# Patient Record
Sex: MALE | Race: WHITE | NOT HISPANIC OR LATINO | Employment: OTHER | URBAN - METROPOLITAN AREA
[De-identification: names, ages, dates, MRNs, and addresses within clinical notes are randomized per-mention and may not be internally consistent; named-entity substitution may affect disease eponyms.]

---

## 2017-01-11 ENCOUNTER — OFFICE VISIT (OUTPATIENT)
Dept: FAMILY MEDICINE | Facility: CLINIC | Age: 37
End: 2017-01-11
Payer: MEDICARE

## 2017-01-11 VITALS
SYSTOLIC BLOOD PRESSURE: 124 MMHG | DIASTOLIC BLOOD PRESSURE: 70 MMHG | HEIGHT: 73 IN | WEIGHT: 166 LBS | OXYGEN SATURATION: 98 % | HEART RATE: 92 BPM | RESPIRATION RATE: 12 BRPM | BODY MASS INDEX: 22 KG/M2

## 2017-01-11 DIAGNOSIS — F17.200 TOBACCO DEPENDENCE: ICD-10-CM

## 2017-01-11 DIAGNOSIS — H10.13 ALLERGIC CONJUNCTIVITIS, BILATERAL: ICD-10-CM

## 2017-01-11 DIAGNOSIS — F41.9 ANXIETY: Primary | ICD-10-CM

## 2017-01-11 PROCEDURE — 99213 OFFICE O/P EST LOW 20 MIN: CPT | Mod: S$PBB,,, | Performed by: FAMILY MEDICINE

## 2017-01-11 PROCEDURE — 99213 OFFICE O/P EST LOW 20 MIN: CPT | Mod: PBBFAC,PO | Performed by: FAMILY MEDICINE

## 2017-01-11 PROCEDURE — 99999 PR PBB SHADOW E&M-EST. PATIENT-LVL III: CPT | Mod: PBBFAC,,, | Performed by: FAMILY MEDICINE

## 2017-01-11 RX ORDER — SODIUM FLUORIDE/POTASSIUM NIT 1.1 %-5 %
PASTE (ML) DENTAL
Refills: 6 | COMMUNITY
Start: 2016-12-27 | End: 2018-03-05

## 2017-01-11 RX ORDER — HYDROCODONE BITARTRATE AND ACETAMINOPHEN 10; 325 MG/1; MG/1
TABLET ORAL
Refills: 0 | COMMUNITY
Start: 2016-12-20 | End: 2017-04-12 | Stop reason: SDUPTHER

## 2017-01-11 RX ORDER — ALPRAZOLAM 2 MG/1
2 TABLET ORAL 2 TIMES DAILY PRN
Qty: 60 TABLET | Refills: 2 | Status: SHIPPED | OUTPATIENT
Start: 2017-01-11 | End: 2017-04-09 | Stop reason: SDUPTHER

## 2017-01-11 NOTE — PROGRESS NOTES
Subjective:       Patient ID: Umang Greer is a 36 y.o. male.    Chief Complaint: Anxiety (3 month follow up)    HPI Comments: Here for f/u anxiety.  Was at ER 3 days ago for eye swelling which was thought to be from allergies.  Anxiety stable.    Anxiety   Presents for follow-up visit. Patient reports no chest pain, decreased concentration, palpitations or shortness of breath. Symptoms occur most days. The quality of sleep is fair.         Review of Systems   Constitutional: Negative for chills and fever.   Eyes: Positive for itching.   Respiratory: Negative for cough, chest tightness and shortness of breath.    Cardiovascular: Negative for chest pain, palpitations and leg swelling.   Gastrointestinal: Negative for abdominal distention and abdominal pain.   Endocrine: Negative for cold intolerance and heat intolerance.   Skin: Negative for rash and wound.   Psychiatric/Behavioral: Negative for decreased concentration and dysphoric mood.       Objective:      Physical Exam   Constitutional: He appears well-developed and well-nourished.   HENT:   Head: Normocephalic and atraumatic.   Cardiovascular: Normal rate, regular rhythm and normal heart sounds.    Pulmonary/Chest: Effort normal and breath sounds normal.   Abdominal: Soft. Bowel sounds are normal.   Psychiatric: He has a normal mood and affect.   Nursing note and vitals reviewed.      Assessment:       1. Anxiety    2. Allergic conjunctivitis, bilateral    3. Tobacco dependence        Plan:       Anxiety  -     alprazolam (XANAX) 2 MG Tab; Take 1 tablet (2 mg total) by mouth 2 (two) times daily as needed.  Dispense: 60 tablet; Refill: 2    Allergic conjunctivitis, bilateral  -     Ambulatory referral to Optometry    Tobacco dependence        To optho to make sure it is just allergies.  Will monitor chronic medical issues and continue current plan of care.    Return in about 3 months (around 4/11/2017), or if symptoms worsen or fail to improve.

## 2017-01-11 NOTE — MR AVS SNAPSHOT
[facility]  [address] [facility] [address]  [facility] [address]  Phone: [phone]  Fax: [phone]                  Umang Greer   2017 9:00 AM   Office Visit    Description:  Male : 1980   Provider:  LUPILLO Khan MD   Department:  [facility]           Reason for Visit     Anxiety           Diagnoses this Visit        Comments    Anxiety    -  Primary     Allergic conjunctivitis, bilateral         Tobacco dependence                To Do List           Future Appointments        Provider Department Dept Phone    2017 11:00 AM JOSE MARIA Starks OD [place] - Optometry [phone]    3/10/2017 10:00 AM LAB, [place] [facility] [phone]    3/17/2017 8:00 AM Luis Locke MD [place] - Rheumatology [phone]    2017 8:40 AM LUPILLO Khan MD [facility] [phone]      Goals (5 Years of Data)     None      Follow-Up and Disposition     Return in about 3 months (around 2017), or if symptoms worsen or fail to improve.    Follow-up and Disposition History       These Medications        Disp Refills Start End    alprazolam (XANAX) 2 MG Tab 60 tablet 2 2017     Take 1 tablet (2 mg total) by mouth 2 (two) times daily as needed. - Oral    Pharmacy: [facility] [address] [place] [address] AT [facility] Ph #: [phone]         [facility] On Call     [facility] Nurse Care Line -  Assistance  Registered nurses in the [facility] Center provide clinical advisement, health education, appointment booking, and other advisory services.  Call for this free service at [phone].             Medications           Message regarding Medications     Verify the changes and/or additions to your medication regime listed below are the same as discussed with your clinician today.  If any of these changes or additions are incorrect, please notify your  "healthcare provider.             Verify that the below list of medications is an accurate representation of the medications you are currently taking.  If none reported, the list may be blank. If incorrect, please contact your healthcare provider. Carry this list with you in case of emergency.           Current Medications     alprazolam (XANAX) 2 MG Tab Take 1 tablet (2 mg total) by mouth 2 (two) times daily as needed.    baclofen (LIORESAL) 20 MG tablet Take 20 mg by mouth 4 (four) times daily as needed.    budesonide-formoterol 160-4.5 mcg (SYMBICORT) 160-4.5 mcg/actuation HFAA Inhale 2 puffs into the lungs every 12 (twelve) hours.    carvedilol (COREG) 3.125 MG tablet Take 1 tablet (3.125 mg total) by mouth 2 (two) times daily.    duloxetine (CYMBALTA) 30 MG capsule Take 1 capsule (30 mg total) by mouth once daily. Back pain    golimumab (SIMPONI) 50 mg/0.5 mL PnIj Inject 1 pen into the skin every 30 days.    hydrocodone-acetaminophen 10-325mg (NORCO)  mg Tab TK ONE T PO Q 4 H PRF PAIN    predniSONE (DELTASONE) 5 MG tablet 1  To 2 tab po qam prn pain    PREVIDENT 5000 ENAMEL PROTECT 1.1-5 % Pste BRUSH BID BRUSH AND SPIT OUT EXCESS . DO NOT RINSE    epinephrine (AUVI-Q) 0.3 mg/0.3 mL (1:1,000) AtIn Inject 0.3 mLs (0.3 mg total) into the muscle once.    oxycodone-acetaminophen (PERCOCET)  mg per tablet Take 1 tablet by mouth every 4 (four) hours as needed for Pain.           Clinical Reference Information           Vital Signs - Last Recorded  Most recent update: 1/11/2017  9:16 AM by Jenna Davison MA    BP Pulse Resp Ht Wt SpO2    124/70 (BP Location: Left arm, Patient Position: Sitting, BP Method: Manual) 92 12 6' 1" (1.854 m) 75.3 kg (166 lb 0.1 oz) 98%    BMI                21.9 kg/m2          Blood Pressure          Most Recent Value    BP  124/70      Allergies as of 1/11/2017     Asa [Aspirin]    Azulfidine [Sulfasalazine]    Ibuprofen    Aspirin (Tartrazine Only)    Gabapentin    "   Immunizations Administered on Date of Encounter - 1/11/2017     None      Orders Placed During Today's Visit      Normal Orders This Visit    Ambulatory referral to Optometry       Smoking Cessation     If you would like to quit smoking:   You may be eligible for free services if you are a [place] resident and started smoking cigarettes before September 1, 1988.  Call the Smoking Cessation Trust ([employer]) toll free at (655) 667-3136 or [phone] 000-6017.   Call 3-659-QUIT-NOW if you do not meet the above criteria.

## 2017-01-18 ENCOUNTER — OFFICE VISIT (OUTPATIENT)
Dept: OPTOMETRY | Facility: CLINIC | Age: 37
End: 2017-01-18
Payer: MEDICARE

## 2017-01-18 DIAGNOSIS — H10.13 ALLERGIC CONJUNCTIVITIS, BILATERAL: Primary | ICD-10-CM

## 2017-01-18 DIAGNOSIS — Z13.5 GLAUCOMA SCREENING: ICD-10-CM

## 2017-01-18 DIAGNOSIS — H43.393 VITREOUS FLOATERS, BILATERAL: ICD-10-CM

## 2017-01-18 PROCEDURE — 99213 OFFICE O/P EST LOW 20 MIN: CPT | Mod: PBBFAC,PO | Performed by: OPTOMETRIST

## 2017-01-18 PROCEDURE — 92004 COMPRE OPH EXAM NEW PT 1/>: CPT | Mod: S$PBB,,, | Performed by: OPTOMETRIST

## 2017-01-18 PROCEDURE — 99999 PR PBB SHADOW E&M-EST. PATIENT-LVL III: CPT | Mod: PBBFAC,,, | Performed by: OPTOMETRIST

## 2017-01-18 NOTE — MR AVS SNAPSHOT
[place] [facility]  [address]  [facility] [address]  Phone: [phone]  Fax: [phone]                  Umang Greer   2017 11:00 AM   Office Visit    Description:  Male : 1980   Provider:  JOSE MARIA Starks, OD   Department:  [place] - Optometry           Reason for Visit     Concerns About Ocular Health     Itchy Eye           Diagnoses this Visit        Comments    Allergic conjunctivitis, bilateral    -  Primary     Vitreous floaters, bilateral         Glaucoma screening                To Do List           Future Appointments        Provider Department Dept Phone    3/10/2017 10:00 AM LAB, [place] [facility] [phone]    3/17/2017 8:00 AM Luis Locke MD [facility] Rheumatology [phone]    2017 8:40 AM LUPILLO Khan MD [facility] Family Medicine [phone]      Goals (5 Years of Data)     None      Follow-Up and Disposition     Return in about 1 year (around 2018) for Annual Eye Exam.      [facility]     [facility] Care [facility] - 24/7 Assistance  Registered nurses in the [facility] provide clinical advisement, health education, appointment booking, and other advisory services.  Call for this free service at [phone].             Medications           Message regarding Medications     Verify the changes and/or additions to your medication regime listed below are the same as discussed with your clinician today.  If any of these changes or additions are incorrect, please notify your healthcare provider.        STOP taking these medications     oxycodone-acetaminophen (PERCOCET)  mg per tablet Take 1 tablet by mouth every 4 (four) hours as needed for Pain.           Verify that the below list of medications is an accurate representation of the medications you are currently taking.  If none reported, the list may be blank. If incorrect, please contact your healthcare provider. Carry this  "list with you in case of emergency.           Current Medications     alprazolam (XANAX) 2 MG Tab Take 1 tablet (2 mg total) by mouth 2 (two) times daily as needed.    baclofen (LIORESAL) 20 MG tablet Take 20 mg by mouth 4 (four) times daily as needed.    budesonide-formoterol 160-4.5 mcg (SYMBICORT) 160-4.5 mcg/actuation HFAA Inhale 2 puffs into the lungs every 12 (twelve) hours.    carvedilol (COREG) 3.125 MG tablet Take 1 tablet (3.125 mg total) by mouth 2 (two) times daily.    duloxetine (CYMBALTA) 30 MG capsule Take 1 capsule (30 mg total) by mouth once daily. Back pain    epinephrine (AUVI-Q) 0.3 mg/0.3 mL (1:1,000) AtIn Inject 0.3 mLs (0.3 mg total) into the muscle once.    golimumab (SIMPONI) 50 mg/0.5 mL PnIj Inject 1 pen into the skin every 30 days.    hydrocodone-acetaminophen 10-325mg (NORCO)  mg Tab TK ONE T PO Q 4 H PRF PAIN    predniSONE (DELTASONE) 5 MG tablet 1  To 2 tab po qam prn pain    PREVIDENT 5000 ENAMEL PROTECT 1.1-5 % Pste BRUSH BID BRUSH AND SPIT OUT EXCESS . DO NOT RINSE           Clinical Reference Information           Allergies as of 1/18/2017     Asa [Aspirin]    Azulfidine [Sulfasalazine]    Ibuprofen    Aspirin (Tartrazine Only)    Gabapentin      Immunizations Administered on Date of Encounter - 1/18/2017     None      Instructions    FLASHES / FLOATERS / POSTERIOR VITREOUS DETACHMENT    Call the clinic if you have any further changes in symptoms.  Including:  Increased numbers of floaters or flashing lights, dimness or darkness that moves through or stays constant in your vision, or any pain in the eye (s).            DRY EYES:  Use Over The Counter artificial tears as needed for dry eye symptoms.  Some common brands include:  Systane, Optive, and Refresh.  These drops can be used as frequently as desired, but may be most helpful use during long periods of concentrated work.  For example, reading / working at the computer.  Avoid drops that "get redness out", as these " contain medication that may further irritate the eyes.    ALLERGY EYES / SYMPTOMS:    Over the counter medications include--Zaditor and Alaway  Use as directed 1-2 drops daily for symptoms of itching / watering eyes.  These drops will not help for dry eye or exposure symptoms.           Smoking Cessation     If you would like to quit smoking:   You may be eligible for free services if you are a [place] resident and started smoking cigarettes before September 1, 1988.  Call the Smoking Cessation Trust ([facility]) toll free at (218) 235-3473 or [phone] 119-5177.   Call 1-742-QUIT-NOW if you do not meet the above criteria.

## 2017-01-18 NOTE — PROGRESS NOTES
HPI     DLE: x 10+ yrs    Pt states last Sunday eyes became puffy and very tender to the touch.   Thinks it may be allergies. + sometimes tear also and eyes have been red   for a couple of weeks. Has been on allergy meds. Was seen by Dr Payne   and was told to have eyes checked.       Last edited by Cheryle Quintana on 1/18/2017 10:57 AM.         Assessment /Plan     For exam results, see Encounter Report.    Allergic conjunctivitis, bilateral    Vitreous floaters, bilateral    Glaucoma screening      1. Acute allergic / possible atopic response OU ---now resolving per history and appearance  Gave suggestions for ATs and otc zaditor or alaway--use prn for s/s  2. Mild OU, RD precautions given  3. Not suspect  No refraction charges / no specs rx  Discussed and educated patient on current findings /plan.  RTC 1 year, prn if any changes / issues

## 2017-01-18 NOTE — LETTER
January 18, 2017      LUPILLO Khan MD  [address] [facility] [address] [place] [address]           [place] [facility]  [address] [place] [address]  Phone: [phone]  Fax: [phone]          Patient: Umang Greer   MR Number: [id number]   YOB: 1980   Date of Visit: 1/18/2017       Dear Dr. LUPILLO Khan:    Thank you for referring Umang Greer to me for evaluation. Attached you will find relevant portions of my assessment and plan of care.    If you have questions, please do not hesitate to call me. I look forward to following Umang Greer along with you.    Sincerely,    JOSE MARIA Starks, OD    Enclosure  CC:  No Recipients    If you would like to receive this communication electronically, please contact [email or web address] or [phone] to request more information on "[employer] Link access.    For providers and/or their staff who would like to refer a patient to [facility], please contact us through our one-stop-shop provider referral line, [facility] [place], at [phone].    If you feel you have received this communication in error or would no longer like to receive these types of communications, please e-mail [email or web address]

## 2017-03-10 ENCOUNTER — LAB VISIT (OUTPATIENT)
Dept: LAB | Facility: HOSPITAL | Age: 37
End: 2017-03-10
Attending: INTERNAL MEDICINE
Payer: MEDICARE

## 2017-03-10 DIAGNOSIS — M79.2 NEUROPATHIC PAIN, LEG, LEFT: ICD-10-CM

## 2017-03-10 DIAGNOSIS — M45.9 ANKYLOSING SPONDYLITIS: ICD-10-CM

## 2017-03-10 DIAGNOSIS — M47.817 LUMBAR AND SACRAL ARTHRITIS: ICD-10-CM

## 2017-03-10 DIAGNOSIS — M16.0 OSTEOARTHRITIS OF BOTH HIPS, UNSPECIFIED OSTEOARTHRITIS TYPE: ICD-10-CM

## 2017-03-10 LAB
ALBUMIN SERPL BCP-MCNC: 3.9 G/DL
ALP SERPL-CCNC: 104 U/L
ALT SERPL W/O P-5'-P-CCNC: 14 U/L
ANION GAP SERPL CALC-SCNC: 8 MMOL/L
AST SERPL-CCNC: 16 U/L
BASOPHILS # BLD AUTO: 0.03 K/UL
BASOPHILS NFR BLD: 0.2 %
BILIRUB SERPL-MCNC: 0.8 MG/DL
BUN SERPL-MCNC: 15 MG/DL
CALCIUM SERPL-MCNC: 9.5 MG/DL
CHLORIDE SERPL-SCNC: 104 MMOL/L
CO2 SERPL-SCNC: 30 MMOL/L
CREAT SERPL-MCNC: 0.9 MG/DL
CRP SERPL-MCNC: 17.8 MG/L
DIFFERENTIAL METHOD: ABNORMAL
EOSINOPHIL # BLD AUTO: 0.1 K/UL
EOSINOPHIL NFR BLD: 0.9 %
ERYTHROCYTE [DISTWIDTH] IN BLOOD BY AUTOMATED COUNT: 12.9 %
ERYTHROCYTE [SEDIMENTATION RATE] IN BLOOD BY WESTERGREN METHOD: 3 MM/HR
EST. GFR  (AFRICAN AMERICAN): >60 ML/MIN/1.73 M^2
EST. GFR  (NON AFRICAN AMERICAN): >60 ML/MIN/1.73 M^2
GLUCOSE SERPL-MCNC: 79 MG/DL
HCT VFR BLD AUTO: 41.5 %
HGB BLD-MCNC: 14.7 G/DL
LYMPHOCYTES # BLD AUTO: 3.8 K/UL
LYMPHOCYTES NFR BLD: 30.1 %
MCH RBC QN AUTO: 30.1 PG
MCHC RBC AUTO-ENTMCNC: 35.4 %
MCV RBC AUTO: 85 FL
MONOCYTES # BLD AUTO: 0.9 K/UL
MONOCYTES NFR BLD: 7.2 %
NEUTROPHILS # BLD AUTO: 7.8 K/UL
NEUTROPHILS NFR BLD: 61.6 %
PLATELET # BLD AUTO: 279 K/UL
PMV BLD AUTO: 9.8 FL
POTASSIUM SERPL-SCNC: 3.8 MMOL/L
PROT SERPL-MCNC: 7.6 G/DL
RBC # BLD AUTO: 4.88 M/UL
SODIUM SERPL-SCNC: 142 MMOL/L
WBC # BLD AUTO: 12.74 K/UL

## 2017-03-10 PROCEDURE — 85651 RBC SED RATE NONAUTOMATED: CPT | Mod: PO

## 2017-03-10 PROCEDURE — 36415 COLL VENOUS BLD VENIPUNCTURE: CPT | Mod: PO

## 2017-03-10 PROCEDURE — 86140 C-REACTIVE PROTEIN: CPT

## 2017-03-10 PROCEDURE — 80053 COMPREHEN METABOLIC PANEL: CPT

## 2017-03-10 PROCEDURE — 85025 COMPLETE CBC W/AUTO DIFF WBC: CPT

## 2017-03-13 ENCOUNTER — TELEPHONE (OUTPATIENT)
Dept: RHEUMATOLOGY | Facility: CLINIC | Age: 37
End: 2017-03-13

## 2017-03-13 NOTE — TELEPHONE ENCOUNTER
Spoke to pt regarding labs and he verbalized understanding. Pt states he doesn't think he has an infection, but was running 103 fever last night, and states he has no fever today and is feeling fine. He did say he has been exposed to the flu bc his dad and daughter both have or had it. He also states that his dermatologist prescribed him dicyclomine  And he is currently taking that right now. Please advise.

## 2017-03-13 NOTE — TELEPHONE ENCOUNTER
----- Message from Luis Locke MD sent at 3/12/2017  9:39 PM CDT -----  crp and whit count is elevated ask he has had an infection

## 2017-03-17 ENCOUNTER — OFFICE VISIT (OUTPATIENT)
Dept: RHEUMATOLOGY | Facility: CLINIC | Age: 37
End: 2017-03-17
Payer: MEDICARE

## 2017-03-17 VITALS
WEIGHT: 175.69 LBS | SYSTOLIC BLOOD PRESSURE: 111 MMHG | DIASTOLIC BLOOD PRESSURE: 65 MMHG | BODY MASS INDEX: 23.18 KG/M2 | HEART RATE: 88 BPM

## 2017-03-17 DIAGNOSIS — M45.9 ANKYLOSING SPONDYLITIS: ICD-10-CM

## 2017-03-17 DIAGNOSIS — M46.46 LUMBAR DISCITIS: ICD-10-CM

## 2017-03-17 DIAGNOSIS — R94.6 ABNORMAL RESULTS OF THYROID FUNCTION STUDIES: ICD-10-CM

## 2017-03-17 DIAGNOSIS — J32.9 SINUSITIS, UNSPECIFIED CHRONICITY, UNSPECIFIED LOCATION: Primary | ICD-10-CM

## 2017-03-17 DIAGNOSIS — E55.9 VITAMIN D DEFICIENCY DISEASE: ICD-10-CM

## 2017-03-17 PROCEDURE — 99999 PR PBB SHADOW E&M-EST. PATIENT-LVL III: CPT | Mod: PBBFAC,,, | Performed by: INTERNAL MEDICINE

## 2017-03-17 PROCEDURE — 99214 OFFICE O/P EST MOD 30 MIN: CPT | Mod: 25,S$PBB,, | Performed by: INTERNAL MEDICINE

## 2017-03-17 PROCEDURE — 99213 OFFICE O/P EST LOW 20 MIN: CPT | Mod: PBBFAC,PO,25 | Performed by: INTERNAL MEDICINE

## 2017-03-17 PROCEDURE — 96372 THER/PROPH/DIAG INJ SC/IM: CPT | Mod: PBBFAC,PO

## 2017-03-17 RX ORDER — AMMONIUM LACTATE 12 G/100G
LOTION TOPICAL
Qty: 225 G | Refills: 0 | Status: SHIPPED | OUTPATIENT
Start: 2017-03-17 | End: 2019-05-03

## 2017-03-17 RX ORDER — AMOXICILLIN AND CLAVULANATE POTASSIUM 875; 125 MG/1; MG/1
1 TABLET, FILM COATED ORAL 2 TIMES DAILY
Qty: 20 TABLET | Refills: 0 | Status: SHIPPED | OUTPATIENT
Start: 2017-03-17 | End: 2017-03-27

## 2017-03-17 RX ORDER — DOXYCYCLINE HYCLATE 100 MG
100 TABLET ORAL DAILY
Refills: 2 | COMMUNITY
Start: 2017-03-02 | End: 2018-02-02

## 2017-03-17 RX ORDER — CEFTRIAXONE 1 G/1
1 INJECTION, POWDER, FOR SOLUTION INTRAMUSCULAR; INTRAVENOUS
Status: COMPLETED | OUTPATIENT
Start: 2017-03-17 | End: 2017-03-17

## 2017-03-17 RX ADMIN — CEFTRIAXONE 1 G: 1 INJECTION, POWDER, FOR SOLUTION INTRAMUSCULAR; INTRAVENOUS at 08:03

## 2017-03-17 ASSESSMENT — ROUTINE ASSESSMENT OF PATIENT INDEX DATA (RAPID3)
TOTAL RAPID3 SCORE: 5.11
FATIGUE SCORE: 2
PAIN SCORE: 9
AM STIFFNESS SCORE: 1, YES
PSYCHOLOGICAL DISTRESS SCORE: 2.2
WHEN YOU AWAKENED IN THE MORNING OVER THE LAST WEEK, PLEASE INDICATE THE AMOUNT OF TIME IT TAKES UNTIL YOU ARE AS LIMBER AS YOU WILL BE FOR THE DAY: 30 MINUTES AFTER WAKING
MDHAQ FUNCTION SCORE: 1
PATIENT GLOBAL ASSESSMENT SCORE: 3

## 2017-03-17 NOTE — PROGRESS NOTES
Administered 1 gram of rocephin mixed with 2.1 ml of lidocaine. Given in the right upper outer gluteal. Informed of s/s to report verbalized understanding. No adverse reactions noted.    Lot # [id number]  Expiration sep 2018

## 2017-03-17 NOTE — PROGRESS NOTES
Subjective:       Patient ID: Umang Greer is a 36 y.o. male.    Chief Complaint: Follow-up    HPI Comments:  Follow up: on simponi finally improved but in pain mid back. And lower back pain. Patient complains of arthralgias and myalgias for which has been present for a few years. Pain is located in multiple joints, both shoulder(s), both elbow(s), both wrist(s), both MCP(s): 1st, 2nd, 3rd, 4th and 5th, both PIP(s): 1st, 2nd, 3rd, 4th and 5th, both DIP(s): 1st and 2nd, both hip(s), both knee(s) and both MTP(s): 1st, 2nd, 3rd, 4th and 5th, is described as aching, pulsating, shooting and throbbing, and is constant, moderate .                The disease course has been worsening.     He complains of joint swelling.         Review of Systems   Constitutional: Positive for activity change. Negative for appetite change and unexpected weight change.   HENT: Positive for sinus pressure. Negative for ear pain, facial swelling, rhinorrhea, sneezing and tinnitus.    Eyes: Negative for pain, discharge, redness, itching and visual disturbance.   Respiratory: Negative for apnea, chest tightness, shortness of breath and wheezing.    Cardiovascular: Negative for palpitations and leg swelling.   Gastrointestinal: Negative for constipation and diarrhea.   Endocrine: Negative for cold intolerance, heat intolerance, polydipsia and polyuria.   Genitourinary: Negative for decreased urine volume, difficulty urinating, flank pain, frequency, hematuria and urgency.   Musculoskeletal: Positive for back pain, joint swelling and neck stiffness. Negative for gait problem.   Skin: Negative for pallor and wound.   Allergic/Immunologic: Negative for immunocompromised state.   Neurological: Negative for dizziness, tremors, seizures and syncope.   Hematological: Negative for adenopathy. Does not bruise/bleed easily.   Psychiatric/Behavioral: Negative for sleep disturbance and suicidal ideas. The patient is not nervous/anxious.           Objective:     /65 (BP Location: Left arm, Patient Position: Sitting, BP Method: Automatic)  Pulse 88  Wt 79.7 kg (175 lb 11.3 oz)  BMI 23.18 kg/m2     Physical Exam   Vitals reviewed.  Constitutional: He is oriented to person, place, and time. He appears distressed.   HENT:   Head: Normocephalic and atraumatic.   Mouth/Throat: Oropharynx is clear and moist.   Eyes: EOM are normal. Pupils are equal, round, and reactive to light.   Neck: Neck supple. No thyromegaly present.   Cardiovascular: Normal rate, regular rhythm and normal heart sounds.  Exam reveals no gallop and no friction rub.    No murmur heard.  Pulmonary/Chest: Breath sounds normal. He has no wheezes. He has no rales. He exhibits no tenderness.   Abdominal: There is no tenderness. There is no rebound and no guarding.       Right Side Rheumatological Exam     Examination finds the 2nd MCP, 3rd MCP, 4th MCP and 5th MCP normal.    The patient is tender to palpation of the shoulder, elbow, wrist, knee, 1st PIP, 1st MCP, 2nd PIP, 2nd MCP, 3rd PIP, 3rd MCP, 4th PIP, 4th MCP and 5th PIP    He has swelling of the elbow, wrist, knee, 1st PIP, 1st MCP, 2nd PIP, 2nd MCP, 3rd PIP, 3rd MCP, 4th PIP, 4th MCP, 5th PIP and 5th MCP    The patient has an enlarged wrist, knee, 1st PIP, 2nd PIP, 3rd PIP, 4th PIP and 5th PIP    Shoulder Exam   Tenderness Location: no tenderness    Range of Motion   Active Abduction: abnormal   Adduction: abnormal  Sensation: normal    Knee Exam   Tenderness Location: medial joint line and LCL    Range of Motion   Extension: normal   Flexion: normal   Patellofemoral Crepitus: positive  Effusion: positive  Sensation: normal    Hip Exam   Tenderness Location: posterior and anterior    Range of Motion   Extension: abnormal   Flexion: abnormal   Sensation: normal    Elbow/Wrist Exam   Tenderness Location: no tenderness    Range of Motion   Elbow   Flexion: normal   Sensation: normal    Muscle Strength (0-5 scale):  Neck Flexion:   3  Neck Extension: 3  Deltoid:  3  Biceps: 3/5   Triceps:  3  Quadriceps:  3   Distal Lower Extremity: 3    Left Side Rheumatological Exam     Examination finds the elbow, wrist, 1st MCP, 2nd MCP, 3rd MCP, 4th MCP and 5th MCP normal.    The patient is tender to palpation of the shoulder, elbow, wrist, knee, 1st PIP, 1st MCP, 2nd PIP, 2nd MCP, 3rd PIP, 3rd MCP, 4th PIP, 4th MCP, 5th PIP and 5th MCP.    He has swelling of the wrist, knee, 1st PIP, 1st MCP, 2nd PIP, 2nd MCP, 3rd PIP, 3rd MCP, 4th PIP, 4th MCP, 5th PIP and 5th MCP    The patient has an enlarged knee, 1st PIP, 2nd PIP, 3rd PIP, 4th PIP and 5th PIP.    Shoulder Exam   Tenderness Location: no tenderness    Range of Motion   Active Abduction: abnormal   Extension: abnormal   Sensation: normal    Knee Exam   Tenderness Location: lateral joint line and medial joint line    Range of Motion   Extension: normal   Flexion: normal     Patellofemoral Crepitus: positive  Effusion: positive  Sensation: normal    Hip Exam   Tenderness Location: posterior and anterior    Range of Motion   Extension: abnormal   Flexion: abnormal   Sensation: normal    Elbow/Wrist Exam     Range of Motion   Elbow   Flexion: normal   Sensation: normal    Muscle Strength (0-5 scale):  Neck Flexion:  3  Neck Extension: 3  Deltoid:  3  Biceps: 3/5   Triceps:  3  Quadriceps:  3   Distal Lower Extremity: 3      Back/Neck Exam   Tenderness Right paramedian tenderness of the Upper C-Spine, Lower L-Spine, SI Joint, Lower T-Spine and Upper L-Spine.Left paramedian tenderness of the Upper C-Spine, Lower L-Spine, Lower C-Spine, SI Joint and Upper L-Spine.    Back Range of Motion   Extension: abnormal  Flexion: abnormal  Lateral Bend Right: abnormal  Lateral Bend Left: abnormal  Rotation Right: abnormal  Rotation Left: abnormal    Neck Range of Motion   Flexion: Limited  Extension: Limited  Right Lateral Bend: abnormal  Left Lateral Bend: abnormal  Right Rotation: abnormal  Left Rotation:  abnormal  Lymphadenopathy:     He has no cervical adenopathy.   Neurological: He is alert and oriented to person, place, and time. He displays weakness. He exhibits abnormal muscle tone.   Reflex Scores:       Patellar reflexes are 2+ on the right side and 2+ on the left side.  Skin: No rash noted. No erythema. No pallor.     Psychiatric: Mood and affect normal.   Musculoskeletal: He exhibits edema, tenderness and deformity.   Overall improved             Results for orders placed or performed in visit on 03/10/17   CBC auto differential   Result Value Ref Range    WBC 12.74 (H) 3.90 - 12.70 K/uL    RBC 4.88 4.60 - 6.20 M/uL    Hemoglobin 14.7 14.0 - 18.0 g/dL    Hematocrit 41.5 40.0 - 54.0 %    MCV 85 82 - 98 fL    MCH 30.1 27.0 - 31.0 pg    MCHC 35.4 32.0 - 36.0 %    RDW 12.9 11.5 - 14.5 %    Platelets 279 150 - 350 K/uL    MPV 9.8 9.2 - 12.9 fL    Gran # 7.8 (H) 1.8 - 7.7 K/uL    Lymph # 3.8 1.0 - 4.8 K/uL    Mono # 0.9 0.3 - 1.0 K/uL    Eos # 0.1 0.0 - 0.5 K/uL    Baso # 0.03 0.00 - 0.20 K/uL    Gran% 61.6 38.0 - 73.0 %    Lymph% 30.1 18.0 - 48.0 %    Mono% 7.2 4.0 - 15.0 %    Eosinophil% 0.9 0.0 - 8.0 %    Basophil% 0.2 0.0 - 1.9 %    Differential Method Automated    Comprehensive metabolic panel   Result Value Ref Range    Sodium 142 136 - 145 mmol/L    Potassium 3.8 3.5 - 5.1 mmol/L    Chloride 104 95 - 110 mmol/L    CO2 30 (H) 23 - 29 mmol/L    Glucose 79 70 - 110 mg/dL    BUN, Bld 15 6 - 20 mg/dL    Creatinine 0.9 0.5 - 1.4 mg/dL    Calcium 9.5 8.7 - 10.5 mg/dL    Total Protein 7.6 6.0 - 8.4 g/dL    Albumin 3.9 3.5 - 5.2 g/dL    Total Bilirubin 0.8 0.1 - 1.0 mg/dL    Alkaline Phosphatase 104 55 - 135 U/L    AST 16 10 - 40 U/L    ALT 14 10 - 44 U/L    Anion Gap 8 8 - 16 mmol/L    eGFR if African American >60.0 >60 mL/min/1.73 m^2    eGFR if non African American >60.0 >60 mL/min/1.73 m^2   C-reactive protein   Result Value Ref Range    CRP 17.8 (H) 0.0 - 8.2 mg/L   Sedimentation rate, manual   Result Value  Ref Range    Sed Rate 3 0 - 10 mm/Hr       Assessment:     Encounter Diagnoses   Name Primary?    Sinusitis, unspecified chronicity, unspecified location Yes    Ankylosing spondylitis     Vitamin D deficiency disease     Lumbar discitis          Plan:     Sinusitis, unspecified chronicity, unspecified location  -     cefTRIAXone injection 1 g; Inject 1 g into the muscle one time.  -     amoxicillin-clavulanate 875-125mg (AUGMENTIN) 875-125 mg per tablet; Take 1 tablet by mouth 2 (two) times daily.  Dispense: 20 tablet; Refill: 0  -     CBC auto differential; Future; Expected date: 3/17/17  -     Comprehensive metabolic panel; Future; Expected date: 3/17/17  -     C-reactive protein; Future; Expected date: 3/17/17  -     Sedimentation rate, manual; Future; Expected date: 3/17/17    Ankylosing spondylitis  -     cefTRIAXone injection 1 g; Inject 1 g into the muscle one time.  -     amoxicillin-clavulanate 875-125mg (AUGMENTIN) 875-125 mg per tablet; Take 1 tablet by mouth 2 (two) times daily.  Dispense: 20 tablet; Refill: 0  -     CBC auto differential; Future; Expected date: 3/17/17  -     Comprehensive metabolic panel; Future; Expected date: 3/17/17  -     C-reactive protein; Future; Expected date: 3/17/17  -     Sedimentation rate, manual; Future; Expected date: 3/17/17    Vitamin D deficiency disease  -     cefTRIAXone injection 1 g; Inject 1 g into the muscle one time.  -     amoxicillin-clavulanate 875-125mg (AUGMENTIN) 875-125 mg per tablet; Take 1 tablet by mouth 2 (two) times daily.  Dispense: 20 tablet; Refill: 0  -     CBC auto differential; Future; Expected date: 3/17/17  -     Comprehensive metabolic panel; Future; Expected date: 3/17/17  -     C-reactive protein; Future; Expected date: 3/17/17  -     Sedimentation rate, manual; Future; Expected date: 3/17/17    Lumbar discitis  -     cefTRIAXone injection 1 g; Inject 1 g into the muscle one time.  -     amoxicillin-clavulanate 875-125mg (AUGMENTIN)  875-125 mg per tablet; Take 1 tablet by mouth 2 (two) times daily.  Dispense: 20 tablet; Refill: 0  -     CBC auto differential; Future; Expected date: 3/17/17  -     Comprehensive metabolic panel; Future; Expected date: 3/17/17  -     C-reactive protein; Future; Expected date: 3/17/17  -     Sedimentation rate, manual; Future; Expected date: 3/17/17    no change in simponi we will consider cosentyx as a new option

## 2017-03-17 NOTE — MR AVS SNAPSHOT
[facility] Rheumatology  [address]  [facility] [place] [address]  Phone: [phone]  Fax: [phone]                  Umang Greer   3/17/2017 8:00 AM   Office Visit    Description:  Male : 1980   Provider:  Luis Locke MD   Department:  [place] - Rheumatology           Reason for Visit     Follow-up           Diagnoses this Visit        Comments    Sinusitis, unspecified chronicity, unspecified location    -  Primary     Ankylosing spondylitis         Vitamin D deficiency disease         Lumbar discitis         Abnormal results of thyroid function studies                To Do List           Future Appointments        Provider Department Dept Phone    2017 8:40 AM LUPILLO Khan MD [facility] [phone]      Goals (5 Years of Data)     None      Follow-Up and Disposition     Return in about 4 months (around 2017).       These Medications        Disp Refills Start End    amoxicillin-clavulanate 875-125mg (AUGMENTIN) 875-125 mg per tablet 20 tablet 0 3/17/2017 3/27/2017    Take 1 tablet by mouth 2 (two) times daily. - Oral    Pharmacy: [facility] [employer] [address] [place] [address] AT [address] [facility]  [address] Ph #: [phone]       ammonium lactate (LAC-HYDRIN) 12 % lotion 225 g 0 3/17/2017     Apply topically as needed for Dry Skin. - Topical (Top)    Pharmacy: [facility] [place] [id number] AT [address] [facility]  [address] Ph #: [phone]         [facility]     [facility] -  Assistance  Registered nurses in the [facility] provide clinical advisement, health education, appointment booking, and other advisory services.  Call for this free service at [phone].             Medications           Message regarding Medications     Verify the changes and/or additions to your medication regime listed below are the same as discussed with your clinician  today.  If any of these changes or additions are incorrect, please notify your healthcare provider.        START taking these NEW medications        Refills    amoxicillin-clavulanate 875-125mg (AUGMENTIN) 875-125 mg per tablet 0    Sig: Take 1 tablet by mouth 2 (two) times daily.    Class: Normal    Route: Oral    ammonium lactate (LAC-HYDRIN) 12 % lotion 0    Sig: Apply topically as needed for Dry Skin.    Class: Normal    Route: Topical (Top)      These medications were administered today        Dose Freq    cefTRIAXone injection 1 g 1 g Clinic/HOD 1 time    Sig: Inject 1 g into the muscle one time.    Class: Normal    Route: Intramuscular           Verify that the below list of medications is an accurate representation of the medications you are currently taking.  If none reported, the list may be blank. If incorrect, please contact your healthcare provider. Carry this list with you in case of emergency.           Current Medications     alprazolam (XANAX) 2 MG Tab Take 1 tablet (2 mg total) by mouth 2 (two) times daily as needed.    baclofen (LIORESAL) 20 MG tablet Take 20 mg by mouth 4 (four) times daily as needed.    budesonide-formoterol 160-4.5 mcg (SYMBICORT) 160-4.5 mcg/actuation HFAA Inhale 2 puffs into the lungs every 12 (twelve) hours.    carvedilol (COREG) 3.125 MG tablet Take 1 tablet (3.125 mg total) by mouth 2 (two) times daily.    doxycycline (VIBRA-TABS) 100 MG tablet Take 100 mg by mouth once daily.    duloxetine (CYMBALTA) 30 MG capsule Take 1 capsule (30 mg total) by mouth once daily. Back pain    golimumab (SIMPONI) 50 mg/0.5 mL PnIj Inject 1 pen into the skin every 30 days.    hydrocodone-acetaminophen 10-325mg (NORCO)  mg Tab TK ONE T PO Q 4 H PRF PAIN    predniSONE (DELTASONE) 5 MG tablet 1  To 2 tab po qam prn pain    PREVIDENT 5000 ENAMEL PROTECT 1.1-5 % Pste BRUSH BID BRUSH AND SPIT OUT EXCESS . DO NOT RINSE    ammonium lactate (LAC-HYDRIN) 12 % lotion Apply topically as needed  for Dry Skin.    amoxicillin-clavulanate 875-125mg (AUGMENTIN) 875-125 mg per tablet Take 1 tablet by mouth 2 (two) times daily.    epinephrine (AUVI-Q) 0.3 mg/0.3 mL (1:1,000) AtIn Inject 0.3 mLs (0.3 mg total) into the muscle once.           Clinical Reference Information           Your Vitals Were     BP Pulse Weight BMI       111/65 (BP Location: Left arm, Patient Position: Sitting, BP Method: Automatic) 88 79.7 kg (175 lb 11.3 oz) 23.18 kg/m2       Blood Pressure          Most Recent Value    BP  111/65      Allergies as of 3/17/2017     Asa [Aspirin]    Azulfidine [Sulfasalazine]    Ibuprofen    Aspirin (Tartrazine Only)    Gabapentin      Immunizations Administered on Date of Encounter - 3/17/2017     None      Orders Placed During Today's Visit     Future Labs/Procedures Expected by Expires    C-reactive protein  3/17/2017 5/16/2018    CBC auto differential  3/17/2017 5/16/2018    Comprehensive metabolic panel  3/17/2017 5/16/2018    Sedimentation rate, manual  3/17/2017 5/16/2018    TSH  3/17/2017 5/16/2018    Vitamin D  3/17/2017 5/16/2018      Instructions    Hold doxy until augment is complete       Smoking Cessation     If you would like to quit smoking:   You may be eligible for free services if you are a [place] resident and started smoking cigarettes before September 1, 1988.  Call the Smoking Cessation Trust ([facility]) toll free at (973) 763-7053 or [phone]) 448-5255.   Call 1-800-QUIT-NOW if you do not meet the above criteria.            Language Assistance Services     ATTENTION: Language assistance services are available, free of charge. Please call [phone].      ATENCIÓN: Si habla español, tiene a oreilly disposición servicios gratuitos de asistencia lingüística. Llame al [phone].     CHÚ Ý: N?u b?n nói Ti?ng Vi?t, có các d?ch v? h? tr? ngôn ng? mi?n phí dành cho b?n. G?i s? [phone].         [facility] complies with applicable Federal civil rights laws and does not  discriminate on the basis of race, color, national origin, age, disability, or sex.

## 2017-03-27 ENCOUNTER — PATIENT MESSAGE (OUTPATIENT)
Dept: FAMILY MEDICINE | Facility: CLINIC | Age: 37
End: 2017-03-27

## 2017-03-31 ENCOUNTER — OFFICE VISIT (OUTPATIENT)
Dept: FAMILY MEDICINE | Facility: CLINIC | Age: 37
End: 2017-03-31
Payer: MEDICARE

## 2017-03-31 VITALS
HEART RATE: 106 BPM | SYSTOLIC BLOOD PRESSURE: 110 MMHG | BODY MASS INDEX: 21.85 KG/M2 | HEIGHT: 73 IN | DIASTOLIC BLOOD PRESSURE: 76 MMHG | RESPIRATION RATE: 16 BRPM | WEIGHT: 164.88 LBS | OXYGEN SATURATION: 96 % | TEMPERATURE: 99 F

## 2017-03-31 DIAGNOSIS — F41.9 ANXIETY: Primary | ICD-10-CM

## 2017-03-31 PROCEDURE — 99213 OFFICE O/P EST LOW 20 MIN: CPT | Mod: S$PBB,,, | Performed by: FAMILY MEDICINE

## 2017-03-31 PROCEDURE — 99999 PR PBB SHADOW E&M-EST. PATIENT-LVL III: CPT | Mod: PBBFAC,,, | Performed by: FAMILY MEDICINE

## 2017-03-31 PROCEDURE — 99213 OFFICE O/P EST LOW 20 MIN: CPT | Mod: PBBFAC,PO | Performed by: FAMILY MEDICINE

## 2017-03-31 RX ORDER — AMOXICILLIN 875 MG/1
875 TABLET, FILM COATED ORAL EVERY 12 HOURS
Qty: 14 TABLET | Refills: 0 | Status: SHIPPED | OUTPATIENT
Start: 2017-03-31 | End: 2017-04-07

## 2017-03-31 NOTE — MR AVS SNAPSHOT
[facility]  [address] [facility] [address]  [facility] [address]  Phone: [phone]  Fax: [phone]                  Umang Greer   3/31/2017 2:00 PM   Office Visit    Description:  Male : 1980   Provider:  LUPILLO Khan MD   Department:  [facility]           Reason for Visit     Neck Pain           Diagnoses this Visit        Comments    Anxiety    -  Primary            To Do List           Future Appointments        Provider Department Dept Phone    2017 8:40 AM LUPILLO Khan MD [facility] [phone]    2017 8:40 AM LUPILLO Khan MD [facility] [phone]    2017 10:00 AM LAB, [place] [facility] [phone]    2017 8:30 AM Luis Locke MD [facility] [phone]      Goals (5 Years of Data)     None      Follow-Up and Disposition     Return in about 3 months (around 2017), or if symptoms worsen or fail to improve.    Follow-up and Disposition History       These Medications        Disp Refills Start End    amoxicillin (AMOXIL) 875 MG tablet 14 tablet 0 3/31/2017 2017    Take 1 tablet (875 mg total) by mouth every 12 (twelve) hours. - Oral    Pharmacy: [facility] [address] [place] [address] AT [facility]  [address] Ph #: [phone]         [facility] On Call     [facility] On [facility] Nurse Care Line -  Assistance  Unless otherwise directed by your provider, please contact [facility] On-Call, our nurse care line that is available for  assistance.     Registered nurses in the [facility] provide: appointment scheduling, clinical advisement, health education, and other advisory services.  Call: [phone] (toll free)               Medications           Message regarding Medications     Verify the changes and/or additions to your medication regime listed below are the same as discussed with your clinician  today.  If any of these changes or additions are incorrect, please notify your healthcare provider.        START taking these NEW medications        Refills    amoxicillin (AMOXIL) 875 MG tablet 0    Sig: Take 1 tablet (875 mg total) by mouth every 12 (twelve) hours.    Class: Normal    Route: Oral           Verify that the below list of medications is an accurate representation of the medications you are currently taking.  If none reported, the list may be blank. If incorrect, please contact your healthcare provider. Carry this list with you in case of emergency.           Current Medications     alprazolam (XANAX) 2 MG Tab Take 1 tablet (2 mg total) by mouth 2 (two) times daily as needed.    ammonium lactate (LAC-HYDRIN) 12 % lotion Apply topically as needed for Dry Skin.    baclofen (LIORESAL) 20 MG tablet Take 20 mg by mouth 4 (four) times daily as needed.    budesonide-formoterol 160-4.5 mcg (SYMBICORT) 160-4.5 mcg/actuation HFAA Inhale 2 puffs into the lungs every 12 (twelve) hours.    carvedilol (COREG) 3.125 MG tablet Take 1 tablet (3.125 mg total) by mouth 2 (two) times daily.    doxycycline (VIBRA-TABS) 100 MG tablet Take 100 mg by mouth once daily.    duloxetine (CYMBALTA) 30 MG capsule Take 1 capsule (30 mg total) by mouth once daily. Back pain    golimumab (SIMPONI) 50 mg/0.5 mL PnIj Inject 1 pen into the skin every 30 days.    hydrocodone-acetaminophen 10-325mg (NORCO)  mg Tab TK ONE T PO Q 4 H PRF PAIN    predniSONE (DELTASONE) 5 MG tablet 1  To 2 tab po qam prn pain    PREVIDENT 5000 ENAMEL PROTECT 1.1-5 % Pste BRUSH BID BRUSH AND SPIT OUT EXCESS . DO NOT RINSE    amoxicillin (AMOXIL) 875 MG tablet Take 1 tablet (875 mg total) by mouth every 12 (twelve) hours.    epinephrine (AUVI-Q) 0.3 mg/0.3 mL (1:1,000) AtIn Inject 0.3 mLs (0.3 mg total) into the muscle once.           Clinical Reference Information           Your Vitals Were     BP Pulse Temp Resp Height Weight    110/76 106 98.9 °F (37.2  "°C) 16 6' 1" (1.854 m) 74.8 kg (164 lb 14.5 oz)    SpO2 BMI             96% 21.76 kg/m2         Blood Pressure          Most Recent Value    BP  110/76      Allergies as of 3/31/2017     Asa [Aspirin]    Azulfidine [Sulfasalazine]    Ibuprofen    Aspirin (Tartrazine Only)    Gabapentin      Immunizations Administered on Date of Encounter - 3/31/2017     None      Smoking Cessation     If you would like to quit smoking:   You may be eligible for free services if you are a [place] resident and started smoking cigarettes before September 1, 1988.  Call the Smoking Cessation Trust ([facility]) toll free at (770) 124-1370 or (558) 706-6701.   Call 2-251-QUIT-NOW if you do not meet the above criteria.   Contact us via email: [email or web address]   View our website for more information: [email or web address]        Language Assistance Services     ATTENTION: Language assistance services are available, free of charge. Please call [phone].      ATENCIÓN: Si elham streeter, tiene a oreilly disposición servicios gratuitos de asistencia lingüística. Llame al [phone].     [facility] Ý: N?u b?n nói Ti?ng Vi?t, có các d?ch v? h? tr? ngôn ng? mi?n phí dành cho b?n. G?i s? [phone].         [facility] complies with applicable Federal civil rights laws and does not discriminate on the basis of race, color, national origin, age, disability, or sex.        "

## 2017-03-31 NOTE — PROGRESS NOTES
Subjective:       Patient ID: Umang Greer is a 36 y.o. male.    Chief Complaint: Neck Pain    HPI Comments: Here for f/u anxiety.  Got augmentin from rheum for sore throat but caused GI issues.  States doing well overall.    Neck Pain    Pertinent negatives include no chest pain or fever.     Review of Systems   Constitutional: Negative for chills and fever.   Respiratory: Negative for cough, chest tightness and shortness of breath.    Cardiovascular: Negative for chest pain, palpitations and leg swelling.   Endocrine: Negative for cold intolerance and heat intolerance.   Musculoskeletal: Positive for neck pain.   Skin: Negative for rash.       Objective:      Physical Exam   Constitutional: He appears well-developed and well-nourished.   HENT:   Head: Normocephalic and atraumatic.   Cardiovascular: Normal rate, regular rhythm and normal heart sounds.    Pulmonary/Chest: Effort normal and breath sounds normal.   Psychiatric: He has a normal mood and affect.   Nursing note and vitals reviewed.      Assessment:       1. Anxiety        Plan:       Anxiety    Other orders  -     amoxicillin (AMOXIL) 875 MG tablet; Take 1 tablet (875 mg total) by mouth every 12 (twelve) hours.  Dispense: 14 tablet; Refill: 0      Refill prn med as needed.  Trial of new med to finish course.    Return in about 3 months (around 6/30/2017), or if symptoms worsen or fail to improve.

## 2017-04-09 DIAGNOSIS — F41.9 ANXIETY: ICD-10-CM

## 2017-04-10 RX ORDER — ALPRAZOLAM 2 MG/1
TABLET ORAL
Qty: 60 TABLET | Refills: 0 | Status: SHIPPED | OUTPATIENT
Start: 2017-04-10 | End: 2017-05-09 | Stop reason: SDUPTHER

## 2017-04-11 ENCOUNTER — PATIENT MESSAGE (OUTPATIENT)
Dept: FAMILY MEDICINE | Facility: CLINIC | Age: 37
End: 2017-04-11

## 2017-04-11 ENCOUNTER — PATIENT MESSAGE (OUTPATIENT)
Dept: RHEUMATOLOGY | Facility: CLINIC | Age: 37
End: 2017-04-11

## 2017-04-11 DIAGNOSIS — J02.9 SORE THROAT: Primary | ICD-10-CM

## 2017-04-11 RX ORDER — HYDROCODONE BITARTRATE AND ACETAMINOPHEN 10; 325 MG/1; MG/1
TABLET ORAL
Refills: 0 | Status: CANCELLED | OUTPATIENT
Start: 2017-04-11

## 2017-04-11 NOTE — TELEPHONE ENCOUNTER
Pend what he needs; but if dr. villeda is not practicing or wont be able to write ever again we will get him new pain doc

## 2017-04-12 ENCOUNTER — TELEPHONE (OUTPATIENT)
Dept: RHEUMATOLOGY | Facility: CLINIC | Age: 37
End: 2017-04-12

## 2017-04-12 ENCOUNTER — PATIENT MESSAGE (OUTPATIENT)
Dept: RHEUMATOLOGY | Facility: CLINIC | Age: 37
End: 2017-04-12

## 2017-04-12 DIAGNOSIS — M45.9 ANKYLOSING SPONDYLITIS, UNSPECIFIED SITE OF SPINE: Primary | ICD-10-CM

## 2017-04-12 RX ORDER — HYDROCODONE BITARTRATE AND ACETAMINOPHEN 10; 325 MG/1; MG/1
TABLET ORAL
Qty: 120 TABLET | Refills: 0 | Status: SHIPPED | OUTPATIENT
Start: 2017-04-12 | End: 2017-05-12 | Stop reason: SDUPTHER

## 2017-04-13 ENCOUNTER — PATIENT MESSAGE (OUTPATIENT)
Dept: FAMILY MEDICINE | Facility: CLINIC | Age: 37
End: 2017-04-13

## 2017-04-17 ENCOUNTER — PATIENT MESSAGE (OUTPATIENT)
Dept: OTOLARYNGOLOGY | Facility: CLINIC | Age: 37
End: 2017-04-17

## 2017-04-17 ENCOUNTER — TELEPHONE (OUTPATIENT)
Dept: OTOLARYNGOLOGY | Facility: CLINIC | Age: 37
End: 2017-04-17

## 2017-04-17 NOTE — TELEPHONE ENCOUNTER
----- Message from Margo Bonilla sent at 4/17/2017  9:16 AM CDT -----  Contact: Umang  Patient is asking if can be seen earlier than scheduled of 5/4/17. Being referred by Dr Khan. Please call [phone]. Thanks!

## 2017-04-24 ENCOUNTER — PATIENT MESSAGE (OUTPATIENT)
Dept: OTOLARYNGOLOGY | Facility: CLINIC | Age: 37
End: 2017-04-24

## 2017-05-03 ENCOUNTER — OFFICE VISIT (OUTPATIENT)
Dept: OTOLARYNGOLOGY | Facility: CLINIC | Age: 37
End: 2017-05-03
Payer: MEDICARE

## 2017-05-03 VITALS
HEIGHT: 73 IN | DIASTOLIC BLOOD PRESSURE: 83 MMHG | SYSTOLIC BLOOD PRESSURE: 117 MMHG | HEART RATE: 104 BPM | WEIGHT: 170 LBS | BODY MASS INDEX: 22.53 KG/M2

## 2017-05-03 DIAGNOSIS — J35.8 ASYMMETRIC TONSILS: ICD-10-CM

## 2017-05-03 DIAGNOSIS — Z72.0 TOBACCO ABUSE: Primary | ICD-10-CM

## 2017-05-03 PROCEDURE — 99213 OFFICE O/P EST LOW 20 MIN: CPT | Mod: PBBFAC,PO | Performed by: NURSE PRACTITIONER

## 2017-05-03 PROCEDURE — 99999 PR PBB SHADOW E&M-EST. PATIENT-LVL III: CPT | Mod: PBBFAC,,, | Performed by: NURSE PRACTITIONER

## 2017-05-03 PROCEDURE — 99213 OFFICE O/P EST LOW 20 MIN: CPT | Mod: S$PBB,,, | Performed by: NURSE PRACTITIONER

## 2017-05-03 NOTE — LETTER
May 3, 2017      LUPILLO Khan MD  [address] [facility] [address] [place] [address]           [facility]  [address] [facility] [address] [place] [address]  Phone: [phone]  Fax: [phone]          Patient: Umang Greer   MR Number: [id number]   YOB: 1980   Date of Visit: 5/3/2017       Dear Dr. LUPILLO Khan:    Thank you for referring Umang Greer to me for evaluation. Attached you will find relevant portions of my assessment and plan of care.    If you have questions, please do not hesitate to call me. I look forward to following Umang Greer along with you.    Sincerely,    Rosi Regalado, PENNY    Enclosure  CC:  No Recipients    If you would like to receive this communication electronically, please contact [email or web address] or [phone] to request more information on [employer] Link access.    For providers and/or their staff who would like to refer a patient to [facility], please contact us through our one-stop-shop provider referral line, [facility], at [phone].    If you feel you have received this communication in error or would no longer like to receive these types of communications, please e-mail [email or web address]

## 2017-05-03 NOTE — PROGRESS NOTES
Subjective:       Patient ID: Umang Greer is a 36 y.o. male.    Chief Complaint: throat red and Oral Swelling    HPI   Patient is referred by Dr. Khan for throat concerns. H/o anxiety, COPD, tobacco abuse. He quit drinking EtOH 8 years ago when he was diagnosed with cardiomyopathy. He is attempting to wean cigarettes on his own, down to half pack per day.   Patient states sore throat started approx 1.5 months ago. Throat looked red and swollen, somewhat tender, so his rheumatologist gave Rocephin & Augmentin on 3/17/17 for sore throat/sinusitis. Then he received amoxicillin on 3/31/17. He states throat pain resolved, no dysphagia or odynophagia. However he looks in his throat and feels that his right tonsil is slightly more swollen than his left tonsil. He has no symptoms, just wants it checked out.     Review of Systems   Constitutional: Negative.    HENT: Negative.    Eyes: Negative.    Respiratory: Negative.    Cardiovascular: Negative.    Gastrointestinal: Negative.    Musculoskeletal: Negative.    Skin: Negative.    Neurological: Negative.    Hematological: Negative.    Psychiatric/Behavioral: Negative.        Objective:      Physical Exam   Constitutional: He is oriented to person, place, and time. Vital signs are normal. He appears well-developed and well-nourished. He is cooperative. He does not appear ill. No distress.   HENT:   Head: Normocephalic and atraumatic.   Right Ear: Hearing, tympanic membrane, external ear and ear canal normal. Tympanic membrane is not erythematous. No middle ear effusion.   Left Ear: Hearing, tympanic membrane, external ear and ear canal normal. Tympanic membrane is not erythematous.  No middle ear effusion.   Nose: Nose normal. No mucosal edema or rhinorrhea. Right sinus exhibits no maxillary sinus tenderness and no frontal sinus tenderness. Left sinus exhibits no maxillary sinus tenderness and no frontal sinus tenderness.   Mouth/Throat: Uvula is midline, oropharynx  is clear and moist and mucous membranes are normal. Mucous membranes are not pale, not dry and not cyanotic. No oral lesions. No oropharyngeal exudate, posterior oropharyngeal edema or posterior oropharyngeal erythema.   Eyes: Conjunctivae, EOM and lids are normal. Pupils are equal, round, and reactive to light. Right eye exhibits no discharge. Left eye exhibits no discharge. No scleral icterus.   Neck: Trachea normal and normal range of motion. Neck supple. No tracheal deviation present. No thyroid mass and no thyromegaly present.   Cardiovascular: Normal rate.    Pulmonary/Chest: Effort normal. No stridor. No respiratory distress. He has no wheezes.   Musculoskeletal: Normal range of motion.   Lymphadenopathy:        Head (right side): No submental, no submandibular, no tonsillar, no preauricular and no posterior auricular adenopathy present.        Head (left side): No submental, no submandibular, no tonsillar, no preauricular and no posterior auricular adenopathy present.     He has no cervical adenopathy.        Right cervical: No superficial cervical and no posterior cervical adenopathy present.       Left cervical: No superficial cervical and no posterior cervical adenopathy present.   Neurological: He is alert and oriented to person, place, and time. He has normal strength. Coordination and gait normal.   Skin: Skin is warm, dry and intact. No lesion and no rash noted. He is not diaphoretic. No cyanosis. No pallor.   Psychiatric: He has a normal mood and affect. His speech is normal and behavior is normal. Judgment and thought content normal. Cognition and memory are normal.   Nursing note and vitals reviewed.        Assessment:     Tonsils fairly symmetrically sized    Bilateral diffuse erythema most likely secondary to tobacco smoke  Plan:     Counseled to stop smoking.   Consideration of CT neck if concerns persist -- patient encouraged to call.     No further antibiotics unless exduate, fever,  lymphadenopathy. Patient to call if further symptoms develop.

## 2017-05-09 ENCOUNTER — PATIENT MESSAGE (OUTPATIENT)
Dept: FAMILY MEDICINE | Facility: CLINIC | Age: 37
End: 2017-05-09

## 2017-05-09 DIAGNOSIS — F41.9 ANXIETY: ICD-10-CM

## 2017-05-10 ENCOUNTER — PATIENT MESSAGE (OUTPATIENT)
Dept: FAMILY MEDICINE | Facility: CLINIC | Age: 37
End: 2017-05-10

## 2017-05-11 ENCOUNTER — TELEPHONE (OUTPATIENT)
Dept: FAMILY MEDICINE | Facility: CLINIC | Age: 37
End: 2017-05-11

## 2017-05-11 ENCOUNTER — PATIENT MESSAGE (OUTPATIENT)
Dept: FAMILY MEDICINE | Facility: CLINIC | Age: 37
End: 2017-05-11

## 2017-05-11 RX ORDER — ALPRAZOLAM 2 MG/1
TABLET ORAL
Qty: 60 TABLET | Refills: 0 | Status: SHIPPED | OUTPATIENT
Start: 2017-05-11 | End: 2017-06-08 | Stop reason: SDUPTHER

## 2017-05-11 RX ORDER — ALPRAZOLAM 2 MG/1
TABLET ORAL
Qty: 60 TABLET | Refills: 0 | OUTPATIENT
Start: 2017-05-11

## 2017-05-11 NOTE — TELEPHONE ENCOUNTER
Spoke to pt and notified him that medication refill request was sent and is waiting on his signature.

## 2017-05-11 NOTE — TELEPHONE ENCOUNTER
----- Message from Dianne Kumar sent at 5/11/2017  9:26 AM CDT -----  Patient would like to speak to office concerning his medication. He did not want to say more than that. Please call back at [phone].

## 2017-05-12 ENCOUNTER — PATIENT MESSAGE (OUTPATIENT)
Dept: RHEUMATOLOGY | Facility: CLINIC | Age: 37
End: 2017-05-12

## 2017-05-12 DIAGNOSIS — M45.9 ANKYLOSING SPONDYLITIS, UNSPECIFIED SITE OF SPINE: ICD-10-CM

## 2017-05-15 RX ORDER — HYDROCODONE BITARTRATE AND ACETAMINOPHEN 10; 325 MG/1; MG/1
TABLET ORAL
Qty: 120 TABLET | Refills: 0 | Status: SHIPPED | OUTPATIENT
Start: 2017-05-15 | End: 2017-06-15 | Stop reason: SDUPTHER

## 2017-06-08 DIAGNOSIS — F41.9 ANXIETY: ICD-10-CM

## 2017-06-09 RX ORDER — ALPRAZOLAM 2 MG/1
2 TABLET ORAL 2 TIMES DAILY PRN
Qty: 60 TABLET | Refills: 0 | Status: SHIPPED | OUTPATIENT
Start: 2017-06-09 | End: 2017-07-05 | Stop reason: SDUPTHER

## 2017-06-14 ENCOUNTER — PATIENT MESSAGE (OUTPATIENT)
Dept: RHEUMATOLOGY | Facility: CLINIC | Age: 37
End: 2017-06-14

## 2017-06-15 DIAGNOSIS — M45.9 ANKYLOSING SPONDYLITIS, UNSPECIFIED SITE OF SPINE: ICD-10-CM

## 2017-06-15 RX ORDER — HYDROCODONE BITARTRATE AND ACETAMINOPHEN 10; 325 MG/1; MG/1
TABLET ORAL
Qty: 120 TABLET | Refills: 0 | Status: SHIPPED | OUTPATIENT
Start: 2017-06-15 | End: 2017-07-14 | Stop reason: SDUPTHER

## 2017-07-05 ENCOUNTER — PATIENT MESSAGE (OUTPATIENT)
Dept: FAMILY MEDICINE | Facility: CLINIC | Age: 37
End: 2017-07-05

## 2017-07-05 DIAGNOSIS — F41.9 ANXIETY: ICD-10-CM

## 2017-07-05 RX ORDER — ALPRAZOLAM 2 MG/1
2 TABLET ORAL 2 TIMES DAILY PRN
Qty: 60 TABLET | Refills: 0 | Status: SHIPPED | OUTPATIENT
Start: 2017-07-05 | End: 2017-08-07 | Stop reason: SDUPTHER

## 2017-07-05 RX ORDER — ALPRAZOLAM 2 MG/1
2 TABLET ORAL 2 TIMES DAILY PRN
Qty: 60 TABLET | Refills: 0 | OUTPATIENT
Start: 2017-07-05

## 2017-07-14 DIAGNOSIS — M45.9 ANKYLOSING SPONDYLITIS, UNSPECIFIED SITE OF SPINE: ICD-10-CM

## 2017-07-14 RX ORDER — HYDROCODONE BITARTRATE AND ACETAMINOPHEN 10; 325 MG/1; MG/1
TABLET ORAL
Qty: 120 TABLET | Refills: 0 | Status: SHIPPED | OUTPATIENT
Start: 2017-07-14 | End: 2017-08-14 | Stop reason: SDUPTHER

## 2017-08-01 ENCOUNTER — LAB VISIT (OUTPATIENT)
Dept: LAB | Facility: HOSPITAL | Age: 37
End: 2017-08-01
Attending: INTERNAL MEDICINE
Payer: MEDICARE

## 2017-08-01 DIAGNOSIS — E55.9 VITAMIN D DEFICIENCY DISEASE: ICD-10-CM

## 2017-08-01 DIAGNOSIS — M45.9 ANKYLOSING SPONDYLITIS: ICD-10-CM

## 2017-08-01 DIAGNOSIS — J32.9 SINUSITIS, UNSPECIFIED CHRONICITY, UNSPECIFIED LOCATION: ICD-10-CM

## 2017-08-01 DIAGNOSIS — M46.46 LUMBAR DISCITIS: ICD-10-CM

## 2017-08-01 DIAGNOSIS — R94.6 ABNORMAL RESULTS OF THYROID FUNCTION STUDIES: ICD-10-CM

## 2017-08-01 LAB
25(OH)D3+25(OH)D2 SERPL-MCNC: 106 NG/ML
ALBUMIN SERPL BCP-MCNC: 3.6 G/DL
ALP SERPL-CCNC: 99 U/L
ALT SERPL W/O P-5'-P-CCNC: 10 U/L
ANION GAP SERPL CALC-SCNC: 11 MMOL/L
AST SERPL-CCNC: 17 U/L
BASOPHILS # BLD AUTO: 0.04 K/UL
BASOPHILS NFR BLD: 0.6 %
BILIRUB SERPL-MCNC: 0.4 MG/DL
BUN SERPL-MCNC: 11 MG/DL
CALCIUM SERPL-MCNC: 9.1 MG/DL
CHLORIDE SERPL-SCNC: 106 MMOL/L
CO2 SERPL-SCNC: 25 MMOL/L
CREAT SERPL-MCNC: 0.9 MG/DL
CRP SERPL-MCNC: 2.7 MG/L
DIFFERENTIAL METHOD: NORMAL
EOSINOPHIL # BLD AUTO: 0.2 K/UL
EOSINOPHIL NFR BLD: 2.2 %
ERYTHROCYTE [DISTWIDTH] IN BLOOD BY AUTOMATED COUNT: 12.7 %
ERYTHROCYTE [SEDIMENTATION RATE] IN BLOOD BY WESTERGREN METHOD: 4 MM/HR
EST. GFR  (AFRICAN AMERICAN): >60 ML/MIN/1.73 M^2
EST. GFR  (NON AFRICAN AMERICAN): >60 ML/MIN/1.73 M^2
GLUCOSE SERPL-MCNC: 113 MG/DL
HCT VFR BLD AUTO: 42.6 %
HGB BLD-MCNC: 14.8 G/DL
LYMPHOCYTES # BLD AUTO: 3.1 K/UL
LYMPHOCYTES NFR BLD: 45.7 %
MCH RBC QN AUTO: 30 PG
MCHC RBC AUTO-ENTMCNC: 34.7 G/DL
MCV RBC AUTO: 86 FL
MONOCYTES # BLD AUTO: 0.3 K/UL
MONOCYTES NFR BLD: 4.9 %
NEUTROPHILS # BLD AUTO: 3.1 K/UL
NEUTROPHILS NFR BLD: 46.5 %
PLATELET # BLD AUTO: 245 K/UL
PMV BLD AUTO: 9.9 FL
POTASSIUM SERPL-SCNC: 3.6 MMOL/L
PROT SERPL-MCNC: 7 G/DL
RBC # BLD AUTO: 4.94 M/UL
SODIUM SERPL-SCNC: 142 MMOL/L
TSH SERPL DL<=0.005 MIU/L-ACNC: 1.62 UIU/ML
WBC # BLD AUTO: 6.69 K/UL

## 2017-08-01 PROCEDURE — 84443 ASSAY THYROID STIM HORMONE: CPT

## 2017-08-01 PROCEDURE — 85025 COMPLETE CBC W/AUTO DIFF WBC: CPT

## 2017-08-01 PROCEDURE — 82306 VITAMIN D 25 HYDROXY: CPT

## 2017-08-01 PROCEDURE — 80053 COMPREHEN METABOLIC PANEL: CPT

## 2017-08-01 PROCEDURE — 85651 RBC SED RATE NONAUTOMATED: CPT | Mod: PO

## 2017-08-01 PROCEDURE — 86140 C-REACTIVE PROTEIN: CPT

## 2017-08-01 PROCEDURE — 36415 COLL VENOUS BLD VENIPUNCTURE: CPT | Mod: PO

## 2017-08-07 ENCOUNTER — PATIENT MESSAGE (OUTPATIENT)
Dept: FAMILY MEDICINE | Facility: CLINIC | Age: 37
End: 2017-08-07

## 2017-08-07 DIAGNOSIS — F41.9 ANXIETY: ICD-10-CM

## 2017-08-07 RX ORDER — ALPRAZOLAM 2 MG/1
2 TABLET ORAL 2 TIMES DAILY PRN
Qty: 60 TABLET | Refills: 0 | Status: CANCELLED | OUTPATIENT
Start: 2017-08-07

## 2017-08-09 RX ORDER — ALPRAZOLAM 2 MG/1
2 TABLET ORAL 2 TIMES DAILY PRN
Qty: 60 TABLET | Refills: 0 | Status: SHIPPED | OUTPATIENT
Start: 2017-08-09 | End: 2017-09-05 | Stop reason: SDUPTHER

## 2017-08-09 RX ORDER — ALPRAZOLAM 2 MG/1
2 TABLET ORAL 2 TIMES DAILY PRN
Qty: 60 TABLET | Refills: 0 | OUTPATIENT
Start: 2017-08-09

## 2017-08-14 DIAGNOSIS — M45.9 ANKYLOSING SPONDYLITIS, UNSPECIFIED SITE OF SPINE: ICD-10-CM

## 2017-08-14 RX ORDER — HYDROCODONE BITARTRATE AND ACETAMINOPHEN 10; 325 MG/1; MG/1
TABLET ORAL
Qty: 120 TABLET | Refills: 0 | Status: SHIPPED | OUTPATIENT
Start: 2017-08-14 | End: 2017-08-23 | Stop reason: SDUPTHER

## 2017-08-23 ENCOUNTER — OFFICE VISIT (OUTPATIENT)
Dept: RHEUMATOLOGY | Facility: CLINIC | Age: 37
End: 2017-08-23
Payer: MEDICARE

## 2017-08-23 VITALS
HEART RATE: 93 BPM | BODY MASS INDEX: 23.24 KG/M2 | WEIGHT: 176.13 LBS | SYSTOLIC BLOOD PRESSURE: 131 MMHG | DIASTOLIC BLOOD PRESSURE: 84 MMHG

## 2017-08-23 DIAGNOSIS — M46.46 LUMBAR DISCITIS: Primary | ICD-10-CM

## 2017-08-23 DIAGNOSIS — E83.52 HYPERCALCEMIA: ICD-10-CM

## 2017-08-23 DIAGNOSIS — T78.40XA ALLERGIC REACTION, INITIAL ENCOUNTER: ICD-10-CM

## 2017-08-23 DIAGNOSIS — G89.29 CHRONIC PAIN OF BOTH KNEES: ICD-10-CM

## 2017-08-23 DIAGNOSIS — M25.562 CHRONIC PAIN OF BOTH KNEES: ICD-10-CM

## 2017-08-23 DIAGNOSIS — M25.561 CHRONIC PAIN OF BOTH KNEES: ICD-10-CM

## 2017-08-23 DIAGNOSIS — M45.9 ANKYLOSING SPONDYLITIS, UNSPECIFIED SITE OF SPINE: ICD-10-CM

## 2017-08-23 PROCEDURE — 99214 OFFICE O/P EST MOD 30 MIN: CPT | Mod: S$PBB,,, | Performed by: INTERNAL MEDICINE

## 2017-08-23 PROCEDURE — 99213 OFFICE O/P EST LOW 20 MIN: CPT | Mod: PBBFAC,PO | Performed by: INTERNAL MEDICINE

## 2017-08-23 PROCEDURE — 99999 PR PBB SHADOW E&M-EST. PATIENT-LVL III: CPT | Mod: PBBFAC,,, | Performed by: INTERNAL MEDICINE

## 2017-08-23 RX ORDER — BACLOFEN 20 MG/1
20 TABLET ORAL 4 TIMES DAILY PRN
Qty: 120 TABLET | Refills: 3 | Status: SHIPPED | OUTPATIENT
Start: 2017-08-23 | End: 2018-01-02 | Stop reason: SDUPTHER

## 2017-08-23 RX ORDER — DOXYCYCLINE HYCLATE 100 MG
100 TABLET ORAL DAILY
Qty: 30 TABLET | Refills: 2 | Status: CANCELLED | OUTPATIENT
Start: 2017-08-23

## 2017-08-23 RX ORDER — HYDROCODONE BITARTRATE AND ACETAMINOPHEN 10; 325 MG/1; MG/1
TABLET ORAL
Qty: 120 TABLET | Refills: 0 | Status: SHIPPED | OUTPATIENT
Start: 2017-09-14 | End: 2017-10-13 | Stop reason: SDUPTHER

## 2017-08-23 RX ORDER — EPINEPHRINE 0.3 MG/.3ML
1 INJECTION SUBCUTANEOUS ONCE
Qty: 0.3 ML | Refills: 3 | Status: SHIPPED | OUTPATIENT
Start: 2017-08-23 | End: 2017-08-28 | Stop reason: SDUPTHER

## 2017-08-23 ASSESSMENT — ROUTINE ASSESSMENT OF PATIENT INDEX DATA (RAPID3)
TOTAL RAPID3 SCORE: 5.78
MDHAQ FUNCTION SCORE: 1.6
PSYCHOLOGICAL DISTRESS SCORE: 3.3
FATIGUE SCORE: 4
WHEN YOU AWAKENED IN THE MORNING OVER THE LAST WEEK, PLEASE INDICATE THE AMOUNT OF TIME IT TAKES UNTIL YOU ARE AS LIMBER AS YOU WILL BE FOR THE DAY: 30 MINUTES AFTER WAKING
AM STIFFNESS SCORE: 1, YES
PAIN SCORE: 9
PATIENT GLOBAL ASSESSMENT SCORE: 3

## 2017-08-23 NOTE — PROGRESS NOTES
Subjective:       Patient ID: Umang Greer is a 36 y.o. male.    Chief Complaint: Follow-up     Follow up: on simponi finally improved, . And lower back pain. Patient complains of arthralgias and myalgias for which has been present for a few years. Pain is located in multiple joints, both shoulder(s), both elbow(s), both wrist(s), both MCP(s): 1st, 2nd, 3rd, 4th and 5th, both PIP(s): 1st, 2nd, 3rd, 4th and 5th, both DIP(s): 1st and 2nd, both hip(s), both knee(s) and both MTP(s): 1st, 2nd, 3rd, 4th and 5th, is described as aching, pulsating, shooting and throbbing, and is constant, moderate .                  The disease course has been worsening.     He complains of joint swelling. Associated symptoms include fatigue.         Review of Systems   Constitutional: Positive for activity change and fatigue. Negative for appetite change and unexpected weight change.   HENT: Positive for sinus pressure. Negative for ear pain, facial swelling, rhinorrhea, sneezing and tinnitus.    Eyes: Negative for pain, discharge, redness, itching and visual disturbance.   Respiratory: Negative for apnea, chest tightness, shortness of breath and wheezing.    Cardiovascular: Negative for palpitations and leg swelling.   Gastrointestinal: Negative for constipation and diarrhea.   Endocrine: Negative for cold intolerance, heat intolerance, polydipsia and polyuria.   Genitourinary: Negative for decreased urine volume, difficulty urinating, flank pain, frequency, hematuria and urgency.   Musculoskeletal: Positive for back pain, joint swelling and neck stiffness. Negative for gait problem.   Skin: Negative for pallor and wound.   Allergic/Immunologic: Negative for immunocompromised state.   Neurological: Negative for dizziness, tremors, seizures and syncope.   Hematological: Negative for adenopathy. Does not bruise/bleed easily.   Psychiatric/Behavioral: Negative for sleep disturbance and suicidal ideas. The patient is not nervous/anxious.           Objective:     /84 (BP Location: Left arm, Patient Position: Sitting, BP Method: Medium (Automatic))   Pulse 93   Wt 79.9 kg (176 lb 2.4 oz)   BMI 23.24 kg/m²      Physical Exam   Vitals reviewed.  Constitutional: He is oriented to person, place, and time. He appears distressed.   HENT:   Head: Normocephalic and atraumatic.   Mouth/Throat: Oropharynx is clear and moist.   Eyes: EOM are normal. Pupils are equal, round, and reactive to light.   Neck: Neck supple. No thyromegaly present.   Cardiovascular: Normal rate, regular rhythm and normal heart sounds.  Exam reveals no gallop and no friction rub.    No murmur heard.  Pulmonary/Chest: Breath sounds normal. He has no wheezes. He has no rales. He exhibits no tenderness.   Abdominal: There is no tenderness. There is no rebound and no guarding.       Right Side Rheumatological Exam     Examination finds the 2nd MCP, 3rd MCP, 4th MCP and 5th MCP normal.    The patient is tender to palpation of the shoulder, elbow, wrist, knee, 1st PIP, 1st MCP, 2nd PIP, 2nd MCP, 3rd PIP, 3rd MCP, 4th PIP, 4th MCP and 5th PIP    He has swelling of the elbow, wrist, knee, 1st PIP, 1st MCP, 2nd PIP, 2nd MCP, 3rd PIP, 3rd MCP, 4th PIP, 4th MCP, 5th PIP and 5th MCP    The patient has an enlarged wrist, knee, 1st PIP, 2nd PIP, 3rd PIP, 4th PIP and 5th PIP    Shoulder Exam   Tenderness Location: no tenderness    Range of Motion   Active Abduction: abnormal   Adduction: abnormal  Sensation: normal    Knee Exam   Tenderness Location: medial joint line and LCL    Range of Motion   Extension: normal   Flexion: normal   Patellofemoral Crepitus: positive  Effusion: positive  Sensation: normal    Hip Exam   Tenderness Location: posterior and anterior    Range of Motion   Extension: abnormal   Flexion: abnormal   Sensation: normal    Elbow/Wrist Exam   Tenderness Location: no tenderness    Range of Motion   Elbow   Flexion: normal   Sensation: normal    Muscle Strength (0-5  scale):  Neck Flexion:  3  Neck Extension: 3  Deltoid:  3  Biceps: 3/5   Triceps:  3  Quadriceps:  3   Distal Lower Extremity: 3    Left Side Rheumatological Exam     Examination finds the elbow, wrist, 1st MCP, 2nd MCP, 3rd MCP, 4th MCP and 5th MCP normal.    The patient is tender to palpation of the shoulder, elbow, wrist, knee, 1st PIP, 1st MCP, 2nd PIP, 2nd MCP, 3rd PIP, 3rd MCP, 4th PIP, 4th MCP, 5th PIP and 5th MCP.    He has swelling of the wrist, knee, 1st PIP, 1st MCP, 2nd PIP, 2nd MCP, 3rd PIP, 3rd MCP, 4th PIP, 4th MCP, 5th PIP and 5th MCP    The patient has an enlarged knee, 1st PIP, 2nd PIP, 3rd PIP, 4th PIP and 5th PIP.    Shoulder Exam   Tenderness Location: no tenderness    Range of Motion   Active Abduction: abnormal   Extension: abnormal   Sensation: normal    Knee Exam   Tenderness Location: lateral joint line and medial joint line    Range of Motion   Extension: normal   Flexion: normal     Patellofemoral Crepitus: positive  Effusion: positive  Sensation: normal    Hip Exam   Tenderness Location: posterior and anterior    Range of Motion   Extension: abnormal   Flexion: abnormal   Sensation: normal    Elbow/Wrist Exam     Range of Motion   Elbow   Flexion: normal   Sensation: normal    Muscle Strength (0-5 scale):  Neck Flexion:  3  Neck Extension: 3  Deltoid:  3  Biceps: 3/5   Triceps:  3  Quadriceps:  3   Distal Lower Extremity: 3      Back/Neck Exam   Tenderness Right paramedian tenderness of the Upper C-Spine, Lower L-Spine, SI Joint, Lower T-Spine and Upper L-Spine.Left paramedian tenderness of the Upper C-Spine, Lower L-Spine, Lower C-Spine, SI Joint and Upper L-Spine.    Back Range of Motion   Extension: abnormal  Flexion: abnormal  Lateral Bend Right: abnormal  Lateral Bend Left: abnormal  Rotation Right: abnormal  Rotation Left: abnormal    Neck Range of Motion   Flexion: Limited  Extension: Limited  Right Lateral Bend: abnormal  Left Lateral Bend: abnormal  Right Rotation:  abnormal  Left Rotation: abnormal  Lymphadenopathy:     He has no cervical adenopathy.   Neurological: He is alert and oriented to person, place, and time. He displays weakness. He exhibits abnormal muscle tone.   Reflex Scores:       Patellar reflexes are 2+ on the right side and 2+ on the left side.  Skin: No rash noted. No erythema. No pallor.     Psychiatric: Mood and affect normal.   Musculoskeletal: He exhibits edema, tenderness and deformity.   Overall improved             Results for orders placed or performed in visit on 08/01/17   CBC auto differential   Result Value Ref Range    WBC 6.69 3.90 - 12.70 K/uL    RBC 4.94 4.60 - 6.20 M/uL    Hemoglobin 14.8 14.0 - 18.0 g/dL    Hematocrit 42.6 40.0 - 54.0 %    MCV 86 82 - 98 fL    MCH 30.0 27.0 - 31.0 pg    MCHC 34.7 32.0 - 36.0 g/dL    RDW 12.7 11.5 - 14.5 %    Platelets 245 150 - 350 K/uL    MPV 9.9 9.2 - 12.9 fL    Gran # 3.1 1.8 - 7.7 K/uL    Lymph # 3.1 1.0 - 4.8 K/uL    Mono # 0.3 0.3 - 1.0 K/uL    Eos # 0.2 0.0 - 0.5 K/uL    Baso # 0.04 0.00 - 0.20 K/uL    Gran% 46.5 38.0 - 73.0 %    Lymph% 45.7 18.0 - 48.0 %    Mono% 4.9 4.0 - 15.0 %    Eosinophil% 2.2 0.0 - 8.0 %    Basophil% 0.6 0.0 - 1.9 %    Differential Method Automated    Comprehensive metabolic panel   Result Value Ref Range    Sodium 142 136 - 145 mmol/L    Potassium 3.6 3.5 - 5.1 mmol/L    Chloride 106 95 - 110 mmol/L    CO2 25 23 - 29 mmol/L    Glucose 113 (H) 70 - 110 mg/dL    BUN, Bld 11 6 - 20 mg/dL    Creatinine 0.9 0.5 - 1.4 mg/dL    Calcium 9.1 8.7 - 10.5 mg/dL    Total Protein 7.0 6.0 - 8.4 g/dL    Albumin 3.6 3.5 - 5.2 g/dL    Total Bilirubin 0.4 0.1 - 1.0 mg/dL    Alkaline Phosphatase 99 55 - 135 U/L    AST 17 10 - 40 U/L    ALT 10 10 - 44 U/L    Anion Gap 11 8 - 16 mmol/L    eGFR if African American >60.0 >60 mL/min/1.73 m^2    eGFR if non African American >60.0 >60 mL/min/1.73 m^2   C-reactive protein   Result Value Ref Range    CRP 2.7 0.0 - 8.2 mg/L   Sedimentation rate, manual    Result Value Ref Range    Sed Rate 4 0 - 10 mm/Hr   Vitamin D   Result Value Ref Range    Vit D, 25-Hydroxy 106 (H) 30 - 96 ng/mL   TSH   Result Value Ref Range    TSH 1.625 0.400 - 4.000 uIU/mL       Assessment:     Encounter Diagnoses   Name Primary?    Lumbar discitis Yes    Allergic reaction, initial encounter     Ankylosing spondylitis, unspecified site of spine     Chronic pain of both knees          Plan:     Lumbar discitis  -     baclofen (LIORESAL) 20 MG tablet; Take 1 tablet (20 mg total) by mouth 4 (four) times daily as needed.  Dispense: 120 tablet; Refill: 3  -     epinephrine (AUVI-Q) 0.3 mg/0.3 mL AtIn; Inject 0.3 mLs (0.3 mg total) into the muscle once.  Dispense: 0.3 mL; Refill: 3  -     hydrocodone-acetaminophen 10-325mg (NORCO)  mg Tab; TK ONE T PO Q 6 H PRF PAIN  Dispense: 120 tablet; Refill: 0  -     X-Ray Knee 1 or 2 View Bilateral; Future; Expected date: 08/23/2017  -     C-reactive protein; Future; Expected date: 08/23/2017  -     CBC auto differential; Future; Expected date: 08/23/2017  -     Comprehensive metabolic panel; Future; Expected date: 08/23/2017  -     Vitamin D; Future; Expected date: 08/23/2017  -     PTH, intact; Future; Expected date: 08/23/2017    Allergic reaction, initial encounter  -     baclofen (LIORESAL) 20 MG tablet; Take 1 tablet (20 mg total) by mouth 4 (four) times daily as needed.  Dispense: 120 tablet; Refill: 3  -     epinephrine (AUVI-Q) 0.3 mg/0.3 mL AtIn; Inject 0.3 mLs (0.3 mg total) into the muscle once.  Dispense: 0.3 mL; Refill: 3  -     hydrocodone-acetaminophen 10-325mg (NORCO)  mg Tab; TK ONE T PO Q 6 H PRF PAIN  Dispense: 120 tablet; Refill: 0  -     X-Ray Knee 1 or 2 View Bilateral; Future; Expected date: 08/23/2017  -     C-reactive protein; Future; Expected date: 08/23/2017  -     CBC auto differential; Future; Expected date: 08/23/2017  -     Comprehensive metabolic panel; Future; Expected date: 08/23/2017  -     Vitamin D;  Future; Expected date: 08/23/2017  -     PTH, intact; Future; Expected date: 08/23/2017    Ankylosing spondylitis, unspecified site of spine  -     baclofen (LIORESAL) 20 MG tablet; Take 1 tablet (20 mg total) by mouth 4 (four) times daily as needed.  Dispense: 120 tablet; Refill: 3  -     epinephrine (AUVI-Q) 0.3 mg/0.3 mL AtIn; Inject 0.3 mLs (0.3 mg total) into the muscle once.  Dispense: 0.3 mL; Refill: 3  -     hydrocodone-acetaminophen 10-325mg (NORCO)  mg Tab; TK ONE T PO Q 6 H PRF PAIN  Dispense: 120 tablet; Refill: 0  -     X-Ray Knee 1 or 2 View Bilateral; Future; Expected date: 08/23/2017  -     C-reactive protein; Future; Expected date: 08/23/2017  -     CBC auto differential; Future; Expected date: 08/23/2017  -     Comprehensive metabolic panel; Future; Expected date: 08/23/2017  -     Vitamin D; Future; Expected date: 08/23/2017  -     PTH, intact; Future; Expected date: 08/23/2017    Chronic pain of both knees  -     baclofen (LIORESAL) 20 MG tablet; Take 1 tablet (20 mg total) by mouth 4 (four) times daily as needed.  Dispense: 120 tablet; Refill: 3  -     epinephrine (AUVI-Q) 0.3 mg/0.3 mL AtIn; Inject 0.3 mLs (0.3 mg total) into the muscle once.  Dispense: 0.3 mL; Refill: 3  -     hydrocodone-acetaminophen 10-325mg (NORCO)  mg Tab; TK ONE T PO Q 6 H PRF PAIN  Dispense: 120 tablet; Refill: 0  -     X-Ray Knee 1 or 2 View Bilateral; Future; Expected date: 08/23/2017  -     C-reactive protein; Future; Expected date: 08/23/2017  -     CBC auto differential; Future; Expected date: 08/23/2017  -     Comprehensive metabolic panel; Future; Expected date: 08/23/2017  -     Vitamin D; Future; Expected date: 08/23/2017  -     PTH, intact; Future; Expected date: 08/23/2017    Hypercalcemia   -     Vitamin D; Future; Expected date: 08/23/2017    Other orders  -     Cancel: doxycycline (VIBRA-TABS) 100 MG tablet; Take 1 tablet (100 mg total) by mouth once daily.  Dispense: 30 tablet; Refill: 2  -      golimumab (SIMPONI) 50 mg/0.5 mL PnIj; Inject 1 pen into the skin every 30 days.  Dispense: 0.5 mL; Refill: 11

## 2017-08-24 ENCOUNTER — HOSPITAL ENCOUNTER (OUTPATIENT)
Dept: RADIOLOGY | Facility: HOSPITAL | Age: 37
Discharge: HOME | End: 2017-08-24
Attending: INTERNAL MEDICINE
Payer: MEDICARE

## 2017-08-24 DIAGNOSIS — G89.29 CHRONIC PAIN OF BOTH KNEES: ICD-10-CM

## 2017-08-24 DIAGNOSIS — M25.562 CHRONIC PAIN OF BOTH KNEES: ICD-10-CM

## 2017-08-24 DIAGNOSIS — M45.9 ANKYLOSING SPONDYLITIS, UNSPECIFIED SITE OF SPINE: ICD-10-CM

## 2017-08-24 DIAGNOSIS — T78.40XA ALLERGIC REACTION, INITIAL ENCOUNTER: ICD-10-CM

## 2017-08-24 DIAGNOSIS — M46.46 LUMBAR DISCITIS: ICD-10-CM

## 2017-08-24 DIAGNOSIS — M25.561 CHRONIC PAIN OF BOTH KNEES: ICD-10-CM

## 2017-08-24 PROCEDURE — 73562 X-RAY EXAM OF KNEE 3: CPT | Mod: 50,TC,PO

## 2017-08-24 PROCEDURE — 73562 X-RAY EXAM OF KNEE 3: CPT | Mod: 26,50,, | Performed by: RADIOLOGY

## 2017-08-28 ENCOUNTER — OFFICE VISIT (OUTPATIENT)
Dept: CARDIOLOGY | Facility: CLINIC | Age: 37
End: 2017-08-28
Payer: MEDICARE

## 2017-08-28 VITALS
BODY MASS INDEX: 23.52 KG/M2 | DIASTOLIC BLOOD PRESSURE: 79 MMHG | SYSTOLIC BLOOD PRESSURE: 128 MMHG | WEIGHT: 177.5 LBS | HEART RATE: 80 BPM | HEIGHT: 73 IN

## 2017-08-28 DIAGNOSIS — I42.6 CARDIOMYOPATHY, ALCOHOLIC: Primary | ICD-10-CM

## 2017-08-28 DIAGNOSIS — F17.200 TOBACCO DEPENDENCE: ICD-10-CM

## 2017-08-28 DIAGNOSIS — I42.9 CARDIOMYOPATHY, UNSPECIFIED TYPE: ICD-10-CM

## 2017-08-28 PROCEDURE — 99214 OFFICE O/P EST MOD 30 MIN: CPT | Mod: S$PBB,,, | Performed by: INTERNAL MEDICINE

## 2017-08-28 PROCEDURE — 99999 PR PBB SHADOW E&M-EST. PATIENT-LVL II: CPT | Mod: PBBFAC,,, | Performed by: INTERNAL MEDICINE

## 2017-08-28 PROCEDURE — 99212 OFFICE O/P EST SF 10 MIN: CPT | Mod: PBBFAC,PO | Performed by: INTERNAL MEDICINE

## 2017-08-28 RX ORDER — EPINEPHRINE 0.15 MG/.3ML
0.3 INJECTION INTRAMUSCULAR
COMMUNITY

## 2017-08-28 RX ORDER — DULOXETIN HYDROCHLORIDE 30 MG/1
30 CAPSULE, DELAYED RELEASE ORAL DAILY
COMMUNITY
End: 2018-02-02 | Stop reason: SDUPTHER

## 2017-08-28 RX ORDER — CARVEDILOL 3.12 MG/1
3.12 TABLET ORAL 2 TIMES DAILY
Qty: 60 TABLET | Refills: 11 | Status: SHIPPED | OUTPATIENT
Start: 2017-08-28 | End: 2018-08-29 | Stop reason: SDUPTHER

## 2017-08-28 NOTE — PROGRESS NOTES
Subjective:    Patient ID:  Umang Greer is a 36 y.o. male who presents for follow-up of cardiomyopathy    HPI  He comes for follow up with no major problems, no chest pain, no shortness of breath.      Review of Systems   Constitution: Negative for decreased appetite, weakness, malaise/fatigue, weight gain and weight loss.   Cardiovascular: Negative for chest pain, dyspnea on exertion, leg swelling, palpitations and syncope.   Respiratory: Negative for cough and shortness of breath.    Gastrointestinal: Negative.    All other systems reviewed and are negative.       Objective:    Physical Exam   Constitutional: He is oriented to person, place, and time. He appears well-developed and well-nourished.   HENT:   Head: Normocephalic.   Eyes: Pupils are equal, round, and reactive to light.   Neck: Normal range of motion. Neck supple. No JVD present. Carotid bruit is not present. No thyromegaly present.   Cardiovascular: Normal rate, regular rhythm, normal heart sounds, intact distal pulses and normal pulses.  PMI is not displaced.  Exam reveals no gallop.    No murmur heard.  Pulmonary/Chest: Effort normal and breath sounds normal.   Abdominal: Soft. Normal appearance. He exhibits no mass. There is no hepatosplenomegaly. There is no tenderness.   Musculoskeletal: Normal range of motion. He exhibits no edema.   Neurological: He is alert and oriented to person, place, and time. He has normal strength and normal reflexes. No sensory deficit.   Skin: Skin is warm and intact.   Psychiatric: He has a normal mood and affect.   Nursing note and vitals reviewed.        Assessment:       1. Cardiomyopathy, alcoholic    2. Tobacco dependence         Plan:     Continue all cardiac medications  Regular exercise program  1 yr f/u with [facility]

## 2017-09-05 DIAGNOSIS — F41.9 ANXIETY: ICD-10-CM

## 2017-09-05 NOTE — TELEPHONE ENCOUNTER
Please see request for refill.   LOV 3/31/2017  LOV 1/11/2017    Next OV 10/30/2017  Please advise.

## 2017-09-06 RX ORDER — ALPRAZOLAM 2 MG/1
2 TABLET ORAL 2 TIMES DAILY PRN
Qty: 60 TABLET | Refills: 0 | Status: SHIPPED | OUTPATIENT
Start: 2017-09-06 | End: 2017-10-03 | Stop reason: SDUPTHER

## 2017-10-03 DIAGNOSIS — F41.9 ANXIETY: ICD-10-CM

## 2017-10-04 RX ORDER — ALPRAZOLAM 2 MG/1
2 TABLET ORAL 2 TIMES DAILY PRN
Qty: 60 TABLET | Refills: 0 | Status: SHIPPED | OUTPATIENT
Start: 2017-10-04 | End: 2017-10-30 | Stop reason: SDUPTHER

## 2017-10-13 ENCOUNTER — PATIENT MESSAGE (OUTPATIENT)
Dept: RHEUMATOLOGY | Facility: CLINIC | Age: 37
End: 2017-10-13

## 2017-10-13 DIAGNOSIS — M25.562 CHRONIC PAIN OF BOTH KNEES: ICD-10-CM

## 2017-10-13 DIAGNOSIS — J44.9 CHRONIC OBSTRUCTIVE PULMONARY DISEASE, UNSPECIFIED COPD TYPE: ICD-10-CM

## 2017-10-13 DIAGNOSIS — M46.46 LUMBAR DISCITIS: ICD-10-CM

## 2017-10-13 DIAGNOSIS — M25.561 CHRONIC PAIN OF BOTH KNEES: ICD-10-CM

## 2017-10-13 DIAGNOSIS — M45.9 ANKYLOSING SPONDYLITIS, UNSPECIFIED SITE OF SPINE: ICD-10-CM

## 2017-10-13 DIAGNOSIS — G89.29 CHRONIC PAIN OF BOTH KNEES: ICD-10-CM

## 2017-10-13 DIAGNOSIS — T78.40XA ALLERGIC REACTION, INITIAL ENCOUNTER: ICD-10-CM

## 2017-10-13 NOTE — PROGRESS NOTES
Refill Authorization Note     is requesting a refill authorization.    Brief assessment and rational for refill: APPROVE: prr  Name of medication: SYMBICORT 160/4.5MCG (120 ORAL INH)  How patient will take medication: 2 puffs BID   Amount/Quantity of medication ordered: 10.2g           Refills Authorized: Yes  If authorized number of refills: 11     Medication-related problems identified:   Medication education needs  Drug-disease interaction  Medication Therapy Plan: Per last notes pt still smokes.  Will approve 12 mo   Comments:

## 2017-10-15 RX ORDER — HYDROCODONE BITARTRATE AND ACETAMINOPHEN 10; 325 MG/1; MG/1
TABLET ORAL
Qty: 120 TABLET | Refills: 0 | Status: SHIPPED | OUTPATIENT
Start: 2017-10-15 | End: 2017-11-15 | Stop reason: SDUPTHER

## 2017-10-16 RX ORDER — BUDESONIDE AND FORMOTEROL FUMARATE DIHYDRATE 160; 4.5 UG/1; UG/1
AEROSOL RESPIRATORY (INHALATION)
Qty: 10.2 G | Refills: 11 | Status: SHIPPED | OUTPATIENT
Start: 2017-10-16 | End: 2018-09-27 | Stop reason: SDUPTHER

## 2017-10-30 ENCOUNTER — OFFICE VISIT (OUTPATIENT)
Dept: FAMILY MEDICINE | Facility: CLINIC | Age: 37
End: 2017-10-30
Payer: MEDICARE

## 2017-10-30 VITALS
BODY MASS INDEX: 23.66 KG/M2 | HEART RATE: 92 BPM | WEIGHT: 178.56 LBS | DIASTOLIC BLOOD PRESSURE: 84 MMHG | HEIGHT: 73 IN | SYSTOLIC BLOOD PRESSURE: 132 MMHG | RESPIRATION RATE: 16 BRPM

## 2017-10-30 DIAGNOSIS — M45.9 ANKYLOSING SPONDYLITIS, UNSPECIFIED SITE OF SPINE: ICD-10-CM

## 2017-10-30 DIAGNOSIS — F41.9 ANXIETY: Primary | ICD-10-CM

## 2017-10-30 DIAGNOSIS — F17.200 TOBACCO DEPENDENCE: ICD-10-CM

## 2017-10-30 PROBLEM — F13.20 BENZODIAZEPINE DEPENDENCE: Status: RESOLVED | Noted: 2017-10-30 | Resolved: 2017-10-30

## 2017-10-30 PROBLEM — F13.20 BENZODIAZEPINE DEPENDENCE: Status: ACTIVE | Noted: 2017-10-30

## 2017-10-30 PROCEDURE — 99214 OFFICE O/P EST MOD 30 MIN: CPT | Mod: S$PBB,,, | Performed by: FAMILY MEDICINE

## 2017-10-30 PROCEDURE — 99213 OFFICE O/P EST LOW 20 MIN: CPT | Mod: PBBFAC,PO | Performed by: FAMILY MEDICINE

## 2017-10-30 PROCEDURE — 99999 PR PBB SHADOW E&M-EST. PATIENT-LVL III: CPT | Mod: PBBFAC,,, | Performed by: FAMILY MEDICINE

## 2017-10-30 RX ORDER — ALPRAZOLAM 2 MG/1
2 TABLET ORAL 2 TIMES DAILY PRN
Qty: 60 TABLET | Refills: 2 | Status: SHIPPED | OUTPATIENT
Start: 2017-10-30 | End: 2018-02-02 | Stop reason: SDUPTHER

## 2017-10-30 NOTE — PROGRESS NOTES
Subjective:       Patient ID: Umang Greer is a 37 y.o. male.    Chief Complaint: Anxiety (Patient here for 3 month follow up)    Here for f/u chronic medical issues.  States doing well overall.  Has cut back on smoking.      Anxiety   Presents for follow-up visit. Symptoms include nervous/anxious behavior. Patient reports no chest pain, palpitations or shortness of breath.         Review of Systems   Constitutional: Negative for chills, fatigue and fever.   Respiratory: Negative for cough, chest tightness and shortness of breath.    Cardiovascular: Negative for chest pain, palpitations and leg swelling.   Gastrointestinal: Negative for abdominal distention and abdominal pain.   Endocrine: Negative for cold intolerance and heat intolerance.   Musculoskeletal: Positive for arthralgias.   Skin: Negative for rash.   Psychiatric/Behavioral: Negative for dysphoric mood. The patient is nervous/anxious.        Objective:      Physical Exam   Constitutional: He appears well-developed and well-nourished.   HENT:   Head: Normocephalic and atraumatic.   Cardiovascular: Normal rate, regular rhythm and normal heart sounds.    Pulmonary/Chest: Effort normal and breath sounds normal.   Musculoskeletal: Normal range of motion.   Psychiatric: He has a normal mood and affect.   Nursing note and vitals reviewed.      Assessment:       1. Anxiety    2. Ankylosing spondylitis, unspecified site of spine    3. Tobacco dependence        Plan:       Anxiety  -     ALPRAZolam (XANAX) 2 MG Tab; Take 1 tablet (2 mg total) by mouth 2 (two) times daily as needed.  Dispense: 60 tablet; Refill: 2    Ankylosing spondylitis, unspecified site of spine    Tobacco dependence      Will monitor chronic medical issues and continue current plan of care.  Stable on current meds.    Return in about 3 months (around 1/30/2018), or if symptoms worsen or fail to improve.

## 2017-11-14 DIAGNOSIS — T78.40XA ALLERGIC REACTION, INITIAL ENCOUNTER: ICD-10-CM

## 2017-11-14 DIAGNOSIS — M45.9 ANKYLOSING SPONDYLITIS: ICD-10-CM

## 2017-11-14 DIAGNOSIS — G89.29 CHRONIC PAIN OF BOTH KNEES: ICD-10-CM

## 2017-11-14 DIAGNOSIS — M25.561 CHRONIC PAIN OF BOTH KNEES: ICD-10-CM

## 2017-11-14 DIAGNOSIS — M45.9 ANKYLOSING SPONDYLITIS, UNSPECIFIED SITE OF SPINE: ICD-10-CM

## 2017-11-14 DIAGNOSIS — M25.562 CHRONIC PAIN OF BOTH KNEES: ICD-10-CM

## 2017-11-14 DIAGNOSIS — M46.46 LUMBAR DISCITIS: ICD-10-CM

## 2017-11-14 DIAGNOSIS — M47.817 LUMBAR AND SACRAL ARTHRITIS: ICD-10-CM

## 2017-11-15 RX ORDER — DULOXETIN HYDROCHLORIDE 30 MG/1
CAPSULE, DELAYED RELEASE ORAL
Qty: 30 CAPSULE | Refills: 0 | Status: SHIPPED | OUTPATIENT
Start: 2017-11-15 | End: 2017-12-12 | Stop reason: SDUPTHER

## 2017-11-15 RX ORDER — HYDROCODONE BITARTRATE AND ACETAMINOPHEN 10; 325 MG/1; MG/1
TABLET ORAL
Qty: 120 TABLET | Refills: 0 | Status: SHIPPED | OUTPATIENT
Start: 2017-11-15 | End: 2017-12-15 | Stop reason: SDUPTHER

## 2017-12-12 DIAGNOSIS — M45.9 ANKYLOSING SPONDYLITIS: ICD-10-CM

## 2017-12-12 DIAGNOSIS — M47.817 LUMBAR AND SACRAL ARTHRITIS: ICD-10-CM

## 2017-12-12 RX ORDER — DULOXETIN HYDROCHLORIDE 30 MG/1
CAPSULE, DELAYED RELEASE ORAL
Qty: 30 CAPSULE | Refills: 3 | Status: SHIPPED | OUTPATIENT
Start: 2017-12-12 | End: 2018-07-27 | Stop reason: SDUPTHER

## 2017-12-15 ENCOUNTER — PATIENT MESSAGE (OUTPATIENT)
Dept: RHEUMATOLOGY | Facility: CLINIC | Age: 37
End: 2017-12-15

## 2017-12-15 DIAGNOSIS — M25.562 CHRONIC PAIN OF BOTH KNEES: ICD-10-CM

## 2017-12-15 DIAGNOSIS — M45.9 ANKYLOSING SPONDYLITIS, UNSPECIFIED SITE OF SPINE: ICD-10-CM

## 2017-12-15 DIAGNOSIS — M25.561 CHRONIC PAIN OF BOTH KNEES: ICD-10-CM

## 2017-12-15 DIAGNOSIS — M46.46 LUMBAR DISCITIS: ICD-10-CM

## 2017-12-15 DIAGNOSIS — G89.29 CHRONIC PAIN OF BOTH KNEES: ICD-10-CM

## 2017-12-15 DIAGNOSIS — T78.40XA ALLERGIC REACTION, INITIAL ENCOUNTER: ICD-10-CM

## 2017-12-18 RX ORDER — HYDROCODONE BITARTRATE AND ACETAMINOPHEN 10; 325 MG/1; MG/1
TABLET ORAL
Qty: 120 TABLET | Refills: 0 | Status: SHIPPED | OUTPATIENT
Start: 2017-12-18 | End: 2018-01-18 | Stop reason: SDUPTHER

## 2017-12-22 ENCOUNTER — LAB VISIT (OUTPATIENT)
Dept: LAB | Facility: HOSPITAL | Age: 37
End: 2017-12-22
Attending: INTERNAL MEDICINE
Payer: MEDICARE

## 2017-12-22 DIAGNOSIS — M25.562 CHRONIC PAIN OF BOTH KNEES: ICD-10-CM

## 2017-12-22 DIAGNOSIS — E83.52 HYPERCALCEMIA: ICD-10-CM

## 2017-12-22 DIAGNOSIS — M25.561 CHRONIC PAIN OF BOTH KNEES: ICD-10-CM

## 2017-12-22 DIAGNOSIS — T78.40XA ALLERGIC REACTION, INITIAL ENCOUNTER: ICD-10-CM

## 2017-12-22 DIAGNOSIS — G89.29 CHRONIC PAIN OF BOTH KNEES: ICD-10-CM

## 2017-12-22 DIAGNOSIS — M46.46 LUMBAR DISCITIS: ICD-10-CM

## 2017-12-22 DIAGNOSIS — M45.9 ANKYLOSING SPONDYLITIS, UNSPECIFIED SITE OF SPINE: ICD-10-CM

## 2017-12-22 LAB
25(OH)D3+25(OH)D2 SERPL-MCNC: 26 NG/ML
ALBUMIN SERPL BCP-MCNC: 3.7 G/DL
ALP SERPL-CCNC: 96 U/L
ALT SERPL W/O P-5'-P-CCNC: 11 U/L
ANION GAP SERPL CALC-SCNC: 9 MMOL/L
AST SERPL-CCNC: 16 U/L
BASOPHILS # BLD AUTO: 0.06 K/UL
BASOPHILS NFR BLD: 0.9 %
BILIRUB SERPL-MCNC: 0.4 MG/DL
BUN SERPL-MCNC: 11 MG/DL
CALCIUM SERPL-MCNC: 9.3 MG/DL
CHLORIDE SERPL-SCNC: 106 MMOL/L
CO2 SERPL-SCNC: 26 MMOL/L
CREAT SERPL-MCNC: 1 MG/DL
CRP SERPL-MCNC: 5.9 MG/L
DIFFERENTIAL METHOD: NORMAL
EOSINOPHIL # BLD AUTO: 0.2 K/UL
EOSINOPHIL NFR BLD: 2.8 %
ERYTHROCYTE [DISTWIDTH] IN BLOOD BY AUTOMATED COUNT: 12.9 %
EST. GFR  (AFRICAN AMERICAN): >60 ML/MIN/1.73 M^2
EST. GFR  (NON AFRICAN AMERICAN): >60 ML/MIN/1.73 M^2
GLUCOSE SERPL-MCNC: 157 MG/DL
HCT VFR BLD AUTO: 41.2 %
HGB BLD-MCNC: 14.2 G/DL
IMM GRANULOCYTES # BLD AUTO: 0.01 K/UL
IMM GRANULOCYTES NFR BLD AUTO: 0.1 %
LYMPHOCYTES # BLD AUTO: 3.1 K/UL
LYMPHOCYTES NFR BLD: 44.9 %
MCH RBC QN AUTO: 30 PG
MCHC RBC AUTO-ENTMCNC: 34.5 G/DL
MCV RBC AUTO: 87 FL
MONOCYTES # BLD AUTO: 0.4 K/UL
MONOCYTES NFR BLD: 5.3 %
NEUTROPHILS # BLD AUTO: 3.1 K/UL
NEUTROPHILS NFR BLD: 46 %
NRBC BLD-RTO: 0 /100 WBC
PLATELET # BLD AUTO: 274 K/UL
PMV BLD AUTO: 9.7 FL
POTASSIUM SERPL-SCNC: 3.9 MMOL/L
PROT SERPL-MCNC: 7.2 G/DL
PTH-INTACT SERPL-MCNC: 44 PG/ML
RBC # BLD AUTO: 4.73 M/UL
SODIUM SERPL-SCNC: 141 MMOL/L
WBC # BLD AUTO: 6.8 K/UL

## 2017-12-22 PROCEDURE — 36415 COLL VENOUS BLD VENIPUNCTURE: CPT | Mod: PO

## 2017-12-22 PROCEDURE — 86140 C-REACTIVE PROTEIN: CPT

## 2017-12-22 PROCEDURE — 83970 ASSAY OF PARATHORMONE: CPT

## 2017-12-22 PROCEDURE — 85025 COMPLETE CBC W/AUTO DIFF WBC: CPT

## 2017-12-22 PROCEDURE — 82306 VITAMIN D 25 HYDROXY: CPT

## 2017-12-22 PROCEDURE — 80053 COMPREHEN METABOLIC PANEL: CPT

## 2018-01-02 DIAGNOSIS — M25.561 CHRONIC PAIN OF BOTH KNEES: ICD-10-CM

## 2018-01-02 DIAGNOSIS — M25.562 CHRONIC PAIN OF BOTH KNEES: ICD-10-CM

## 2018-01-02 DIAGNOSIS — T78.40XA ALLERGIC REACTION, INITIAL ENCOUNTER: ICD-10-CM

## 2018-01-02 DIAGNOSIS — G89.29 CHRONIC PAIN OF BOTH KNEES: ICD-10-CM

## 2018-01-02 DIAGNOSIS — M46.46 LUMBAR DISCITIS: ICD-10-CM

## 2018-01-02 DIAGNOSIS — M45.9 ANKYLOSING SPONDYLITIS, UNSPECIFIED SITE OF SPINE: ICD-10-CM

## 2018-01-02 RX ORDER — BACLOFEN 20 MG/1
TABLET ORAL
Qty: 120 TABLET | Refills: 0 | Status: SHIPPED | OUTPATIENT
Start: 2018-01-02 | End: 2018-02-04 | Stop reason: SDUPTHER

## 2018-01-18 DIAGNOSIS — G89.29 CHRONIC PAIN OF BOTH KNEES: ICD-10-CM

## 2018-01-18 DIAGNOSIS — M25.562 CHRONIC PAIN OF BOTH KNEES: ICD-10-CM

## 2018-01-18 DIAGNOSIS — M46.46 LUMBAR DISCITIS: ICD-10-CM

## 2018-01-18 DIAGNOSIS — M25.561 CHRONIC PAIN OF BOTH KNEES: ICD-10-CM

## 2018-01-18 DIAGNOSIS — T78.40XA ALLERGIC REACTION, INITIAL ENCOUNTER: ICD-10-CM

## 2018-01-18 DIAGNOSIS — M45.9 ANKYLOSING SPONDYLITIS, UNSPECIFIED SITE OF SPINE: ICD-10-CM

## 2018-01-19 RX ORDER — HYDROCODONE BITARTRATE AND ACETAMINOPHEN 10; 325 MG/1; MG/1
TABLET ORAL
Qty: 120 TABLET | Refills: 0 | Status: SHIPPED | OUTPATIENT
Start: 2018-01-19 | End: 2018-02-20 | Stop reason: SDUPTHER

## 2018-02-02 ENCOUNTER — OFFICE VISIT (OUTPATIENT)
Dept: FAMILY MEDICINE | Facility: CLINIC | Age: 38
End: 2018-02-02
Payer: MEDICARE

## 2018-02-02 VITALS
HEIGHT: 73 IN | BODY MASS INDEX: 22.82 KG/M2 | SYSTOLIC BLOOD PRESSURE: 128 MMHG | DIASTOLIC BLOOD PRESSURE: 78 MMHG | HEART RATE: 88 BPM | WEIGHT: 172.19 LBS | RESPIRATION RATE: 16 BRPM

## 2018-02-02 DIAGNOSIS — E55.9 VITAMIN D DEFICIENCY: ICD-10-CM

## 2018-02-02 DIAGNOSIS — F41.9 ANXIETY: Primary | ICD-10-CM

## 2018-02-02 DIAGNOSIS — J44.9 CHRONIC OBSTRUCTIVE PULMONARY DISEASE, UNSPECIFIED COPD TYPE: ICD-10-CM

## 2018-02-02 DIAGNOSIS — I42.6 CARDIOMYOPATHY, ALCOHOLIC: ICD-10-CM

## 2018-02-02 PROCEDURE — 99213 OFFICE O/P EST LOW 20 MIN: CPT | Mod: PBBFAC,PO | Performed by: FAMILY MEDICINE

## 2018-02-02 PROCEDURE — 99214 OFFICE O/P EST MOD 30 MIN: CPT | Mod: S$PBB,,, | Performed by: FAMILY MEDICINE

## 2018-02-02 PROCEDURE — 99999 PR PBB SHADOW E&M-EST. PATIENT-LVL III: CPT | Mod: PBBFAC,,, | Performed by: FAMILY MEDICINE

## 2018-02-02 RX ORDER — ALPRAZOLAM 2 MG/1
2 TABLET ORAL 2 TIMES DAILY PRN
Qty: 60 TABLET | Refills: 2 | Status: SHIPPED | OUTPATIENT
Start: 2018-02-02 | End: 2018-05-01 | Stop reason: SDUPTHER

## 2018-02-02 NOTE — PROGRESS NOTES
Subjective:       Patient ID: Umang Greer is a 37 y.o. male.    Chief Complaint: Anxiety (pateint here for follow up) and Medication Refill    Here for f/u chronic medical issues. Anxiety stable on prn med. Copd stable on current meds.      Anxiety   Presents for follow-up visit. Patient reports no chest pain, nervous/anxious behavior, palpitations, shortness of breath or suicidal ideas.       Medication Refill   Pertinent negatives include no abdominal pain, chest pain, chills, coughing, fatigue, fever or rash.     Review of Systems   Constitutional: Negative for chills, fatigue and fever.   Respiratory: Negative for cough, chest tightness and shortness of breath.    Cardiovascular: Negative for chest pain, palpitations and leg swelling.   Gastrointestinal: Negative for abdominal distention and abdominal pain.   Endocrine: Negative for cold intolerance and heat intolerance.   Skin: Negative for rash.   Psychiatric/Behavioral: Negative for dysphoric mood and suicidal ideas. The patient is not nervous/anxious.        Objective:      Physical Exam   Constitutional: He appears well-developed and well-nourished.   HENT:   Head: Normocephalic and atraumatic.   Cardiovascular: Normal rate, regular rhythm and normal heart sounds.    Pulmonary/Chest: Effort normal and breath sounds normal.   Abdominal: Soft. Bowel sounds are normal.   Psychiatric: He has a normal mood and affect.   Nursing note and vitals reviewed.      Assessment:       1. Anxiety    2. Cardiomyopathy, alcoholic    3. Chronic obstructive pulmonary disease, unspecified COPD type    4. Vitamin D deficiency        Plan:       Anxiety  -     ALPRAZolam (XANAX) 2 MG Tab; Take 1 tablet (2 mg total) by mouth 2 (two) times daily as needed.  Dispense: 60 tablet; Refill: 2    Cardiomyopathy, alcoholic    Chronic obstructive pulmonary disease, unspecified COPD type    Vitamin D deficiency    Will monitor chronic medical issues and continue current plan of  care.  Educated on vit d supplement.    Follow-up in about 3 months (around 5/2/2018), or if symptoms worsen or fail to improve.

## 2018-02-04 DIAGNOSIS — M25.562 CHRONIC PAIN OF BOTH KNEES: ICD-10-CM

## 2018-02-04 DIAGNOSIS — G89.29 CHRONIC PAIN OF BOTH KNEES: ICD-10-CM

## 2018-02-04 DIAGNOSIS — M45.9 ANKYLOSING SPONDYLITIS, UNSPECIFIED SITE OF SPINE: ICD-10-CM

## 2018-02-04 DIAGNOSIS — M25.561 CHRONIC PAIN OF BOTH KNEES: ICD-10-CM

## 2018-02-04 DIAGNOSIS — M46.46 LUMBAR DISCITIS: ICD-10-CM

## 2018-02-04 DIAGNOSIS — T78.40XA ALLERGIC REACTION, INITIAL ENCOUNTER: ICD-10-CM

## 2018-02-06 RX ORDER — BACLOFEN 20 MG/1
TABLET ORAL
Qty: 120 TABLET | Refills: 3 | Status: SHIPPED | OUTPATIENT
Start: 2018-02-06 | End: 2018-07-16 | Stop reason: SDUPTHER

## 2018-02-20 DIAGNOSIS — T78.40XA ALLERGIC REACTION, INITIAL ENCOUNTER: ICD-10-CM

## 2018-02-20 DIAGNOSIS — M46.46 LUMBAR DISCITIS: ICD-10-CM

## 2018-02-20 DIAGNOSIS — M45.9 ANKYLOSING SPONDYLITIS, UNSPECIFIED SITE OF SPINE: ICD-10-CM

## 2018-02-20 DIAGNOSIS — G89.29 CHRONIC PAIN OF BOTH KNEES: ICD-10-CM

## 2018-02-20 DIAGNOSIS — M25.561 CHRONIC PAIN OF BOTH KNEES: ICD-10-CM

## 2018-02-20 DIAGNOSIS — M25.562 CHRONIC PAIN OF BOTH KNEES: ICD-10-CM

## 2018-02-20 RX ORDER — HYDROCODONE BITARTRATE AND ACETAMINOPHEN 10; 325 MG/1; MG/1
TABLET ORAL
Qty: 120 TABLET | Refills: 0 | Status: SHIPPED | OUTPATIENT
Start: 2018-02-20 | End: 2018-03-05 | Stop reason: SDUPTHER

## 2018-03-05 ENCOUNTER — HOSPITAL ENCOUNTER (OUTPATIENT)
Dept: RADIOLOGY | Facility: HOSPITAL | Age: 38
Discharge: HOME | End: 2018-03-05
Attending: INTERNAL MEDICINE
Payer: MEDICARE

## 2018-03-05 ENCOUNTER — OFFICE VISIT (OUTPATIENT)
Dept: RHEUMATOLOGY | Facility: CLINIC | Age: 38
End: 2018-03-05
Payer: MEDICARE

## 2018-03-05 VITALS
SYSTOLIC BLOOD PRESSURE: 139 MMHG | HEART RATE: 85 BPM | BODY MASS INDEX: 22.95 KG/M2 | DIASTOLIC BLOOD PRESSURE: 94 MMHG | WEIGHT: 173.94 LBS

## 2018-03-05 DIAGNOSIS — M47.817 LUMBAR AND SACRAL ARTHRITIS: ICD-10-CM

## 2018-03-05 DIAGNOSIS — G89.29 CHRONIC PAIN OF BOTH KNEES: ICD-10-CM

## 2018-03-05 DIAGNOSIS — E55.9 VITAMIN D DEFICIENCY DISEASE: Primary | ICD-10-CM

## 2018-03-05 DIAGNOSIS — M25.561 CHRONIC PAIN OF BOTH KNEES: ICD-10-CM

## 2018-03-05 DIAGNOSIS — M25.562 CHRONIC PAIN OF BOTH KNEES: ICD-10-CM

## 2018-03-05 DIAGNOSIS — M45.9 ANKYLOSING SPONDYLITIS, UNSPECIFIED SITE OF SPINE: ICD-10-CM

## 2018-03-05 DIAGNOSIS — M46.46 LUMBAR DISCITIS: ICD-10-CM

## 2018-03-05 DIAGNOSIS — E55.9 VITAMIN D DEFICIENCY DISEASE: ICD-10-CM

## 2018-03-05 PROCEDURE — 99999 PR PBB SHADOW E&M-EST. PATIENT-LVL III: CPT | Mod: PBBFAC,,, | Performed by: INTERNAL MEDICINE

## 2018-03-05 PROCEDURE — 99213 OFFICE O/P EST LOW 20 MIN: CPT | Mod: PBBFAC,25,PO | Performed by: INTERNAL MEDICINE

## 2018-03-05 PROCEDURE — 72114 X-RAY EXAM L-S SPINE BENDING: CPT | Mod: 26,,, | Performed by: RADIOLOGY

## 2018-03-05 PROCEDURE — 72040 X-RAY EXAM NECK SPINE 2-3 VW: CPT | Mod: TC,PO

## 2018-03-05 PROCEDURE — 72070 X-RAY EXAM THORAC SPINE 2VWS: CPT | Mod: 26,,, | Performed by: RADIOLOGY

## 2018-03-05 PROCEDURE — 99214 OFFICE O/P EST MOD 30 MIN: CPT | Mod: S$PBB,,, | Performed by: INTERNAL MEDICINE

## 2018-03-05 PROCEDURE — 72040 X-RAY EXAM NECK SPINE 2-3 VW: CPT | Mod: 26,,, | Performed by: RADIOLOGY

## 2018-03-05 PROCEDURE — 72070 X-RAY EXAM THORAC SPINE 2VWS: CPT | Mod: TC,PO

## 2018-03-05 PROCEDURE — 72114 X-RAY EXAM L-S SPINE BENDING: CPT | Mod: TC,PO

## 2018-03-05 RX ORDER — HYDROCODONE BITARTRATE AND ACETAMINOPHEN 10; 325 MG/1; MG/1
TABLET ORAL
Qty: 120 TABLET | Refills: 0 | Status: SHIPPED | OUTPATIENT
Start: 2018-04-05 | End: 2018-05-15 | Stop reason: SDUPTHER

## 2018-03-05 RX ORDER — HYDROCODONE BITARTRATE AND ACETAMINOPHEN 10; 325 MG/1; MG/1
TABLET ORAL
Qty: 120 TABLET | Refills: 0 | Status: SHIPPED | OUTPATIENT
Start: 2018-03-05 | End: 2018-03-05 | Stop reason: SDUPTHER

## 2018-03-05 RX ORDER — PREDNISONE 5 MG/1
5 TABLET ORAL DAILY PRN
Qty: 30 TABLET | Refills: 0 | Status: SHIPPED | OUTPATIENT
Start: 2018-03-05 | End: 2018-04-04

## 2018-03-05 RX ORDER — CLINDAMYCIN PHOSPHATE 11.9 MG/ML
SOLUTION TOPICAL
Refills: 2 | COMMUNITY
Start: 2018-02-15 | End: 2020-01-22

## 2018-03-05 RX ORDER — DOXYCYCLINE 100 MG/1
CAPSULE ORAL
Refills: 2 | COMMUNITY
Start: 2018-02-15 | End: 2018-07-27

## 2018-03-05 ASSESSMENT — ROUTINE ASSESSMENT OF PATIENT INDEX DATA (RAPID3)
AM STIFFNESS SCORE: 1, YES
MDHAQ FUNCTION SCORE: 1.5
PATIENT GLOBAL ASSESSMENT SCORE: 3
FATIGUE SCORE: 5
PAIN SCORE: 10
WHEN YOU AWAKENED IN THE MORNING OVER THE LAST WEEK, PLEASE INDICATE THE AMOUNT OF TIME IT TAKES UNTIL YOU ARE AS LIMBER AS YOU WILL BE FOR THE DAY: ONE HOUR AFTER WAKING
PSYCHOLOGICAL DISTRESS SCORE: 3.3
TOTAL RAPID3 SCORE: 6

## 2018-03-05 NOTE — PROGRESS NOTES
Subjective:       Patient ID: Umang Greer is a 37 y.o. male.    Chief Complaint: Follow-up     Follow up: on simponi finally improved, . And lower back pain. Fatigue due to situation. Patient complains of arthralgias and myalgias for which has been present for a few years. Pain is located in multiple joints, both shoulder(s), both elbow(s), both wrist(s), both MCP(s): 1st, 2nd, 3rd, 4th and 5th, both PIP(s): 1st, 2nd, 3rd, 4th and 5th, both DIP(s): 1st and 2nd, both hip(s), both knee(s) and both MTP(s): 1st, 2nd, 3rd, 4th and 5th, is described as aching, pulsating, shooting and throbbing, and is constant, moderate .        Review of Systems   Constitutional: Positive for activity change. Negative for appetite change and unexpected weight change.   HENT: Positive for sinus pressure. Negative for ear pain, facial swelling, rhinorrhea, sneezing and tinnitus.    Eyes: Negative for pain, discharge, redness, itching and visual disturbance.   Respiratory: Negative for apnea, chest tightness, shortness of breath and wheezing.    Cardiovascular: Negative for palpitations and leg swelling.   Gastrointestinal: Negative for constipation and diarrhea.   Endocrine: Negative for cold intolerance, heat intolerance, polydipsia and polyuria.   Genitourinary: Negative for decreased urine volume, difficulty urinating, flank pain, frequency, hematuria and urgency.   Musculoskeletal: Positive for back pain and neck stiffness. Negative for gait problem.   Skin: Negative for pallor and wound.   Allergic/Immunologic: Negative for immunocompromised state.   Neurological: Negative for dizziness, tremors, seizures and syncope.   Hematological: Negative for adenopathy. Does not bruise/bleed easily.   Psychiatric/Behavioral: Negative for sleep disturbance and suicidal ideas. The patient is not nervous/anxious.          Objective:     BP (!) 139/94 (BP Location: Right arm, Patient Position: Sitting, BP Method: Medium (Automatic))   Pulse  85   Wt 78.9 kg (173 lb 15.1 oz)   BMI 22.95 kg/m²      Physical Exam   Vitals reviewed.  Constitutional: He is oriented to person, place, and time. He appears distressed.   HENT:   Head: Normocephalic and atraumatic.   Mouth/Throat: Oropharynx is clear and moist.   Eyes: EOM are normal. Pupils are equal, round, and reactive to light.   Neck: Neck supple. No thyromegaly present.   Cardiovascular: Normal rate, regular rhythm and normal heart sounds.  Exam reveals no gallop and no friction rub.    No murmur heard.  Pulmonary/Chest: Breath sounds normal. He has no wheezes. He has no rales. He exhibits no tenderness.   Abdominal: There is no tenderness. There is no rebound and no guarding.       Right Side Rheumatological Exam     Examination finds the 2nd MCP, 3rd MCP, 4th MCP and 5th MCP normal.    The patient is tender to palpation of the shoulder, elbow, wrist, knee, 1st PIP, 1st MCP, 2nd PIP, 2nd MCP, 3rd PIP, 3rd MCP, 4th PIP, 4th MCP and 5th PIP    He has swelling of the elbow, wrist, knee, 1st PIP, 1st MCP, 2nd PIP, 2nd MCP, 3rd PIP, 3rd MCP, 4th PIP, 4th MCP, 5th PIP and 5th MCP    The patient has an enlarged wrist, knee, 1st PIP, 2nd PIP, 3rd PIP, 4th PIP and 5th PIP    Shoulder Exam   Tenderness Location: no tenderness    Range of Motion   Active Abduction: abnormal   Adduction: abnormal  Sensation: normal    Knee Exam   Tenderness Location: medial joint line and LCL    Range of Motion   Extension: normal   Flexion: normal   Patellofemoral Crepitus: positive  Effusion: positive  Sensation: normal    Hip Exam   Tenderness Location: posterior and anterior    Range of Motion   Extension: abnormal   Flexion: abnormal   Sensation: normal    Elbow/Wrist Exam   Tenderness Location: no tenderness    Range of Motion   Elbow   Flexion: normal   Sensation: normal    Muscle Strength (0-5 scale):  Neck Flexion:  3  Neck Extension: 3  Deltoid:  3  Biceps: 3/5   Triceps:  3  Quadriceps:  3   Distal Lower Extremity:  3    Left Side Rheumatological Exam     Examination finds the elbow, wrist, 1st MCP, 2nd MCP, 3rd MCP, 4th MCP and 5th MCP normal.    The patient is tender to palpation of the shoulder, elbow, wrist, knee, 1st PIP, 1st MCP, 2nd PIP, 2nd MCP, 3rd PIP, 3rd MCP, 4th PIP, 4th MCP, 5th PIP and 5th MCP.    He has swelling of the wrist, knee, 1st PIP, 1st MCP, 2nd PIP, 2nd MCP, 3rd PIP, 3rd MCP, 4th PIP, 4th MCP, 5th PIP and 5th MCP    The patient has an enlarged knee, 1st PIP, 2nd PIP, 3rd PIP, 4th PIP and 5th PIP.    Shoulder Exam   Tenderness Location: no tenderness    Range of Motion   Active Abduction: abnormal   Extension: abnormal   Sensation: normal    Knee Exam   Tenderness Location: lateral joint line and medial joint line    Range of Motion   Extension: normal   Flexion: normal     Patellofemoral Crepitus: positive  Effusion: positive  Sensation: normal    Hip Exam   Tenderness Location: posterior and anterior    Range of Motion   Extension: abnormal   Flexion: abnormal   Sensation: normal    Elbow/Wrist Exam     Range of Motion   Elbow   Flexion: normal   Sensation: normal    Muscle Strength (0-5 scale):  Neck Flexion:  3  Neck Extension: 3  Deltoid:  3  Biceps: 3/5   Triceps:  3  Quadriceps:  3   Distal Lower Extremity: 3      Back/Neck Exam   Tenderness Right paramedian tenderness of the Upper C-Spine, Lower L-Spine, SI Joint, Lower T-Spine and Upper L-Spine.Left paramedian tenderness of the Upper C-Spine, Lower L-Spine, Lower C-Spine, SI Joint and Upper L-Spine.    Back Range of Motion   Extension: abnormal  Flexion: abnormal  Lateral Bend Right: abnormal  Lateral Bend Left: abnormal  Rotation Right: abnormal  Rotation Left: abnormal    Neck Range of Motion   Flexion: Limited  Extension: Limited  Right Lateral Bend: abnormal  Left Lateral Bend: abnormal  Right Rotation: abnormal  Left Rotation: abnormal  Lymphadenopathy:     He has no cervical adenopathy.   Neurological: He is alert and oriented to person,  place, and time. He displays weakness. He exhibits abnormal muscle tone.   Reflex Scores:       Patellar reflexes are 2+ on the right side and 2+ on the left side.  Skin: No rash noted. No erythema. No pallor.     Psychiatric: Mood and affect normal.   Musculoskeletal: He exhibits edema, tenderness and deformity.   Overall improved             Results for orders placed or performed in visit on 12/22/17   C-reactive protein   Result Value Ref Range    CRP 5.9 0.0 - 8.2 mg/L   CBC auto differential   Result Value Ref Range    WBC 6.80 3.90 - 12.70 K/uL    RBC 4.73 4.60 - 6.20 M/uL    Hemoglobin 14.2 14.0 - 18.0 g/dL    Hematocrit 41.2 40.0 - 54.0 %    MCV 87 82 - 98 fL    MCH 30.0 27.0 - 31.0 pg    MCHC 34.5 32.0 - 36.0 g/dL    RDW 12.9 11.5 - 14.5 %    Platelets 274 150 - 350 K/uL    MPV 9.7 9.2 - 12.9 fL    Immature Granulocytes 0.1 0.0 - 0.5 %    Gran # (ANC) 3.1 1.8 - 7.7 K/uL    Immature Grans (Abs) 0.01 0.00 - 0.04 K/uL    Lymph # 3.1 1.0 - 4.8 K/uL    Mono # 0.4 0.3 - 1.0 K/uL    Eos # 0.2 0.0 - 0.5 K/uL    Baso # 0.06 0.00 - 0.20 K/uL    nRBC 0 0 /100 WBC    Gran% 46.0 38.0 - 73.0 %    Lymph% 44.9 18.0 - 48.0 %    Mono% 5.3 4.0 - 15.0 %    Eosinophil% 2.8 0.0 - 8.0 %    Basophil% 0.9 0.0 - 1.9 %    Differential Method Automated    Comprehensive metabolic panel   Result Value Ref Range    Sodium 141 136 - 145 mmol/L    Potassium 3.9 3.5 - 5.1 mmol/L    Chloride 106 95 - 110 mmol/L    CO2 26 23 - 29 mmol/L    Glucose 157 (H) 70 - 110 mg/dL    BUN, Bld 11 6 - 20 mg/dL    Creatinine 1.0 0.5 - 1.4 mg/dL    Calcium 9.3 8.7 - 10.5 mg/dL    Total Protein 7.2 6.0 - 8.4 g/dL    Albumin 3.7 3.5 - 5.2 g/dL    Total Bilirubin 0.4 0.1 - 1.0 mg/dL    Alkaline Phosphatase 96 55 - 135 U/L    AST 16 10 - 40 U/L    ALT 11 10 - 44 U/L    Anion Gap 9 8 - 16 mmol/L    eGFR if African American >60.0 >60 mL/min/1.73 m^2    eGFR if non African American >60.0 >60 mL/min/1.73 m^2   Vitamin D   Result Value Ref Range    Vit D, 25-Hydroxy  26 (L) 30 - 96 ng/mL   PTH, intact   Result Value Ref Range    PTH, Intact 44.0 9.0 - 77.0 pg/mL       Assessment:     Encounter Diagnoses   Name Primary?    Vitamin D deficiency disease Yes    Lumbar and sacral arthritis     Lumbar discitis     Ankylosing spondylitis, unspecified site of spine     Chronic pain of both knees          Plan:     Vitamin D deficiency disease  -     X-Ray Cervical Spine AP And Lateral; Future; Expected date: 03/05/2018  -     X-Ray Lumbar Complete With Flex And Ext; Future; Expected date: 03/05/2018  -     X-Ray Thoracic Spine AP Lateral; Future; Expected date: 03/05/2018  -     Discontinue: hydrocodone-acetaminophen 10-325mg (NORCO)  mg Tab; TK ONE T PO Q 6 H PRF PAIN  Dispense: 120 tablet; Refill: 0  -     Comprehensive metabolic panel; Future; Expected date: 03/05/2018  -     CBC auto differential; Future; Expected date: 03/05/2018  -     C-reactive protein; Future; Expected date: 03/05/2018  -     Sedimentation rate, manual; Future; Expected date: 03/05/2018  -     predniSONE (DELTASONE) 5 MG tablet; Take 1 tablet (5 mg total) by mouth daily as needed.  Dispense: 30 tablet; Refill: 0  -     hydrocodone-acetaminophen 10-325mg (NORCO)  mg Tab; TK ONE T PO Q 6 H PRF PAIN  Dispense: 120 tablet; Refill: 0    Lumbar and sacral arthritis  -     X-Ray Cervical Spine AP And Lateral; Future; Expected date: 03/05/2018  -     X-Ray Lumbar Complete With Flex And Ext; Future; Expected date: 03/05/2018  -     X-Ray Thoracic Spine AP Lateral; Future; Expected date: 03/05/2018  -     Discontinue: hydrocodone-acetaminophen 10-325mg (NORCO)  mg Tab; TK ONE T PO Q 6 H PRF PAIN  Dispense: 120 tablet; Refill: 0  -     Comprehensive metabolic panel; Future; Expected date: 03/05/2018  -     CBC auto differential; Future; Expected date: 03/05/2018  -     C-reactive protein; Future; Expected date: 03/05/2018  -     Sedimentation rate, manual; Future; Expected date: 03/05/2018  -      predniSONE (DELTASONE) 5 MG tablet; Take 1 tablet (5 mg total) by mouth daily as needed.  Dispense: 30 tablet; Refill: 0  -     hydrocodone-acetaminophen 10-325mg (NORCO)  mg Tab; TK ONE T PO Q 6 H PRF PAIN  Dispense: 120 tablet; Refill: 0    Lumbar discitis  -     X-Ray Cervical Spine AP And Lateral; Future; Expected date: 03/05/2018  -     X-Ray Lumbar Complete With Flex And Ext; Future; Expected date: 03/05/2018  -     X-Ray Thoracic Spine AP Lateral; Future; Expected date: 03/05/2018  -     Discontinue: hydrocodone-acetaminophen 10-325mg (NORCO)  mg Tab; TK ONE T PO Q 6 H PRF PAIN  Dispense: 120 tablet; Refill: 0  -     Comprehensive metabolic panel; Future; Expected date: 03/05/2018  -     CBC auto differential; Future; Expected date: 03/05/2018  -     C-reactive protein; Future; Expected date: 03/05/2018  -     Sedimentation rate, manual; Future; Expected date: 03/05/2018  -     predniSONE (DELTASONE) 5 MG tablet; Take 1 tablet (5 mg total) by mouth daily as needed.  Dispense: 30 tablet; Refill: 0  -     hydrocodone-acetaminophen 10-325mg (NORCO)  mg Tab; TK ONE T PO Q 6 H PRF PAIN  Dispense: 120 tablet; Refill: 0    Ankylosing spondylitis, unspecified site of spine  -     X-Ray Cervical Spine AP And Lateral; Future; Expected date: 03/05/2018  -     X-Ray Lumbar Complete With Flex And Ext; Future; Expected date: 03/05/2018  -     X-Ray Thoracic Spine AP Lateral; Future; Expected date: 03/05/2018  -     Discontinue: hydrocodone-acetaminophen 10-325mg (NORCO)  mg Tab; TK ONE T PO Q 6 H PRF PAIN  Dispense: 120 tablet; Refill: 0  -     Comprehensive metabolic panel; Future; Expected date: 03/05/2018  -     CBC auto differential; Future; Expected date: 03/05/2018  -     C-reactive protein; Future; Expected date: 03/05/2018  -     Sedimentation rate, manual; Future; Expected date: 03/05/2018  -     predniSONE (DELTASONE) 5 MG tablet; Take 1 tablet (5 mg total) by mouth daily as needed.   Dispense: 30 tablet; Refill: 0  -     hydrocodone-acetaminophen 10-325mg (NORCO)  mg Tab; TK ONE T PO Q 6 H PRF PAIN  Dispense: 120 tablet; Refill: 0    Chronic pain of both knees  -     X-Ray Cervical Spine AP And Lateral; Future; Expected date: 03/05/2018  -     X-Ray Lumbar Complete With Flex And Ext; Future; Expected date: 03/05/2018  -     X-Ray Thoracic Spine AP Lateral; Future; Expected date: 03/05/2018  -     Discontinue: hydrocodone-acetaminophen 10-325mg (NORCO)  mg Tab; TK ONE T PO Q 6 H PRF PAIN  Dispense: 120 tablet; Refill: 0  -     Comprehensive metabolic panel; Future; Expected date: 03/05/2018  -     CBC auto differential; Future; Expected date: 03/05/2018  -     C-reactive protein; Future; Expected date: 03/05/2018  -     Sedimentation rate, manual; Future; Expected date: 03/05/2018  -     predniSONE (DELTASONE) 5 MG tablet; Take 1 tablet (5 mg total) by mouth daily as needed.  Dispense: 30 tablet; Refill: 0  -     hydrocodone-acetaminophen 10-325mg (NORCO)  mg Tab; TK ONE T PO Q 6 H PRF PAIN  Dispense: 120 tablet; Refill: 0

## 2018-05-01 ENCOUNTER — PATIENT MESSAGE (OUTPATIENT)
Dept: FAMILY MEDICINE | Facility: CLINIC | Age: 38
End: 2018-05-01

## 2018-05-01 DIAGNOSIS — F41.9 ANXIETY: ICD-10-CM

## 2018-05-01 NOTE — TELEPHONE ENCOUNTER
Patient scheduled for 5/29 for med check appointment.     Is requesting refill until his appointment.   Advising that he must make the 5/29 appointment to continue the medication.     Order pended for your review.

## 2018-05-02 RX ORDER — ALPRAZOLAM 2 MG/1
2 TABLET ORAL 2 TIMES DAILY PRN
Qty: 60 TABLET | Refills: 2 | Status: SHIPPED | OUTPATIENT
Start: 2018-05-02 | End: 2018-08-01 | Stop reason: SDUPTHER

## 2018-05-15 DIAGNOSIS — E55.9 VITAMIN D DEFICIENCY DISEASE: ICD-10-CM

## 2018-05-15 DIAGNOSIS — M25.561 CHRONIC PAIN OF BOTH KNEES: ICD-10-CM

## 2018-05-15 DIAGNOSIS — M47.817 LUMBAR AND SACRAL ARTHRITIS: ICD-10-CM

## 2018-05-15 DIAGNOSIS — M45.9 ANKYLOSING SPONDYLITIS, UNSPECIFIED SITE OF SPINE: ICD-10-CM

## 2018-05-15 DIAGNOSIS — M46.46 LUMBAR DISCITIS: ICD-10-CM

## 2018-05-15 DIAGNOSIS — G89.29 CHRONIC PAIN OF BOTH KNEES: ICD-10-CM

## 2018-05-15 DIAGNOSIS — M25.562 CHRONIC PAIN OF BOTH KNEES: ICD-10-CM

## 2018-05-16 RX ORDER — HYDROCODONE BITARTRATE AND ACETAMINOPHEN 10; 325 MG/1; MG/1
TABLET ORAL
Qty: 120 TABLET | Refills: 0 | Status: SHIPPED | OUTPATIENT
Start: 2018-05-16 | End: 2018-06-14 | Stop reason: SDUPTHER

## 2018-05-29 ENCOUNTER — OFFICE VISIT (OUTPATIENT)
Dept: FAMILY MEDICINE | Facility: CLINIC | Age: 38
End: 2018-05-29
Payer: MEDICARE

## 2018-05-29 VITALS
HEIGHT: 73 IN | HEART RATE: 80 BPM | SYSTOLIC BLOOD PRESSURE: 122 MMHG | DIASTOLIC BLOOD PRESSURE: 80 MMHG | WEIGHT: 173.31 LBS | BODY MASS INDEX: 22.97 KG/M2

## 2018-05-29 DIAGNOSIS — F41.9 ANXIETY: ICD-10-CM

## 2018-05-29 DIAGNOSIS — J44.1 COPD EXACERBATION: Primary | ICD-10-CM

## 2018-05-29 PROCEDURE — 99999 PR PBB SHADOW E&M-EST. PATIENT-LVL III: CPT | Mod: PBBFAC,,, | Performed by: FAMILY MEDICINE

## 2018-05-29 PROCEDURE — 99214 OFFICE O/P EST MOD 30 MIN: CPT | Mod: S$PBB,,, | Performed by: FAMILY MEDICINE

## 2018-05-29 PROCEDURE — 99213 OFFICE O/P EST LOW 20 MIN: CPT | Mod: PBBFAC,PO | Performed by: FAMILY MEDICINE

## 2018-05-29 RX ORDER — CETIRIZINE HYDROCHLORIDE 10 MG/1
10 TABLET ORAL DAILY
COMMUNITY

## 2018-05-29 NOTE — PROGRESS NOTES
Subjective:       Patient ID: Umang Greer is a 37 y.o. male.    Chief Complaint: Anxiety (3 month follow up ); COPD; and Chest Congestion    Here for f/u chronic medical issues. States doing well overall.      Anxiety   Patient reports no chest pain, nervous/anxious behavior, palpitations, shortness of breath or suicidal ideas.       COPD   Pertinent negatives include no abdominal pain, chest pain, chills, coughing, fatigue, fever or rash.     Review of Systems   Constitutional: Negative for chills, fatigue and fever.   Respiratory: Negative for cough, chest tightness and shortness of breath.    Cardiovascular: Negative for chest pain, palpitations and leg swelling.   Gastrointestinal: Negative for abdominal distention and abdominal pain.   Endocrine: Negative for cold intolerance and heat intolerance.   Skin: Negative for rash.   Psychiatric/Behavioral: Negative for dysphoric mood and suicidal ideas. The patient is not nervous/anxious.        Objective:      Physical Exam   Constitutional: He appears well-developed and well-nourished.   HENT:   Head: Normocephalic and atraumatic.   Cardiovascular: Normal rate, regular rhythm and normal heart sounds.    Pulmonary/Chest: Effort normal and breath sounds normal.   Psychiatric: He has a normal mood and affect.   Nursing note and vitals reviewed.      Assessment:       1. COPD exacerbation    2. Anxiety        Plan:       COPD exacerbation    Anxiety      Just finished augmentin for derm issue so hold abx for now but call in 2 days if worsens and he agrees.  Will monitor chronic medical issues and continue current plan of care.  Stop smoking    Follow-up in about 4 months (around 9/29/2018), or if symptoms worsen or fail to improve.

## 2018-06-04 ENCOUNTER — PATIENT MESSAGE (OUTPATIENT)
Dept: FAMILY MEDICINE | Facility: CLINIC | Age: 38
End: 2018-06-04

## 2018-06-14 DIAGNOSIS — M25.562 CHRONIC PAIN OF BOTH KNEES: ICD-10-CM

## 2018-06-14 DIAGNOSIS — M45.9 ANKYLOSING SPONDYLITIS, UNSPECIFIED SITE OF SPINE: ICD-10-CM

## 2018-06-14 DIAGNOSIS — M46.46 LUMBAR DISCITIS: ICD-10-CM

## 2018-06-14 DIAGNOSIS — M25.561 CHRONIC PAIN OF BOTH KNEES: ICD-10-CM

## 2018-06-14 DIAGNOSIS — M47.817 LUMBAR AND SACRAL ARTHRITIS: ICD-10-CM

## 2018-06-14 DIAGNOSIS — E55.9 VITAMIN D DEFICIENCY DISEASE: ICD-10-CM

## 2018-06-14 DIAGNOSIS — G89.29 CHRONIC PAIN OF BOTH KNEES: ICD-10-CM

## 2018-06-15 RX ORDER — HYDROCODONE BITARTRATE AND ACETAMINOPHEN 10; 325 MG/1; MG/1
TABLET ORAL
Qty: 120 TABLET | Refills: 0 | Status: SHIPPED | OUTPATIENT
Start: 2018-06-15 | End: 2018-07-13 | Stop reason: SDUPTHER

## 2018-07-12 ENCOUNTER — LAB VISIT (OUTPATIENT)
Dept: LAB | Facility: HOSPITAL | Age: 38
End: 2018-07-12
Attending: INTERNAL MEDICINE
Payer: MEDICARE

## 2018-07-12 DIAGNOSIS — M45.9 ANKYLOSING SPONDYLITIS, UNSPECIFIED SITE OF SPINE: ICD-10-CM

## 2018-07-12 DIAGNOSIS — E55.9 VITAMIN D DEFICIENCY DISEASE: ICD-10-CM

## 2018-07-12 DIAGNOSIS — M47.817 LUMBAR AND SACRAL ARTHRITIS: ICD-10-CM

## 2018-07-12 DIAGNOSIS — G89.29 CHRONIC PAIN OF BOTH KNEES: ICD-10-CM

## 2018-07-12 DIAGNOSIS — M25.561 CHRONIC PAIN OF BOTH KNEES: ICD-10-CM

## 2018-07-12 DIAGNOSIS — M46.46 LUMBAR DISCITIS: ICD-10-CM

## 2018-07-12 DIAGNOSIS — M25.562 CHRONIC PAIN OF BOTH KNEES: ICD-10-CM

## 2018-07-12 LAB
ALBUMIN SERPL BCP-MCNC: 3.8 G/DL
ALP SERPL-CCNC: 124 U/L
ALT SERPL W/O P-5'-P-CCNC: 12 U/L
ANION GAP SERPL CALC-SCNC: 9 MMOL/L
AST SERPL-CCNC: 15 U/L
BASOPHILS # BLD AUTO: 0.06 K/UL
BASOPHILS NFR BLD: 0.4 %
BILIRUB SERPL-MCNC: 0.4 MG/DL
BUN SERPL-MCNC: 9 MG/DL
CALCIUM SERPL-MCNC: 9.7 MG/DL
CHLORIDE SERPL-SCNC: 104 MMOL/L
CO2 SERPL-SCNC: 28 MMOL/L
CREAT SERPL-MCNC: 0.9 MG/DL
CRP SERPL-MCNC: 7.5 MG/L
DIFFERENTIAL METHOD: ABNORMAL
EOSINOPHIL # BLD AUTO: 0.2 K/UL
EOSINOPHIL NFR BLD: 1.3 %
ERYTHROCYTE [DISTWIDTH] IN BLOOD BY AUTOMATED COUNT: 12.9 %
ERYTHROCYTE [SEDIMENTATION RATE] IN BLOOD BY WESTERGREN METHOD: 15 MM/HR
EST. GFR  (AFRICAN AMERICAN): >60 ML/MIN/1.73 M^2
EST. GFR  (NON AFRICAN AMERICAN): >60 ML/MIN/1.73 M^2
GLUCOSE SERPL-MCNC: 132 MG/DL
HCT VFR BLD AUTO: 42.7 %
HGB BLD-MCNC: 14 G/DL
IMM GRANULOCYTES # BLD AUTO: 0.04 K/UL
IMM GRANULOCYTES NFR BLD AUTO: 0.3 %
LYMPHOCYTES # BLD AUTO: 3.7 K/UL
LYMPHOCYTES NFR BLD: 25.6 %
MCH RBC QN AUTO: 29.5 PG
MCHC RBC AUTO-ENTMCNC: 32.8 G/DL
MCV RBC AUTO: 90 FL
MONOCYTES # BLD AUTO: 0.6 K/UL
MONOCYTES NFR BLD: 4.5 %
NEUTROPHILS # BLD AUTO: 9.7 K/UL
NEUTROPHILS NFR BLD: 67.9 %
NRBC BLD-RTO: 0 /100 WBC
PLATELET # BLD AUTO: 357 K/UL
PMV BLD AUTO: 9.4 FL
POTASSIUM SERPL-SCNC: 4 MMOL/L
PROT SERPL-MCNC: 7.5 G/DL
RBC # BLD AUTO: 4.75 M/UL
SODIUM SERPL-SCNC: 141 MMOL/L
WBC # BLD AUTO: 14.25 K/UL

## 2018-07-12 PROCEDURE — 80053 COMPREHEN METABOLIC PANEL: CPT

## 2018-07-12 PROCEDURE — 85651 RBC SED RATE NONAUTOMATED: CPT | Mod: PO

## 2018-07-12 PROCEDURE — 85025 COMPLETE CBC W/AUTO DIFF WBC: CPT

## 2018-07-12 PROCEDURE — 86140 C-REACTIVE PROTEIN: CPT

## 2018-07-12 PROCEDURE — 36415 COLL VENOUS BLD VENIPUNCTURE: CPT | Mod: PO

## 2018-07-13 DIAGNOSIS — M25.562 CHRONIC PAIN OF BOTH KNEES: ICD-10-CM

## 2018-07-13 DIAGNOSIS — E55.9 VITAMIN D DEFICIENCY DISEASE: ICD-10-CM

## 2018-07-13 DIAGNOSIS — G89.29 CHRONIC PAIN OF BOTH KNEES: ICD-10-CM

## 2018-07-13 DIAGNOSIS — M45.9 ANKYLOSING SPONDYLITIS, UNSPECIFIED SITE OF SPINE: ICD-10-CM

## 2018-07-13 DIAGNOSIS — M47.817 LUMBAR AND SACRAL ARTHRITIS: ICD-10-CM

## 2018-07-13 DIAGNOSIS — M46.46 LUMBAR DISCITIS: ICD-10-CM

## 2018-07-13 DIAGNOSIS — M25.561 CHRONIC PAIN OF BOTH KNEES: ICD-10-CM

## 2018-07-13 RX ORDER — HYDROCODONE BITARTRATE AND ACETAMINOPHEN 10; 325 MG/1; MG/1
TABLET ORAL
Qty: 120 TABLET | Refills: 0 | Status: SHIPPED | OUTPATIENT
Start: 2018-07-13 | End: 2018-07-27 | Stop reason: SDUPTHER

## 2018-07-16 DIAGNOSIS — G89.29 CHRONIC PAIN OF BOTH KNEES: ICD-10-CM

## 2018-07-16 DIAGNOSIS — M46.46 LUMBAR DISCITIS: ICD-10-CM

## 2018-07-16 DIAGNOSIS — M25.561 CHRONIC PAIN OF BOTH KNEES: ICD-10-CM

## 2018-07-16 DIAGNOSIS — T78.40XA ALLERGIC REACTION, INITIAL ENCOUNTER: ICD-10-CM

## 2018-07-16 DIAGNOSIS — M45.9 ANKYLOSING SPONDYLITIS, UNSPECIFIED SITE OF SPINE: ICD-10-CM

## 2018-07-16 DIAGNOSIS — M25.562 CHRONIC PAIN OF BOTH KNEES: ICD-10-CM

## 2018-07-16 RX ORDER — BACLOFEN 20 MG/1
10 TABLET ORAL 3 TIMES DAILY
Qty: 120 TABLET | Refills: 3 | Status: SHIPPED | OUTPATIENT
Start: 2018-07-16 | End: 2019-02-14 | Stop reason: SDUPTHER

## 2018-07-27 ENCOUNTER — OFFICE VISIT (OUTPATIENT)
Dept: RHEUMATOLOGY | Facility: CLINIC | Age: 38
End: 2018-07-27
Payer: MEDICARE

## 2018-07-27 VITALS
BODY MASS INDEX: 22.42 KG/M2 | SYSTOLIC BLOOD PRESSURE: 130 MMHG | HEIGHT: 73 IN | HEART RATE: 70 BPM | DIASTOLIC BLOOD PRESSURE: 87 MMHG | WEIGHT: 169.19 LBS

## 2018-07-27 DIAGNOSIS — M62.838 NECK MUSCLE SPASM: ICD-10-CM

## 2018-07-27 DIAGNOSIS — M47.817 LUMBAR AND SACRAL ARTHRITIS: ICD-10-CM

## 2018-07-27 DIAGNOSIS — M46.46 LUMBAR DISCITIS: ICD-10-CM

## 2018-07-27 DIAGNOSIS — M45.9 ANKYLOSING SPONDYLITIS, UNSPECIFIED SITE OF SPINE: Primary | ICD-10-CM

## 2018-07-27 DIAGNOSIS — E55.9 VITAMIN D DEFICIENCY DISEASE: ICD-10-CM

## 2018-07-27 DIAGNOSIS — G89.29 CHRONIC PAIN OF BOTH KNEES: ICD-10-CM

## 2018-07-27 DIAGNOSIS — M25.562 CHRONIC PAIN OF BOTH KNEES: ICD-10-CM

## 2018-07-27 DIAGNOSIS — M25.561 CHRONIC PAIN OF BOTH KNEES: ICD-10-CM

## 2018-07-27 PROCEDURE — 99214 OFFICE O/P EST MOD 30 MIN: CPT | Mod: S$PBB,,, | Performed by: INTERNAL MEDICINE

## 2018-07-27 PROCEDURE — 99999 PR PBB SHADOW E&M-EST. PATIENT-LVL III: CPT | Mod: PBBFAC,,, | Performed by: INTERNAL MEDICINE

## 2018-07-27 PROCEDURE — 99213 OFFICE O/P EST LOW 20 MIN: CPT | Mod: PBBFAC,PO | Performed by: INTERNAL MEDICINE

## 2018-07-27 RX ORDER — HYDROCODONE BITARTRATE AND ACETAMINOPHEN 10; 325 MG/1; MG/1
TABLET ORAL
Qty: 120 TABLET | Refills: 0 | Status: SHIPPED | OUTPATIENT
Start: 2018-08-10 | End: 2018-07-27 | Stop reason: SDUPTHER

## 2018-07-27 RX ORDER — HYDROCODONE BITARTRATE AND ACETAMINOPHEN 10; 325 MG/1; MG/1
TABLET ORAL
Qty: 120 TABLET | Refills: 0 | Status: SHIPPED | OUTPATIENT
Start: 2018-09-10 | End: 2018-07-27 | Stop reason: SDUPTHER

## 2018-07-27 RX ORDER — HYDROCODONE BITARTRATE AND ACETAMINOPHEN 10; 325 MG/1; MG/1
TABLET ORAL
Qty: 120 TABLET | Refills: 0 | Status: SHIPPED | OUTPATIENT
Start: 2018-10-10 | End: 2018-11-06 | Stop reason: SDUPTHER

## 2018-07-27 RX ORDER — DULOXETIN HYDROCHLORIDE 30 MG/1
CAPSULE, DELAYED RELEASE ORAL
Qty: 30 CAPSULE | Refills: 6 | Status: SHIPPED | OUTPATIENT
Start: 2018-07-27 | End: 2019-01-17 | Stop reason: SDUPTHER

## 2018-07-27 RX ORDER — PREDNISONE 2.5 MG/1
5 TABLET ORAL DAILY
Qty: 60 TABLET | Refills: 3 | Status: SHIPPED | OUTPATIENT
Start: 2018-07-27 | End: 2018-12-26

## 2018-07-27 ASSESSMENT — ROUTINE ASSESSMENT OF PATIENT INDEX DATA (RAPID3)
MDHAQ FUNCTION SCORE: 1.2
PAIN SCORE: 7.5
TOTAL RAPID3 SCORE: 6.17
PSYCHOLOGICAL DISTRESS SCORE: 3.3
PATIENT GLOBAL ASSESSMENT SCORE: 7

## 2018-07-27 NOTE — PROGRESS NOTES
Subjective:       Patient ID: Umang Greer is a 37 y.o. male.    Chief Complaint: Follow-up     Follow up: on simponi finally improved, he has lumbar back pain,  Fatigue due to situation. Patient complains of arthralgias and myalgias for which has been present for a few years. Pain is located in multiple joints, both shoulder(s), both elbow(s), both wrist(s), both MCP(s): 1st, 2nd, 3rd, 4th and 5th, both PIP(s): 1st, 2nd, 3rd, 4th and 5th, both DIP(s): 1st and 2nd, both hip(s), both knee(s) and both MTP(s): 1st, 2nd, 3rd, 4th and 5th, is described as aching, pulsating, shooting and throbbing, and is constant, moderate .   He is newly allergic to cereal,      Review of Systems   Constitutional: Positive for activity change and fatigue. Negative for appetite change and unexpected weight change.   HENT: Negative for ear pain, facial swelling, rhinorrhea, sinus pressure, sneezing and tinnitus.    Eyes: Negative for pain, discharge, redness, itching and visual disturbance.   Respiratory: Negative for apnea, chest tightness, shortness of breath and wheezing.    Cardiovascular: Negative for palpitations and leg swelling.   Gastrointestinal: Negative for constipation and diarrhea.   Endocrine: Negative for cold intolerance, heat intolerance, polydipsia and polyuria.   Genitourinary: Negative for decreased urine volume, difficulty urinating, flank pain, frequency, hematuria and urgency.   Musculoskeletal: Positive for back pain, gait problem, joint swelling, neck pain and neck stiffness.   Skin: Negative for pallor and wound.   Allergic/Immunologic: Negative for immunocompromised state.   Neurological: Negative for dizziness, tremors, seizures and syncope.   Hematological: Negative for adenopathy. Does not bruise/bleed easily.   Psychiatric/Behavioral: Negative for sleep disturbance and suicidal ideas. The patient is not nervous/anxious.          Objective:     /87 (BP Location: Left arm, Patient Position:  "Sitting, BP Method: Medium (Automatic))   Pulse 70   Ht 6' 1" (1.854 m)   Wt 76.8 kg (169 lb 3.3 oz)   BMI 22.32 kg/m²      Physical Exam   Vitals reviewed.  Constitutional: He is oriented to person, place, and time. No distress.   HENT:   Head: Normocephalic and atraumatic.   Mouth/Throat: Oropharynx is clear and moist.   Eyes: EOM are normal. Pupils are equal, round, and reactive to light.   Neck: Neck supple. No thyromegaly present.   Cardiovascular: Normal rate, regular rhythm and normal heart sounds.  Exam reveals no gallop and no friction rub.    No murmur heard.  Pulmonary/Chest: Breath sounds normal. He has no wheezes. He has no rales. He exhibits no tenderness.   Abdominal: There is no tenderness. There is no rebound and no guarding.       Right Side Rheumatological Exam     Examination finds the 2nd MCP, 3rd MCP, 4th MCP and 5th MCP normal.    The patient is tender to palpation of the shoulder, elbow, wrist, knee, 1st PIP, 1st MCP, 2nd PIP, 2nd MCP, 3rd PIP, 3rd MCP, 4th PIP, 4th MCP and 5th PIP    He has swelling of the elbow, wrist, knee, 1st PIP, 1st MCP, 2nd PIP, 2nd MCP, 3rd PIP, 3rd MCP, 4th PIP, 4th MCP, 5th PIP and 5th MCP    The patient has an enlarged wrist, knee, 1st PIP, 2nd PIP, 3rd PIP, 4th PIP and 5th PIP    Shoulder Exam   Tenderness Location: no tenderness    Range of Motion   Active Abduction: abnormal   Adduction: abnormal  Sensation: normal    Knee Exam   Tenderness Location: medial joint line and LCL    Range of Motion   Extension: normal   Flexion: normal   Patellofemoral Crepitus: positive  Effusion: positive  Sensation: normal    Hip Exam   Tenderness Location: posterior and anterior    Range of Motion   Extension: abnormal   Flexion: abnormal   Sensation: normal    Elbow/Wrist Exam   Tenderness Location: no tenderness    Range of Motion   Elbow   Flexion: normal   Sensation: normal    Muscle Strength (0-5 scale):  Neck Flexion:  3  Neck Extension: 3  Deltoid:  3  Biceps: 3/5 "   Triceps:  3  Quadriceps:  3   Distal Lower Extremity: 3    Left Side Rheumatological Exam     Examination finds the elbow, wrist, 1st MCP, 2nd MCP, 3rd MCP, 4th MCP and 5th MCP normal.    The patient is tender to palpation of the shoulder, elbow, wrist, knee, 1st PIP, 1st MCP, 2nd PIP, 2nd MCP, 3rd PIP, 3rd MCP, 4th PIP, 4th MCP, 5th PIP and 5th MCP.    He has swelling of the wrist, knee, 1st PIP, 1st MCP, 2nd PIP, 2nd MCP, 3rd PIP, 3rd MCP, 4th PIP, 4th MCP, 5th PIP and 5th MCP    The patient has an enlarged knee, 1st PIP, 2nd PIP, 3rd PIP, 4th PIP and 5th PIP.    Shoulder Exam   Tenderness Location: no tenderness    Range of Motion   Active Abduction: abnormal   Extension: abnormal   Sensation: normal    Knee Exam   Tenderness Location: lateral joint line and medial joint line    Range of Motion   Extension: normal   Flexion: normal     Patellofemoral Crepitus: positive  Effusion: positive  Sensation: normal    Hip Exam   Tenderness Location: posterior and anterior    Range of Motion   Extension: abnormal   Flexion: abnormal   Sensation: normal    Elbow/Wrist Exam     Range of Motion   Elbow   Flexion: normal   Sensation: normal    Muscle Strength (0-5 scale):  Neck Flexion:  3  Neck Extension: 3  Deltoid:  3  Biceps: 3/5   Triceps:  3  Quadriceps:  3   Distal Lower Extremity: 3      Back/Neck Exam   Tenderness Right paramedian tenderness of the Upper C-Spine, Lower L-Spine, SI Joint, Lower T-Spine and Upper L-Spine.Left paramedian tenderness of the Upper C-Spine, Lower L-Spine, Lower C-Spine, SI Joint and Upper L-Spine.    Back Range of Motion   Extension: abnormal  Flexion: abnormal  Lateral Bend Right: abnormal  Lateral Bend Left: abnormal  Rotation Right: abnormal  Rotation Left: abnormal    Neck Range of Motion   Flexion: Limited  Extension: Limited  Right Lateral Bend: abnormal  Left Lateral Bend: abnormal  Right Rotation: abnormal  Left Rotation: abnormal  Lymphadenopathy:     He has no cervical  adenopathy.   Neurological: He is alert and oriented to person, place, and time. He displays weakness. He exhibits abnormal muscle tone.   Reflex Scores:       Patellar reflexes are 2+ on the right side and 2+ on the left side.  Skin: No rash noted. No erythema. No pallor.     Psychiatric: Mood and affect normal.   Musculoskeletal: He exhibits tenderness and deformity. He exhibits no edema.   Lumbar spine,             Results for orders placed or performed in visit on 07/12/18   Comprehensive metabolic panel   Result Value Ref Range    Sodium 141 136 - 145 mmol/L    Potassium 4.0 3.5 - 5.1 mmol/L    Chloride 104 95 - 110 mmol/L    CO2 28 23 - 29 mmol/L    Glucose 132 (H) 70 - 110 mg/dL    BUN, Bld 9 6 - 20 mg/dL    Creatinine 0.9 0.5 - 1.4 mg/dL    Calcium 9.7 8.7 - 10.5 mg/dL    Total Protein 7.5 6.0 - 8.4 g/dL    Albumin 3.8 3.5 - 5.2 g/dL    Total Bilirubin 0.4 0.1 - 1.0 mg/dL    Alkaline Phosphatase 124 55 - 135 U/L    AST 15 10 - 40 U/L    ALT 12 10 - 44 U/L    Anion Gap 9 8 - 16 mmol/L    eGFR if African American >60.0 >60 mL/min/1.73 m^2    eGFR if non African American >60.0 >60 mL/min/1.73 m^2   CBC auto differential   Result Value Ref Range    WBC 14.25 (H) 3.90 - 12.70 K/uL    RBC 4.75 4.60 - 6.20 M/uL    Hemoglobin 14.0 14.0 - 18.0 g/dL    Hematocrit 42.7 40.0 - 54.0 %    MCV 90 82 - 98 fL    MCH 29.5 27.0 - 31.0 pg    MCHC 32.8 32.0 - 36.0 g/dL    RDW 12.9 11.5 - 14.5 %    Platelets 357 (H) 150 - 350 K/uL    MPV 9.4 9.2 - 12.9 fL    Immature Granulocytes 0.3 0.0 - 0.5 %    Gran # (ANC) 9.7 (H) 1.8 - 7.7 K/uL    Immature Grans (Abs) 0.04 0.00 - 0.04 K/uL    Lymph # 3.7 1.0 - 4.8 K/uL    Mono # 0.6 0.3 - 1.0 K/uL    Eos # 0.2 0.0 - 0.5 K/uL    Baso # 0.06 0.00 - 0.20 K/uL    nRBC 0 0 /100 WBC    Gran% 67.9 38.0 - 73.0 %    Lymph% 25.6 18.0 - 48.0 %    Mono% 4.5 4.0 - 15.0 %    Eosinophil% 1.3 0.0 - 8.0 %    Basophil% 0.4 0.0 - 1.9 %    Differential Method Automated    C-reactive protein   Result Value Ref  Range    CRP 7.5 0.0 - 8.2 mg/L   Sedimentation rate, manual   Result Value Ref Range    Sed Rate 15 (H) 0 - 10 mm/Hr       Assessment:     Encounter Diagnoses   Name Primary?    Ankylosing spondylitis, unspecified site of spine Yes    Lumbar and sacral arthritis     Neck muscle spasm     Lumbar discitis     Chronic pain of both knees     Vitamin D deficiency disease          Plan:     Ankylosing spondylitis, unspecified site of spine  -     DULoxetine (CYMBALTA) 30 MG capsule; TAKE 1 CAPSULE(30 MG) BY MOUTH EVERY DAY FOR BACK PAIN  Dispense: 30 capsule; Refill: 6  -     Discontinue: HYDROcodone-acetaminophen (NORCO)  mg per tablet; TK ONE T PO Q 6 H PRF PAIN  Dispense: 120 tablet; Refill: 0  -     Discontinue: HYDROcodone-acetaminophen (NORCO)  mg per tablet; TK ONE T PO Q 6 H PRF PAIN  Dispense: 120 tablet; Refill: 0  -     HYDROcodone-acetaminophen (NORCO)  mg per tablet; TK ONE T PO Q 6 H PRF PAIN  Dispense: 120 tablet; Refill: 0  -     CBC auto differential; Future; Expected date: 07/27/2018  -     Comprehensive metabolic panel; Future; Expected date: 07/27/2018  -     C-reactive protein; Future; Expected date: 07/27/2018  -     Sedimentation rate; Future; Expected date: 07/27/2018    Lumbar and sacral arthritis  -     DULoxetine (CYMBALTA) 30 MG capsule; TAKE 1 CAPSULE(30 MG) BY MOUTH EVERY DAY FOR BACK PAIN  Dispense: 30 capsule; Refill: 6  -     Discontinue: HYDROcodone-acetaminophen (NORCO)  mg per tablet; TK ONE T PO Q 6 H PRF PAIN  Dispense: 120 tablet; Refill: 0  -     Discontinue: HYDROcodone-acetaminophen (NORCO)  mg per tablet; TK ONE T PO Q 6 H PRF PAIN  Dispense: 120 tablet; Refill: 0  -     HYDROcodone-acetaminophen (NORCO)  mg per tablet; TK ONE T PO Q 6 H PRF PAIN  Dispense: 120 tablet; Refill: 0  -     CBC auto differential; Future; Expected date: 07/27/2018  -     Comprehensive metabolic panel; Future; Expected date: 07/27/2018  -     C-reactive  protein; Future; Expected date: 07/27/2018  -     Sedimentation rate; Future; Expected date: 07/27/2018    Neck muscle spasm  -     CBC auto differential; Future; Expected date: 07/27/2018  -     Comprehensive metabolic panel; Future; Expected date: 07/27/2018  -     C-reactive protein; Future; Expected date: 07/27/2018  -     Sedimentation rate; Future; Expected date: 07/27/2018    Lumbar discitis  -     Discontinue: HYDROcodone-acetaminophen (NORCO)  mg per tablet; TK ONE T PO Q 6 H PRF PAIN  Dispense: 120 tablet; Refill: 0  -     Discontinue: HYDROcodone-acetaminophen (NORCO)  mg per tablet; TK ONE T PO Q 6 H PRF PAIN  Dispense: 120 tablet; Refill: 0  -     HYDROcodone-acetaminophen (NORCO)  mg per tablet; TK ONE T PO Q 6 H PRF PAIN  Dispense: 120 tablet; Refill: 0  -     CBC auto differential; Future; Expected date: 07/27/2018  -     Comprehensive metabolic panel; Future; Expected date: 07/27/2018  -     C-reactive protein; Future; Expected date: 07/27/2018  -     Sedimentation rate; Future; Expected date: 07/27/2018    Chronic pain of both knees  -     Discontinue: HYDROcodone-acetaminophen (NORCO)  mg per tablet; TK ONE T PO Q 6 H PRF PAIN  Dispense: 120 tablet; Refill: 0  -     Discontinue: HYDROcodone-acetaminophen (NORCO)  mg per tablet; TK ONE T PO Q 6 H PRF PAIN  Dispense: 120 tablet; Refill: 0  -     HYDROcodone-acetaminophen (NORCO)  mg per tablet; TK ONE T PO Q 6 H PRF PAIN  Dispense: 120 tablet; Refill: 0  -     CBC auto differential; Future; Expected date: 07/27/2018  -     Comprehensive metabolic panel; Future; Expected date: 07/27/2018  -     C-reactive protein; Future; Expected date: 07/27/2018  -     Sedimentation rate; Future; Expected date: 07/27/2018    Vitamin D deficiency disease  -     Discontinue: HYDROcodone-acetaminophen (NORCO)  mg per tablet; TK ONE T PO Q 6 H PRF PAIN  Dispense: 120 tablet; Refill: 0  -     Discontinue:  HYDROcodone-acetaminophen (NORCO)  mg per tablet; TK ONE T PO Q 6 H PRF PAIN  Dispense: 120 tablet; Refill: 0  -     HYDROcodone-acetaminophen (NORCO)  mg per tablet; TK ONE T PO Q 6 H PRF PAIN  Dispense: 120 tablet; Refill: 0  -     CBC auto differential; Future; Expected date: 07/27/2018  -     Comprehensive metabolic panel; Future; Expected date: 07/27/2018  -     C-reactive protein; Future; Expected date: 07/27/2018  -     Sedimentation rate; Future; Expected date: 07/27/2018    Other orders  -     predniSONE (DELTASONE) 2.5 MG tablet; Take 2 tablets (5 mg total) by mouth once daily.  Dispense: 60 tablet; Refill: 3

## 2018-08-01 ENCOUNTER — PATIENT MESSAGE (OUTPATIENT)
Dept: FAMILY MEDICINE | Facility: CLINIC | Age: 38
End: 2018-08-01

## 2018-08-01 DIAGNOSIS — F41.9 ANXIETY: ICD-10-CM

## 2018-08-02 RX ORDER — ALPRAZOLAM 2 MG/1
2 TABLET ORAL 2 TIMES DAILY PRN
Qty: 60 TABLET | Refills: 2 | Status: SHIPPED | OUTPATIENT
Start: 2018-08-02 | End: 2018-11-01 | Stop reason: SDUPTHER

## 2018-08-20 RX ORDER — GOLIMUMAB 50 MG/.5ML
INJECTION, SOLUTION SUBCUTANEOUS
Qty: 0.5 ML | Refills: 6 | Status: SHIPPED | OUTPATIENT
Start: 2018-08-20 | End: 2019-03-12 | Stop reason: SDUPTHER

## 2018-08-29 DIAGNOSIS — I42.9 CARDIOMYOPATHY, UNSPECIFIED TYPE: ICD-10-CM

## 2018-08-29 RX ORDER — CARVEDILOL 3.12 MG/1
TABLET ORAL
Qty: 60 TABLET | Refills: 0 | Status: SHIPPED | OUTPATIENT
Start: 2018-08-29 | End: 2018-09-26 | Stop reason: SDUPTHER

## 2018-09-26 DIAGNOSIS — I42.9 CARDIOMYOPATHY, UNSPECIFIED TYPE: ICD-10-CM

## 2018-09-26 RX ORDER — CARVEDILOL 3.12 MG/1
TABLET ORAL
Qty: 60 TABLET | Refills: 0 | Status: SHIPPED | OUTPATIENT
Start: 2018-09-26 | End: 2018-10-24 | Stop reason: SDUPTHER

## 2018-09-27 ENCOUNTER — OFFICE VISIT (OUTPATIENT)
Dept: FAMILY MEDICINE | Facility: CLINIC | Age: 38
End: 2018-09-27
Payer: MEDICARE

## 2018-09-27 VITALS
HEART RATE: 78 BPM | DIASTOLIC BLOOD PRESSURE: 80 MMHG | OXYGEN SATURATION: 98 % | BODY MASS INDEX: 22.26 KG/M2 | WEIGHT: 168 LBS | HEIGHT: 73 IN | SYSTOLIC BLOOD PRESSURE: 118 MMHG | RESPIRATION RATE: 18 BRPM

## 2018-09-27 DIAGNOSIS — F41.9 ANXIETY: ICD-10-CM

## 2018-09-27 DIAGNOSIS — J44.9 CHRONIC OBSTRUCTIVE PULMONARY DISEASE, UNSPECIFIED COPD TYPE: Primary | ICD-10-CM

## 2018-09-27 PROCEDURE — 99213 OFFICE O/P EST LOW 20 MIN: CPT | Mod: PBBFAC,PO | Performed by: FAMILY MEDICINE

## 2018-09-27 PROCEDURE — 99999 PR PBB SHADOW E&M-EST. PATIENT-LVL III: CPT | Mod: PBBFAC,,, | Performed by: FAMILY MEDICINE

## 2018-09-27 PROCEDURE — 99214 OFFICE O/P EST MOD 30 MIN: CPT | Mod: S$PBB,,, | Performed by: FAMILY MEDICINE

## 2018-09-27 RX ORDER — BUDESONIDE AND FORMOTEROL FUMARATE DIHYDRATE 160; 4.5 UG/1; UG/1
AEROSOL RESPIRATORY (INHALATION)
Qty: 10.2 G | Refills: 11 | Status: SHIPPED | OUTPATIENT
Start: 2018-09-27 | End: 2019-09-01 | Stop reason: SDUPTHER

## 2018-09-27 NOTE — PROGRESS NOTES
Subjective:       Patient ID: Umang Greer is a 38 y.o. male.    Chief Complaint: Follow-up (medication refill)    Here for f/u copd and anxiety. Doing well.  Copd stable. Anxiety stable on current meds.      Review of Systems   Constitutional: Negative for chills, fatigue and fever.   Respiratory: Negative for cough, chest tightness and shortness of breath.    Cardiovascular: Negative for chest pain, palpitations and leg swelling.   Gastrointestinal: Negative for abdominal distention and abdominal pain.   Endocrine: Negative for cold intolerance and heat intolerance.   Skin: Negative for rash.   Psychiatric/Behavioral: Negative for dysphoric mood. The patient is not nervous/anxious.        Objective:      Physical Exam   Constitutional: He appears well-developed and well-nourished.   HENT:   Head: Normocephalic and atraumatic.   Cardiovascular: Normal rate, regular rhythm and normal heart sounds.   Pulmonary/Chest: Effort normal and breath sounds normal.   Psychiatric: He has a normal mood and affect.   Nursing note and vitals reviewed.      Assessment:       1. Chronic obstructive pulmonary disease, unspecified COPD type    2. Anxiety        Plan:       Chronic obstructive pulmonary disease, unspecified COPD type  -     budesonide-formoterol 160-4.5 mcg (SYMBICORT) 160-4.5 mcg/actuation HFAA; Controller  Dispense: 10.2 g; Refill: 11    Anxiety      Will monitor chronic medical issues and continue current plan of care.  Stable on current benzo dose. database ok.    Follow-up in about 4 months (around 1/27/2019), or if symptoms worsen or fail to improve.     Wound class 1. bilateral direct inguinal hernias

## 2018-10-24 DIAGNOSIS — I42.9 CARDIOMYOPATHY, UNSPECIFIED TYPE: ICD-10-CM

## 2018-10-24 RX ORDER — CARVEDILOL 3.12 MG/1
TABLET ORAL
Qty: 60 TABLET | Refills: 0 | Status: SHIPPED | OUTPATIENT
Start: 2018-10-24 | End: 2018-11-20 | Stop reason: SDUPTHER

## 2018-11-01 ENCOUNTER — PATIENT MESSAGE (OUTPATIENT)
Dept: FAMILY MEDICINE | Facility: CLINIC | Age: 38
End: 2018-11-01

## 2018-11-01 DIAGNOSIS — F41.9 ANXIETY: ICD-10-CM

## 2018-11-03 RX ORDER — ALPRAZOLAM 2 MG/1
2 TABLET ORAL 2 TIMES DAILY PRN
Qty: 60 TABLET | Refills: 2 | Status: SHIPPED | OUTPATIENT
Start: 2018-11-03 | End: 2019-01-30 | Stop reason: SDUPTHER

## 2018-11-06 DIAGNOSIS — M46.46 LUMBAR DISCITIS: ICD-10-CM

## 2018-11-06 DIAGNOSIS — G89.29 CHRONIC PAIN OF BOTH KNEES: ICD-10-CM

## 2018-11-06 DIAGNOSIS — M47.817 LUMBAR AND SACRAL ARTHRITIS: ICD-10-CM

## 2018-11-06 DIAGNOSIS — M45.9 ANKYLOSING SPONDYLITIS, UNSPECIFIED SITE OF SPINE: ICD-10-CM

## 2018-11-06 DIAGNOSIS — M25.562 CHRONIC PAIN OF BOTH KNEES: ICD-10-CM

## 2018-11-06 DIAGNOSIS — M25.561 CHRONIC PAIN OF BOTH KNEES: ICD-10-CM

## 2018-11-06 DIAGNOSIS — E55.9 VITAMIN D DEFICIENCY DISEASE: ICD-10-CM

## 2018-11-07 ENCOUNTER — PATIENT MESSAGE (OUTPATIENT)
Dept: RHEUMATOLOGY | Facility: CLINIC | Age: 38
End: 2018-11-07

## 2018-11-09 ENCOUNTER — PATIENT MESSAGE (OUTPATIENT)
Dept: RHEUMATOLOGY | Facility: CLINIC | Age: 38
End: 2018-11-09

## 2018-11-09 RX ORDER — HYDROCODONE BITARTRATE AND ACETAMINOPHEN 10; 325 MG/1; MG/1
TABLET ORAL
Qty: 120 TABLET | Refills: 0 | Status: SHIPPED | OUTPATIENT
Start: 2018-11-09 | End: 2018-12-07 | Stop reason: SDUPTHER

## 2018-11-20 ENCOUNTER — OFFICE VISIT (OUTPATIENT)
Dept: CARDIOLOGY | Facility: CLINIC | Age: 38
End: 2018-11-20
Payer: MEDICARE

## 2018-11-20 VITALS
BODY MASS INDEX: 23.11 KG/M2 | SYSTOLIC BLOOD PRESSURE: 135 MMHG | DIASTOLIC BLOOD PRESSURE: 86 MMHG | HEART RATE: 93 BPM | HEIGHT: 73 IN | WEIGHT: 174.38 LBS

## 2018-11-20 DIAGNOSIS — I42.6 CARDIOMYOPATHY, ALCOHOLIC: Primary | ICD-10-CM

## 2018-11-20 DIAGNOSIS — I42.9 CARDIOMYOPATHY, UNSPECIFIED TYPE: ICD-10-CM

## 2018-11-20 DIAGNOSIS — F17.200 TOBACCO DEPENDENCE: ICD-10-CM

## 2018-11-20 PROCEDURE — 99213 OFFICE O/P EST LOW 20 MIN: CPT | Mod: PBBFAC,PO | Performed by: INTERNAL MEDICINE

## 2018-11-20 PROCEDURE — 99999 PR PBB SHADOW E&M-EST. PATIENT-LVL III: CPT | Mod: PBBFAC,,, | Performed by: INTERNAL MEDICINE

## 2018-11-20 PROCEDURE — 99214 OFFICE O/P EST MOD 30 MIN: CPT | Mod: S$PBB,,, | Performed by: INTERNAL MEDICINE

## 2018-11-20 RX ORDER — CARVEDILOL 3.12 MG/1
TABLET ORAL
Qty: 60 TABLET | Refills: 11 | Status: SHIPPED | OUTPATIENT
Start: 2018-11-20 | End: 2019-11-20 | Stop reason: SDUPTHER

## 2018-11-20 NOTE — PROGRESS NOTES
Subjective:    Patient ID:  Umang Greer is a 38 y.o. male who presents for follow-up of CMP    HPI  He comes for follow up with no major problems, no chest pain, no shortness of breath.  FC II    Review of Systems   Constitution: Negative for decreased appetite, weakness, malaise/fatigue, weight gain and weight loss.   Cardiovascular: Negative for chest pain, dyspnea on exertion, leg swelling, palpitations and syncope.   Respiratory: Negative for cough and shortness of breath.    Gastrointestinal: Negative.    All other systems reviewed and are negative.       Objective:      Physical Exam   Constitutional: He is oriented to person, place, and time. He appears well-developed and well-nourished.   HENT:   Head: Normocephalic.   Eyes: Pupils are equal, round, and reactive to light.   Neck: Normal range of motion. Neck supple. No JVD present. Carotid bruit is not present. No thyromegaly present.   Cardiovascular: Normal rate, regular rhythm, normal heart sounds, intact distal pulses and normal pulses. PMI is not displaced. Exam reveals no gallop.   No murmur heard.  Pulmonary/Chest: Effort normal and breath sounds normal.   Abdominal: Soft. Normal appearance. He exhibits no mass. There is no hepatosplenomegaly. There is no tenderness.   Musculoskeletal: Normal range of motion. He exhibits no edema.   Neurological: He is alert and oriented to person, place, and time. He has normal strength and normal reflexes. No sensory deficit.   Skin: Skin is warm and intact.   Psychiatric: He has a normal mood and affect.   Nursing note and vitals reviewed.        Assessment:       1. Cardiomyopathy, alcoholic    2. Tobacco dependence    3. Cardiomyopathy, unspecified type         Plan:     Continue all cardiac medications  Regular exercise program  Quit smoking  1 yr f/u with stress echo

## 2018-12-07 DIAGNOSIS — M45.9 ANKYLOSING SPONDYLITIS, UNSPECIFIED SITE OF SPINE: ICD-10-CM

## 2018-12-07 DIAGNOSIS — M46.46 LUMBAR DISCITIS: ICD-10-CM

## 2018-12-07 DIAGNOSIS — E55.9 VITAMIN D DEFICIENCY DISEASE: ICD-10-CM

## 2018-12-07 DIAGNOSIS — M25.562 CHRONIC PAIN OF BOTH KNEES: ICD-10-CM

## 2018-12-07 DIAGNOSIS — G89.29 CHRONIC PAIN OF BOTH KNEES: ICD-10-CM

## 2018-12-07 DIAGNOSIS — M25.561 CHRONIC PAIN OF BOTH KNEES: ICD-10-CM

## 2018-12-07 DIAGNOSIS — M47.817 LUMBAR AND SACRAL ARTHRITIS: ICD-10-CM

## 2018-12-07 RX ORDER — HYDROCODONE BITARTRATE AND ACETAMINOPHEN 10; 325 MG/1; MG/1
TABLET ORAL
Qty: 120 TABLET | Refills: 0 | Status: SHIPPED | OUTPATIENT
Start: 2018-12-07 | End: 2019-01-04 | Stop reason: SDUPTHER

## 2019-01-04 ENCOUNTER — PATIENT MESSAGE (OUTPATIENT)
Dept: RHEUMATOLOGY | Facility: CLINIC | Age: 39
End: 2019-01-04

## 2019-01-04 DIAGNOSIS — E55.9 VITAMIN D DEFICIENCY DISEASE: ICD-10-CM

## 2019-01-04 DIAGNOSIS — M25.561 CHRONIC PAIN OF BOTH KNEES: ICD-10-CM

## 2019-01-04 DIAGNOSIS — G89.29 CHRONIC PAIN OF BOTH KNEES: ICD-10-CM

## 2019-01-04 DIAGNOSIS — M45.9 ANKYLOSING SPONDYLITIS, UNSPECIFIED SITE OF SPINE: ICD-10-CM

## 2019-01-04 DIAGNOSIS — M47.817 LUMBAR AND SACRAL ARTHRITIS: ICD-10-CM

## 2019-01-04 DIAGNOSIS — M25.562 CHRONIC PAIN OF BOTH KNEES: ICD-10-CM

## 2019-01-04 DIAGNOSIS — M46.46 LUMBAR DISCITIS: ICD-10-CM

## 2019-01-05 RX ORDER — HYDROCODONE BITARTRATE AND ACETAMINOPHEN 10; 325 MG/1; MG/1
TABLET ORAL
Qty: 120 TABLET | Refills: 0 | Status: SHIPPED | OUTPATIENT
Start: 2019-01-05 | End: 2019-01-17

## 2019-01-08 ENCOUNTER — LAB VISIT (OUTPATIENT)
Dept: LAB | Facility: HOSPITAL | Age: 39
End: 2019-01-08
Attending: INTERNAL MEDICINE
Payer: MEDICARE

## 2019-01-08 DIAGNOSIS — E55.9 VITAMIN D DEFICIENCY DISEASE: ICD-10-CM

## 2019-01-08 DIAGNOSIS — M25.562 CHRONIC PAIN OF BOTH KNEES: ICD-10-CM

## 2019-01-08 DIAGNOSIS — M46.46 LUMBAR DISCITIS: ICD-10-CM

## 2019-01-08 DIAGNOSIS — M47.817 LUMBAR AND SACRAL ARTHRITIS: ICD-10-CM

## 2019-01-08 DIAGNOSIS — M62.838 NECK MUSCLE SPASM: ICD-10-CM

## 2019-01-08 DIAGNOSIS — M25.561 CHRONIC PAIN OF BOTH KNEES: ICD-10-CM

## 2019-01-08 DIAGNOSIS — G89.29 CHRONIC PAIN OF BOTH KNEES: ICD-10-CM

## 2019-01-08 DIAGNOSIS — M45.9 ANKYLOSING SPONDYLITIS, UNSPECIFIED SITE OF SPINE: ICD-10-CM

## 2019-01-08 LAB
ALBUMIN SERPL BCP-MCNC: 3.8 G/DL
ALP SERPL-CCNC: 93 U/L
ALT SERPL W/O P-5'-P-CCNC: 13 U/L
ANION GAP SERPL CALC-SCNC: 9 MMOL/L
AST SERPL-CCNC: 16 U/L
BASOPHILS # BLD AUTO: 0.05 K/UL
BASOPHILS NFR BLD: 0.6 %
BILIRUB SERPL-MCNC: 0.4 MG/DL
BUN SERPL-MCNC: 13 MG/DL
CALCIUM SERPL-MCNC: 9.6 MG/DL
CHLORIDE SERPL-SCNC: 104 MMOL/L
CO2 SERPL-SCNC: 27 MMOL/L
CREAT SERPL-MCNC: 0.9 MG/DL
CRP SERPL-MCNC: 21.6 MG/L
DIFFERENTIAL METHOD: NORMAL
EOSINOPHIL # BLD AUTO: 0.1 K/UL
EOSINOPHIL NFR BLD: 0.9 %
ERYTHROCYTE [DISTWIDTH] IN BLOOD BY AUTOMATED COUNT: 13.4 %
ERYTHROCYTE [SEDIMENTATION RATE] IN BLOOD BY WESTERGREN METHOD: 7 MM/HR
EST. GFR  (AFRICAN AMERICAN): >60 ML/MIN/1.73 M^2
EST. GFR  (NON AFRICAN AMERICAN): >60 ML/MIN/1.73 M^2
GLUCOSE SERPL-MCNC: 81 MG/DL
HCT VFR BLD AUTO: 42.5 %
HGB BLD-MCNC: 14.2 G/DL
IMM GRANULOCYTES # BLD AUTO: 0.03 K/UL
IMM GRANULOCYTES NFR BLD AUTO: 0.4 %
LYMPHOCYTES # BLD AUTO: 1.9 K/UL
LYMPHOCYTES NFR BLD: 23.5 %
MCH RBC QN AUTO: 29.4 PG
MCHC RBC AUTO-ENTMCNC: 33.4 G/DL
MCV RBC AUTO: 88 FL
MONOCYTES # BLD AUTO: 0.8 K/UL
MONOCYTES NFR BLD: 9.9 %
NEUTROPHILS # BLD AUTO: 5.3 K/UL
NEUTROPHILS NFR BLD: 64.7 %
NRBC BLD-RTO: 0 /100 WBC
PLATELET # BLD AUTO: 277 K/UL
PMV BLD AUTO: 9.3 FL
POTASSIUM SERPL-SCNC: 3.8 MMOL/L
PROT SERPL-MCNC: 7.6 G/DL
RBC # BLD AUTO: 4.83 M/UL
SODIUM SERPL-SCNC: 140 MMOL/L
WBC # BLD AUTO: 8.12 K/UL

## 2019-01-08 PROCEDURE — 86140 C-REACTIVE PROTEIN: CPT

## 2019-01-08 PROCEDURE — 85651 RBC SED RATE NONAUTOMATED: CPT | Mod: PO

## 2019-01-08 PROCEDURE — 85025 COMPLETE CBC W/AUTO DIFF WBC: CPT

## 2019-01-08 PROCEDURE — 36415 COLL VENOUS BLD VENIPUNCTURE: CPT | Mod: PO

## 2019-01-08 PROCEDURE — 80053 COMPREHEN METABOLIC PANEL: CPT

## 2019-01-17 ENCOUNTER — OFFICE VISIT (OUTPATIENT)
Dept: RHEUMATOLOGY | Facility: CLINIC | Age: 39
End: 2019-01-17
Payer: MEDICARE

## 2019-01-17 VITALS
DIASTOLIC BLOOD PRESSURE: 86 MMHG | HEART RATE: 76 BPM | WEIGHT: 175.5 LBS | SYSTOLIC BLOOD PRESSURE: 126 MMHG | HEIGHT: 73 IN | BODY MASS INDEX: 23.26 KG/M2

## 2019-01-17 DIAGNOSIS — Z79.52 LONG TERM CURRENT USE OF SYSTEMIC STEROIDS: ICD-10-CM

## 2019-01-17 DIAGNOSIS — J32.9 SINUSITIS, UNSPECIFIED CHRONICITY, UNSPECIFIED LOCATION: ICD-10-CM

## 2019-01-17 DIAGNOSIS — M47.817 LUMBAR AND SACRAL ARTHRITIS: ICD-10-CM

## 2019-01-17 DIAGNOSIS — M45.9 ANKYLOSING SPONDYLITIS, UNSPECIFIED SITE OF SPINE: Primary | ICD-10-CM

## 2019-01-17 DIAGNOSIS — R94.6 ABNORMAL RESULTS OF THYROID FUNCTION STUDIES: ICD-10-CM

## 2019-01-17 DIAGNOSIS — G89.4 CHRONIC PAIN SYNDROME: ICD-10-CM

## 2019-01-17 PROCEDURE — 99213 OFFICE O/P EST LOW 20 MIN: CPT | Mod: PBBFAC,PO | Performed by: INTERNAL MEDICINE

## 2019-01-17 PROCEDURE — 96372 THER/PROPH/DIAG INJ SC/IM: CPT | Mod: PBBFAC,PO

## 2019-01-17 PROCEDURE — 99214 PR OFFICE/OUTPT VISIT, EST, LEVL IV, 30-39 MIN: ICD-10-PCS | Mod: 25,S$PBB,, | Performed by: INTERNAL MEDICINE

## 2019-01-17 PROCEDURE — 99214 OFFICE O/P EST MOD 30 MIN: CPT | Mod: 25,S$PBB,, | Performed by: INTERNAL MEDICINE

## 2019-01-17 PROCEDURE — 99999 PR PBB SHADOW E&M-EST. PATIENT-LVL III: CPT | Mod: PBBFAC,,, | Performed by: INTERNAL MEDICINE

## 2019-01-17 PROCEDURE — 99999 PR PBB SHADOW E&M-EST. PATIENT-LVL III: ICD-10-PCS | Mod: PBBFAC,,, | Performed by: INTERNAL MEDICINE

## 2019-01-17 RX ORDER — METHYLPREDNISOLONE ACETATE 80 MG/ML
160 INJECTION, SUSPENSION INTRA-ARTICULAR; INTRALESIONAL; INTRAMUSCULAR; SOFT TISSUE
Status: COMPLETED | OUTPATIENT
Start: 2019-01-17 | End: 2019-01-17

## 2019-01-17 RX ORDER — DULOXETIN HYDROCHLORIDE 30 MG/1
CAPSULE, DELAYED RELEASE ORAL
Qty: 30 CAPSULE | Refills: 6 | Status: SHIPPED | OUTPATIENT
Start: 2019-01-17 | End: 2019-06-14 | Stop reason: SDUPTHER

## 2019-01-17 RX ORDER — PREDNISONE 2.5 MG/1
2.5 TABLET ORAL DAILY
COMMUNITY
End: 2019-01-17 | Stop reason: SDUPTHER

## 2019-01-17 RX ORDER — OXYCODONE AND ACETAMINOPHEN 10; 325 MG/1; MG/1
1 TABLET ORAL EVERY 8 HOURS PRN
Qty: 90 TABLET | Refills: 0 | Status: SHIPPED | OUTPATIENT
Start: 2019-01-17 | End: 2019-02-14 | Stop reason: SDUPTHER

## 2019-01-17 RX ORDER — AZITHROMYCIN 250 MG/1
TABLET, FILM COATED ORAL
Qty: 6 TABLET | Refills: 0 | Status: SHIPPED | OUTPATIENT
Start: 2019-01-17 | End: 2019-05-03 | Stop reason: ALTCHOICE

## 2019-01-17 RX ORDER — PREDNISONE 2.5 MG/1
7.5 TABLET ORAL DAILY PRN
Qty: 90 TABLET | Refills: 6 | Status: SHIPPED | OUTPATIENT
Start: 2019-01-17 | End: 2019-02-16

## 2019-01-17 RX ADMIN — METHYLPREDNISOLONE ACETATE 160 MG: 80 INJECTION, SUSPENSION INTRA-ARTICULAR; INTRALESIONAL; INTRAMUSCULAR; SOFT TISSUE at 09:01

## 2019-01-17 NOTE — PROGRESS NOTES
Administered 2 cc ( 80 mg/ml ) of depomedrol to the right upper outer gluteal. Informed of s/s to report verbalized understanding. No adverse reactions noted.    Lot # [id number]  Expiration 02/20

## 2019-01-17 NOTE — PROGRESS NOTES
Subjective:       Patient ID: Umang Greer is a 38 y.o. male.    Chief Complaint: Disease Management     Follow up: on simponi he had a flare and has a cough, he has lumbar back pain,  Fatigue due to situation. Patient complains of arthralgias and myalgias for which has been present for a few years. Pain is located in multiple joints, both shoulder(s), both elbow(s), both wrist(s), both MCP(s): 1st, 2nd, 3rd, 4th and 5th, both PIP(s): 1st, 2nd, 3rd, 4th and 5th, both DIP(s): 1st and 2nd, both hip(s), both knee(s) and both MTP(s): 1st, 2nd, 3rd, 4th and 5th, is described as aching, pulsating, shooting and throbbing, and is constant, moderate .                He complains of joint swelling. Associated symptoms include fatigue.         Review of Systems   Constitutional: Positive for activity change and fatigue. Negative for appetite change and unexpected weight change.   HENT: Negative for ear pain, facial swelling, rhinorrhea, sinus pressure, sneezing and tinnitus.    Eyes: Negative for pain, discharge, redness, itching and visual disturbance.   Respiratory: Negative for apnea, chest tightness, shortness of breath and wheezing.    Cardiovascular: Negative for palpitations and leg swelling.   Gastrointestinal: Negative for constipation and diarrhea.   Endocrine: Negative for cold intolerance, heat intolerance, polydipsia and polyuria.   Genitourinary: Negative for decreased urine volume, difficulty urinating, flank pain, frequency, hematuria and urgency.   Musculoskeletal: Positive for back pain, gait problem, joint swelling, neck pain and neck stiffness.   Skin: Negative for pallor and wound.   Allergic/Immunologic: Negative for immunocompromised state.   Neurological: Negative for dizziness, tremors, seizures and syncope.   Hematological: Negative for adenopathy. Does not bruise/bleed easily.   Psychiatric/Behavioral: Negative for sleep disturbance and suicidal ideas. The patient is not nervous/anxious.       "    Objective:     /86   Pulse 76   Ht 6' 1" (1.854 m)   Wt 79.6 kg (175 lb 7.8 oz)   BMI 23.15 kg/m²      Physical Exam   Vitals reviewed.  Constitutional: He is oriented to person, place, and time. No distress.   HENT:   Head: Normocephalic and atraumatic.   Mouth/Throat: Oropharynx is clear and moist.   Eyes: EOM are normal. Pupils are equal, round, and reactive to light.   Neck: Neck supple. No thyromegaly present.   Cardiovascular: Normal rate, regular rhythm and normal heart sounds.  Exam reveals no gallop and no friction rub.    No murmur heard.  Pulmonary/Chest: Breath sounds normal. He has no wheezes. He has no rales. He exhibits no tenderness.   Abdominal: There is no tenderness. There is no rebound and no guarding.       Right Side Rheumatological Exam     Examination finds the 2nd MCP, 3rd MCP, 4th MCP and 5th MCP normal.    The patient is tender to palpation of the shoulder, elbow, wrist, knee, 1st PIP, 1st MCP, 2nd PIP, 2nd MCP, 3rd PIP, 3rd MCP, 4th PIP, 4th MCP and 5th PIP    He has swelling of the elbow, wrist, knee, 1st PIP, 1st MCP, 2nd PIP, 2nd MCP, 3rd PIP, 3rd MCP, 4th PIP, 4th MCP, 5th PIP and 5th MCP    The patient has an enlarged wrist, knee, 1st PIP, 2nd PIP, 3rd PIP, 4th PIP and 5th PIP    Shoulder Exam   Tenderness Location: no tenderness    Range of Motion   Active abduction: abnormal   Adduction: abnormal  Sensation: normal    Knee Exam   Tenderness Location: medial joint line and LCL    Range of Motion   Extension: normal   Flexion: normal   Patellofemoral Crepitus: positive  Effusion: positive  Sensation: normal    Hip Exam   Tenderness Location: posterior and anterior    Range of Motion   Extension: abnormal   Flexion: abnormal   Sensation: normal    Elbow/Wrist Exam   Tenderness Location: no tenderness    Range of Motion   Elbow   Flexion: normal   Sensation: normal    Muscle Strength (0-5 scale):  Neck Flexion:  3  Neck Extension: 3  Deltoid:  3  Biceps: 3/5   Triceps:  " 3  Quadriceps:  3   Distal Lower Extremity: 3    Left Side Rheumatological Exam     Examination finds the elbow, wrist, 1st MCP, 2nd MCP, 3rd MCP, 4th MCP and 5th MCP normal.    The patient is tender to palpation of the shoulder, elbow, wrist, knee, 1st PIP, 1st MCP, 2nd PIP, 2nd MCP, 3rd PIP, 3rd MCP, 4th PIP, 4th MCP, 5th PIP and 5th MCP.    He has swelling of the wrist, knee, 1st PIP, 1st MCP, 2nd PIP, 2nd MCP, 3rd PIP, 3rd MCP, 4th PIP, 4th MCP, 5th PIP and 5th MCP    The patient has an enlarged knee, 1st PIP, 2nd PIP, 3rd PIP, 4th PIP and 5th PIP.    Shoulder Exam   Tenderness Location: no tenderness    Range of Motion   Active abduction: abnormal   Extension: abnormal   Sensation: normal    Knee Exam   Tenderness Location: lateral joint line and medial joint line    Range of Motion   Extension: normal   Flexion: normal     Patellofemoral Crepitus: positive  Effusion: positive  Sensation: normal    Hip Exam   Tenderness Location: posterior and anterior    Range of Motion   Extension: abnormal   Flexion: abnormal   Sensation: normal    Elbow/Wrist Exam     Range of Motion   Elbow   Flexion: normal   Sensation: normal    Muscle Strength (0-5 scale):  Neck Flexion:  3  Neck Extension: 3  Deltoid:  3  Biceps: 3/5   Triceps:  3  Quadriceps:  3   Distal Lower Extremity: 3      Back/Neck Exam   Tenderness Right paramedian tenderness of the Upper C-Spine, Lower L-Spine, SI Joint, Lower T-Spine and Upper L-Spine.Left paramedian tenderness of the Upper C-Spine, Lower L-Spine, Lower C-Spine, SI Joint and Upper L-Spine.    Back Range of Motion   Extension: abnormal  Flexion: abnormal  Lateral Bend Right: abnormal  Lateral Bend Left: abnormal  Rotation Right: abnormal  Rotation Left: abnormal    Neck Range of Motion   Flexion: Limited  Extension: Limited  Right Lateral Bend: abnormal  Left Lateral Bend: abnormal  Right Rotation: abnormal  Left Rotation: abnormal  Lymphadenopathy:     He has no cervical adenopathy.    Neurological: He is alert and oriented to person, place, and time. He displays weakness. He exhibits abnormal muscle tone.   Reflex Scores:       Patellar reflexes are 2+ on the right side and 2+ on the left side.  Skin: No rash noted. No erythema. No pallor.     Psychiatric: Mood and affect normal.   Musculoskeletal: He exhibits tenderness and deformity. He exhibits no edema.   Lumbar spine,             Results for orders placed or performed in visit on 01/08/19   CBC auto differential   Result Value Ref Range    WBC 8.12 3.90 - 12.70 K/uL    RBC 4.83 4.60 - 6.20 M/uL    Hemoglobin 14.2 14.0 - 18.0 g/dL    Hematocrit 42.5 40.0 - 54.0 %    MCV 88 82 - 98 fL    MCH 29.4 27.0 - 31.0 pg    MCHC 33.4 32.0 - 36.0 g/dL    RDW 13.4 11.5 - 14.5 %    Platelets 277 150 - 350 K/uL    MPV 9.3 9.2 - 12.9 fL    Immature Granulocytes 0.4 0.0 - 0.5 %    Gran # (ANC) 5.3 1.8 - 7.7 K/uL    Immature Grans (Abs) 0.03 0.00 - 0.04 K/uL    Lymph # 1.9 1.0 - 4.8 K/uL    Mono # 0.8 0.3 - 1.0 K/uL    Eos # 0.1 0.0 - 0.5 K/uL    Baso # 0.05 0.00 - 0.20 K/uL    nRBC 0 0 /100 WBC    Gran% 64.7 38.0 - 73.0 %    Lymph% 23.5 18.0 - 48.0 %    Mono% 9.9 4.0 - 15.0 %    Eosinophil% 0.9 0.0 - 8.0 %    Basophil% 0.6 0.0 - 1.9 %    Differential Method Automated    Comprehensive metabolic panel   Result Value Ref Range    Sodium 140 136 - 145 mmol/L    Potassium 3.8 3.5 - 5.1 mmol/L    Chloride 104 95 - 110 mmol/L    CO2 27 23 - 29 mmol/L    Glucose 81 70 - 110 mg/dL    BUN, Bld 13 6 - 20 mg/dL    Creatinine 0.9 0.5 - 1.4 mg/dL    Calcium 9.6 8.7 - 10.5 mg/dL    Total Protein 7.6 6.0 - 8.4 g/dL    Albumin 3.8 3.5 - 5.2 g/dL    Total Bilirubin 0.4 0.1 - 1.0 mg/dL    Alkaline Phosphatase 93 55 - 135 U/L    AST 16 10 - 40 U/L    ALT 13 10 - 44 U/L    Anion Gap 9 8 - 16 mmol/L    eGFR if African American >60.0 >60 mL/min/1.73 m^2    eGFR if non African American >60.0 >60 mL/min/1.73 m^2   C-reactive protein   Result Value Ref Range    CRP 21.6 (H) 0.0 -  8.2 mg/L   Sedimentation rate   Result Value Ref Range    Sed Rate 7 0 - 10 mm/Hr       Assessment:     Encounter Diagnoses   Name Primary?    Ankylosing spondylitis, unspecified site of spine Yes    Lumbar and sacral arthritis     Sinusitis, unspecified chronicity, unspecified location     Abnormal results of thyroid function studies      Long term current use of systemic steroids      Chronic pain syndrome          Plan:     Ankylosing spondylitis, unspecified site of spine  -     DULoxetine (CYMBALTA) 30 MG capsule; TAKE 1 CAPSULE(30 MG) BY MOUTH EVERY DAY FOR BACK PAIN  Dispense: 30 capsule; Refill: 6  -     methylPREDNISolone acetate injection 160 mg  -     CBC auto differential; Future; Expected date: 01/17/2019  -     Comprehensive metabolic panel; Future; Expected date: 01/17/2019  -     C-reactive protein; Future; Expected date: 01/17/2019  -     Sedimentation rate; Future; Expected date: 01/17/2019  -     TSH; Future; Expected date: 01/17/2019  -     T4, free; Future; Expected date: 01/17/2019  -     Vitamin D; Future; Expected date: 01/17/2019    Lumbar and sacral arthritis  -     DULoxetine (CYMBALTA) 30 MG capsule; TAKE 1 CAPSULE(30 MG) BY MOUTH EVERY DAY FOR BACK PAIN  Dispense: 30 capsule; Refill: 6  -     methylPREDNISolone acetate injection 160 mg  -     CBC auto differential; Future; Expected date: 01/17/2019  -     Comprehensive metabolic panel; Future; Expected date: 01/17/2019  -     C-reactive protein; Future; Expected date: 01/17/2019  -     Sedimentation rate; Future; Expected date: 01/17/2019  -     TSH; Future; Expected date: 01/17/2019  -     T4, free; Future; Expected date: 01/17/2019  -     Vitamin D; Future; Expected date: 01/17/2019    Sinusitis, unspecified chronicity, unspecified location  -     methylPREDNISolone acetate injection 160 mg  -     CBC auto differential; Future; Expected date: 01/17/2019  -     Comprehensive metabolic panel; Future; Expected date: 01/17/2019  -      C-reactive protein; Future; Expected date: 01/17/2019  -     Sedimentation rate; Future; Expected date: 01/17/2019  -     TSH; Future; Expected date: 01/17/2019  -     T4, free; Future; Expected date: 01/17/2019  -     Vitamin D; Future; Expected date: 01/17/2019    Abnormal results of thyroid function studies   -     CBC auto differential; Future; Expected date: 01/17/2019  -     Comprehensive metabolic panel; Future; Expected date: 01/17/2019  -     C-reactive protein; Future; Expected date: 01/17/2019  -     Sedimentation rate; Future; Expected date: 01/17/2019  -     TSH; Future; Expected date: 01/17/2019  -     T4, free; Future; Expected date: 01/17/2019  -     Vitamin D; Future; Expected date: 01/17/2019    Long term current use of systemic steroids   -     Vitamin D; Future; Expected date: 01/17/2019    Chronic pain syndrome    Other orders  -     azithromycin (Z-DENISE) 250 MG tablet; Take 2 tablets by mouth on day 1; Take 1 tablet by mouth on days 2-5  Dispense: 6 tablet; Refill: 0  -     oxyCODONE-acetaminophen (PERCOCET)  mg per tablet; Take 1 tablet by mouth every 8 (eight) hours as needed for Pain.  Dispense: 90 tablet; Refill: 0  -     predniSONE (DELTASONE) 2.5 MG tablet; Take 3 tablets (7.5 mg total) by mouth daily as needed. Patient taking 2 tablets every morning  Dispense: 90 tablet; Refill: 6       Pt has sinusitis that we will treat   . I have  check [place] prescription monitoring program site and no unusual or abnormal behavior has occured

## 2019-01-18 ENCOUNTER — PATIENT MESSAGE (OUTPATIENT)
Dept: RHEUMATOLOGY | Facility: CLINIC | Age: 39
End: 2019-01-18

## 2019-01-30 ENCOUNTER — OFFICE VISIT (OUTPATIENT)
Dept: FAMILY MEDICINE | Facility: CLINIC | Age: 39
End: 2019-01-30
Payer: MEDICARE

## 2019-01-30 VITALS
BODY MASS INDEX: 22.83 KG/M2 | SYSTOLIC BLOOD PRESSURE: 118 MMHG | DIASTOLIC BLOOD PRESSURE: 80 MMHG | OXYGEN SATURATION: 97 % | HEART RATE: 80 BPM | WEIGHT: 173.06 LBS

## 2019-01-30 DIAGNOSIS — F41.9 ANXIETY: ICD-10-CM

## 2019-01-30 DIAGNOSIS — J44.1 COPD EXACERBATION: Primary | ICD-10-CM

## 2019-01-30 PROCEDURE — 99999 PR PBB SHADOW E&M-EST. PATIENT-LVL III: ICD-10-PCS | Mod: PBBFAC,,, | Performed by: FAMILY MEDICINE

## 2019-01-30 PROCEDURE — 99214 OFFICE O/P EST MOD 30 MIN: CPT | Mod: S$PBB,,, | Performed by: FAMILY MEDICINE

## 2019-01-30 PROCEDURE — 99999 PR PBB SHADOW E&M-EST. PATIENT-LVL III: CPT | Mod: PBBFAC,,, | Performed by: FAMILY MEDICINE

## 2019-01-30 PROCEDURE — 99214 PR OFFICE/OUTPT VISIT, EST, LEVL IV, 30-39 MIN: ICD-10-PCS | Mod: S$PBB,,, | Performed by: FAMILY MEDICINE

## 2019-01-30 PROCEDURE — 99213 OFFICE O/P EST LOW 20 MIN: CPT | Mod: PBBFAC,PO | Performed by: FAMILY MEDICINE

## 2019-01-30 RX ORDER — ALPRAZOLAM 2 MG/1
2 TABLET ORAL 2 TIMES DAILY PRN
Qty: 60 TABLET | Refills: 2 | Status: SHIPPED | OUTPATIENT
Start: 2019-01-30 | End: 2019-05-03 | Stop reason: SDUPTHER

## 2019-01-30 NOTE — PROGRESS NOTES
Subjective:       Patient ID: Umang Greer is a 38 y.o. male.    Chief Complaint: Follow-up and Cough    Here for f/u anxiety.  Doing well overall. Dealing with bronchitis still and seems to be improving after zpak and increased steroid per rheumatology.      Review of Systems   Constitutional: Negative for chills, fatigue and fever.   Respiratory: Negative for cough, chest tightness and shortness of breath.    Cardiovascular: Negative for chest pain, palpitations and leg swelling.   Endocrine: Negative for cold intolerance and heat intolerance.   Skin: Negative for rash.   Psychiatric/Behavioral: Negative for dysphoric mood. The patient is not nervous/anxious.        Objective:      Physical Exam   Constitutional: He appears well-developed and well-nourished.   HENT:   Head: Normocephalic and atraumatic.   Cardiovascular: Normal rate, regular rhythm and normal heart sounds.   Pulmonary/Chest: Effort normal and breath sounds normal.   Psychiatric: He has a normal mood and affect.   Nursing note and vitals reviewed.      Assessment:       1. COPD exacerbation    2. Anxiety        Plan:       COPD exacerbation    Anxiety  -     ALPRAZolam (XANAX) 2 MG Tab; Take 1 tablet (2 mg total) by mouth 2 (two) times daily as needed.  Dispense: 60 tablet; Refill: 2      Stop smoking; resolving bronchitis but call if fever or worsening cough  Follow-up in about 3 months (around 4/30/2019), or if symptoms worsen or fail to improve.

## 2019-02-14 DIAGNOSIS — T78.40XA ALLERGIC REACTION, INITIAL ENCOUNTER: ICD-10-CM

## 2019-02-14 DIAGNOSIS — M46.46 LUMBAR DISCITIS: ICD-10-CM

## 2019-02-14 DIAGNOSIS — G89.29 CHRONIC PAIN OF BOTH KNEES: ICD-10-CM

## 2019-02-14 DIAGNOSIS — M45.9 ANKYLOSING SPONDYLITIS, UNSPECIFIED SITE OF SPINE: ICD-10-CM

## 2019-02-14 DIAGNOSIS — M25.562 CHRONIC PAIN OF BOTH KNEES: ICD-10-CM

## 2019-02-14 DIAGNOSIS — M25.561 CHRONIC PAIN OF BOTH KNEES: ICD-10-CM

## 2019-02-15 ENCOUNTER — PATIENT MESSAGE (OUTPATIENT)
Dept: RHEUMATOLOGY | Facility: CLINIC | Age: 39
End: 2019-02-15

## 2019-02-15 RX ORDER — BACLOFEN 20 MG/1
10 TABLET ORAL 3 TIMES DAILY
Qty: 120 TABLET | Refills: 3 | Status: SHIPPED | OUTPATIENT
Start: 2019-02-15 | End: 2019-12-31

## 2019-02-15 RX ORDER — OXYCODONE AND ACETAMINOPHEN 10; 325 MG/1; MG/1
1 TABLET ORAL EVERY 8 HOURS PRN
Qty: 90 TABLET | Refills: 0 | Status: SHIPPED | OUTPATIENT
Start: 2019-02-15 | End: 2019-03-15 | Stop reason: SDUPTHER

## 2019-03-12 DIAGNOSIS — D84.821 IMMUNOCOMPROMISED STATE DUE TO DRUG THERAPY: ICD-10-CM

## 2019-03-12 DIAGNOSIS — M45.9 ANKYLOSING SPONDYLITIS, UNSPECIFIED SITE OF SPINE: Primary | ICD-10-CM

## 2019-03-12 DIAGNOSIS — Z79.899 IMMUNOCOMPROMISED STATE DUE TO DRUG THERAPY: ICD-10-CM

## 2019-03-13 RX ORDER — OXYCODONE AND ACETAMINOPHEN 10; 325 MG/1; MG/1
1 TABLET ORAL EVERY 8 HOURS PRN
Qty: 90 TABLET | Refills: 0 | Status: CANCELLED | OUTPATIENT
Start: 2019-03-13 | End: 2019-04-12

## 2019-03-14 ENCOUNTER — PATIENT MESSAGE (OUTPATIENT)
Dept: RHEUMATOLOGY | Facility: CLINIC | Age: 39
End: 2019-03-14

## 2019-03-14 RX ORDER — OXYCODONE AND ACETAMINOPHEN 10; 325 MG/1; MG/1
1 TABLET ORAL EVERY 8 HOURS PRN
Qty: 90 TABLET | Refills: 0 | Status: CANCELLED | OUTPATIENT
Start: 2019-03-14 | End: 2019-04-13

## 2019-03-14 RX ORDER — GOLIMUMAB 50 MG/.5ML
INJECTION, SOLUTION SUBCUTANEOUS
Qty: 0.5 ML | Refills: 6 | Status: SHIPPED | OUTPATIENT
Start: 2019-03-14 | End: 2019-08-22 | Stop reason: SDUPTHER

## 2019-03-14 NOTE — TELEPHONE ENCOUNTER
Simponi sent to [facility]. He needs updated TB/hepatitis B/C testing --please schedule.

## 2019-03-14 NOTE — TELEPHONE ENCOUNTER
Spoke with patient and informed no denial for medication was sent from office. Patient stated he received an email saying pain medication was denied. Nurse informed patient that medication was not denied still waiting for Dr Locke to send to the pharmacy. Patient verbalized understanding.

## 2019-03-15 ENCOUNTER — PATIENT MESSAGE (OUTPATIENT)
Dept: RHEUMATOLOGY | Facility: CLINIC | Age: 39
End: 2019-03-15

## 2019-03-15 RX ORDER — OXYCODONE AND ACETAMINOPHEN 10; 325 MG/1; MG/1
1 TABLET ORAL EVERY 8 HOURS PRN
Qty: 90 TABLET | Refills: 0 | Status: SHIPPED | OUTPATIENT
Start: 2019-03-15 | End: 2019-04-15 | Stop reason: SDUPTHER

## 2019-04-15 ENCOUNTER — PATIENT MESSAGE (OUTPATIENT)
Dept: RHEUMATOLOGY | Facility: CLINIC | Age: 39
End: 2019-04-15

## 2019-04-15 RX ORDER — OXYCODONE AND ACETAMINOPHEN 10; 325 MG/1; MG/1
1 TABLET ORAL EVERY 8 HOURS PRN
Qty: 90 TABLET | Refills: 0 | Status: SHIPPED | OUTPATIENT
Start: 2019-04-15 | End: 2019-05-15 | Stop reason: SDUPTHER

## 2019-05-03 ENCOUNTER — OFFICE VISIT (OUTPATIENT)
Dept: FAMILY MEDICINE | Facility: CLINIC | Age: 39
End: 2019-05-03
Payer: MEDICARE

## 2019-05-03 VITALS
RESPIRATION RATE: 18 BRPM | BODY MASS INDEX: 22.45 KG/M2 | DIASTOLIC BLOOD PRESSURE: 80 MMHG | HEIGHT: 73 IN | WEIGHT: 169.38 LBS | HEART RATE: 72 BPM | SYSTOLIC BLOOD PRESSURE: 112 MMHG

## 2019-05-03 DIAGNOSIS — F17.200 TOBACCO DEPENDENCE: ICD-10-CM

## 2019-05-03 DIAGNOSIS — F41.9 ANXIETY: Primary | ICD-10-CM

## 2019-05-03 PROCEDURE — 99999 PR PBB SHADOW E&M-EST. PATIENT-LVL III: ICD-10-PCS | Mod: PBBFAC,,, | Performed by: FAMILY MEDICINE

## 2019-05-03 PROCEDURE — 99213 OFFICE O/P EST LOW 20 MIN: CPT | Mod: PBBFAC,PO | Performed by: FAMILY MEDICINE

## 2019-05-03 PROCEDURE — 99214 PR OFFICE/OUTPT VISIT, EST, LEVL IV, 30-39 MIN: ICD-10-PCS | Mod: S$PBB,,, | Performed by: FAMILY MEDICINE

## 2019-05-03 PROCEDURE — 99999 PR PBB SHADOW E&M-EST. PATIENT-LVL III: CPT | Mod: PBBFAC,,, | Performed by: FAMILY MEDICINE

## 2019-05-03 PROCEDURE — 99214 OFFICE O/P EST MOD 30 MIN: CPT | Mod: S$PBB,,, | Performed by: FAMILY MEDICINE

## 2019-05-03 RX ORDER — MUPIROCIN 20 MG/G
OINTMENT TOPICAL
Refills: 2 | COMMUNITY
Start: 2019-04-18 | End: 2020-03-20 | Stop reason: SDUPTHER

## 2019-05-03 RX ORDER — ALPRAZOLAM 2 MG/1
2 TABLET ORAL 2 TIMES DAILY PRN
Qty: 60 TABLET | Refills: 3 | Status: SHIPPED | OUTPATIENT
Start: 2019-05-03 | End: 2019-09-09 | Stop reason: SDUPTHER

## 2019-05-03 RX ORDER — CLOTRIMAZOLE AND BETAMETHASONE DIPROPIONATE 10; .64 MG/G; MG/G
CREAM TOPICAL
Refills: 2 | COMMUNITY
Start: 2019-04-18 | End: 2020-01-22

## 2019-05-03 RX ORDER — DOXYCYCLINE 100 MG/1
CAPSULE ORAL
Refills: 1 | COMMUNITY
Start: 2019-04-18 | End: 2019-10-22

## 2019-05-03 RX ORDER — PREDNISONE 2.5 MG/1
2.5 TABLET ORAL DAILY
Refills: 6 | COMMUNITY
Start: 2019-04-12 | End: 2019-06-14 | Stop reason: SDUPTHER

## 2019-05-03 NOTE — PROGRESS NOTES
Subjective:       Patient ID: Umang Greer is a 38 y.o. male.    Chief Complaint: Anxiety (Patient here for 3 month follow up)    Here for f/u anxiety. Slightly worse this week as his grandmother .  He is ready to quit smoking.    Review of Systems   Constitutional: Negative for chills, fatigue and fever.   Respiratory: Negative for cough, chest tightness and shortness of breath.    Cardiovascular: Negative for chest pain, palpitations and leg swelling.   Endocrine: Negative for cold intolerance and heat intolerance.   Skin: Negative for rash.   Psychiatric/Behavioral: Negative for dysphoric mood. The patient is not nervous/anxious.        Objective:      Physical Exam   Constitutional: He appears well-developed and well-nourished.   HENT:   Head: Normocephalic and atraumatic.   Cardiovascular: Normal rate, regular rhythm and normal heart sounds.   Pulmonary/Chest: Effort normal and breath sounds normal.   Psychiatric: He has a normal mood and affect.   Nursing note and vitals reviewed.      Assessment:       1. Anxiety    2. Tobacco dependence        Plan:       Anxiety  -     ALPRAZolam (XANAX) 2 MG Tab; Take 1 tablet (2 mg total) by mouth 2 (two) times daily as needed.  Dispense: 60 tablet; Refill: 3    Tobacco dependence    will try smoking cessation on his own but call if needs assistance  Will monitor chronic medical issues and continue current plan of care.      Follow up in about 4 months (around 9/3/2019), or if symptoms worsen or fail to improve.

## 2019-05-13 RX ORDER — OXYCODONE AND ACETAMINOPHEN 10; 325 MG/1; MG/1
1 TABLET ORAL EVERY 8 HOURS PRN
Qty: 90 TABLET | Refills: 0 | Status: CANCELLED | OUTPATIENT
Start: 2019-05-13 | End: 2019-06-12

## 2019-05-15 ENCOUNTER — PATIENT MESSAGE (OUTPATIENT)
Dept: RHEUMATOLOGY | Facility: CLINIC | Age: 39
End: 2019-05-15

## 2019-05-16 ENCOUNTER — PATIENT MESSAGE (OUTPATIENT)
Dept: RHEUMATOLOGY | Facility: CLINIC | Age: 39
End: 2019-05-16

## 2019-05-16 RX ORDER — OXYCODONE AND ACETAMINOPHEN 10; 325 MG/1; MG/1
1 TABLET ORAL EVERY 8 HOURS PRN
Qty: 90 TABLET | Refills: 0 | Status: SHIPPED | OUTPATIENT
Start: 2019-05-16 | End: 2019-06-14

## 2019-05-30 ENCOUNTER — LAB VISIT (OUTPATIENT)
Dept: LAB | Facility: HOSPITAL | Age: 39
End: 2019-05-30
Attending: INTERNAL MEDICINE
Payer: MEDICARE

## 2019-05-30 DIAGNOSIS — J32.9 SINUSITIS, UNSPECIFIED CHRONICITY, UNSPECIFIED LOCATION: ICD-10-CM

## 2019-05-30 DIAGNOSIS — M45.9 ANKYLOSING SPONDYLITIS, UNSPECIFIED SITE OF SPINE: ICD-10-CM

## 2019-05-30 DIAGNOSIS — Z79.52 LONG TERM CURRENT USE OF SYSTEMIC STEROIDS: ICD-10-CM

## 2019-05-30 DIAGNOSIS — D84.821 IMMUNOCOMPROMISED STATE DUE TO DRUG THERAPY: ICD-10-CM

## 2019-05-30 DIAGNOSIS — Z79.899 IMMUNOCOMPROMISED STATE DUE TO DRUG THERAPY: ICD-10-CM

## 2019-05-30 DIAGNOSIS — M47.817 LUMBAR AND SACRAL ARTHRITIS: ICD-10-CM

## 2019-05-30 DIAGNOSIS — R94.6 ABNORMAL RESULTS OF THYROID FUNCTION STUDIES: ICD-10-CM

## 2019-05-30 LAB
ALBUMIN SERPL BCP-MCNC: 3.8 G/DL (ref 3.5–5.2)
ALP SERPL-CCNC: 86 U/L (ref 55–135)
ALT SERPL W/O P-5'-P-CCNC: 14 U/L (ref 10–44)
ANION GAP SERPL CALC-SCNC: 9 MMOL/L (ref 8–16)
AST SERPL-CCNC: 14 U/L (ref 10–40)
BASOPHILS # BLD AUTO: 0.04 K/UL (ref 0–0.2)
BASOPHILS NFR BLD: 0.3 % (ref 0–1.9)
BILIRUB SERPL-MCNC: 0.6 MG/DL (ref 0.1–1)
BUN SERPL-MCNC: 15 MG/DL (ref 6–20)
CALCIUM SERPL-MCNC: 9.7 MG/DL (ref 8.7–10.5)
CHLORIDE SERPL-SCNC: 103 MMOL/L (ref 95–110)
CO2 SERPL-SCNC: 25 MMOL/L (ref 23–29)
CREAT SERPL-MCNC: 1 MG/DL (ref 0.5–1.4)
CRP SERPL-MCNC: 4.3 MG/L (ref 0–8.2)
DIFFERENTIAL METHOD: ABNORMAL
EOSINOPHIL # BLD AUTO: 0 K/UL (ref 0–0.5)
EOSINOPHIL NFR BLD: 0.3 % (ref 0–8)
ERYTHROCYTE [DISTWIDTH] IN BLOOD BY AUTOMATED COUNT: 13.2 % (ref 11.5–14.5)
ERYTHROCYTE [SEDIMENTATION RATE] IN BLOOD BY WESTERGREN METHOD: 8 MM/HR (ref 0–10)
EST. GFR  (AFRICAN AMERICAN): >60 ML/MIN/1.73 M^2
EST. GFR  (NON AFRICAN AMERICAN): >60 ML/MIN/1.73 M^2
GLUCOSE SERPL-MCNC: 91 MG/DL (ref 70–110)
HCT VFR BLD AUTO: 44.8 % (ref 40–54)
HGB BLD-MCNC: 15 G/DL (ref 14–18)
IMM GRANULOCYTES # BLD AUTO: 0.03 K/UL (ref 0–0.04)
IMM GRANULOCYTES NFR BLD AUTO: 0.2 % (ref 0–0.5)
LYMPHOCYTES # BLD AUTO: 2.3 K/UL (ref 1–4.8)
LYMPHOCYTES NFR BLD: 18.5 % (ref 18–48)
MCH RBC QN AUTO: 30.2 PG (ref 27–31)
MCHC RBC AUTO-ENTMCNC: 33.5 G/DL (ref 32–36)
MCV RBC AUTO: 90 FL (ref 82–98)
MONOCYTES # BLD AUTO: 0.5 K/UL (ref 0.3–1)
MONOCYTES NFR BLD: 4 % (ref 4–15)
NEUTROPHILS # BLD AUTO: 9.3 K/UL (ref 1.8–7.7)
NEUTROPHILS NFR BLD: 76.7 % (ref 38–73)
NRBC BLD-RTO: 0 /100 WBC
PLATELET # BLD AUTO: 284 K/UL (ref 150–350)
PMV BLD AUTO: 9.7 FL (ref 9.2–12.9)
POTASSIUM SERPL-SCNC: 3.6 MMOL/L (ref 3.5–5.1)
PROT SERPL-MCNC: 7.4 G/DL (ref 6–8.4)
RBC # BLD AUTO: 4.96 M/UL (ref 4.6–6.2)
SODIUM SERPL-SCNC: 137 MMOL/L (ref 136–145)
T4 FREE SERPL-MCNC: 1.06 NG/DL (ref 0.71–1.51)
TSH SERPL DL<=0.005 MIU/L-ACNC: 0.97 UIU/ML (ref 0.4–4)
WBC # BLD AUTO: 12.17 K/UL (ref 3.9–12.7)

## 2019-05-30 PROCEDURE — 86140 C-REACTIVE PROTEIN: CPT

## 2019-05-30 PROCEDURE — 86803 HEPATITIS C AB TEST: CPT

## 2019-05-30 PROCEDURE — 36415 COLL VENOUS BLD VENIPUNCTURE: CPT | Mod: PO

## 2019-05-30 PROCEDURE — 86480 TB TEST CELL IMMUN MEASURE: CPT

## 2019-05-30 PROCEDURE — 84443 ASSAY THYROID STIM HORMONE: CPT

## 2019-05-30 PROCEDURE — 85651 RBC SED RATE NONAUTOMATED: CPT | Mod: PO

## 2019-05-30 PROCEDURE — 85025 COMPLETE CBC W/AUTO DIFF WBC: CPT

## 2019-05-30 PROCEDURE — 82306 VITAMIN D 25 HYDROXY: CPT

## 2019-05-30 PROCEDURE — 84439 ASSAY OF FREE THYROXINE: CPT

## 2019-05-30 PROCEDURE — 80053 COMPREHEN METABOLIC PANEL: CPT

## 2019-05-30 PROCEDURE — 86705 HEP B CORE ANTIBODY IGM: CPT

## 2019-05-31 LAB
25(OH)D3+25(OH)D2 SERPL-MCNC: 53 NG/ML (ref 30–96)
HBV CORE IGM SERPL QL IA: NEGATIVE
HCV AB SERPL QL IA: NEGATIVE

## 2019-06-04 ENCOUNTER — PATIENT MESSAGE (OUTPATIENT)
Dept: RHEUMATOLOGY | Facility: CLINIC | Age: 39
End: 2019-06-04

## 2019-06-07 LAB
M TB IFN-G CD4+ BCKGRND COR BLD-ACNC: -0.02 IU/ML
MITOGEN IGNF BCKGRD COR BLD-ACNC: 3.89 IU/ML
MITOGEN IGNF BCKGRD COR BLD-ACNC: NEGATIVE [IU]/ML
NIL: 0.04 IU/ML
TB2 - NIL: -0.03 IU/ML

## 2019-06-13 ENCOUNTER — PATIENT MESSAGE (OUTPATIENT)
Dept: RHEUMATOLOGY | Facility: CLINIC | Age: 39
End: 2019-06-13

## 2019-06-14 ENCOUNTER — OFFICE VISIT (OUTPATIENT)
Dept: RHEUMATOLOGY | Facility: CLINIC | Age: 39
End: 2019-06-14
Payer: MEDICARE

## 2019-06-14 VITALS
HEIGHT: 73 IN | HEART RATE: 98 BPM | BODY MASS INDEX: 22.66 KG/M2 | SYSTOLIC BLOOD PRESSURE: 130 MMHG | WEIGHT: 171 LBS | DIASTOLIC BLOOD PRESSURE: 80 MMHG

## 2019-06-14 DIAGNOSIS — R94.6 ABNORMAL RESULTS OF THYROID FUNCTION STUDIES: ICD-10-CM

## 2019-06-14 DIAGNOSIS — M45.9 ANKYLOSING SPONDYLITIS, UNSPECIFIED SITE OF SPINE: Primary | ICD-10-CM

## 2019-06-14 DIAGNOSIS — M46.46 LUMBAR DISCITIS: ICD-10-CM

## 2019-06-14 DIAGNOSIS — M47.817 LUMBAR AND SACRAL ARTHRITIS: ICD-10-CM

## 2019-06-14 DIAGNOSIS — M62.838 NECK MUSCLE SPASM: ICD-10-CM

## 2019-06-14 PROCEDURE — 99214 PR OFFICE/OUTPT VISIT, EST, LEVL IV, 30-39 MIN: ICD-10-PCS | Mod: 25,S$PBB,, | Performed by: INTERNAL MEDICINE

## 2019-06-14 PROCEDURE — 99999 PR PBB SHADOW E&M-EST. PATIENT-LVL III: ICD-10-PCS | Mod: PBBFAC,,, | Performed by: INTERNAL MEDICINE

## 2019-06-14 PROCEDURE — 99213 OFFICE O/P EST LOW 20 MIN: CPT | Mod: PBBFAC,PO,25 | Performed by: INTERNAL MEDICINE

## 2019-06-14 PROCEDURE — 96372 THER/PROPH/DIAG INJ SC/IM: CPT | Mod: PBBFAC,PO

## 2019-06-14 PROCEDURE — 99999 PR PBB SHADOW E&M-EST. PATIENT-LVL III: CPT | Mod: PBBFAC,,, | Performed by: INTERNAL MEDICINE

## 2019-06-14 PROCEDURE — 99214 OFFICE O/P EST MOD 30 MIN: CPT | Mod: 25,S$PBB,, | Performed by: INTERNAL MEDICINE

## 2019-06-14 RX ORDER — METHYLPREDNISOLONE ACETATE 80 MG/ML
160 INJECTION, SUSPENSION INTRA-ARTICULAR; INTRALESIONAL; INTRAMUSCULAR; SOFT TISSUE
Status: COMPLETED | OUTPATIENT
Start: 2019-06-14 | End: 2019-06-14

## 2019-06-14 RX ORDER — HYDROCODONE BITARTRATE AND ACETAMINOPHEN 10; 325 MG/1; MG/1
1 TABLET ORAL EVERY 6 HOURS PRN
Qty: 120 TABLET | Refills: 0 | Status: SHIPPED | OUTPATIENT
Start: 2019-08-13 | End: 2019-09-09 | Stop reason: SDUPTHER

## 2019-06-14 RX ORDER — HYDROCODONE BITARTRATE AND ACETAMINOPHEN 10; 325 MG/1; MG/1
1 TABLET ORAL EVERY 6 HOURS PRN
Qty: 120 TABLET | Refills: 0 | Status: SHIPPED | OUTPATIENT
Start: 2019-06-14 | End: 2019-06-14 | Stop reason: SDUPTHER

## 2019-06-14 RX ORDER — DULOXETIN HYDROCHLORIDE 30 MG/1
CAPSULE, DELAYED RELEASE ORAL
Qty: 30 CAPSULE | Refills: 6 | Status: SHIPPED | OUTPATIENT
Start: 2019-06-14 | End: 2020-01-22

## 2019-06-14 RX ORDER — HYDROCODONE BITARTRATE AND ACETAMINOPHEN 10; 325 MG/1; MG/1
1 TABLET ORAL EVERY 6 HOURS PRN
Qty: 120 TABLET | Refills: 0 | Status: SHIPPED | OUTPATIENT
Start: 2019-07-13 | End: 2019-06-14 | Stop reason: SDUPTHER

## 2019-06-14 RX ORDER — PREDNISONE 2.5 MG/1
2.5 TABLET ORAL DAILY
Qty: 30 TABLET | Refills: 6 | Status: SHIPPED | OUTPATIENT
Start: 2019-06-14 | End: 2019-07-14

## 2019-06-14 RX ADMIN — METHYLPREDNISOLONE ACETATE 160 MG: 80 INJECTION, SUSPENSION INTRA-ARTICULAR; INTRALESIONAL; INTRAMUSCULAR; SOFT TISSUE at 03:06

## 2019-06-14 ASSESSMENT — ROUTINE ASSESSMENT OF PATIENT INDEX DATA (RAPID3)
PAIN SCORE: 8
MDHAQ FUNCTION SCORE: 3
TOTAL RAPID3 SCORE: 8
PSYCHOLOGICAL DISTRESS SCORE: 2.2
PATIENT GLOBAL ASSESSMENT SCORE: 6

## 2019-06-14 NOTE — PROGRESS NOTES
Subjective:       Patient ID: Umang Greer is a 38 y.o. male.    Chief Complaint: Disease Management (Ankylosing spondylitis, unspecified site of spine) and Vitamin D Deficiency     Follow up: AS on simponi and perocet doing poorly, he had a flare  he has lumbar back pain,  Fatigue due to situation. Patient complains of arthralgias and myalgias for which has been present for a few years. Pain is located in multiple joints, both shoulder(s), both elbow(s), both wrist(s), both MCP(s): 1st, 2nd, 3rd, 4th and 5th, both PIP(s): 1st, 2nd, 3rd, 4th and 5th, both DIP(s): 1st and 2nd, both hip(s), both knee(s) and both MTP(s): 1st, 2nd, 3rd, 4th and 5th, is described as aching, pulsating, shooting and throbbing, and is constant, moderate .              He complains of joint swelling. Associated symptoms include fatigue.         Review of Systems   Constitutional: Positive for activity change and fatigue. Negative for appetite change and unexpected weight change.   HENT: Negative for ear pain, facial swelling, rhinorrhea, sinus pressure, sneezing and tinnitus.    Eyes: Negative for pain, discharge, redness, itching and visual disturbance.   Respiratory: Negative for apnea, chest tightness, shortness of breath and wheezing.    Cardiovascular: Negative for palpitations and leg swelling.   Gastrointestinal: Negative for constipation and diarrhea.   Endocrine: Negative for cold intolerance, heat intolerance, polydipsia and polyuria.   Genitourinary: Negative for decreased urine volume, difficulty urinating, flank pain, frequency, hematuria and urgency.   Musculoskeletal: Positive for back pain, gait problem, joint swelling, neck pain and neck stiffness.   Skin: Negative for pallor and wound.   Allergic/Immunologic: Negative for immunocompromised state.   Neurological: Negative for dizziness, tremors, seizures and syncope.   Hematological: Negative for adenopathy. Does not bruise/bleed easily.   Psychiatric/Behavioral:  "Negative for sleep disturbance and suicidal ideas. The patient is not nervous/anxious.          Objective:     /80 (BP Location: Left arm, Patient Position: Sitting, BP Method: Medium (Automatic))   Pulse 98   Ht 6' 1" (1.854 m)   Wt 77.6 kg (171 lb)   BMI 22.56 kg/m²      Physical Exam   Vitals reviewed.  Constitutional: He is oriented to person, place, and time. No distress.   HENT:   Head: Normocephalic and atraumatic.   Mouth/Throat: Oropharynx is clear and moist.   Eyes: EOM are normal. Pupils are equal, round, and reactive to light.   Neck: Neck supple. No thyromegaly present.   Cardiovascular: Normal rate, regular rhythm and normal heart sounds.  Exam reveals no gallop and no friction rub.    No murmur heard.  Pulmonary/Chest: Breath sounds normal. He has no wheezes. He has no rales. He exhibits no tenderness.   Abdominal: There is no tenderness. There is no rebound and no guarding.       Right Side Rheumatological Exam     Examination finds the 2nd MCP, 3rd MCP, 4th MCP and 5th MCP normal.    The patient is tender to palpation of the shoulder, elbow, wrist, knee, 1st PIP, 1st MCP, 2nd PIP, 2nd MCP, 3rd PIP, 3rd MCP, 4th PIP, 4th MCP and 5th PIP    He has swelling of the elbow, wrist, knee, 1st PIP, 1st MCP, 2nd PIP, 2nd MCP, 3rd PIP, 3rd MCP, 4th PIP, 4th MCP, 5th PIP and 5th MCP    The patient has an enlarged wrist, knee, 1st PIP, 2nd PIP, 3rd PIP, 4th PIP and 5th PIP    Shoulder Exam   Tenderness Location: no tenderness    Range of Motion   Active abduction: abnormal   Adduction: abnormal  Sensation: normal    Knee Exam   Tenderness Location: medial joint line and LCL    Range of Motion   Extension: normal   Flexion: normal   Patellofemoral Crepitus: positive  Effusion: positive  Sensation: normal    Hip Exam   Tenderness Location: posterior and anterior    Range of Motion   Extension: abnormal   Flexion: abnormal   Sensation: normal    Elbow/Wrist Exam   Tenderness Location: no " tenderness    Range of Motion   Elbow   Flexion: normal   Sensation: normal    Muscle Strength (0-5 scale):  Neck Flexion:  3  Neck Extension: 3  Deltoid:  3  Biceps: 3/5   Triceps:  3  Quadriceps:  3   Distal Lower Extremity: 3    Left Side Rheumatological Exam     Examination finds the elbow, wrist, 1st MCP, 2nd MCP, 3rd MCP, 4th MCP and 5th MCP normal.    The patient is tender to palpation of the shoulder, elbow, wrist, knee, 1st PIP, 1st MCP, 2nd PIP, 2nd MCP, 3rd PIP, 3rd MCP, 4th PIP, 4th MCP, 5th PIP and 5th MCP.    He has swelling of the wrist, knee, 1st PIP, 1st MCP, 2nd PIP, 2nd MCP, 3rd PIP, 3rd MCP, 4th PIP, 4th MCP, 5th PIP and 5th MCP    The patient has an enlarged knee, 1st PIP, 2nd PIP, 3rd PIP, 4th PIP and 5th PIP.    Shoulder Exam   Tenderness Location: no tenderness    Range of Motion   Active abduction: abnormal   Extension: abnormal   Sensation: normal    Knee Exam   Tenderness Location: lateral joint line and medial joint line    Range of Motion   Extension: normal   Flexion: normal     Patellofemoral Crepitus: positive  Effusion: positive  Sensation: normal    Hip Exam   Tenderness Location: posterior and anterior    Range of Motion   Extension: abnormal   Flexion: abnormal   Sensation: normal    Elbow/Wrist Exam     Range of Motion   Elbow   Flexion: normal   Sensation: normal    Muscle Strength (0-5 scale):  Neck Flexion:  3  Neck Extension: 3  Deltoid:  3  Biceps: 3/5   Triceps:  3  Quadriceps:  3   Distal Lower Extremity: 3      Back/Neck Exam   Tenderness Right paramedian tenderness of the Upper C-Spine, Lower L-Spine, SI Joint, Lower T-Spine and Upper L-Spine.Left paramedian tenderness of the Upper C-Spine, Lower L-Spine, Lower C-Spine, SI Joint and Upper L-Spine.    Back Range of Motion   Extension: abnormal  Flexion: abnormal  Lateral Bend Right: abnormal  Lateral Bend Left: abnormal  Rotation Right: abnormal  Rotation Left: abnormal    Neck Range of Motion   Flexion:  Limited  Extension: Limited  Right Lateral Bend: abnormal  Left Lateral Bend: abnormal  Right Rotation: abnormal  Left Rotation: abnormal  Lymphadenopathy:     He has no cervical adenopathy.   Neurological: He is alert and oriented to person, place, and time. He displays weakness. He exhibits abnormal muscle tone.   Reflex Scores:       Patellar reflexes are 2+ on the right side and 2+ on the left side.  Skin: No rash noted. No erythema. No pallor.     Psychiatric: Mood and affect normal.   Musculoskeletal: He exhibits tenderness and deformity. He exhibits no edema.   Lumbar spine,             Results for orders placed or performed in visit on 05/30/19   CBC auto differential   Result Value Ref Range    WBC 12.17 3.90 - 12.70 K/uL    RBC 4.96 4.60 - 6.20 M/uL    Hemoglobin 15.0 14.0 - 18.0 g/dL    Hematocrit 44.8 40.0 - 54.0 %    Mean Corpuscular Volume 90 82 - 98 fL    Mean Corpuscular Hemoglobin 30.2 27.0 - 31.0 pg    Mean Corpuscular Hemoglobin Conc 33.5 32.0 - 36.0 g/dL    RDW 13.2 11.5 - 14.5 %    Platelets 284 150 - 350 K/uL    MPV 9.7 9.2 - 12.9 fL    Immature Granulocytes 0.2 0.0 - 0.5 %    Gran # (ANC) 9.3 (H) 1.8 - 7.7 K/uL    Immature Grans (Abs) 0.03 0.00 - 0.04 K/uL    Lymph # 2.3 1.0 - 4.8 K/uL    Mono # 0.5 0.3 - 1.0 K/uL    Eos # 0.0 0.0 - 0.5 K/uL    Baso # 0.04 0.00 - 0.20 K/uL    nRBC 0 0 /100 WBC    Gran% 76.7 (H) 38.0 - 73.0 %    Lymph% 18.5 18.0 - 48.0 %    Mono% 4.0 4.0 - 15.0 %    Eosinophil% 0.3 0.0 - 8.0 %    Basophil% 0.3 0.0 - 1.9 %    Differential Method Automated    Comprehensive metabolic panel   Result Value Ref Range    Sodium 137 136 - 145 mmol/L    Potassium 3.6 3.5 - 5.1 mmol/L    Chloride 103 95 - 110 mmol/L    CO2 25 23 - 29 mmol/L    Glucose 91 70 - 110 mg/dL    BUN, Bld 15 6 - 20 mg/dL    Creatinine 1.0 0.5 - 1.4 mg/dL    Calcium 9.7 8.7 - 10.5 mg/dL    Total Protein 7.4 6.0 - 8.4 g/dL    Albumin 3.8 3.5 - 5.2 g/dL    Total Bilirubin 0.6 0.1 - 1.0 mg/dL    Alkaline Phosphatase  86 55 - 135 U/L    AST 14 10 - 40 U/L    ALT 14 10 - 44 U/L    Anion Gap 9 8 - 16 mmol/L    eGFR if African American >60.0 >60 mL/min/1.73 m^2    eGFR if non African American >60.0 >60 mL/min/1.73 m^2   C-reactive protein   Result Value Ref Range    CRP 4.3 0.0 - 8.2 mg/L   Sedimentation rate   Result Value Ref Range    Sed Rate 8 0 - 10 mm/Hr   TSH   Result Value Ref Range    TSH 0.967 0.400 - 4.000 uIU/mL   T4, free   Result Value Ref Range    Free T4 1.06 0.71 - 1.51 ng/dL   Vitamin D   Result Value Ref Range    Vit D, 25-Hydroxy 53 30 - 96 ng/mL   Hepatitis C antibody   Result Value Ref Range    Hepatitis C Ab Negative    Hepatitis B core antibody, IgM   Result Value Ref Range    Hep B C IgM Negative    Quantiferon Gold TB   Result Value Ref Range    NIL 0.040 See text IU/mL    TB1 - Nil -0.020 See text IU/mL    TB2 - Nil -0.030 See text IU/mL    Mitogen - Nil 3.890 See text IU/mL    TB Gold Plus Negative        Assessment:     Encounter Diagnoses   Name Primary?    Ankylosing spondylitis, unspecified site of spine Yes    Lumbar and sacral arthritis     Neck muscle spasm     Lumbar discitis     Abnormal results of thyroid function studies           Plan:     Ankylosing spondylitis, unspecified site of spine  -     Discontinue: HYDROcodone-acetaminophen (NORCO)  mg per tablet; Take 1 tablet by mouth every 6 (six) hours as needed.  Dispense: 120 tablet; Refill: 0  -     DULoxetine (CYMBALTA) 30 MG capsule; TAKE 1 CAPSULE(30 MG) BY MOUTH EVERY DAY FOR BACK PAIN  Dispense: 30 capsule; Refill: 6  -     predniSONE (DELTASONE) 2.5 MG tablet; Take 1 tablet (2.5 mg total) by mouth once daily.  Dispense: 30 tablet; Refill: 6  -     Discontinue: HYDROcodone-acetaminophen (NORCO)  mg per tablet; Take 1 tablet by mouth every 6 (six) hours as needed.  Dispense: 120 tablet; Refill: 0  -     HYDROcodone-acetaminophen (NORCO)  mg per tablet; Take 1 tablet by mouth every 6 (six) hours as needed.  Dispense:  120 tablet; Refill: 0  -     methylPREDNISolone acetate injection 160 mg  -     CBC auto differential; Future; Expected date: 06/14/2019  -     Comprehensive metabolic panel; Future; Expected date: 06/14/2019  -     C-reactive protein; Future; Expected date: 06/14/2019  -     TSH; Future; Expected date: 06/14/2019  -     T3, free; Future; Expected date: 06/14/2019    Lumbar and sacral arthritis  -     Discontinue: HYDROcodone-acetaminophen (NORCO)  mg per tablet; Take 1 tablet by mouth every 6 (six) hours as needed.  Dispense: 120 tablet; Refill: 0  -     DULoxetine (CYMBALTA) 30 MG capsule; TAKE 1 CAPSULE(30 MG) BY MOUTH EVERY DAY FOR BACK PAIN  Dispense: 30 capsule; Refill: 6  -     predniSONE (DELTASONE) 2.5 MG tablet; Take 1 tablet (2.5 mg total) by mouth once daily.  Dispense: 30 tablet; Refill: 6  -     Discontinue: HYDROcodone-acetaminophen (NORCO)  mg per tablet; Take 1 tablet by mouth every 6 (six) hours as needed.  Dispense: 120 tablet; Refill: 0  -     HYDROcodone-acetaminophen (NORCO)  mg per tablet; Take 1 tablet by mouth every 6 (six) hours as needed.  Dispense: 120 tablet; Refill: 0  -     methylPREDNISolone acetate injection 160 mg  -     CBC auto differential; Future; Expected date: 06/14/2019  -     Comprehensive metabolic panel; Future; Expected date: 06/14/2019  -     C-reactive protein; Future; Expected date: 06/14/2019  -     TSH; Future; Expected date: 06/14/2019  -     T3, free; Future; Expected date: 06/14/2019    Neck muscle spasm  -     Discontinue: HYDROcodone-acetaminophen (NORCO)  mg per tablet; Take 1 tablet by mouth every 6 (six) hours as needed.  Dispense: 120 tablet; Refill: 0  -     DULoxetine (CYMBALTA) 30 MG capsule; TAKE 1 CAPSULE(30 MG) BY MOUTH EVERY DAY FOR BACK PAIN  Dispense: 30 capsule; Refill: 6  -     predniSONE (DELTASONE) 2.5 MG tablet; Take 1 tablet (2.5 mg total) by mouth once daily.  Dispense: 30 tablet; Refill: 6  -     Discontinue:  HYDROcodone-acetaminophen (NORCO)  mg per tablet; Take 1 tablet by mouth every 6 (six) hours as needed.  Dispense: 120 tablet; Refill: 0  -     HYDROcodone-acetaminophen (NORCO)  mg per tablet; Take 1 tablet by mouth every 6 (six) hours as needed.  Dispense: 120 tablet; Refill: 0  -     methylPREDNISolone acetate injection 160 mg  -     CBC auto differential; Future; Expected date: 06/14/2019  -     Comprehensive metabolic panel; Future; Expected date: 06/14/2019  -     C-reactive protein; Future; Expected date: 06/14/2019  -     TSH; Future; Expected date: 06/14/2019  -     T3, free; Future; Expected date: 06/14/2019    Lumbar discitis  -     Discontinue: HYDROcodone-acetaminophen (NORCO)  mg per tablet; Take 1 tablet by mouth every 6 (six) hours as needed.  Dispense: 120 tablet; Refill: 0  -     DULoxetine (CYMBALTA) 30 MG capsule; TAKE 1 CAPSULE(30 MG) BY MOUTH EVERY DAY FOR BACK PAIN  Dispense: 30 capsule; Refill: 6  -     predniSONE (DELTASONE) 2.5 MG tablet; Take 1 tablet (2.5 mg total) by mouth once daily.  Dispense: 30 tablet; Refill: 6  -     Discontinue: HYDROcodone-acetaminophen (NORCO)  mg per tablet; Take 1 tablet by mouth every 6 (six) hours as needed.  Dispense: 120 tablet; Refill: 0  -     HYDROcodone-acetaminophen (NORCO)  mg per tablet; Take 1 tablet by mouth every 6 (six) hours as needed.  Dispense: 120 tablet; Refill: 0  -     methylPREDNISolone acetate injection 160 mg  -     CBC auto differential; Future; Expected date: 06/14/2019  -     Comprehensive metabolic panel; Future; Expected date: 06/14/2019  -     C-reactive protein; Future; Expected date: 06/14/2019  -     TSH; Future; Expected date: 06/14/2019  -     T3, free; Future; Expected date: 06/14/2019    Abnormal results of thyroid function studies   -     TSH; Future; Expected date: 06/14/2019         . I have  check [place] prescription monitoring program site and no unusual or abnormal behavior has  occured

## 2019-06-14 NOTE — PROGRESS NOTES
Administered 2 cc ( 80 mg/ml ) of depomedrol to the right upper outer gluteal. Informed of s/s to report verbalized understanding. No adverse reactions noted.    Lot # [id number]  Expiration 05/20

## 2019-07-11 ENCOUNTER — PATIENT MESSAGE (OUTPATIENT)
Dept: RHEUMATOLOGY | Facility: CLINIC | Age: 39
End: 2019-07-11
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
## 2019-07-31 ENCOUNTER — PATIENT MESSAGE (OUTPATIENT)
Dept: RHEUMATOLOGY | Facility: CLINIC | Age: 39
End: 2019-07-31

## 2019-08-01 RX ORDER — PREDNISONE 2.5 MG/1
2.5 TABLET ORAL DAILY
Qty: 90 TABLET | Refills: 3 | Status: SHIPPED | OUTPATIENT
Start: 2019-08-01 | End: 2019-10-22 | Stop reason: SDUPTHER

## 2019-08-03 DIAGNOSIS — M45.9 ANKYLOSING SPONDYLITIS, UNSPECIFIED SITE OF SPINE: ICD-10-CM

## 2019-08-03 DIAGNOSIS — M47.817 LUMBAR AND SACRAL ARTHRITIS: ICD-10-CM

## 2019-08-06 RX ORDER — DULOXETIN HYDROCHLORIDE 30 MG/1
CAPSULE, DELAYED RELEASE ORAL
Qty: 30 CAPSULE | Refills: 3 | Status: SHIPPED | OUTPATIENT
Start: 2019-08-06 | End: 2020-06-25 | Stop reason: SDUPTHER

## 2019-08-22 DIAGNOSIS — M45.9 ANKYLOSING SPONDYLITIS, UNSPECIFIED SITE OF SPINE: ICD-10-CM

## 2019-08-23 ENCOUNTER — TELEPHONE (OUTPATIENT)
Dept: PHARMACY | Facility: CLINIC | Age: 39
End: 2019-08-23

## 2019-08-23 NOTE — TELEPHONE ENCOUNTER
Informed Patient  that [facility] received prescription for Simponi and prior authorization is required.  [facility] will be back in touch once insurance determination is received.

## 2019-08-26 NOTE — TELEPHONE ENCOUNTER
DOCUMENTATION ONLY:   Lilian prior authorization approved until 8/24/20  CASE ID # [id number]  Estimated Co-Pay: $0

## 2019-08-27 ENCOUNTER — TELEPHONE (OUTPATIENT)
Dept: PHARMACY | Facility: CLINIC | Age: 39
End: 2019-08-27

## 2019-08-27 NOTE — TELEPHONE ENCOUNTER
Simponi will be shipped to pt's home via [employer] on 8/28. $0 copay, address verified. Pt will inject at the end of the month. Continuation of therapy, Pt declined consultation. Dosage, storage and administration reviewed. Med list and allergies reviewed-no ddis. Pt is aware to hold dose if ill. Pt is tolerating medication well- no side effects or injection site reactions. [facility] refill process explained to pt. Pt did not have any additional questions or concerns.       Makayla Cruz, PharmD, CSP  [facility]  [phone]

## 2019-09-01 DIAGNOSIS — J44.9 CHRONIC OBSTRUCTIVE PULMONARY DISEASE, UNSPECIFIED COPD TYPE: ICD-10-CM

## 2019-09-02 RX ORDER — BUDESONIDE AND FORMOTEROL FUMARATE DIHYDRATE 160; 4.5 UG/1; UG/1
AEROSOL RESPIRATORY (INHALATION)
Qty: 10.2 G | Refills: 0 | Status: SHIPPED | OUTPATIENT
Start: 2019-09-02 | End: 2019-09-09 | Stop reason: SDUPTHER

## 2019-09-09 ENCOUNTER — OFFICE VISIT (OUTPATIENT)
Dept: FAMILY MEDICINE | Facility: CLINIC | Age: 39
End: 2019-09-09
Payer: MEDICARE

## 2019-09-09 VITALS
HEART RATE: 89 BPM | OXYGEN SATURATION: 98 % | WEIGHT: 176.38 LBS | BODY MASS INDEX: 23.37 KG/M2 | HEIGHT: 73 IN | DIASTOLIC BLOOD PRESSURE: 76 MMHG | TEMPERATURE: 99 F | SYSTOLIC BLOOD PRESSURE: 122 MMHG

## 2019-09-09 DIAGNOSIS — I51.7 IDIOPATHIC CARDIOMEGALY: ICD-10-CM

## 2019-09-09 DIAGNOSIS — M47.817 LUMBAR AND SACRAL ARTHRITIS: ICD-10-CM

## 2019-09-09 DIAGNOSIS — J44.9 CHRONIC OBSTRUCTIVE PULMONARY DISEASE, UNSPECIFIED COPD TYPE: ICD-10-CM

## 2019-09-09 DIAGNOSIS — M62.838 NECK MUSCLE SPASM: ICD-10-CM

## 2019-09-09 DIAGNOSIS — R73.9 HYPERGLYCEMIA: ICD-10-CM

## 2019-09-09 DIAGNOSIS — M45.9 ANKYLOSING SPONDYLITIS, UNSPECIFIED SITE OF SPINE: ICD-10-CM

## 2019-09-09 DIAGNOSIS — M46.46 LUMBAR DISCITIS: ICD-10-CM

## 2019-09-09 DIAGNOSIS — G89.4 CHRONIC PAIN SYNDROME: Primary | ICD-10-CM

## 2019-09-09 DIAGNOSIS — F41.9 ANXIETY: ICD-10-CM

## 2019-09-09 DIAGNOSIS — R93.3 ABNORMAL FINDINGS ON DIAGNOSTIC IMAGING OF OTHER PARTS OF DIGESTIVE TRACT: ICD-10-CM

## 2019-09-09 PROCEDURE — 99999 PR PBB SHADOW E&M-EST. PATIENT-LVL III: CPT | Mod: PBBFAC,,, | Performed by: FAMILY MEDICINE

## 2019-09-09 PROCEDURE — 99213 OFFICE O/P EST LOW 20 MIN: CPT | Mod: S$PBB,,, | Performed by: FAMILY MEDICINE

## 2019-09-09 PROCEDURE — 99213 OFFICE O/P EST LOW 20 MIN: CPT | Mod: PBBFAC,PO | Performed by: FAMILY MEDICINE

## 2019-09-09 PROCEDURE — 99213 PR OFFICE/OUTPT VISIT, EST, LEVL III, 20-29 MIN: ICD-10-PCS | Mod: S$PBB,,, | Performed by: FAMILY MEDICINE

## 2019-09-09 PROCEDURE — 99999 PR PBB SHADOW E&M-EST. PATIENT-LVL III: ICD-10-PCS | Mod: PBBFAC,,, | Performed by: FAMILY MEDICINE

## 2019-09-09 RX ORDER — BUDESONIDE AND FORMOTEROL FUMARATE DIHYDRATE 160; 4.5 UG/1; UG/1
AEROSOL RESPIRATORY (INHALATION)
Qty: 10.2 G | Refills: 11 | Status: SHIPPED | OUTPATIENT
Start: 2019-09-09 | End: 2020-09-14

## 2019-09-09 RX ORDER — NALOXONE HYDROCHLORIDE 4 MG/.1ML
1 SPRAY NASAL ONCE
Qty: 1 EACH | Refills: 0 | Status: SHIPPED | OUTPATIENT
Start: 2019-09-09 | End: 2019-09-09

## 2019-09-09 RX ORDER — ALPRAZOLAM 2 MG/1
2 TABLET ORAL 2 TIMES DAILY PRN
Qty: 60 TABLET | Refills: 3 | Status: SHIPPED | OUTPATIENT
Start: 2019-09-09 | End: 2020-01-13 | Stop reason: SDUPTHER

## 2019-09-09 RX ORDER — HYDROCODONE BITARTRATE AND ACETAMINOPHEN 10; 325 MG/1; MG/1
1 TABLET ORAL EVERY 6 HOURS PRN
Qty: 120 TABLET | Refills: 0 | Status: SHIPPED | OUTPATIENT
Start: 2019-09-09 | End: 2019-10-03 | Stop reason: SDUPTHER

## 2019-09-09 RX ORDER — DOXYCYCLINE HYCLATE 100 MG
TABLET ORAL
Refills: 1 | COMMUNITY
Start: 2019-08-17 | End: 2019-10-22 | Stop reason: SDUPTHER

## 2019-10-03 ENCOUNTER — PATIENT MESSAGE (OUTPATIENT)
Dept: RHEUMATOLOGY | Facility: CLINIC | Age: 39
End: 2019-10-03

## 2019-10-03 DIAGNOSIS — G89.4 CHRONIC PAIN SYNDROME: ICD-10-CM

## 2019-10-03 DIAGNOSIS — M46.46 LUMBAR DISCITIS: ICD-10-CM

## 2019-10-03 DIAGNOSIS — M47.817 LUMBAR AND SACRAL ARTHRITIS: ICD-10-CM

## 2019-10-03 DIAGNOSIS — M62.838 NECK MUSCLE SPASM: ICD-10-CM

## 2019-10-03 DIAGNOSIS — M45.9 ANKYLOSING SPONDYLITIS, UNSPECIFIED SITE OF SPINE: ICD-10-CM

## 2019-10-07 ENCOUNTER — PATIENT MESSAGE (OUTPATIENT)
Dept: RHEUMATOLOGY | Facility: CLINIC | Age: 39
End: 2019-10-07

## 2019-10-07 DIAGNOSIS — M45.9 ANKYLOSING SPONDYLITIS, UNSPECIFIED SITE OF SPINE: ICD-10-CM

## 2019-10-07 DIAGNOSIS — M46.46 LUMBAR DISCITIS: ICD-10-CM

## 2019-10-07 DIAGNOSIS — M62.838 NECK MUSCLE SPASM: ICD-10-CM

## 2019-10-07 DIAGNOSIS — M47.817 LUMBAR AND SACRAL ARTHRITIS: ICD-10-CM

## 2019-10-07 DIAGNOSIS — G89.4 CHRONIC PAIN SYNDROME: ICD-10-CM

## 2019-10-07 RX ORDER — HYDROCODONE BITARTRATE AND ACETAMINOPHEN 10; 325 MG/1; MG/1
1 TABLET ORAL EVERY 6 HOURS PRN
Qty: 120 TABLET | Refills: 0 | Status: SHIPPED | OUTPATIENT
Start: 2019-10-07 | End: 2019-10-22 | Stop reason: SDUPTHER

## 2019-10-07 RX ORDER — HYDROCODONE BITARTRATE AND ACETAMINOPHEN 10; 325 MG/1; MG/1
1 TABLET ORAL EVERY 6 HOURS PRN
Qty: 120 TABLET | Refills: 0 | OUTPATIENT
Start: 2019-10-07 | End: 2019-11-06

## 2019-10-10 ENCOUNTER — LAB VISIT (OUTPATIENT)
Dept: LAB | Facility: HOSPITAL | Age: 39
End: 2019-10-10
Attending: INTERNAL MEDICINE
Payer: MEDICARE

## 2019-10-10 DIAGNOSIS — M46.46 LUMBAR DISCITIS: ICD-10-CM

## 2019-10-10 DIAGNOSIS — M47.817 LUMBAR AND SACRAL ARTHRITIS: ICD-10-CM

## 2019-10-10 DIAGNOSIS — R94.6 ABNORMAL RESULTS OF THYROID FUNCTION STUDIES: ICD-10-CM

## 2019-10-10 DIAGNOSIS — M62.838 NECK MUSCLE SPASM: ICD-10-CM

## 2019-10-10 DIAGNOSIS — M45.9 ANKYLOSING SPONDYLITIS, UNSPECIFIED SITE OF SPINE: ICD-10-CM

## 2019-10-10 LAB
ALBUMIN SERPL BCP-MCNC: 3.8 G/DL (ref 3.5–5.2)
ALP SERPL-CCNC: 110 U/L (ref 55–135)
ALT SERPL W/O P-5'-P-CCNC: 8 U/L (ref 10–44)
ANION GAP SERPL CALC-SCNC: 8 MMOL/L (ref 8–16)
AST SERPL-CCNC: 16 U/L (ref 10–40)
BASOPHILS # BLD AUTO: 0.05 K/UL (ref 0–0.2)
BASOPHILS NFR BLD: 0.3 % (ref 0–1.9)
BILIRUB SERPL-MCNC: 0.7 MG/DL (ref 0.1–1)
BUN SERPL-MCNC: 19 MG/DL (ref 6–20)
CALCIUM SERPL-MCNC: 9.5 MG/DL (ref 8.7–10.5)
CHLORIDE SERPL-SCNC: 103 MMOL/L (ref 95–110)
CO2 SERPL-SCNC: 26 MMOL/L (ref 23–29)
CREAT SERPL-MCNC: 0.9 MG/DL (ref 0.5–1.4)
CRP SERPL-MCNC: 23 MG/L (ref 0–8.2)
DIFFERENTIAL METHOD: ABNORMAL
EOSINOPHIL # BLD AUTO: 0.1 K/UL (ref 0–0.5)
EOSINOPHIL NFR BLD: 0.5 % (ref 0–8)
ERYTHROCYTE [DISTWIDTH] IN BLOOD BY AUTOMATED COUNT: 12.2 % (ref 11.5–14.5)
EST. GFR  (AFRICAN AMERICAN): >60 ML/MIN/1.73 M^2
EST. GFR  (NON AFRICAN AMERICAN): >60 ML/MIN/1.73 M^2
GLUCOSE SERPL-MCNC: 84 MG/DL (ref 70–110)
HCT VFR BLD AUTO: 41.9 % (ref 40–54)
HGB BLD-MCNC: 13.9 G/DL (ref 14–18)
IMM GRANULOCYTES # BLD AUTO: 0.05 K/UL (ref 0–0.04)
IMM GRANULOCYTES NFR BLD AUTO: 0.3 % (ref 0–0.5)
LYMPHOCYTES # BLD AUTO: 3.3 K/UL (ref 1–4.8)
LYMPHOCYTES NFR BLD: 21.6 % (ref 18–48)
MCH RBC QN AUTO: 30.1 PG (ref 27–31)
MCHC RBC AUTO-ENTMCNC: 33.2 G/DL (ref 32–36)
MCV RBC AUTO: 91 FL (ref 82–98)
MONOCYTES # BLD AUTO: 0.9 K/UL (ref 0.3–1)
MONOCYTES NFR BLD: 6.1 % (ref 4–15)
NEUTROPHILS # BLD AUTO: 10.8 K/UL (ref 1.8–7.7)
NEUTROPHILS NFR BLD: 71.2 % (ref 38–73)
NRBC BLD-RTO: 0 /100 WBC
PLATELET # BLD AUTO: 343 K/UL (ref 150–350)
PMV BLD AUTO: 9.6 FL (ref 9.2–12.9)
POTASSIUM SERPL-SCNC: 4 MMOL/L (ref 3.5–5.1)
PROT SERPL-MCNC: 7.4 G/DL (ref 6–8.4)
RBC # BLD AUTO: 4.62 M/UL (ref 4.6–6.2)
SODIUM SERPL-SCNC: 137 MMOL/L (ref 136–145)
T3FREE SERPL-MCNC: 3.2 PG/ML (ref 2.3–4.2)
TSH SERPL DL<=0.005 MIU/L-ACNC: 1.31 UIU/ML (ref 0.4–4)
WBC # BLD AUTO: 15.12 K/UL (ref 3.9–12.7)

## 2019-10-10 PROCEDURE — 80053 COMPREHEN METABOLIC PANEL: CPT

## 2019-10-10 PROCEDURE — 84443 ASSAY THYROID STIM HORMONE: CPT

## 2019-10-10 PROCEDURE — 36415 COLL VENOUS BLD VENIPUNCTURE: CPT | Mod: PO

## 2019-10-10 PROCEDURE — 85025 COMPLETE CBC W/AUTO DIFF WBC: CPT

## 2019-10-10 PROCEDURE — 84481 FREE ASSAY (FT-3): CPT

## 2019-10-10 PROCEDURE — 86140 C-REACTIVE PROTEIN: CPT

## 2019-10-22 ENCOUNTER — OFFICE VISIT (OUTPATIENT)
Dept: RHEUMATOLOGY | Facility: CLINIC | Age: 39
End: 2019-10-22
Payer: MEDICARE

## 2019-10-22 VITALS
SYSTOLIC BLOOD PRESSURE: 122 MMHG | BODY MASS INDEX: 23.06 KG/M2 | WEIGHT: 174 LBS | HEART RATE: 91 BPM | HEIGHT: 73 IN | DIASTOLIC BLOOD PRESSURE: 83 MMHG

## 2019-10-22 DIAGNOSIS — E55.9 VITAMIN D DEFICIENCY, UNSPECIFIED: ICD-10-CM

## 2019-10-22 DIAGNOSIS — L08.9 LOCAL SKIN INFECTION: Primary | ICD-10-CM

## 2019-10-22 DIAGNOSIS — M62.838 NECK MUSCLE SPASM: ICD-10-CM

## 2019-10-22 DIAGNOSIS — M47.817 LUMBAR AND SACRAL ARTHRITIS: ICD-10-CM

## 2019-10-22 DIAGNOSIS — M46.46 LUMBAR DISCITIS: ICD-10-CM

## 2019-10-22 DIAGNOSIS — G89.4 CHRONIC PAIN SYNDROME: ICD-10-CM

## 2019-10-22 DIAGNOSIS — M45.9 ANKYLOSING SPONDYLITIS, UNSPECIFIED SITE OF SPINE: ICD-10-CM

## 2019-10-22 PROCEDURE — 99999 PR PBB SHADOW E&M-EST. PATIENT-LVL III: ICD-10-PCS | Mod: PBBFAC,,, | Performed by: INTERNAL MEDICINE

## 2019-10-22 PROCEDURE — 99215 PR OFFICE/OUTPT VISIT, EST, LEVL V, 40-54 MIN: ICD-10-PCS | Mod: 25,S$PBB,, | Performed by: INTERNAL MEDICINE

## 2019-10-22 PROCEDURE — 96372 THER/PROPH/DIAG INJ SC/IM: CPT | Mod: PBBFAC,PO

## 2019-10-22 PROCEDURE — 99215 OFFICE O/P EST HI 40 MIN: CPT | Mod: 25,S$PBB,, | Performed by: INTERNAL MEDICINE

## 2019-10-22 PROCEDURE — 99999 PR PBB SHADOW E&M-EST. PATIENT-LVL III: CPT | Mod: PBBFAC,,, | Performed by: INTERNAL MEDICINE

## 2019-10-22 PROCEDURE — 99213 OFFICE O/P EST LOW 20 MIN: CPT | Mod: PBBFAC,PO,25 | Performed by: INTERNAL MEDICINE

## 2019-10-22 RX ORDER — CEFTRIAXONE 1 G/1
1 INJECTION, POWDER, FOR SOLUTION INTRAMUSCULAR; INTRAVENOUS
Status: COMPLETED | OUTPATIENT
Start: 2019-10-22 | End: 2019-10-22

## 2019-10-22 RX ORDER — DOXYCYCLINE HYCLATE 100 MG
100 TABLET ORAL EVERY 12 HOURS
Qty: 60 TABLET | Refills: 3 | Status: SHIPPED | OUTPATIENT
Start: 2019-10-22 | End: 2019-11-21

## 2019-10-22 RX ORDER — HYDROCODONE BITARTRATE AND ACETAMINOPHEN 10; 325 MG/1; MG/1
1 TABLET ORAL EVERY 6 HOURS PRN
Qty: 120 TABLET | Refills: 0 | Status: SHIPPED | OUTPATIENT
Start: 2020-01-06 | End: 2020-02-03 | Stop reason: SDUPTHER

## 2019-10-22 RX ORDER — PREDNISONE 2.5 MG/1
7.5 TABLET ORAL DAILY
Qty: 90 TABLET | Refills: 6 | Status: SHIPPED | OUTPATIENT
Start: 2019-10-22 | End: 2019-11-21

## 2019-10-22 RX ORDER — NALOXONE HYDROCHLORIDE 4 MG/.1ML
SPRAY NASAL
Refills: 0 | COMMUNITY
Start: 2019-09-09

## 2019-10-22 RX ORDER — HYDROCODONE BITARTRATE AND ACETAMINOPHEN 10; 325 MG/1; MG/1
1 TABLET ORAL EVERY 6 HOURS PRN
Qty: 120 TABLET | Refills: 0 | Status: SHIPPED | OUTPATIENT
Start: 2019-11-06 | End: 2019-10-22 | Stop reason: SDUPTHER

## 2019-10-22 RX ORDER — HYDROCODONE BITARTRATE AND ACETAMINOPHEN 10; 325 MG/1; MG/1
1 TABLET ORAL EVERY 6 HOURS PRN
Qty: 120 TABLET | Refills: 0 | Status: SHIPPED | OUTPATIENT
Start: 2019-12-06 | End: 2019-10-22 | Stop reason: SDUPTHER

## 2019-10-22 RX ORDER — METHYLPREDNISOLONE ACETATE 80 MG/ML
80 INJECTION, SUSPENSION INTRA-ARTICULAR; INTRALESIONAL; INTRAMUSCULAR; SOFT TISSUE
Status: COMPLETED | OUTPATIENT
Start: 2019-10-22 | End: 2019-10-22

## 2019-10-22 RX ADMIN — METHYLPREDNISOLONE ACETATE 80 MG: 80 INJECTION, SUSPENSION INTRA-ARTICULAR; INTRALESIONAL; INTRAMUSCULAR; SOFT TISSUE at 09:10

## 2019-10-22 RX ADMIN — CEFTRIAXONE SODIUM 1 G: 1 INJECTION, POWDER, FOR SOLUTION INTRAMUSCULAR; INTRAVENOUS at 09:10

## 2019-10-22 ASSESSMENT — ROUTINE ASSESSMENT OF PATIENT INDEX DATA (RAPID3)
PAIN SCORE: 7.5
FATIGUE SCORE: 2.2
TOTAL RAPID3 SCORE: 6.11
PSYCHOLOGICAL DISTRESS SCORE: 2.2
MDHAQ FUNCTION SCORE: 1
PATIENT GLOBAL ASSESSMENT SCORE: 7.5

## 2019-10-22 NOTE — PROGRESS NOTES
Subjective:       Patient ID: Umang Greer is a 39 y.o. male.    Chief Complaint: Disease Management (Ankylosing spondylitis )     Follow up: AS on simponi , he had a flare with the weather changes, he has lumbar back pain, Patient complains of arthralgias and myalgias for which has been present for a few years. Pain is located in multiple joints, both shoulder(s), both elbow(s), both wrist(s), both MCP(s): 1st, 2nd, 3rd, 4th and 5th, both PIP(s): 1st, 2nd, 3rd, 4th and 5th, both DIP(s): 1st and 2nd, both hip(s), both knee(s) and both MTP(s): 1st, 2nd, 3rd, 4th and 5th, is described as aching, pulsating, shooting and throbbing, and is constant, moderate .  Numbness in his Left leg. He had a skin flare on his back            He complains of joint swelling. Associated symptoms include fatigue.         Review of Systems   Constitutional: Positive for activity change and fatigue. Negative for appetite change and unexpected weight change.   HENT: Negative for ear pain, facial swelling, rhinorrhea, sinus pressure, sneezing and tinnitus.    Eyes: Negative for pain, discharge, redness, itching and visual disturbance.   Respiratory: Negative for apnea, chest tightness, shortness of breath and wheezing.    Cardiovascular: Negative for palpitations and leg swelling.   Gastrointestinal: Negative for constipation and diarrhea.   Endocrine: Negative for cold intolerance, heat intolerance, polydipsia and polyuria.   Genitourinary: Negative for decreased urine volume, difficulty urinating, flank pain, frequency, hematuria and urgency.   Musculoskeletal: Positive for back pain, gait problem, joint swelling, neck pain and neck stiffness.   Skin: Negative for pallor and wound.   Allergic/Immunologic: Negative for immunocompromised state.   Neurological: Negative for dizziness, tremors, seizures and syncope.   Hematological: Negative for adenopathy. Does not bruise/bleed easily.   Psychiatric/Behavioral: Negative for sleep  "disturbance and suicidal ideas. The patient is not nervous/anxious.          Objective:     /83 (BP Location: Left arm, Patient Position: Sitting, BP Method: Medium (Automatic))   Pulse 91   Ht 6' 1" (1.854 m)   Wt 78.9 kg (174 lb)   BMI 22.96 kg/m²      Physical Exam   Vitals reviewed.  Constitutional: He is oriented to person, place, and time. No distress.   HENT:   Head: Normocephalic and atraumatic.   Mouth/Throat: Oropharynx is clear and moist.   Eyes: EOM are normal. Pupils are equal, round, and reactive to light.   Neck: Neck supple. No thyromegaly present.   Cardiovascular: Normal rate, regular rhythm and normal heart sounds.  Exam reveals no gallop and no friction rub.    No murmur heard.  Pulmonary/Chest: Breath sounds normal. He has no wheezes. He has no rales. He exhibits no tenderness.   Abdominal: There is no tenderness. There is no rebound and no guarding.       Right Side Rheumatological Exam     Examination finds the 2nd MCP, 3rd MCP, 4th MCP and 5th MCP normal.    The patient is tender to palpation of the shoulder, elbow, wrist, knee, 1st PIP, 1st MCP, 2nd PIP, 2nd MCP, 3rd PIP, 3rd MCP, 4th PIP, 4th MCP and 5th PIP    He has swelling of the elbow, wrist, knee, 1st PIP, 1st MCP, 2nd PIP, 2nd MCP, 3rd PIP, 3rd MCP, 4th PIP, 4th MCP, 5th PIP and 5th MCP    The patient has an enlarged wrist, knee, 1st PIP, 2nd PIP, 3rd PIP, 4th PIP and 5th PIP    Shoulder Exam   Tenderness Location: no tenderness    Range of Motion   Active abduction: abnormal   Adduction: abnormal  Sensation: normal    Knee Exam   Tenderness Location: medial joint line and LCL    Range of Motion   Extension: normal   Flexion: normal   Patellofemoral Crepitus: positive  Effusion: positive  Sensation: normal    Hip Exam   Tenderness Location: posterior and anterior    Range of Motion   Extension: abnormal   Flexion: abnormal   Sensation: normal    Elbow/Wrist Exam   Tenderness Location: no tenderness    Range of Motion "   Elbow   Flexion: normal   Sensation: normal    Muscle Strength (0-5 scale):  Neck Flexion:  3  Neck Extension: 3  Deltoid:  3  Biceps: 3/5   Triceps:  3  Quadriceps:  3   Distal Lower Extremity: 3    Left Side Rheumatological Exam     Examination finds the elbow, wrist, 1st MCP, 2nd MCP, 3rd MCP, 4th MCP and 5th MCP normal.    The patient is tender to palpation of the shoulder, elbow, wrist, knee, 1st PIP, 1st MCP, 2nd PIP, 2nd MCP, 3rd PIP, 3rd MCP, 4th PIP, 4th MCP, 5th PIP and 5th MCP.    He has swelling of the wrist, knee, 1st PIP, 1st MCP, 2nd PIP, 2nd MCP, 3rd PIP, 3rd MCP, 4th PIP, 4th MCP, 5th PIP and 5th MCP    The patient has an enlarged knee, 1st PIP, 2nd PIP, 3rd PIP, 4th PIP and 5th PIP.    Shoulder Exam   Tenderness Location: no tenderness    Range of Motion   Active abduction: abnormal   Extension: abnormal   Sensation: normal    Knee Exam   Tenderness Location: lateral joint line and medial joint line    Range of Motion   Extension: normal   Flexion: normal     Patellofemoral Crepitus: positive  Effusion: positive  Sensation: normal    Hip Exam   Tenderness Location: posterior and anterior    Range of Motion   Extension: abnormal   Flexion: abnormal   Sensation: normal    Elbow/Wrist Exam     Range of Motion   Elbow   Flexion: normal   Sensation: normal    Muscle Strength (0-5 scale):  Neck Flexion:  3  Neck Extension: 3  Deltoid:  3  Biceps: 3/5   Triceps:  3  Quadriceps:  3   Distal Lower Extremity: 3      Back/Neck Exam   Tenderness Right paramedian tenderness of the Upper C-Spine, Lower L-Spine, SI Joint, Lower T-Spine and Upper L-Spine.Left paramedian tenderness of the Upper C-Spine, Lower L-Spine, Lower C-Spine, SI Joint and Upper L-Spine.    Back Range of Motion   Extension: abnormal  Flexion: abnormal  Lateral Bend Right: abnormal  Lateral Bend Left: abnormal  Rotation Right: abnormal  Rotation Left: abnormal    Neck Range of Motion   Flexion: Limited  Extension: Limited  Right Lateral  Bend: abnormal  Left Lateral Bend: abnormal  Right Rotation: abnormal  Left Rotation: abnormal  Lymphadenopathy:     He has no cervical adenopathy.   Neurological: He is alert and oriented to person, place, and time. He displays weakness. He exhibits abnormal muscle tone.   Reflex Scores:       Patellar reflexes are 2+ on the right side and 2+ on the left side.  Skin: Rash noted. No erythema. No pallor.     Skin abscess   Psychiatric: Mood and affect normal.   Musculoskeletal: He exhibits tenderness and deformity. He exhibits no edema.   Lumbar spine,             Results for orders placed or performed in visit on 10/10/19   CBC auto differential   Result Value Ref Range    WBC 15.12 (H) 3.90 - 12.70 K/uL    RBC 4.62 4.60 - 6.20 M/uL    Hemoglobin 13.9 (L) 14.0 - 18.0 g/dL    Hematocrit 41.9 40.0 - 54.0 %    Mean Corpuscular Volume 91 82 - 98 fL    Mean Corpuscular Hemoglobin 30.1 27.0 - 31.0 pg    Mean Corpuscular Hemoglobin Conc 33.2 32.0 - 36.0 g/dL    RDW 12.2 11.5 - 14.5 %    Platelets 343 150 - 350 K/uL    MPV 9.6 9.2 - 12.9 fL    Immature Granulocytes 0.3 0.0 - 0.5 %    Gran # (ANC) 10.8 (H) 1.8 - 7.7 K/uL    Immature Grans (Abs) 0.05 (H) 0.00 - 0.04 K/uL    Lymph # 3.3 1.0 - 4.8 K/uL    Mono # 0.9 0.3 - 1.0 K/uL    Eos # 0.1 0.0 - 0.5 K/uL    Baso # 0.05 0.00 - 0.20 K/uL    nRBC 0 0 /100 WBC    Gran% 71.2 38.0 - 73.0 %    Lymph% 21.6 18.0 - 48.0 %    Mono% 6.1 4.0 - 15.0 %    Eosinophil% 0.5 0.0 - 8.0 %    Basophil% 0.3 0.0 - 1.9 %    Differential Method Automated    Comprehensive metabolic panel   Result Value Ref Range    Sodium 137 136 - 145 mmol/L    Potassium 4.0 3.5 - 5.1 mmol/L    Chloride 103 95 - 110 mmol/L    CO2 26 23 - 29 mmol/L    Glucose 84 70 - 110 mg/dL    BUN, Bld 19 6 - 20 mg/dL    Creatinine 0.9 0.5 - 1.4 mg/dL    Calcium 9.5 8.7 - 10.5 mg/dL    Total Protein 7.4 6.0 - 8.4 g/dL    Albumin 3.8 3.5 - 5.2 g/dL    Total Bilirubin 0.7 0.1 - 1.0 mg/dL    Alkaline Phosphatase 110 55 - 135 U/L     AST 16 10 - 40 U/L    ALT 8 (L) 10 - 44 U/L    Anion Gap 8 8 - 16 mmol/L    eGFR if African American >60.0 >60 mL/min/1.73 m^2    eGFR if non African American >60.0 >60 mL/min/1.73 m^2   C-reactive protein   Result Value Ref Range    CRP 23.0 (H) 0.0 - 8.2 mg/L   TSH   Result Value Ref Range    TSH 1.307 0.400 - 4.000 uIU/mL   T3, free   Result Value Ref Range    T3, Free 3.2 2.3 - 4.2 pg/mL       Assessment:     Encounter Diagnoses   Name Primary?    Local skin infection Yes    Ankylosing spondylitis, unspecified site of spine     Lumbar and sacral arthritis     Neck muscle spasm     Lumbar discitis     Chronic pain syndrome     Vitamin D deficiency, unspecified           Plan:     Local skin infection  -     Discontinue: HYDROcodone-acetaminophen (NORCO)  mg per tablet; Take 1 tablet by mouth every 6 (six) hours as needed.  Dispense: 120 tablet; Refill: 0  -     cefTRIAXone injection 1 g  -     doxycycline (VIBRA-TABS) 100 MG tablet; Take 1 tablet (100 mg total) by mouth every 12 (twelve) hours.  Dispense: 60 tablet; Refill: 3  -     predniSONE (DELTASONE) 2.5 MG tablet; Take 3 tablets (7.5 mg total) by mouth once daily.  Dispense: 90 tablet; Refill: 6  -     methylPREDNISolone acetate injection 80 mg  -     Discontinue: HYDROcodone-acetaminophen (NORCO)  mg per tablet; Take 1 tablet by mouth every 6 (six) hours as needed.  Dispense: 120 tablet; Refill: 0  -     HYDROcodone-acetaminophen (NORCO)  mg per tablet; Take 1 tablet by mouth every 6 (six) hours as needed.  Dispense: 120 tablet; Refill: 0  -     CBC auto differential; Future; Expected date: 10/22/2019  -     Comprehensive metabolic panel; Future; Expected date: 10/22/2019  -     C-reactive protein; Future; Expected date: 10/22/2019  -     Sedimentation rate; Future; Expected date: 10/22/2019  -     Vitamin D; Future; Expected date: 10/22/2019    Ankylosing spondylitis, unspecified site of spine  -     Discontinue:  HYDROcodone-acetaminophen (NORCO)  mg per tablet; Take 1 tablet by mouth every 6 (six) hours as needed.  Dispense: 120 tablet; Refill: 0  -     cefTRIAXone injection 1 g  -     doxycycline (VIBRA-TABS) 100 MG tablet; Take 1 tablet (100 mg total) by mouth every 12 (twelve) hours.  Dispense: 60 tablet; Refill: 3  -     predniSONE (DELTASONE) 2.5 MG tablet; Take 3 tablets (7.5 mg total) by mouth once daily.  Dispense: 90 tablet; Refill: 6  -     methylPREDNISolone acetate injection 80 mg  -     Discontinue: HYDROcodone-acetaminophen (NORCO)  mg per tablet; Take 1 tablet by mouth every 6 (six) hours as needed.  Dispense: 120 tablet; Refill: 0  -     HYDROcodone-acetaminophen (NORCO)  mg per tablet; Take 1 tablet by mouth every 6 (six) hours as needed.  Dispense: 120 tablet; Refill: 0  -     CBC auto differential; Future; Expected date: 10/22/2019  -     Comprehensive metabolic panel; Future; Expected date: 10/22/2019  -     C-reactive protein; Future; Expected date: 10/22/2019  -     Sedimentation rate; Future; Expected date: 10/22/2019  -     Vitamin D; Future; Expected date: 10/22/2019    Lumbar and sacral arthritis  -     Discontinue: HYDROcodone-acetaminophen (NORCO)  mg per tablet; Take 1 tablet by mouth every 6 (six) hours as needed.  Dispense: 120 tablet; Refill: 0  -     cefTRIAXone injection 1 g  -     doxycycline (VIBRA-TABS) 100 MG tablet; Take 1 tablet (100 mg total) by mouth every 12 (twelve) hours.  Dispense: 60 tablet; Refill: 3  -     predniSONE (DELTASONE) 2.5 MG tablet; Take 3 tablets (7.5 mg total) by mouth once daily.  Dispense: 90 tablet; Refill: 6  -     methylPREDNISolone acetate injection 80 mg  -     Discontinue: HYDROcodone-acetaminophen (NORCO)  mg per tablet; Take 1 tablet by mouth every 6 (six) hours as needed.  Dispense: 120 tablet; Refill: 0  -     HYDROcodone-acetaminophen (NORCO)  mg per tablet; Take 1 tablet by mouth every 6 (six) hours as needed.   Dispense: 120 tablet; Refill: 0  -     CBC auto differential; Future; Expected date: 10/22/2019  -     Comprehensive metabolic panel; Future; Expected date: 10/22/2019  -     C-reactive protein; Future; Expected date: 10/22/2019  -     Sedimentation rate; Future; Expected date: 10/22/2019  -     Vitamin D; Future; Expected date: 10/22/2019    Neck muscle spasm  -     Discontinue: HYDROcodone-acetaminophen (NORCO)  mg per tablet; Take 1 tablet by mouth every 6 (six) hours as needed.  Dispense: 120 tablet; Refill: 0  -     cefTRIAXone injection 1 g  -     doxycycline (VIBRA-TABS) 100 MG tablet; Take 1 tablet (100 mg total) by mouth every 12 (twelve) hours.  Dispense: 60 tablet; Refill: 3  -     predniSONE (DELTASONE) 2.5 MG tablet; Take 3 tablets (7.5 mg total) by mouth once daily.  Dispense: 90 tablet; Refill: 6  -     methylPREDNISolone acetate injection 80 mg  -     Discontinue: HYDROcodone-acetaminophen (NORCO)  mg per tablet; Take 1 tablet by mouth every 6 (six) hours as needed.  Dispense: 120 tablet; Refill: 0  -     HYDROcodone-acetaminophen (NORCO)  mg per tablet; Take 1 tablet by mouth every 6 (six) hours as needed.  Dispense: 120 tablet; Refill: 0  -     CBC auto differential; Future; Expected date: 10/22/2019  -     Comprehensive metabolic panel; Future; Expected date: 10/22/2019  -     C-reactive protein; Future; Expected date: 10/22/2019  -     Sedimentation rate; Future; Expected date: 10/22/2019  -     Vitamin D; Future; Expected date: 10/22/2019    Lumbar discitis  -     Discontinue: HYDROcodone-acetaminophen (NORCO)  mg per tablet; Take 1 tablet by mouth every 6 (six) hours as needed.  Dispense: 120 tablet; Refill: 0  -     cefTRIAXone injection 1 g  -     doxycycline (VIBRA-TABS) 100 MG tablet; Take 1 tablet (100 mg total) by mouth every 12 (twelve) hours.  Dispense: 60 tablet; Refill: 3  -     predniSONE (DELTASONE) 2.5 MG tablet; Take 3 tablets (7.5 mg total) by mouth once  daily.  Dispense: 90 tablet; Refill: 6  -     methylPREDNISolone acetate injection 80 mg  -     Discontinue: HYDROcodone-acetaminophen (NORCO)  mg per tablet; Take 1 tablet by mouth every 6 (six) hours as needed.  Dispense: 120 tablet; Refill: 0  -     HYDROcodone-acetaminophen (NORCO)  mg per tablet; Take 1 tablet by mouth every 6 (six) hours as needed.  Dispense: 120 tablet; Refill: 0  -     CBC auto differential; Future; Expected date: 10/22/2019  -     Comprehensive metabolic panel; Future; Expected date: 10/22/2019  -     C-reactive protein; Future; Expected date: 10/22/2019  -     Sedimentation rate; Future; Expected date: 10/22/2019  -     Vitamin D; Future; Expected date: 10/22/2019    Chronic pain syndrome  -     Discontinue: HYDROcodone-acetaminophen (NORCO)  mg per tablet; Take 1 tablet by mouth every 6 (six) hours as needed.  Dispense: 120 tablet; Refill: 0  -     cefTRIAXone injection 1 g  -     doxycycline (VIBRA-TABS) 100 MG tablet; Take 1 tablet (100 mg total) by mouth every 12 (twelve) hours.  Dispense: 60 tablet; Refill: 3  -     predniSONE (DELTASONE) 2.5 MG tablet; Take 3 tablets (7.5 mg total) by mouth once daily.  Dispense: 90 tablet; Refill: 6  -     methylPREDNISolone acetate injection 80 mg  -     Discontinue: HYDROcodone-acetaminophen (NORCO)  mg per tablet; Take 1 tablet by mouth every 6 (six) hours as needed.  Dispense: 120 tablet; Refill: 0  -     HYDROcodone-acetaminophen (NORCO)  mg per tablet; Take 1 tablet by mouth every 6 (six) hours as needed.  Dispense: 120 tablet; Refill: 0  -     CBC auto differential; Future; Expected date: 10/22/2019  -     Comprehensive metabolic panel; Future; Expected date: 10/22/2019  -     C-reactive protein; Future; Expected date: 10/22/2019  -     Sedimentation rate; Future; Expected date: 10/22/2019  -     Vitamin D; Future; Expected date: 10/22/2019    Vitamin D deficiency, unspecified   -     Vitamin D; Future; Expected  date: 10/22/2019         I have  check [place] prescription monitoring program site and no unusual or abnormal behavior has occurred pt understand the risk and benefits of taking opioid medications and has decided to continue the  medication

## 2019-10-22 NOTE — PROGRESS NOTES
Administered 1 gram of rocephin mixed with 2.1 ml of lidocaine. Given in the left upper outer gluteal. Informed of s/s to report verbalized understanding. No adverse reactions noted.    Lot # [id number]  Expiration 12/2020    Administered 1 cc ( 80 mg/ml ) of depomedrol to the right upper outer gluteal. Informed of s/s to report verbalized understanding. No adverse reactions noted.    Lot # [id number]  Expiration 05/20

## 2019-11-07 ENCOUNTER — PATIENT MESSAGE (OUTPATIENT)
Dept: CARDIOLOGY | Facility: CLINIC | Age: 39
End: 2019-11-07

## 2019-11-11 ENCOUNTER — CLINICAL SUPPORT (OUTPATIENT)
Dept: CARDIOLOGY | Facility: CLINIC | Age: 39
End: 2019-11-11
Attending: INTERNAL MEDICINE
Payer: MEDICARE

## 2019-11-11 VITALS — HEIGHT: 73 IN | BODY MASS INDEX: 23.06 KG/M2 | WEIGHT: 174 LBS

## 2019-11-11 DIAGNOSIS — I42.9 CARDIOMYOPATHY, UNSPECIFIED TYPE: ICD-10-CM

## 2019-11-11 DIAGNOSIS — I42.6 CARDIOMYOPATHY, ALCOHOLIC: ICD-10-CM

## 2019-11-11 DIAGNOSIS — F17.200 TOBACCO DEPENDENCE: ICD-10-CM

## 2019-11-11 PROCEDURE — 99999 PR PBB SHADOW E&M-EST. PATIENT-LVL I: ICD-10-PCS | Mod: PBBFAC,,,

## 2019-11-11 PROCEDURE — 93351 STRESS TTE COMPLETE: CPT | Mod: PBBFAC,PO | Performed by: INTERNAL MEDICINE

## 2019-11-11 PROCEDURE — 99211 OFF/OP EST MAY X REQ PHY/QHP: CPT | Mod: PBBFAC,PO,25

## 2019-11-11 PROCEDURE — 99999 PR PBB SHADOW E&M-EST. PATIENT-LVL I: CPT | Mod: PBBFAC,,,

## 2019-11-11 PROCEDURE — 93351 ECHOCARDIOGRAM STRESS TEST (CUPID ONLY): ICD-10-PCS | Mod: 26,S$PBB,, | Performed by: INTERNAL MEDICINE

## 2019-11-12 LAB
ASCENDING AORTA: 3.08 CM
BSA FOR ECHO PROCEDURE: 2.02 M2
CV ECHO LV RWT: 0.29 CM
CV STRESS BASE HR: 97 BPM
DIASTOLIC BLOOD PRESSURE: 96 MMHG
DOP CALC LVOT AREA: 4.7 CM2
DOP CALC LVOT DIAMETER: 2.45 CM
DOP CALC LVOT PEAK VEL: 0.79 M/S
DOP CALC LVOT STROKE VOLUME: 72.19 CM3
DOP CALCLVOT PEAK VEL VTI: 15.32 CM
E WAVE DECELERATION TIME: 136.32 MSEC
E/A RATIO: 0.76
E/E' RATIO: 9.07 M/S
ECHO LV POSTERIOR WALL: 0.82 CM (ref 0.6–1.1)
FRACTIONAL SHORTENING: 28 % (ref 28–44)
INTERVENTRICULAR SEPTUM: 0.8 CM (ref 0.6–1.1)
LA MAJOR: 4.39 CM
LA MINOR: 3.38 CM
LA WIDTH: 4.62 CM
LEFT ATRIUM SIZE: 3.14 CM
LEFT ATRIUM VOLUME INDEX: 23.2 ML/M2
LEFT ATRIUM VOLUME: 47.1 CM3
LEFT INTERNAL DIMENSION IN SYSTOLE: 4.13 CM (ref 2.1–4)
LEFT VENTRICLE DIASTOLIC VOLUME INDEX: 79.1 ML/M2
LEFT VENTRICLE DIASTOLIC VOLUME: 160.43 ML
LEFT VENTRICLE MASS INDEX: 85 G/M2
LEFT VENTRICLE SYSTOLIC VOLUME INDEX: 37.3 ML/M2
LEFT VENTRICLE SYSTOLIC VOLUME: 75.71 ML
LEFT VENTRICULAR INTERNAL DIMENSION IN DIASTOLE: 5.71 CM (ref 3.5–6)
LEFT VENTRICULAR MASS: 173.38 G
LV LATERAL E/E' RATIO: 8.5 M/S
LV SEPTAL E/E' RATIO: 9.71 M/S
MV PEAK A VEL: 0.9 M/S
MV PEAK E VEL: 0.68 M/S
OHS CV CPX 1 MINUTE RECOVERY HEART RATE: 122 BPM
OHS CV CPX 85 PERCENT MAX PREDICTED HEART RATE MALE: 153
OHS CV CPX ESTIMATED METS: 10
OHS CV CPX MAX PREDICTED HEART RATE: 180
OHS CV CPX PATIENT IS FEMALE: 0
OHS CV CPX PATIENT IS MALE: 1
OHS CV CPX PEAK DIASTOLIC BLOOD PRESSURE: 75 MMHG
OHS CV CPX PEAK HEAR RATE: 157 BPM
OHS CV CPX PEAK RATE PRESSURE PRODUCT: ABNORMAL
OHS CV CPX PEAK SYSTOLIC BLOOD PRESSURE: 152 MMHG
OHS CV CPX PERCENT MAX PREDICTED HEART RATE ACHIEVED: 87
OHS CV CPX RATE PRESSURE PRODUCT PRESENTING: ABNORMAL
PISA TR MAX VEL: 2.16 M/S
PULM VEIN S/D RATIO: 1.43
PV PEAK D VEL: 0.42 M/S
PV PEAK S VEL: 0.6 M/S
RA MAJOR: 3.97 CM
RA WIDTH: 3.39 CM
SINUS: 3.73 CM
STJ: 3.14 CM
STRESS ECHO POST EXERCISE DUR MIN: 5 MINUTES
STRESS ECHO POST EXERCISE DUR SEC: 43 SECONDS
SYSTOLIC BLOOD PRESSURE: 146 MMHG
TDI LATERAL: 0.08 M/S
TDI SEPTAL: 0.07 M/S
TDI: 0.08 M/S
TR MAX PG: 19 MMHG

## 2019-11-20 ENCOUNTER — OFFICE VISIT (OUTPATIENT)
Dept: CARDIOLOGY | Facility: CLINIC | Age: 39
End: 2019-11-20
Payer: MEDICARE

## 2019-11-20 VITALS
SYSTOLIC BLOOD PRESSURE: 117 MMHG | BODY MASS INDEX: 23.2 KG/M2 | DIASTOLIC BLOOD PRESSURE: 79 MMHG | HEIGHT: 73 IN | HEART RATE: 93 BPM | WEIGHT: 175.06 LBS

## 2019-11-20 DIAGNOSIS — I42.9 CARDIOMYOPATHY, UNSPECIFIED TYPE: ICD-10-CM

## 2019-11-20 PROCEDURE — 99999 PR PBB SHADOW E&M-EST. PATIENT-LVL III: CPT | Mod: PBBFAC,,, | Performed by: INTERNAL MEDICINE

## 2019-11-20 PROCEDURE — 99999 PR PBB SHADOW E&M-EST. PATIENT-LVL III: ICD-10-PCS | Mod: PBBFAC,,, | Performed by: INTERNAL MEDICINE

## 2019-11-20 PROCEDURE — 99214 PR OFFICE/OUTPT VISIT, EST, LEVL IV, 30-39 MIN: ICD-10-PCS | Mod: S$PBB,,, | Performed by: INTERNAL MEDICINE

## 2019-11-20 PROCEDURE — 99213 OFFICE O/P EST LOW 20 MIN: CPT | Mod: PBBFAC,PO | Performed by: INTERNAL MEDICINE

## 2019-11-20 PROCEDURE — 99214 OFFICE O/P EST MOD 30 MIN: CPT | Mod: S$PBB,,, | Performed by: INTERNAL MEDICINE

## 2019-11-20 RX ORDER — CARVEDILOL 3.12 MG/1
TABLET ORAL
Qty: 60 TABLET | Refills: 11 | Status: SHIPPED | OUTPATIENT
Start: 2019-11-20 | End: 2020-11-03 | Stop reason: SDUPTHER

## 2019-11-20 NOTE — PROGRESS NOTES
Subjective:    Patient ID:  Umang Greer is a 39 y.o. male who presents for follow-up of cmp    HPI  He comes for follow up with no major problems, no chest pain, no shortness of breath.  Still smoking    Review of Systems   Constitution: Negative for decreased appetite, malaise/fatigue, weight gain and weight loss.   Cardiovascular: Negative for chest pain, dyspnea on exertion, leg swelling, palpitations and syncope.   Respiratory: Negative for cough and shortness of breath.    Gastrointestinal: Negative.    Neurological: Negative for weakness.   All other systems reviewed and are negative.       Objective:      Physical Exam   Constitutional: He is oriented to person, place, and time. He appears well-developed and well-nourished.   HENT:   Head: Normocephalic.   Eyes: Pupils are equal, round, and reactive to light.   Neck: Normal range of motion. Neck supple. No JVD present. Carotid bruit is not present. No thyromegaly present.   Cardiovascular: Normal rate, regular rhythm, normal heart sounds, intact distal pulses and normal pulses. PMI is not displaced. Exam reveals no gallop.   No murmur heard.  Pulmonary/Chest: Effort normal and breath sounds normal.   Abdominal: Soft. Normal appearance. He exhibits no mass. There is no hepatosplenomegaly. There is no tenderness.   Musculoskeletal: Normal range of motion. He exhibits no edema.   Neurological: He is alert and oriented to person, place, and time. He has normal strength and normal reflexes. No sensory deficit.   Skin: Skin is warm and intact.   Psychiatric: He has a normal mood and affect.   Nursing note and vitals reviewed.    Stress echo reviewed      Assessment:       1. Cardiomyopathy, unspecified type         Plan:     Continue all cardiac medications  Regular exercise program  Quit smoking  1 yr f/u

## 2019-12-12 DIAGNOSIS — M45.9 ANKYLOSING SPONDYLITIS, UNSPECIFIED SITE OF SPINE: ICD-10-CM

## 2019-12-16 NOTE — TELEPHONE ENCOUNTER
"----- Message from Mani Fonseca sent at 12/16/2019  3:06 PM CST -----  Regarding: Lilian Dailey    We received an order for Simponi    Please send this order to [facility] per patient request.     Please send prescription to [facility] - [facility] - [address]  which has been added to the patient's EPIC profile.      To complete this in EPIC, the original order MUST be discontinued and re-typed as a new prescription with the updated pharmacy listed  .   Clicking "reorder" will continue to route the rx to [facility] even if the pharmacy is changed.     Please opt the patient out of [facility] when the BPA is fired.      Thank you,   Mani    "

## 2019-12-19 ENCOUNTER — TELEPHONE (OUTPATIENT)
Dept: RHEUMATOLOGY | Facility: CLINIC | Age: 39
End: 2019-12-19

## 2019-12-19 DIAGNOSIS — M45.9 ANKYLOSING SPONDYLITIS, UNSPECIFIED SITE OF SPINE: ICD-10-CM

## 2019-12-20 ENCOUNTER — TELEPHONE (OUTPATIENT)
Dept: PHARMACY | Facility: CLINIC | Age: 39
End: 2019-12-20

## 2019-12-20 NOTE — TELEPHONE ENCOUNTER
Informed Patient  that [facility] received prescription for SIMPONI and benefits investigation is required.  [facility] will be back in touch once insurance determination is received.

## 2019-12-29 DIAGNOSIS — T78.40XA ALLERGIC REACTION, INITIAL ENCOUNTER: ICD-10-CM

## 2019-12-29 DIAGNOSIS — M25.562 CHRONIC PAIN OF BOTH KNEES: ICD-10-CM

## 2019-12-29 DIAGNOSIS — G89.29 CHRONIC PAIN OF BOTH KNEES: ICD-10-CM

## 2019-12-29 DIAGNOSIS — M46.46 LUMBAR DISCITIS: ICD-10-CM

## 2019-12-29 DIAGNOSIS — M25.561 CHRONIC PAIN OF BOTH KNEES: ICD-10-CM

## 2019-12-29 DIAGNOSIS — M45.9 ANKYLOSING SPONDYLITIS, UNSPECIFIED SITE OF SPINE: ICD-10-CM

## 2019-12-30 NOTE — TELEPHONE ENCOUNTER
----- Message from Ju Gupta sent at 12/30/2019  9:07 AM CST -----  Type:  Pharmacy Calling concerning an RX    Name of Caller:  Irene  Pharmacy Name:  [facility]  Prescription Name:  Lilian  What do they need to clarify?:  Refill   Best Call Back Number:  [phone]  Additional Information:

## 2019-12-31 RX ORDER — BACLOFEN 20 MG/1
TABLET ORAL
Qty: 120 TABLET | Refills: 3 | Status: SHIPPED | OUTPATIENT
Start: 2019-12-31 | End: 2020-11-03 | Stop reason: SDUPTHER

## 2020-01-13 ENCOUNTER — PATIENT MESSAGE (OUTPATIENT)
Dept: FAMILY MEDICINE | Facility: CLINIC | Age: 40
End: 2020-01-13

## 2020-01-13 DIAGNOSIS — F41.9 ANXIETY: ICD-10-CM

## 2020-01-14 RX ORDER — ALPRAZOLAM 2 MG/1
2 TABLET ORAL 2 TIMES DAILY PRN
Qty: 60 TABLET | Refills: 1 | Status: SHIPPED | OUTPATIENT
Start: 2020-01-14 | End: 2020-03-12 | Stop reason: SDUPTHER

## 2020-01-17 ENCOUNTER — LAB VISIT (OUTPATIENT)
Dept: LAB | Facility: HOSPITAL | Age: 40
End: 2020-01-17
Attending: INTERNAL MEDICINE
Payer: MEDICARE

## 2020-01-17 DIAGNOSIS — R73.9 HYPERGLYCEMIA: ICD-10-CM

## 2020-01-17 DIAGNOSIS — M45.9 ANKYLOSING SPONDYLITIS, UNSPECIFIED SITE OF SPINE: ICD-10-CM

## 2020-01-17 DIAGNOSIS — M47.817 LUMBAR AND SACRAL ARTHRITIS: ICD-10-CM

## 2020-01-17 DIAGNOSIS — M62.838 NECK MUSCLE SPASM: ICD-10-CM

## 2020-01-17 DIAGNOSIS — L08.9 LOCAL SKIN INFECTION: ICD-10-CM

## 2020-01-17 DIAGNOSIS — I51.7 IDIOPATHIC CARDIOMEGALY: ICD-10-CM

## 2020-01-17 DIAGNOSIS — M46.46 LUMBAR DISCITIS: ICD-10-CM

## 2020-01-17 DIAGNOSIS — R93.3 ABNORMAL FINDINGS ON DIAGNOSTIC IMAGING OF OTHER PARTS OF DIGESTIVE TRACT: ICD-10-CM

## 2020-01-17 DIAGNOSIS — G89.4 CHRONIC PAIN SYNDROME: ICD-10-CM

## 2020-01-17 DIAGNOSIS — E55.9 VITAMIN D DEFICIENCY, UNSPECIFIED: ICD-10-CM

## 2020-01-17 LAB
25(OH)D3+25(OH)D2 SERPL-MCNC: 45 NG/ML (ref 30–96)
ALBUMIN SERPL BCP-MCNC: 3.8 G/DL (ref 3.5–5.2)
ALP SERPL-CCNC: 92 U/L (ref 55–135)
ALT SERPL W/O P-5'-P-CCNC: 15 U/L (ref 10–44)
ANION GAP SERPL CALC-SCNC: 11 MMOL/L (ref 8–16)
ANISOCYTOSIS BLD QL SMEAR: SLIGHT
AST SERPL-CCNC: 16 U/L (ref 10–40)
BASOPHILS # BLD AUTO: 0.06 K/UL (ref 0–0.2)
BASOPHILS NFR BLD: 0.6 % (ref 0–1.9)
BILIRUB SERPL-MCNC: 0.3 MG/DL (ref 0.1–1)
BUN SERPL-MCNC: 12 MG/DL (ref 6–20)
CALCIUM SERPL-MCNC: 9.7 MG/DL (ref 8.7–10.5)
CHLORIDE SERPL-SCNC: 101 MMOL/L (ref 95–110)
CHOLEST SERPL-MCNC: 215 MG/DL (ref 120–199)
CHOLEST/HDLC SERPL: 6.3 {RATIO} (ref 2–5)
CO2 SERPL-SCNC: 28 MMOL/L (ref 23–29)
CREAT SERPL-MCNC: 0.9 MG/DL (ref 0.5–1.4)
CRP SERPL-MCNC: 11.6 MG/L (ref 0–8.2)
DIFFERENTIAL METHOD: ABNORMAL
EOSINOPHIL # BLD AUTO: 0.1 K/UL (ref 0–0.5)
EOSINOPHIL NFR BLD: 1.1 % (ref 0–8)
ERYTHROCYTE [DISTWIDTH] IN BLOOD BY AUTOMATED COUNT: 12.6 % (ref 11.5–14.5)
ERYTHROCYTE [SEDIMENTATION RATE] IN BLOOD BY WESTERGREN METHOD: 12 MM/HR (ref 0–10)
EST. GFR  (AFRICAN AMERICAN): >60 ML/MIN/1.73 M^2
EST. GFR  (NON AFRICAN AMERICAN): >60 ML/MIN/1.73 M^2
ESTIMATED AVG GLUCOSE: 105 MG/DL (ref 68–131)
GLUCOSE SERPL-MCNC: 94 MG/DL (ref 70–110)
HBA1C MFR BLD HPLC: 5.3 % (ref 4–5.6)
HCT VFR BLD AUTO: 45.7 % (ref 40–54)
HDLC SERPL-MCNC: 34 MG/DL (ref 40–75)
HDLC SERPL: 15.8 % (ref 20–50)
HGB BLD-MCNC: 15.2 G/DL (ref 14–18)
HYPOCHROMIA BLD QL SMEAR: ABNORMAL
IMM GRANULOCYTES # BLD AUTO: 0.03 K/UL (ref 0–0.04)
IMM GRANULOCYTES NFR BLD AUTO: 0.3 % (ref 0–0.5)
LDLC SERPL CALC-MCNC: 122 MG/DL (ref 63–159)
LYMPHOCYTES # BLD AUTO: 6.1 K/UL (ref 1–4.8)
LYMPHOCYTES NFR BLD: 56.9 % (ref 18–48)
MCH RBC QN AUTO: 30.2 PG (ref 27–31)
MCHC RBC AUTO-ENTMCNC: 33.3 G/DL (ref 32–36)
MCV RBC AUTO: 91 FL (ref 82–98)
MONOCYTES # BLD AUTO: 0.5 K/UL (ref 0.3–1)
MONOCYTES NFR BLD: 4.9 % (ref 4–15)
NEUTROPHILS # BLD AUTO: 3.9 K/UL (ref 1.8–7.7)
NEUTROPHILS NFR BLD: 36.2 % (ref 38–73)
NONHDLC SERPL-MCNC: 181 MG/DL
NRBC BLD-RTO: 0 /100 WBC
PLATELET # BLD AUTO: 314 K/UL (ref 150–350)
PMV BLD AUTO: 9.5 FL (ref 9.2–12.9)
POTASSIUM SERPL-SCNC: 4.1 MMOL/L (ref 3.5–5.1)
PROT SERPL-MCNC: 7.5 G/DL (ref 6–8.4)
RBC # BLD AUTO: 5.03 M/UL (ref 4.6–6.2)
SODIUM SERPL-SCNC: 140 MMOL/L (ref 136–145)
TRIGL SERPL-MCNC: 295 MG/DL (ref 30–150)
WBC # BLD AUTO: 10.76 K/UL (ref 3.9–12.7)

## 2020-01-17 PROCEDURE — 85025 COMPLETE CBC W/AUTO DIFF WBC: CPT

## 2020-01-17 PROCEDURE — 82306 VITAMIN D 25 HYDROXY: CPT

## 2020-01-17 PROCEDURE — 80053 COMPREHEN METABOLIC PANEL: CPT

## 2020-01-17 PROCEDURE — 36415 COLL VENOUS BLD VENIPUNCTURE: CPT | Mod: PO

## 2020-01-17 PROCEDURE — 86140 C-REACTIVE PROTEIN: CPT

## 2020-01-17 PROCEDURE — 83036 HEMOGLOBIN GLYCOSYLATED A1C: CPT

## 2020-01-17 PROCEDURE — 80061 LIPID PANEL: CPT

## 2020-01-17 PROCEDURE — 85651 RBC SED RATE NONAUTOMATED: CPT | Mod: PO

## 2020-01-22 ENCOUNTER — OFFICE VISIT (OUTPATIENT)
Dept: RHEUMATOLOGY | Facility: CLINIC | Age: 40
End: 2020-01-22
Payer: MEDICARE

## 2020-01-22 VITALS
WEIGHT: 176.5 LBS | BODY MASS INDEX: 23.39 KG/M2 | HEART RATE: 106 BPM | SYSTOLIC BLOOD PRESSURE: 128 MMHG | DIASTOLIC BLOOD PRESSURE: 85 MMHG | HEIGHT: 73 IN

## 2020-01-22 DIAGNOSIS — R79.82 ELEVATED C-REACTIVE PROTEIN (CRP): ICD-10-CM

## 2020-01-22 DIAGNOSIS — M45.9 ANKYLOSING SPONDYLITIS, UNSPECIFIED SITE OF SPINE: Primary | ICD-10-CM

## 2020-01-22 DIAGNOSIS — D84.821 IMMUNOCOMPROMISED STATE DUE TO DRUG THERAPY: ICD-10-CM

## 2020-01-22 DIAGNOSIS — R70.0 ELEVATED SED RATE: ICD-10-CM

## 2020-01-22 DIAGNOSIS — G89.4 CHRONIC PAIN SYNDROME: ICD-10-CM

## 2020-01-22 DIAGNOSIS — Z79.899 IMMUNOCOMPROMISED STATE DUE TO DRUG THERAPY: ICD-10-CM

## 2020-01-22 PROCEDURE — 99999 PR PBB SHADOW E&M-EST. PATIENT-LVL III: ICD-10-PCS | Mod: PBBFAC,,, | Performed by: PHYSICIAN ASSISTANT

## 2020-01-22 PROCEDURE — 99213 OFFICE O/P EST LOW 20 MIN: CPT | Mod: PBBFAC,PO | Performed by: PHYSICIAN ASSISTANT

## 2020-01-22 PROCEDURE — 99999 PR PBB SHADOW E&M-EST. PATIENT-LVL III: CPT | Mod: PBBFAC,,, | Performed by: PHYSICIAN ASSISTANT

## 2020-01-22 PROCEDURE — 99214 PR OFFICE/OUTPT VISIT, EST, LEVL IV, 30-39 MIN: ICD-10-PCS | Mod: 25,S$PBB,, | Performed by: PHYSICIAN ASSISTANT

## 2020-01-22 PROCEDURE — 96372 THER/PROPH/DIAG INJ SC/IM: CPT | Mod: PBBFAC,PO

## 2020-01-22 PROCEDURE — 99214 OFFICE O/P EST MOD 30 MIN: CPT | Mod: 25,S$PBB,, | Performed by: PHYSICIAN ASSISTANT

## 2020-01-22 RX ORDER — PREDNISONE 2.5 MG/1
7.5 TABLET ORAL DAILY
COMMUNITY
Start: 2020-01-06 | End: 2020-05-08 | Stop reason: SDUPTHER

## 2020-01-22 RX ORDER — DOXYCYCLINE HYCLATE 100 MG
100 TABLET ORAL 2 TIMES DAILY
Refills: 3 | COMMUNITY
Start: 2019-11-22 | End: 2020-01-22

## 2020-01-22 RX ORDER — METHYLPREDNISOLONE ACETATE 80 MG/ML
80 INJECTION, SUSPENSION INTRA-ARTICULAR; INTRALESIONAL; INTRAMUSCULAR; SOFT TISSUE
Status: COMPLETED | OUTPATIENT
Start: 2020-01-22 | End: 2020-01-22

## 2020-01-22 RX ADMIN — METHYLPREDNISOLONE ACETATE 80 MG: 80 INJECTION, SUSPENSION INTRA-ARTICULAR; INTRALESIONAL; INTRAMUSCULAR; SOFT TISSUE at 08:01

## 2020-01-22 ASSESSMENT — ROUTINE ASSESSMENT OF PATIENT INDEX DATA (RAPID3)
PAIN SCORE: 7.5
TOTAL RAPID3 SCORE: 5.33
PATIENT GLOBAL ASSESSMENT SCORE: 5.5
MDHAQ FUNCTION SCORE: .9
FATIGUE SCORE: 1.1
PSYCHOLOGICAL DISTRESS SCORE: 2.2

## 2020-01-22 NOTE — PROGRESS NOTES
Administered 1 cc ( 80 mg/ml ) of depomedrol to the right upper outer gluteal. Informed of s/s to report verbalized understanding. No adverse reactions noted.    Lot # [id number]  Expiration 05/2021

## 2020-01-22 NOTE — PROGRESS NOTES
"Subjective:       Patient ID: Umang Greer is a 39 y.o. male.    Chief Complaint: Disease Management; Pain; and Follow-up    Mr. Greer is a 39 year old male who presents to clinic for follow up on Ankylosing spondylitis. He is a new patient to me. He has been doing fair since his last visit. He complains of arthritis flare with the cold weather involving his knees and low back. He states joints are always "popping." He has chronic thoracic and lumbar axial spine pain that is unchanged throughout the day. Massage/PT has made back pain worse in the past. Baclofen and norco are helpful for providing relief and are not causing side effects. He has cut back on smoking to 1/2 pack per day. No interested in smoking cessation program. He has been taking prednisone 2.5 mg- 7.5 mg daily as needed for pain.    Current tx:  1. Simponi  2. Prednisone prn  3. norco 10/325 q6    Review of Systems   Constitutional: Positive for activity change. Negative for chills, fatigue and fever.   Eyes: Negative for visual disturbance.   Respiratory: Negative for cough, shortness of breath and wheezing.    Cardiovascular: Negative for chest pain, palpitations and leg swelling.   Gastrointestinal: Negative for abdominal pain, constipation, diarrhea, nausea and vomiting.   Musculoskeletal: Positive for arthralgias, back pain, joint swelling and myalgias.   Allergic/Immunologic: Positive for immunocompromised state.   Neurological: Negative for dizziness, syncope and headaches.   Hematological: Negative for adenopathy.         Objective:     Vitals:    01/22/20 0810   BP: 128/85   Pulse: 106       Past Medical History:   Diagnosis Date    Anxiety     Asthma     Cardiomyopathy     CHF (congestive heart failure)     Emphysema of lung     Sickle cell anemia     only trait    Sickle cell trait      Past Surgical History:   Procedure Laterality Date    HERNIA REPAIR            Physical Exam   Constitutional: He is well-developed, " well-nourished, and in no distress.   Eyes: Right conjunctiva is not injected. Left conjunctiva is not injected. Right eye exhibits normal extraocular motion. Left eye exhibits normal extraocular motion.   Neck: No JVD present. No thyromegaly present.   Cardiovascular: Normal rate and regular rhythm.  Exam reveals no decreased pulses.    Pulmonary/Chest: He has no wheezes. He has no rhonchi. He has no rales.       Right Side Rheumatological Exam     Examination finds the shoulder, elbow, wrist, 1st PIP, 1st MCP, 2nd PIP, 2nd MCP, 3rd PIP, 3rd MCP, 4th PIP, 4th MCP, 5th PIP and 5th MCP normal.    The patient is tender to palpation of the knee    Left Side Rheumatological Exam     Examination finds the shoulder, elbow, wrist, 1st PIP, 1st MCP, 2nd PIP, 2nd MCP, 3rd PIP, 3rd MCP, 4th PIP, 4th MCP, 5th PIP and 5th MCP normal.    The patient is tender to palpation of the knee.      Back/Neck Exam   Tenderness Right paramedian tenderness of the Upper T-Spine, Lower T-Spine, Upper L-Spine, Lower L-Spine and SI Joint.Left paramedian tenderness of the Upper T-Spine, Lower T-Spine, Upper L-Spine, Lower L-Spine and SI Joint.      Lymphadenopathy:     He has no cervical adenopathy.   Neurological: Gait normal.   Skin: No rash noted.     Psychiatric: Mood and affect normal.       Recent labs reviewed:  Component      Latest Ref Rng & Units 1/17/2020   WBC      3.90 - 12.70 K/uL 10.76   RBC      4.60 - 6.20 M/uL 5.03   Hemoglobin      14.0 - 18.0 g/dL 15.2   Hematocrit      40.0 - 54.0 % 45.7   MCV      82 - 98 fL 91   MCH      27.0 - 31.0 pg 30.2   MCHC      32.0 - 36.0 g/dL 33.3   RDW      11.5 - 14.5 % 12.6   Platelets      150 - 350 K/uL 314   MPV      9.2 - 12.9 fL 9.5   Immature Granulocytes      0.0 - 0.5 % 0.3   Gran # (ANC)      1.8 - 7.7 K/uL 3.9   Immature Grans (Abs)      0.00 - 0.04 K/uL 0.03   Lymph #      1.0 - 4.8 K/uL 6.1 (H)   Mono #      0.3 - 1.0 K/uL 0.5   Eos #      0.0 - 0.5 K/uL 0.1   Baso #      0.00 -  0.20 K/uL 0.06   nRBC      0 /100 WBC 0   Gran%      38.0 - 73.0 % 36.2 (L)   Lymph%      18.0 - 48.0 % 56.9 (H)   Mono%      4.0 - 15.0 % 4.9   Eosinophil%      0.0 - 8.0 % 1.1   Basophil%      0.0 - 1.9 % 0.6   Aniso       Slight   Hypo       Occasional   Differential Method       Automated   Sodium      136 - 145 mmol/L 140   Potassium      3.5 - 5.1 mmol/L 4.1   Chloride      95 - 110 mmol/L 101   CO2      23 - 29 mmol/L 28   Glucose      70 - 110 mg/dL 94   BUN, Bld      6 - 20 mg/dL 12   Creatinine      0.5 - 1.4 mg/dL 0.9   Calcium      8.7 - 10.5 mg/dL 9.7   PROTEIN TOTAL      6.0 - 8.4 g/dL 7.5   Albumin      3.5 - 5.2 g/dL 3.8   BILIRUBIN TOTAL      0.1 - 1.0 mg/dL 0.3   Alkaline Phosphatase      55 - 135 U/L 92   AST      10 - 40 U/L 16   ALT      10 - 44 U/L 15   Anion Gap      8 - 16 mmol/L 11   eGFR if African American      >60 mL/min/1.73 m:2 >60.0   eGFR if non African American      >60 mL/min/1.73 m:2 >60.0   Cholesterol      120 - 199 mg/dL 215 (H)   Triglycerides      30 - 150 mg/dL 295 (H)   HDL      40 - 75 mg/dL 34 (L)   LDL Cholesterol External      63.0 - 159.0 mg/dL 122.0   Hdl/Cholesterol Ratio      20.0 - 50.0 % 15.8 (L)   Total Cholesterol/HDL Ratio      2.0 - 5.0 6.3 (H)   Non-HDL Cholesterol      mg/dL 181   Hemoglobin A1C External      4.0 - 5.6 % 5.3   Estimated Avg Glucose      68 - 131 mg/dL 105   CRP      0.0 - 8.2 mg/L 11.6 (H)   Sed Rate      0 - 10 mm/Hr 12 (H)   Vit D, 25-Hydroxy      30 - 96 ng/mL 45        Assessment:       1. Ankylosing spondylitis, unspecified site of spine    2. Immunocompromised state due to drug therapy    3. Chronic pain syndrome    4. Elevated sed rate    5. Elevated C-reactive protein (CRP)            Plan:       Ankylosing spondylitis, unspecified site of spine  -     methylPREDNISolone acetate injection 80 mg  -     CBC auto differential; Future; Expected date: 01/22/2020  -     Comprehensive metabolic panel; Future; Expected date: 01/22/2020  -      C-reactive protein; Future; Expected date: 01/22/2020  -     Sedimentation rate; Future; Expected date: 01/22/2020  -     golimumab (SIMPONI) 50 mg/0.5 mL PnIj; Inject 50 mg into the skin every 30 days.  Dispense: 0.5 mL; Refill: 6    Immunocompromised state due to drug therapy    Chronic pain syndrome    Elevated sed rate    Elevated C-reactive protein (CRP)        Assessment:  39 year old male with  Ankylosing spondylitis, elevated ESR/CRP on simponi  --chronic pain syndrome on norco 10/325 Q6  --alcoholic cardiomyopathy, EF 55% followed by Cardiology  --COPD, tobacco use  --chronic anxiety on xanax PRN per PCP    Plan:  1. Depo 80 mg given today  2. Cont. simponi monthly. Hold for infection in the future.  3. Cont. Baclofen PRN  4. Cont. norco q6 per Dr. Locke. I have checked [place] prescription monitoring program site and no unusual or abnormal behavior has occurred pt understand the risk and benefits of taking opioid medications and has decided to continue the  Medication.  5. Encouraged smoking cessation. Discussed decrease fried/fatty foods. Follow up with PCP for cholesterol management    Follow up:  3-4 months Dr. Locke w/labs prior

## 2020-01-24 ENCOUNTER — TELEPHONE (OUTPATIENT)
Dept: PHARMACY | Facility: CLINIC | Age: 40
End: 2020-01-24

## 2020-01-24 NOTE — TELEPHONE ENCOUNTER
Spoke with patient regarding refill Simponi. Patient confirmed need for refill, 0 dose on hand and dose due . Patient takes at the end of the month. Shipment set for  for patient to receive . Address and  verified. Patient denies any changes with other medications, conditions or allergies. No side effects or concerns today. Patient is aware to let [facility] know if any questions or changes arise. $8.95 copay (004).

## 2020-01-27 ENCOUNTER — OFFICE VISIT (OUTPATIENT)
Dept: FAMILY MEDICINE | Facility: CLINIC | Age: 40
End: 2020-01-27
Payer: MEDICARE

## 2020-01-27 VITALS
HEART RATE: 97 BPM | HEIGHT: 73 IN | WEIGHT: 174.81 LBS | SYSTOLIC BLOOD PRESSURE: 104 MMHG | OXYGEN SATURATION: 99 % | DIASTOLIC BLOOD PRESSURE: 84 MMHG | TEMPERATURE: 99 F | BODY MASS INDEX: 23.17 KG/M2

## 2020-01-27 DIAGNOSIS — E78.5 HYPERLIPIDEMIA, UNSPECIFIED HYPERLIPIDEMIA TYPE: ICD-10-CM

## 2020-01-27 DIAGNOSIS — F41.9 ANXIETY: Primary | ICD-10-CM

## 2020-01-27 DIAGNOSIS — J44.9 CHRONIC OBSTRUCTIVE PULMONARY DISEASE, UNSPECIFIED COPD TYPE: ICD-10-CM

## 2020-01-27 PROCEDURE — 99214 PR OFFICE/OUTPT VISIT, EST, LEVL IV, 30-39 MIN: ICD-10-PCS | Mod: S$PBB,,, | Performed by: FAMILY MEDICINE

## 2020-01-27 PROCEDURE — 99999 PR PBB SHADOW E&M-EST. PATIENT-LVL III: ICD-10-PCS | Mod: PBBFAC,,, | Performed by: FAMILY MEDICINE

## 2020-01-27 PROCEDURE — 99213 OFFICE O/P EST LOW 20 MIN: CPT | Mod: PBBFAC,PO | Performed by: FAMILY MEDICINE

## 2020-01-27 PROCEDURE — 99999 PR PBB SHADOW E&M-EST. PATIENT-LVL III: CPT | Mod: PBBFAC,,, | Performed by: FAMILY MEDICINE

## 2020-01-27 PROCEDURE — 99214 OFFICE O/P EST MOD 30 MIN: CPT | Mod: S$PBB,,, | Performed by: FAMILY MEDICINE

## 2020-01-27 NOTE — PROGRESS NOTES
Subjective:       Patient ID: Umang Greer is a 39 y.o. male.    Chief Complaint: Follow-up    Here for f/u anxiety and copd. Doing well overall mood stable.    Hyperlipidemia   This is a chronic problem. The current episode started more than 1 year ago. Pertinent negatives include no chest pain or shortness of breath.     Review of Systems   Constitutional: Negative for chills and fever.   Respiratory: Negative for cough, chest tightness and shortness of breath.    Cardiovascular: Negative for chest pain, palpitations and leg swelling.   Endocrine: Negative for cold intolerance and heat intolerance.   Psychiatric/Behavioral: Negative for decreased concentration. The patient is not nervous/anxious.        Objective:      Physical Exam   Constitutional: He appears well-developed and well-nourished.   HENT:   Head: Normocephalic and atraumatic.   Cardiovascular: Normal rate, regular rhythm and normal heart sounds.   Pulmonary/Chest: Effort normal and breath sounds normal.   Psychiatric: He has a normal mood and affect.   Nursing note and vitals reviewed.      Assessment:       1. Anxiety    2. Chronic obstructive pulmonary disease, unspecified COPD type    3. Hyperlipidemia, unspecified hyperlipidemia type        Plan:       Anxiety    Chronic obstructive pulmonary disease, unspecified COPD type    Hyperlipidemia, unspecified hyperlipidemia type      Ok in  database  Mood stable  Discussed diet/exercise for lipids  Follow up in about 4 months (around 5/27/2020), or if symptoms worsen or fail to improve.

## 2020-02-03 ENCOUNTER — PATIENT MESSAGE (OUTPATIENT)
Dept: RHEUMATOLOGY | Facility: CLINIC | Age: 40
End: 2020-02-03

## 2020-02-03 DIAGNOSIS — L08.9 LOCAL SKIN INFECTION: ICD-10-CM

## 2020-02-03 DIAGNOSIS — M62.838 NECK MUSCLE SPASM: ICD-10-CM

## 2020-02-03 DIAGNOSIS — G89.4 CHRONIC PAIN SYNDROME: ICD-10-CM

## 2020-02-03 DIAGNOSIS — M47.817 LUMBAR AND SACRAL ARTHRITIS: ICD-10-CM

## 2020-02-03 DIAGNOSIS — M46.46 LUMBAR DISCITIS: ICD-10-CM

## 2020-02-03 DIAGNOSIS — M45.9 ANKYLOSING SPONDYLITIS, UNSPECIFIED SITE OF SPINE: ICD-10-CM

## 2020-02-04 ENCOUNTER — PATIENT MESSAGE (OUTPATIENT)
Dept: RHEUMATOLOGY | Facility: CLINIC | Age: 40
End: 2020-02-04

## 2020-02-04 RX ORDER — HYDROCODONE BITARTRATE AND ACETAMINOPHEN 10; 325 MG/1; MG/1
1 TABLET ORAL EVERY 6 HOURS PRN
Qty: 120 TABLET | Refills: 0 | Status: SHIPPED | OUTPATIENT
Start: 2020-02-04 | End: 2020-03-02 | Stop reason: SDUPTHER

## 2020-02-21 ENCOUNTER — TELEPHONE (OUTPATIENT)
Dept: PHARMACY | Facility: CLINIC | Age: 40
End: 2020-02-21

## 2020-03-02 ENCOUNTER — PATIENT MESSAGE (OUTPATIENT)
Dept: RHEUMATOLOGY | Facility: CLINIC | Age: 40
End: 2020-03-02

## 2020-03-02 DIAGNOSIS — M47.817 LUMBAR AND SACRAL ARTHRITIS: ICD-10-CM

## 2020-03-02 DIAGNOSIS — L08.9 LOCAL SKIN INFECTION: ICD-10-CM

## 2020-03-02 DIAGNOSIS — M62.838 NECK MUSCLE SPASM: ICD-10-CM

## 2020-03-02 DIAGNOSIS — M46.46 LUMBAR DISCITIS: ICD-10-CM

## 2020-03-02 DIAGNOSIS — G89.4 CHRONIC PAIN SYNDROME: ICD-10-CM

## 2020-03-02 DIAGNOSIS — M45.9 ANKYLOSING SPONDYLITIS, UNSPECIFIED SITE OF SPINE: ICD-10-CM

## 2020-03-03 ENCOUNTER — PATIENT MESSAGE (OUTPATIENT)
Dept: RHEUMATOLOGY | Facility: CLINIC | Age: 40
End: 2020-03-03

## 2020-03-03 RX ORDER — HYDROCODONE BITARTRATE AND ACETAMINOPHEN 10; 325 MG/1; MG/1
1 TABLET ORAL EVERY 6 HOURS PRN
Qty: 120 TABLET | Refills: 0 | Status: SHIPPED | OUTPATIENT
Start: 2020-03-03 | End: 2020-03-27

## 2020-03-12 ENCOUNTER — PATIENT MESSAGE (OUTPATIENT)
Dept: FAMILY MEDICINE | Facility: CLINIC | Age: 40
End: 2020-03-12

## 2020-03-12 DIAGNOSIS — F41.9 ANXIETY: ICD-10-CM

## 2020-03-15 RX ORDER — ALPRAZOLAM 2 MG/1
2 TABLET ORAL 2 TIMES DAILY PRN
Qty: 60 TABLET | Refills: 1 | Status: SHIPPED | OUTPATIENT
Start: 2020-03-15 | End: 2020-05-12 | Stop reason: SDUPTHER

## 2020-03-17 ENCOUNTER — TELEPHONE (OUTPATIENT)
Dept: PHARMACY | Facility: CLINIC | Age: 40
End: 2020-03-17

## 2020-03-18 ENCOUNTER — PATIENT MESSAGE (OUTPATIENT)
Dept: RHEUMATOLOGY | Facility: CLINIC | Age: 40
End: 2020-03-18

## 2020-03-19 ENCOUNTER — PATIENT MESSAGE (OUTPATIENT)
Dept: RHEUMATOLOGY | Facility: CLINIC | Age: 40
End: 2020-03-19

## 2020-03-19 DIAGNOSIS — L08.9 LOCAL SKIN INFECTION: Primary | ICD-10-CM

## 2020-03-19 DIAGNOSIS — M46.46 LUMBAR DISCITIS: ICD-10-CM

## 2020-03-19 DIAGNOSIS — M47.817 LUMBAR AND SACRAL ARTHRITIS: ICD-10-CM

## 2020-03-20 RX ORDER — MUPIROCIN 20 MG/G
OINTMENT TOPICAL
Qty: 30 G | Refills: 2 | Status: SHIPPED | OUTPATIENT
Start: 2020-03-20 | End: 2020-08-03 | Stop reason: SDUPTHER

## 2020-03-20 RX ORDER — CLOTRIMAZOLE AND BETAMETHASONE DIPROPIONATE 10; .64 MG/G; MG/G
CREAM TOPICAL 2 TIMES DAILY
Qty: 45 G | Refills: 3 | Status: SHIPPED | OUTPATIENT
Start: 2020-03-20 | End: 2020-05-12 | Stop reason: SDUPTHER

## 2020-03-23 ENCOUNTER — TELEPHONE (OUTPATIENT)
Dept: RHEUMATOLOGY | Facility: CLINIC | Age: 40
End: 2020-03-23

## 2020-03-23 NOTE — TELEPHONE ENCOUNTER
----- Message from Cezar Gaona, RT sent at 3/23/2020 10:43 AM CDT -----  Contact: Amanda Talbert [phone] [phone]   Good morning! Patient scheduled for an mri on maura. Seeing if this is an urgent matter or if it could be postponed to a later date? Thanks

## 2020-03-24 ENCOUNTER — PATIENT MESSAGE (OUTPATIENT)
Dept: RHEUMATOLOGY | Facility: CLINIC | Age: 40
End: 2020-03-24

## 2020-03-24 ENCOUNTER — HOSPITAL ENCOUNTER (OUTPATIENT)
Dept: RADIOLOGY | Facility: HOSPITAL | Age: 40
Discharge: HOME | End: 2020-03-24
Attending: INTERNAL MEDICINE
Payer: MEDICARE

## 2020-03-24 DIAGNOSIS — L08.9 LOCAL SKIN INFECTION: ICD-10-CM

## 2020-03-24 DIAGNOSIS — M46.46 LUMBAR DISCITIS: ICD-10-CM

## 2020-03-24 DIAGNOSIS — M47.817 LUMBAR AND SACRAL ARTHRITIS: ICD-10-CM

## 2020-03-24 PROCEDURE — 72148 MRI LUMBAR SPINE W/O DYE: CPT | Mod: 26,,, | Performed by: RADIOLOGY

## 2020-03-24 PROCEDURE — 72148 MRI LUMBAR SPINE WITHOUT CONTRAST: ICD-10-PCS | Mod: 26,,, | Performed by: RADIOLOGY

## 2020-03-24 PROCEDURE — 72148 MRI LUMBAR SPINE W/O DYE: CPT | Mod: TC,PO

## 2020-03-24 NOTE — TELEPHONE ENCOUNTER
Spoke to pt, verified that he can check in on 3/27 for video visit. Pt also advises he had MRI done today. Advised it is resulted for vj to review. No further questions.

## 2020-03-26 ENCOUNTER — PATIENT MESSAGE (OUTPATIENT)
Dept: RHEUMATOLOGY | Facility: CLINIC | Age: 40
End: 2020-03-26

## 2020-03-26 DIAGNOSIS — M45.9 ANKYLOSING SPONDYLITIS, UNSPECIFIED SITE OF SPINE: ICD-10-CM

## 2020-03-26 DIAGNOSIS — M47.817 LUMBAR AND SACRAL ARTHRITIS: ICD-10-CM

## 2020-03-26 DIAGNOSIS — L08.9 LOCAL SKIN INFECTION: ICD-10-CM

## 2020-03-26 DIAGNOSIS — G89.4 CHRONIC PAIN SYNDROME: ICD-10-CM

## 2020-03-26 DIAGNOSIS — M46.46 LUMBAR DISCITIS: ICD-10-CM

## 2020-03-26 DIAGNOSIS — M62.838 NECK MUSCLE SPASM: ICD-10-CM

## 2020-03-27 ENCOUNTER — OFFICE VISIT (OUTPATIENT)
Dept: RHEUMATOLOGY | Facility: CLINIC | Age: 40
End: 2020-03-27
Payer: MEDICARE

## 2020-03-27 DIAGNOSIS — M45.9 ANKYLOSING SPONDYLITIS, UNSPECIFIED SITE OF SPINE: Primary | ICD-10-CM

## 2020-03-27 DIAGNOSIS — M47.817 LUMBAR AND SACRAL ARTHRITIS: ICD-10-CM

## 2020-03-27 DIAGNOSIS — M62.838 NECK MUSCLE SPASM: ICD-10-CM

## 2020-03-27 DIAGNOSIS — G89.4 CHRONIC PAIN SYNDROME: ICD-10-CM

## 2020-03-27 PROCEDURE — 99214 PR OFFICE/OUTPT VISIT, EST, LEVL IV, 30-39 MIN: ICD-10-PCS | Mod: 95,,, | Performed by: INTERNAL MEDICINE

## 2020-03-27 PROCEDURE — 99214 OFFICE O/P EST MOD 30 MIN: CPT | Mod: 95,,, | Performed by: INTERNAL MEDICINE

## 2020-03-27 RX ORDER — OXYCODONE AND ACETAMINOPHEN 10; 325 MG/1; MG/1
TABLET ORAL
Qty: 100 TABLET | Refills: 0 | Status: SHIPPED | OUTPATIENT
Start: 2020-03-27 | End: 2020-04-24 | Stop reason: SDUPTHER

## 2020-03-27 RX ORDER — HYDROCODONE BITARTRATE AND ACETAMINOPHEN 10; 325 MG/1; MG/1
1 TABLET ORAL EVERY 6 HOURS PRN
Qty: 120 TABLET | Refills: 0 | OUTPATIENT
Start: 2020-03-27 | End: 2020-04-26

## 2020-03-27 NOTE — PROGRESS NOTES
"Subjective:       Patient ID: Umang Greer is a 39 y.o. male.    Chief Complaint: Pain and Swelling    Follow up: 39 year old male  On  Ankylosing spondylitis. His  Back pain is severe and his MRI  3/24/20 and i He states joints are always "popping." He has chronic thoracic and lumbar axial spine pain that is unchanged throughout the day. Massage/PT has made back pain worse in the past. Baclofen and norco are helpful for providing relief and are not causing side effects. He has cut back on smoking to 1/2 pack per day. No interested in smoking cessation program. He has been taking prednisone 2.5 mg- 7.5 mg daily as needed for pain.    Current tx:  1. Simponi  2. Prednisone prn  3. norco 10/325 q6    Review of Systems   Constitutional: Positive for activity change and fatigue. Negative for chills and fever.   Eyes: Negative for visual disturbance.   Respiratory: Negative for cough, shortness of breath and wheezing.    Cardiovascular: Negative for chest pain, palpitations and leg swelling.   Gastrointestinal: Negative for abdominal pain, constipation, diarrhea, nausea and vomiting.   Musculoskeletal: Positive for arthralgias, back pain, joint swelling and myalgias.   Allergic/Immunologic: Positive for immunocompromised state.   Neurological: Negative for dizziness, syncope and headaches.   Hematological: Negative for adenopathy.         Objective:     There were no vitals filed for this visit.    Past Medical History:   Diagnosis Date    Anxiety     Asthma     Cardiomyopathy     CHF (congestive heart failure)     Emphysema of lung     Sickle cell anemia     only trait    Sickle cell trait      Past Surgical History:   Procedure Laterality Date    HERNIA REPAIR            Physical Exam   Constitutional: He is oriented to person, place, and time and well-developed, well-nourished, and in no distress.   Neurological: He is alert and oriented to person, place, and time.   Psychiatric: Mood, affect and judgment " normal.       Results for orders placed or performed in visit on 01/17/20   Lipid panel   Result Value Ref Range    Cholesterol 215 (H) 120 - 199 mg/dL    Triglycerides 295 (H) 30 - 150 mg/dL    HDL 34 (L) 40 - 75 mg/dL    LDL Cholesterol 122.0 63.0 - 159.0 mg/dL    Hdl/Cholesterol Ratio 15.8 (L) 20.0 - 50.0 %    Total Cholesterol/HDL Ratio 6.3 (H) 2.0 - 5.0    Non-HDL Cholesterol 181 mg/dL   Hemoglobin A1c   Result Value Ref Range    Hemoglobin A1C 5.3 4.0 - 5.6 %    Estimated Avg Glucose 105 68 - 131 mg/dL   CBC auto differential   Result Value Ref Range    WBC 10.76 3.90 - 12.70 K/uL    RBC 5.03 4.60 - 6.20 M/uL    Hemoglobin 15.2 14.0 - 18.0 g/dL    Hematocrit 45.7 40.0 - 54.0 %    Mean Corpuscular Volume 91 82 - 98 fL    Mean Corpuscular Hemoglobin 30.2 27.0 - 31.0 pg    Mean Corpuscular Hemoglobin Conc 33.3 32.0 - 36.0 g/dL    RDW 12.6 11.5 - 14.5 %    Platelets 314 150 - 350 K/uL    MPV 9.5 9.2 - 12.9 fL    Immature Granulocytes 0.3 0.0 - 0.5 %    Gran # (ANC) 3.9 1.8 - 7.7 K/uL    Immature Grans (Abs) 0.03 0.00 - 0.04 K/uL    Lymph # 6.1 (H) 1.0 - 4.8 K/uL    Mono # 0.5 0.3 - 1.0 K/uL    Eos # 0.1 0.0 - 0.5 K/uL    Baso # 0.06 0.00 - 0.20 K/uL    nRBC 0 0 /100 WBC    Gran% 36.2 (L) 38.0 - 73.0 %    Lymph% 56.9 (H) 18.0 - 48.0 %    Mono% 4.9 4.0 - 15.0 %    Eosinophil% 1.1 0.0 - 8.0 %    Basophil% 0.6 0.0 - 1.9 %    Aniso Slight     Hypo Occasional     Differential Method Automated    Comprehensive metabolic panel   Result Value Ref Range    Sodium 140 136 - 145 mmol/L    Potassium 4.1 3.5 - 5.1 mmol/L    Chloride 101 95 - 110 mmol/L    CO2 28 23 - 29 mmol/L    Glucose 94 70 - 110 mg/dL    BUN, Bld 12 6 - 20 mg/dL    Creatinine 0.9 0.5 - 1.4 mg/dL    Calcium 9.7 8.7 - 10.5 mg/dL    Total Protein 7.5 6.0 - 8.4 g/dL    Albumin 3.8 3.5 - 5.2 g/dL    Total Bilirubin 0.3 0.1 - 1.0 mg/dL    Alkaline Phosphatase 92 55 - 135 U/L    AST 16 10 - 40 U/L    ALT 15 10 - 44 U/L    Anion Gap 11 8 - 16 mmol/L    eGFR if  African American >60.0 >60 mL/min/1.73 m^2    eGFR if non African American >60.0 >60 mL/min/1.73 m^2   C-reactive protein   Result Value Ref Range    CRP 11.6 (H) 0.0 - 8.2 mg/L   Sedimentation rate   Result Value Ref Range    Sed Rate 12 (H) 0 - 10 mm/Hr   Vitamin D   Result Value Ref Range    Vit D, 25-Hydroxy 45 30 - 96 ng/mL     Narrative     EXAMINATION:  MRI LUMBAR SPINE WITHOUT CONTRAST    CLINICAL HISTORY:  Low back pain, >6wks conservative tx, persistent-progressive sx, surgical candidate; Local infection of the skin and subcutaneous tissue, unspecified    TECHNIQUE:  Multiplanar, multisequence MR images were acquired from the thoracolumbar junction to the sacrum without the administration of contrast.    COMPARISON:  Plain films 03/05/2018    FINDINGS:  Vertebral body heights and alignment are maintained without acute compression or subluxation, and there is no abnormal marrow signal suggesting acute fracture.  There is disc desiccation L4-L5, L3-L4, and to lesser degree mild disc desiccation L2-L3 and L1-L2.    There are mild degenerative changes with small osteophyte formation, facet arthropathy.  There are small Schmorl's nodes    The conus is normal in appearance and is at the level of L1-L2.    T12-L1: No disc protrusion or spinal canal neural foraminal narrowing    .    L1-L2: Small focal posterior right paracentral disc protrusion with mild impression anteriorly on the thecal sac just to the right of the midline.  No significant neural foraminal narrowing    L2-L3: Small broad posterior left lateral disc bulge/protrusion with small annular tear with mild impression on the thecal sac anteriorly in and to the left of the midline more so to the left of the midline and with mild to moderate left neural foraminal narrowing    L3-L4: Mild facet arthropathy.  Mild diffuse disc bulge broad-based posteriorly with mild moderate impression anteriorly on the thecal sac.  Moderate bilateral neural foraminal  narrowing left more so than right    L4-5: Facet arthropathy.  Small broad posterior midline disc protrusion with small annular tear with mild to moderate impression anteriorly on the thecal sac slightly more so midline and to the left of the midline.  Moderate bilateral neural foraminal narrowing    L5-S1: Facet arthropathy.  Small posterior disc/osteophyte complex posterior right paracentral with just very slight if any impression anteriorly on the thecal sac.  Left S1-S2 nerve root sleeves conjoined at their origin.    AP diameter spinal canal also appears narrow on a congenital basis in the lower lumbar spine      Impression       Spinal canal appears narrow on a congenital basis and there are multilevel disc bulges/protrusions.  L4-5 and L3-L4 there appears mild to moderate spinal canal narrowing and at L2-L3 broad posterior left lateral disc bulge/protrusion with impression on the thecal sac anteriorly on the left and mild spinal canal narrowing.  Small posterior midline right paracentral disc protrusion L1-L2.      Electronically signed by: Love Spaulding MD  Date: 03/24/2020  Time: 08:53             Last Resulted: 03/24/20 08:53 Order Details View Encounter Lab and Collection Details Routing Result History            External Result Report     External Result Report   Narrative     EXAMINATION:  MRI LUMBAR SPINE WITHOUT CONTRAST    CLINICAL HISTORY:  Low back pain, >6wks conservative tx, persistent-progressive sx, surgical candidate; Local infection of the skin and subcutaneous tissue, unspecified    TECHNIQUE:  Multiplanar, multisequence MR images were acquired from the thoracolumbar junction to the sacrum without the administration of contrast.    COMPARISON:  Plain films 03/05/2018    FINDINGS:  Vertebral body heights and alignment are maintained without acute compression or subluxation, and there is no abnormal marrow signal suggesting acute fracture.  There is disc desiccation L4-L5, L3-L4, and to  lesser degree mild disc desiccation L2-L3 and L1-L2.    There are mild degenerative changes with small osteophyte formation, facet arthropathy.  There are small Schmorl's nodes    The conus is normal in appearance and is at the level of L1-L2.    T12-L1: No disc protrusion or spinal canal neural foraminal narrowing    .    L1-L2: Small focal posterior right paracentral disc protrusion with mild impression anteriorly on the thecal sac just to the right of the midline.  No significant neural foraminal narrowing    L2-L3: Small broad posterior left lateral disc bulge/protrusion with small annular tear with mild impression on the thecal sac anteriorly in and to the left of the midline more so to the left of the midline and with mild to moderate left neural foraminal narrowing    L3-L4: Mild facet arthropathy.  Mild diffuse disc bulge broad-based posteriorly with mild moderate impression anteriorly on the thecal sac.  Moderate bilateral neural foraminal narrowing left more so than right    L4-5: Facet arthropathy.  Small broad posterior midline disc protrusion with small annular tear with mild to moderate impression anteriorly on the thecal sac slightly more so midline and to the left of the midline.  Moderate bilateral neural foraminal narrowing    L5-S1: Facet arthropathy.  Small posterior disc/osteophyte complex posterior right paracentral with just very slight if any impression anteriorly on the thecal sac.  Left S1-S2 nerve root sleeves conjoined at their origin.    AP diameter spinal canal also appears narrow on a congenital basis in the lower lumbar spine   Impression       Spinal canal appears narrow on a congenital basis and there are multilevel disc bulges/protrusions.  L4-5 and L3-L4 there appears mild to moderate spinal canal narrowing and at L2-L3 broad posterior left lateral disc bulge/protrusion with impression on the thecal sac anteriorly on the left and mild spinal canal narrowing.  Small posterior  midline right paracentral disc protrusion L1-L2.            reviewed labs with patient during this visit      Assessment:       1. Ankylosing spondylitis, unspecified site of spine    2. Lumbar and sacral arthritis    3. Neck muscle spasm    4. Chronic pain syndrome            Plan:       Ankylosing spondylitis, unspecified site of spine  -     oxyCODONE-acetaminophen (PERCOCET)  mg per tablet; 1 tab po q7 hours prn pain  Dispense: 100 tablet; Refill: 0  -     Ambulatory referral/consult to Pain Clinic; Future; Expected date: 04/03/2020  -     CBC auto differential; Future; Expected date: 03/27/2020  -     Comprehensive metabolic panel; Future; Expected date: 03/27/2020  -     C-Reactive Protein; Future; Expected date: 03/27/2020  -     Sedimentation rate; Future; Expected date: 03/27/2020    Lumbar and sacral arthritis  -     oxyCODONE-acetaminophen (PERCOCET)  mg per tablet; 1 tab po q7 hours prn pain  Dispense: 100 tablet; Refill: 0  -     Ambulatory referral/consult to Pain Clinic; Future; Expected date: 04/03/2020  -     CBC auto differential; Future; Expected date: 03/27/2020  -     Comprehensive metabolic panel; Future; Expected date: 03/27/2020  -     C-Reactive Protein; Future; Expected date: 03/27/2020  -     Sedimentation rate; Future; Expected date: 03/27/2020    Neck muscle spasm  -     oxyCODONE-acetaminophen (PERCOCET)  mg per tablet; 1 tab po q7 hours prn pain  Dispense: 100 tablet; Refill: 0  -     Ambulatory referral/consult to Pain Clinic; Future; Expected date: 04/03/2020  -     CBC auto differential; Future; Expected date: 03/27/2020  -     Comprehensive metabolic panel; Future; Expected date: 03/27/2020  -     C-Reactive Protein; Future; Expected date: 03/27/2020  -     Sedimentation rate; Future; Expected date: 03/27/2020    Chronic pain syndrome  -     oxyCODONE-acetaminophen (PERCOCET)  mg per tablet; 1 tab po q7 hours prn pain  Dispense: 100 tablet; Refill: 0  -      Ambulatory referral/consult to Pain Clinic; Future; Expected date: 04/03/2020  -     CBC auto differential; Future; Expected date: 03/27/2020  -     Comprehensive metabolic panel; Future; Expected date: 03/27/2020  -     C-Reactive Protein; Future; Expected date: 03/27/2020  -     Sedimentation rate; Future; Expected date: 03/27/2020        No change in treatment at this time  I have  check [place] prescription monitoring program site and no unusual or abnormal behavior has occurred pt understand the risk and benefits of taking opioid medications and has decided to continue the  medication   Patient is aware that there is a COVID B-19 pandemic and that the immunosuppressants being taken to treat the arthritis but can increased the  risk for infection/Viruses.  It is understood to hold all DMARD with the exception of Plaquenil,  if having fever chills, cough, shortness of breath loss of sense of smell and or myalgias.  It is understood to call the office as well as call their primary care physician and observe  social isolation    The patient location is: home  The chief complaint leading to consultation is: AS,   Visit type: Virtual visit with synchronous audio and video  Total time spent with patient: 25 min  Each patient to whom he or she provides medical services by telemedicine is:  (1) informed of the relationship between the physician and patient and the respective role of any other health care provider with respect to management of the patient; and (2) notified that he or she may decline to receive medical services by telemedicine and may withdraw from such care at any time.

## 2020-03-28 ENCOUNTER — PATIENT MESSAGE (OUTPATIENT)
Dept: RHEUMATOLOGY | Facility: CLINIC | Age: 40
End: 2020-03-28

## 2020-04-17 ENCOUNTER — TELEPHONE (OUTPATIENT)
Dept: PHARMACY | Facility: CLINIC | Age: 40
End: 2020-04-17

## 2020-04-20 ENCOUNTER — TELEPHONE (OUTPATIENT)
Dept: PAIN MEDICINE | Facility: CLINIC | Age: 40
End: 2020-04-20

## 2020-04-21 ENCOUNTER — OFFICE VISIT (OUTPATIENT)
Dept: PAIN MEDICINE | Facility: CLINIC | Age: 40
End: 2020-04-21
Payer: MEDICARE

## 2020-04-21 DIAGNOSIS — M47.816 LUMBAR SPONDYLOSIS: ICD-10-CM

## 2020-04-21 DIAGNOSIS — M51.36 DDD (DEGENERATIVE DISC DISEASE), LUMBAR: Primary | ICD-10-CM

## 2020-04-21 DIAGNOSIS — M54.16 LEFT LUMBAR RADICULOPATHY: ICD-10-CM

## 2020-04-21 DIAGNOSIS — M45.9 ANKYLOSING SPONDYLITIS, UNSPECIFIED SITE OF SPINE: ICD-10-CM

## 2020-04-21 PROCEDURE — 99203 PR OFFICE/OUTPT VISIT, NEW, LEVL III, 30-44 MIN: ICD-10-PCS | Mod: 95,,, | Performed by: ANESTHESIOLOGY

## 2020-04-21 PROCEDURE — 99203 OFFICE O/P NEW LOW 30 MIN: CPT | Mod: 95,,, | Performed by: ANESTHESIOLOGY

## 2020-04-21 NOTE — Clinical Note
Please contact the patient and get him set up for a left L4/5 transforaminal injection once the surgery center opens. Tier 3 please

## 2020-04-21 NOTE — PROGRESS NOTES
This note was completed with dictation software and grammatical errors may exist.    The patient location is: [place], [place]  The chief complaint leading to consultation is: Back and left leg pain  Visit type: audiovisual  Total time spent with patient: 16 minutes  Each patient to whom he or she provides medical services by telemedicine is:  (1) informed of the relationship between the physician and patient and the respective role of any other health care provider with respect to management of the patient; and (2) notified that he or she may decline to receive medical services by telemedicine and may withdraw from such care at any time.      CC:  Back and left leg pain    HPI:  The patient is a 39-year-old man with a history of COPD with continued tobacco use, anxiety, ankylosing spondylitis who presents in referral from Dr. Locke for back and left leg pain.  The patient reports having back pain for many years but starting about 1 month ago he began developing numbness and pain throughout his left leg into the top of his left foot.  He denies any specific trauma that may have caused this.  He reports that it is stinging, burning, numb, tingling and aching.  He finds that it is worse if he is sitting for a long time especially if he is driving long distance.  It is slightly better when he stands up.  He feels that the leg can be weak but he has not had any falls, denies any bowel or bladder incontinence.  He has been taking golimumab, prednisone for his ankylosing spondylitis pain, also takes Percocet 10/325 3 times daily, had taken hydrocodone 10/325 for years.    Pain intervention history:  He reports having some injections greater than 5 years ago, was probably Dr. Catracho Fishman.  He states that these injections would help for 1-2 weeks but then the pain would worsen.    Antineuropathics:  NSAIDs:  Physical therapy:  Has done physical therapy in the past with worsened pain.  Antidepressants:  Cymbalta 30  milligrams  Muscle relaxers:  Baclofen 20 minutes  Opioids:  Hydrocodone 10/325 for years, now taking Percocet 10/325  Antiplatelets/Anticoagulants:        ROS:  He reports joint stiffness, joint pain, difficulty sleeping, back pain.  Balance of review of systems is negative.    Lab Results   Component Value Date    HGBA1C 5.3 01/17/2020       Lab Results   Component Value Date    WBC 10.76 01/17/2020    HGB 15.2 01/17/2020    HCT 45.7 01/17/2020    MCV 91 01/17/2020     01/17/2020             Past Medical History:   Diagnosis Date    Anxiety     Asthma     Cardiomyopathy     CHF (congestive heart failure)     Emphysema of lung     Sickle cell anemia     only trait    Sickle cell trait        Past Surgical History:   Procedure Laterality Date    HERNIA REPAIR         Social History     Socioeconomic History    Marital status: Single     Spouse name: Not on file    Number of children: Not on file    Years of education: Not on file    Highest education level: Not on file   Occupational History    Not on file   Social Needs    Financial resource strain: Not on file    Food insecurity:     Worry: Not on file     Inability: Not on file    Transportation needs:     Medical: Not on file     Non-medical: Not on file   Tobacco Use    Smoking status: Current Every Day Smoker     Packs/day: 0.50     Years: 0.50     Pack years: 0.25     Types: Cigarettes    Smokeless tobacco: Current User   Substance and Sexual Activity    Alcohol use: No    Drug use: Yes     Types: Hydrocodone    Sexual activity: Yes     Partners: Female   Lifestyle    Physical activity:     Days per week: Not on file     Minutes per session: Not on file    Stress: Not on file   Relationships    Social connections:     Talks on phone: Not on file     Gets together: Not on file     Attends [employer] service: Not on file     Active member of club or organization: Not on file     Attends meetings of clubs or organizations: Not on  file     Relationship status: Not on file   Other Topics Concern    Not on file   Social History Narrative    Not on file         Medications/Allergies: See med card    There were no vitals filed for this visit.      Physical exam:  Alert and oriented x3, pleasant\  Moving all extremities equally    Imaging:  3/24/20 MRI L-spine:  T12-L1: No disc protrusion or spinal canal neural foraminal narrowing  L1-L2: Small focal posterior right paracentral disc protrusion with mild impression anteriorly on the thecal sac just to the right of the midline.  No significant neural foraminal narrowing  L2-L3: Small broad posterior left lateral disc bulge/protrusion with small annular tear with mild impression on the thecal sac anteriorly in and to the left of the midline more so to the left of the midline and with mild to moderate left neural foraminal narrowing  L3-L4: Mild facet arthropathy.  Mild diffuse disc bulge broad-based posteriorly with mild moderate impression anteriorly on the thecal sac.  Moderate bilateral neural foraminal narrowing left more so than right  L4-5: Facet arthropathy.  Small broad posterior midline disc protrusion with small annular tear with mild to moderate impression anteriorly on the thecal sac slightly more so midline and to the left of the midline.  Moderate bilateral neural foraminal narrowing.  There may be contact of the left descending L5 nerve root as well  L5-S1: Facet arthropathy.  Small posterior disc/osteophyte complex posterior right paracentral with just very slight if any impression anteriorly on the thecal sac.  Left S1-S2 nerve root sleeves conjoined at their origin.  AP diameter spinal canal also appears narrow on a congenital basis in the lower lumbar spine    3/5/18 Xray T-spine:  The bones are osteopenic.  There is no acute fracture or malalignment.  There is mild disc space narrowing with minimal endplate osteophyte formation in the mid to lower thoracic spine.  The facet  joints maintain normal alignment.  The paraspinous soft tissues are normal.    Assessment:    The patient is a 39-year-old man with a history of COPD with continued tobacco use, anxiety, ankylosing spondylitis who presents in referral from Dr. Locke for back and left leg pain.      1. DDD (degenerative disc disease), lumbar     2. Left lumbar radiculopathy     3. Ankylosing spondylitis, unspecified site of spine     4. Lumbar spondylosis         Plan:  1.  We discussed his symptoms, reviewed his lumbar spine MRI together and I showed him the images.  He has a congenitally narrow spinal canal with a annular tear and disc protrusion at L4/5 which extends more to the left side and may contact the descending L5 nerve root which would seem to explain his symptoms.  We also discussed that he has lumbar spondylosis which would contribute to his chronic low back pain.  We discussed the role of epidural steroid injections and he would like to proceed.  I am going to set him up for a left L4/5 transforaminal injection when we have opened up the surgery center.  We will get him tested for COVID 2 days prior and I will have him return in clinic for an in person visit 3 weeks after the injection.    Thank you for referring this interesting patient, and I look forward to continuing to collaborate in his care.

## 2020-04-21 NOTE — LETTER
April 21, 2020      Luis Locke MD  [address] [facility] [address] [place] [address]           [facility]  [address] [place] [address]  Phone: [phone]  Fax: [phone]          Patient: Umang Greer   MR Number: [id number]   YOB: 1980   Date of Visit: 4/21/2020       Dear Dr. Luis Locke:    Thank you for referring Umang Greer to me for evaluation. Attached you will find relevant portions of my assessment and plan of care.    If you have questions, please do not hesitate to call me. I look forward to following Umang Greer along with you.    Sincerely,    Hayden Sellers MD    Enclosure  CC:  No Recipients    If you would like to receive this communication electronically, please contact [email or web address] or [phone] to request more information on [facility] access.    For providers and/or their staff who would like to refer a patient to [facility], please contact us through our one-stop-shop provider referral line, [facility], at [phone].    If you feel you have received this communication in error or would no longer like to receive these types of communications, please e-mail [email or web address]

## 2020-04-24 DIAGNOSIS — G89.4 CHRONIC PAIN SYNDROME: ICD-10-CM

## 2020-04-24 DIAGNOSIS — M45.9 ANKYLOSING SPONDYLITIS, UNSPECIFIED SITE OF SPINE: ICD-10-CM

## 2020-04-24 DIAGNOSIS — M47.817 LUMBAR AND SACRAL ARTHRITIS: ICD-10-CM

## 2020-04-24 DIAGNOSIS — M62.838 NECK MUSCLE SPASM: ICD-10-CM

## 2020-04-24 RX ORDER — OXYCODONE AND ACETAMINOPHEN 10; 325 MG/1; MG/1
TABLET ORAL
Qty: 100 TABLET | Refills: 0 | Status: SHIPPED | OUTPATIENT
Start: 2020-04-24 | End: 2020-05-20 | Stop reason: SDUPTHER

## 2020-05-08 ENCOUNTER — PATIENT MESSAGE (OUTPATIENT)
Dept: RHEUMATOLOGY | Facility: CLINIC | Age: 40
End: 2020-05-08

## 2020-05-08 DIAGNOSIS — M45.9 ANKYLOSING SPONDYLITIS, UNSPECIFIED SITE OF SPINE: Primary | ICD-10-CM

## 2020-05-08 RX ORDER — PREDNISONE 2.5 MG/1
7.5 TABLET ORAL DAILY
Qty: 90 TABLET | Refills: 3 | Status: SHIPPED | OUTPATIENT
Start: 2020-05-08 | End: 2020-06-07

## 2020-05-12 ENCOUNTER — PATIENT MESSAGE (OUTPATIENT)
Dept: FAMILY MEDICINE | Facility: CLINIC | Age: 40
End: 2020-05-12

## 2020-05-12 ENCOUNTER — PATIENT MESSAGE (OUTPATIENT)
Dept: RHEUMATOLOGY | Facility: CLINIC | Age: 40
End: 2020-05-12

## 2020-05-12 DIAGNOSIS — L08.9 LOCAL SKIN INFECTION: ICD-10-CM

## 2020-05-12 DIAGNOSIS — F41.9 ANXIETY: ICD-10-CM

## 2020-05-12 RX ORDER — CLOTRIMAZOLE AND BETAMETHASONE DIPROPIONATE 10; .64 MG/G; MG/G
CREAM TOPICAL 2 TIMES DAILY
Qty: 45 G | Refills: 3 | Status: SHIPPED | OUTPATIENT
Start: 2020-05-12 | End: 2020-08-03 | Stop reason: SDUPTHER

## 2020-05-12 NOTE — TELEPHONE ENCOUNTER
Good afternoon, our lab has made a few changes to their times due to COVID concern and social distancing. They only want 3 patients at the lab every 10 minutes and they also want patients to come in for their scheduled time. You are scheduled for blood work this coming Thursday 5/14/20 for 1:10pm. If you do not plan to come in, you can cancel your appointment on your portal or give the phone room a call at [phone] to get rescheduled. Have a good day!

## 2020-05-14 ENCOUNTER — LAB VISIT (OUTPATIENT)
Dept: LAB | Facility: HOSPITAL | Age: 40
End: 2020-05-14
Attending: FAMILY MEDICINE
Payer: MEDICARE

## 2020-05-14 DIAGNOSIS — M45.9 ANKYLOSING SPONDYLITIS, UNSPECIFIED SITE OF SPINE: ICD-10-CM

## 2020-05-14 LAB
ALBUMIN SERPL BCP-MCNC: 3.9 G/DL (ref 3.5–5.2)
ALP SERPL-CCNC: 105 U/L (ref 55–135)
ALT SERPL W/O P-5'-P-CCNC: 7 U/L (ref 10–44)
ANION GAP SERPL CALC-SCNC: 12 MMOL/L (ref 8–16)
AST SERPL-CCNC: 12 U/L (ref 10–40)
BASOPHILS # BLD AUTO: 0.06 K/UL (ref 0–0.2)
BASOPHILS NFR BLD: 0.4 % (ref 0–1.9)
BILIRUB SERPL-MCNC: 0.5 MG/DL (ref 0.1–1)
BUN SERPL-MCNC: 9 MG/DL (ref 6–20)
CALCIUM SERPL-MCNC: 9.7 MG/DL (ref 8.7–10.5)
CHLORIDE SERPL-SCNC: 100 MMOL/L (ref 95–110)
CO2 SERPL-SCNC: 26 MMOL/L (ref 23–29)
CREAT SERPL-MCNC: 0.9 MG/DL (ref 0.5–1.4)
CRP SERPL-MCNC: 34.9 MG/L (ref 0–8.2)
DIFFERENTIAL METHOD: ABNORMAL
EOSINOPHIL # BLD AUTO: 0.1 K/UL (ref 0–0.5)
EOSINOPHIL NFR BLD: 0.4 % (ref 0–8)
ERYTHROCYTE [DISTWIDTH] IN BLOOD BY AUTOMATED COUNT: 12.5 % (ref 11.5–14.5)
ERYTHROCYTE [SEDIMENTATION RATE] IN BLOOD BY WESTERGREN METHOD: 21 MM/HR (ref 0–10)
EST. GFR  (AFRICAN AMERICAN): >60 ML/MIN/1.73 M^2
EST. GFR  (NON AFRICAN AMERICAN): >60 ML/MIN/1.73 M^2
GLUCOSE SERPL-MCNC: 109 MG/DL (ref 70–110)
HCT VFR BLD AUTO: 45.2 % (ref 40–54)
HGB BLD-MCNC: 14.8 G/DL (ref 14–18)
IMM GRANULOCYTES # BLD AUTO: 0.05 K/UL (ref 0–0.04)
IMM GRANULOCYTES NFR BLD AUTO: 0.3 % (ref 0–0.5)
LYMPHOCYTES # BLD AUTO: 2.3 K/UL (ref 1–4.8)
LYMPHOCYTES NFR BLD: 14.6 % (ref 18–48)
MCH RBC QN AUTO: 30.3 PG (ref 27–31)
MCHC RBC AUTO-ENTMCNC: 32.7 G/DL (ref 32–36)
MCV RBC AUTO: 93 FL (ref 82–98)
MONOCYTES # BLD AUTO: 0.8 K/UL (ref 0.3–1)
MONOCYTES NFR BLD: 5.1 % (ref 4–15)
NEUTROPHILS # BLD AUTO: 12.3 K/UL (ref 1.8–7.7)
NEUTROPHILS NFR BLD: 79.2 % (ref 38–73)
NRBC BLD-RTO: 0 /100 WBC
PLATELET # BLD AUTO: 304 K/UL (ref 150–350)
PMV BLD AUTO: 9.7 FL (ref 9.2–12.9)
POTASSIUM SERPL-SCNC: 4.2 MMOL/L (ref 3.5–5.1)
PROT SERPL-MCNC: 7.7 G/DL (ref 6–8.4)
RBC # BLD AUTO: 4.88 M/UL (ref 4.6–6.2)
SODIUM SERPL-SCNC: 138 MMOL/L (ref 136–145)
WBC # BLD AUTO: 15.47 K/UL (ref 3.9–12.7)

## 2020-05-14 PROCEDURE — 36415 COLL VENOUS BLD VENIPUNCTURE: CPT | Mod: PO

## 2020-05-14 PROCEDURE — 85025 COMPLETE CBC W/AUTO DIFF WBC: CPT

## 2020-05-14 PROCEDURE — 86140 C-REACTIVE PROTEIN: CPT

## 2020-05-14 PROCEDURE — 85651 RBC SED RATE NONAUTOMATED: CPT | Mod: PO

## 2020-05-14 PROCEDURE — 80053 COMPREHEN METABOLIC PANEL: CPT

## 2020-05-15 ENCOUNTER — PATIENT MESSAGE (OUTPATIENT)
Dept: FAMILY MEDICINE | Facility: CLINIC | Age: 40
End: 2020-05-15

## 2020-05-18 ENCOUNTER — PATIENT MESSAGE (OUTPATIENT)
Dept: FAMILY MEDICINE | Facility: CLINIC | Age: 40
End: 2020-05-18

## 2020-05-18 ENCOUNTER — TELEPHONE (OUTPATIENT)
Dept: PHARMACY | Facility: CLINIC | Age: 40
End: 2020-05-18

## 2020-05-18 ENCOUNTER — PATIENT MESSAGE (OUTPATIENT)
Dept: PHARMACY | Facility: CLINIC | Age: 40
End: 2020-05-18

## 2020-05-18 RX ORDER — ALPRAZOLAM 2 MG/1
2 TABLET ORAL 2 TIMES DAILY PRN
Qty: 60 TABLET | Refills: 1 | Status: SHIPPED | OUTPATIENT
Start: 2020-05-18 | End: 2020-07-02 | Stop reason: SDUPTHER

## 2020-05-20 ENCOUNTER — OFFICE VISIT (OUTPATIENT)
Dept: RHEUMATOLOGY | Facility: CLINIC | Age: 40
End: 2020-05-20
Payer: MEDICARE

## 2020-05-20 VITALS — HEIGHT: 73 IN | BODY MASS INDEX: 23.06 KG/M2 | WEIGHT: 174 LBS

## 2020-05-20 DIAGNOSIS — Z79.899 IMMUNOCOMPROMISED STATE DUE TO DRUG THERAPY: Primary | ICD-10-CM

## 2020-05-20 DIAGNOSIS — L02.212 ABSCESS OF LOWER BACK: ICD-10-CM

## 2020-05-20 DIAGNOSIS — M47.817 LUMBAR AND SACRAL ARTHRITIS: ICD-10-CM

## 2020-05-20 DIAGNOSIS — M62.838 NECK MUSCLE SPASM: ICD-10-CM

## 2020-05-20 DIAGNOSIS — G89.4 CHRONIC PAIN SYNDROME: ICD-10-CM

## 2020-05-20 DIAGNOSIS — M45.9 ANKYLOSING SPONDYLITIS, UNSPECIFIED SITE OF SPINE: ICD-10-CM

## 2020-05-20 DIAGNOSIS — L08.9 LOCAL SKIN INFECTION: ICD-10-CM

## 2020-05-20 DIAGNOSIS — D84.821 IMMUNOCOMPROMISED STATE DUE TO DRUG THERAPY: Primary | ICD-10-CM

## 2020-05-20 PROCEDURE — 99214 PR OFFICE/OUTPT VISIT, EST, LEVL IV, 30-39 MIN: ICD-10-PCS | Mod: 95,,, | Performed by: INTERNAL MEDICINE

## 2020-05-20 PROCEDURE — 99214 OFFICE O/P EST MOD 30 MIN: CPT | Mod: 95,,, | Performed by: INTERNAL MEDICINE

## 2020-05-20 RX ORDER — CLINDAMYCIN HYDROCHLORIDE 300 MG/1
300 CAPSULE ORAL EVERY 8 HOURS
Qty: 30 CAPSULE | Refills: 0 | Status: SHIPPED | OUTPATIENT
Start: 2020-05-20 | End: 2020-05-30

## 2020-05-20 RX ORDER — CLINDAMYCIN PHOSPHATE 11.9 MG/ML
SOLUTION TOPICAL
COMMUNITY
Start: 2020-03-06 | End: 2023-01-27

## 2020-05-20 RX ORDER — FLUCONAZOLE 150 MG/1
150 TABLET ORAL DAILY
Qty: 3 TABLET | Refills: 3 | Status: SHIPPED | OUTPATIENT
Start: 2020-05-20 | End: 2020-05-23

## 2020-05-20 RX ORDER — OXYCODONE AND ACETAMINOPHEN 10; 325 MG/1; MG/1
TABLET ORAL
Qty: 100 TABLET | Refills: 0 | Status: SHIPPED | OUTPATIENT
Start: 2020-05-20 | End: 2020-07-02

## 2020-05-20 RX ORDER — DOXYCYCLINE 100 MG/1
100 CAPSULE ORAL 2 TIMES DAILY
Qty: 20 CAPSULE | Refills: 3 | Status: SHIPPED | OUTPATIENT
Start: 2020-05-20 | End: 2020-05-30

## 2020-05-20 RX ORDER — DOXYCYCLINE HYCLATE 100 MG
TABLET ORAL
COMMUNITY
Start: 2020-04-14 | End: 2020-09-08 | Stop reason: SDUPTHER

## 2020-05-20 NOTE — PROGRESS NOTES
"Subjective:       Patient ID: Umang Greer is a 39 y.o. male.    Chief Complaint: Disease Management    Follow up: 39 year old male  On  Ankylosing spondylitis.his pustular dermatitis on his back  and it is infected.He states joints are always "popping." He has chronic thoracic and lumbar axial spine pain that is unchanged throughout the day. Baclofen and norco are helpful for providing relief and are not causing side effects. He has cut back on smoking to 1/2 pack per day. Prednisone 7.5 mg    Current tx:  1. Simponi  2. Prednisone prn  3. perocet 10/325 q6    Review of Systems   Constitutional: Positive for activity change. Negative for chills.   Eyes: Negative for visual disturbance.   Respiratory: Negative for cough, shortness of breath and wheezing.    Cardiovascular: Negative for chest pain, palpitations and leg swelling.   Gastrointestinal: Negative for abdominal pain, constipation, diarrhea, nausea and vomiting.   Musculoskeletal: Positive for arthralgias and back pain.   Allergic/Immunologic: Positive for immunocompromised state.   Neurological: Negative for dizziness and syncope.   Hematological: Negative for adenopathy.         Objective:     There were no vitals filed for this visit.    Past Medical History:   Diagnosis Date    Anxiety     Asthma     Cardiomyopathy     CHF (congestive heart failure)     Emphysema of lung     Sickle cell anemia     only trait    Sickle cell trait      Past Surgical History:   Procedure Laterality Date    HERNIA REPAIR            Physical Exam   Constitutional: He is oriented to person, place, and time and well-developed, well-nourished, and in no distress.   Neurological: He is alert and oriented to person, place, and time.   Psychiatric: Mood, affect and judgment normal.       Results for orders placed or performed in visit on 05/14/20   CBC auto differential   Result Value Ref Range    WBC 15.47 (H) 3.90 - 12.70 K/uL    RBC 4.88 4.60 - 6.20 M/uL    " Hemoglobin 14.8 14.0 - 18.0 g/dL    Hematocrit 45.2 40.0 - 54.0 %    Mean Corpuscular Volume 93 82 - 98 fL    Mean Corpuscular Hemoglobin 30.3 27.0 - 31.0 pg    Mean Corpuscular Hemoglobin Conc 32.7 32.0 - 36.0 g/dL    RDW 12.5 11.5 - 14.5 %    Platelets 304 150 - 350 K/uL    MPV 9.7 9.2 - 12.9 fL    Immature Granulocytes 0.3 0.0 - 0.5 %    Gran # (ANC) 12.3 (H) 1.8 - 7.7 K/uL    Immature Grans (Abs) 0.05 (H) 0.00 - 0.04 K/uL    Lymph # 2.3 1.0 - 4.8 K/uL    Mono # 0.8 0.3 - 1.0 K/uL    Eos # 0.1 0.0 - 0.5 K/uL    Baso # 0.06 0.00 - 0.20 K/uL    nRBC 0 0 /100 WBC    Gran% 79.2 (H) 38.0 - 73.0 %    Lymph% 14.6 (L) 18.0 - 48.0 %    Mono% 5.1 4.0 - 15.0 %    Eosinophil% 0.4 0.0 - 8.0 %    Basophil% 0.4 0.0 - 1.9 %    Differential Method Automated    Comprehensive metabolic panel   Result Value Ref Range    Sodium 138 136 - 145 mmol/L    Potassium 4.2 3.5 - 5.1 mmol/L    Chloride 100 95 - 110 mmol/L    CO2 26 23 - 29 mmol/L    Glucose 109 70 - 110 mg/dL    BUN, Bld 9 6 - 20 mg/dL    Creatinine 0.9 0.5 - 1.4 mg/dL    Calcium 9.7 8.7 - 10.5 mg/dL    Total Protein 7.7 6.0 - 8.4 g/dL    Albumin 3.9 3.5 - 5.2 g/dL    Total Bilirubin 0.5 0.1 - 1.0 mg/dL    Alkaline Phosphatase 105 55 - 135 U/L    AST 12 10 - 40 U/L    ALT 7 (L) 10 - 44 U/L    Anion Gap 12 8 - 16 mmol/L    eGFR if African American >60.0 >60 mL/min/1.73 m^2    eGFR if non African American >60.0 >60 mL/min/1.73 m^2   C-reactive protein   Result Value Ref Range    CRP 34.9 (H) 0.0 - 8.2 mg/L   Sedimentation rate   Result Value Ref Range    Sed Rate 21 (H) 0 - 10 mm/Hr             reviewed labs with patient during this visit      Assessment:       1. Immunocompromised state due to drug therapy    2. Local skin infection    3. Ankylosing spondylitis, unspecified site of spine    4. Abscess of lower back    5. Lumbar and sacral arthritis    6. Neck muscle spasm    7. Chronic pain syndrome            Plan:       Immunocompromised state due to drug therapy  -      doxycycline (MONODOX) 100 MG capsule; Take 1 capsule (100 mg total) by mouth 2 (two) times daily. for 10 days  Dispense: 20 capsule; Refill: 3  -     clindamycin (CLEOCIN) 300 MG capsule; Take 1 capsule (300 mg total) by mouth every 8 (eight) hours. for 10 days  Dispense: 30 capsule; Refill: 0  -     fluconazole (DIFLUCAN) 150 MG Tab; Take 1 tablet (150 mg total) by mouth once daily. for 3 days  Dispense: 3 tablet; Refill: 3  -     Comprehensive metabolic panel; Future; Expected date: 05/20/2020  -     CBC auto differential; Future; Expected date: 05/20/2020  -     C-Reactive Protein; Future; Expected date: 05/20/2020  -     Sedimentation rate; Future; Expected date: 05/20/2020    Local skin infection  -     doxycycline (MONODOX) 100 MG capsule; Take 1 capsule (100 mg total) by mouth 2 (two) times daily. for 10 days  Dispense: 20 capsule; Refill: 3  -     clindamycin (CLEOCIN) 300 MG capsule; Take 1 capsule (300 mg total) by mouth every 8 (eight) hours. for 10 days  Dispense: 30 capsule; Refill: 0  -     fluconazole (DIFLUCAN) 150 MG Tab; Take 1 tablet (150 mg total) by mouth once daily. for 3 days  Dispense: 3 tablet; Refill: 3  -     Comprehensive metabolic panel; Future; Expected date: 05/20/2020  -     CBC auto differential; Future; Expected date: 05/20/2020  -     C-Reactive Protein; Future; Expected date: 05/20/2020  -     Sedimentation rate; Future; Expected date: 05/20/2020    Ankylosing spondylitis, unspecified site of spine  -     doxycycline (MONODOX) 100 MG capsule; Take 1 capsule (100 mg total) by mouth 2 (two) times daily. for 10 days  Dispense: 20 capsule; Refill: 3  -     clindamycin (CLEOCIN) 300 MG capsule; Take 1 capsule (300 mg total) by mouth every 8 (eight) hours. for 10 days  Dispense: 30 capsule; Refill: 0  -     fluconazole (DIFLUCAN) 150 MG Tab; Take 1 tablet (150 mg total) by mouth once daily. for 3 days  Dispense: 3 tablet; Refill: 3  -     oxyCODONE-acetaminophen (PERCOCET)  mg per  tablet; 1 tab po q7 hours prn pain  Dispense: 100 tablet; Refill: 0  -     Comprehensive metabolic panel; Future; Expected date: 05/20/2020  -     CBC auto differential; Future; Expected date: 05/20/2020  -     C-Reactive Protein; Future; Expected date: 05/20/2020  -     Sedimentation rate; Future; Expected date: 05/20/2020    Abscess of lower back  -     doxycycline (MONODOX) 100 MG capsule; Take 1 capsule (100 mg total) by mouth 2 (two) times daily. for 10 days  Dispense: 20 capsule; Refill: 3  -     clindamycin (CLEOCIN) 300 MG capsule; Take 1 capsule (300 mg total) by mouth every 8 (eight) hours. for 10 days  Dispense: 30 capsule; Refill: 0  -     fluconazole (DIFLUCAN) 150 MG Tab; Take 1 tablet (150 mg total) by mouth once daily. for 3 days  Dispense: 3 tablet; Refill: 3  -     Comprehensive metabolic panel; Future; Expected date: 05/20/2020  -     CBC auto differential; Future; Expected date: 05/20/2020  -     C-Reactive Protein; Future; Expected date: 05/20/2020  -     Sedimentation rate; Future; Expected date: 05/20/2020    Lumbar and sacral arthritis  -     oxyCODONE-acetaminophen (PERCOCET)  mg per tablet; 1 tab po q7 hours prn pain  Dispense: 100 tablet; Refill: 0  -     Comprehensive metabolic panel; Future; Expected date: 05/20/2020  -     CBC auto differential; Future; Expected date: 05/20/2020  -     C-Reactive Protein; Future; Expected date: 05/20/2020  -     Sedimentation rate; Future; Expected date: 05/20/2020    Neck muscle spasm  -     oxyCODONE-acetaminophen (PERCOCET)  mg per tablet; 1 tab po q7 hours prn pain  Dispense: 100 tablet; Refill: 0  -     Comprehensive metabolic panel; Future; Expected date: 05/20/2020  -     CBC auto differential; Future; Expected date: 05/20/2020  -     C-Reactive Protein; Future; Expected date: 05/20/2020  -     Sedimentation rate; Future; Expected date: 05/20/2020    Chronic pain syndrome  -     oxyCODONE-acetaminophen (PERCOCET)  mg per tablet;  1 tab po q7 hours prn pain  Dispense: 100 tablet; Refill: 0        Patient had an infection on his back he has had them in the past history treatment with clindamycin ointment which allowed to pop will start doxycycline 1st if he does not get better he will DC the doxycycline and start clindamycin a Diflucan was sent in case he gets a yeast infection.  If his back does not get better after the injections with Dr. Sellers we may consider D seen Lilian in adding Cosentyx     I have  check [place] prescription monitoring program site and no unusual or abnormal behavior has occurred pt understand the risk and benefits of taking opioid medications and has decided to continue the  medication       The patient location is: home  The chief complaint leading to consultation is: AS,   Visit type: Virtual visit with synchronous audio and video  Total time spent with patient: 25 min  Each patient to whom he or she provides medical services by telemedicine is:  (1) informed of the relationship between the physician and patient and the respective role of any other health care provider with respect to management of the patient; and (2) notified that he or she may decline to receive medical services by telemedicine and may withdraw from such care at any time.

## 2020-05-21 ENCOUNTER — PATIENT MESSAGE (OUTPATIENT)
Dept: RHEUMATOLOGY | Facility: CLINIC | Age: 40
End: 2020-05-21

## 2020-06-01 ENCOUNTER — PATIENT MESSAGE (OUTPATIENT)
Dept: RHEUMATOLOGY | Facility: CLINIC | Age: 40
End: 2020-06-01

## 2020-06-01 DIAGNOSIS — M47.817 LUMBAR AND SACRAL ARTHRITIS: ICD-10-CM

## 2020-06-01 DIAGNOSIS — M45.9 ANKYLOSING SPONDYLITIS, UNSPECIFIED SITE OF SPINE: ICD-10-CM

## 2020-06-01 DIAGNOSIS — G89.4 CHRONIC PAIN SYNDROME: ICD-10-CM

## 2020-06-01 DIAGNOSIS — L08.9 LOCAL SKIN INFECTION: ICD-10-CM

## 2020-06-01 DIAGNOSIS — M46.46 LUMBAR DISCITIS: ICD-10-CM

## 2020-06-01 DIAGNOSIS — M62.838 NECK MUSCLE SPASM: ICD-10-CM

## 2020-06-02 RX ORDER — HYDROCODONE BITARTRATE AND ACETAMINOPHEN 10; 325 MG/1; MG/1
1 TABLET ORAL EVERY 6 HOURS PRN
Qty: 120 TABLET | Refills: 0 | Status: SHIPPED | OUTPATIENT
Start: 2020-06-17 | End: 2020-07-13 | Stop reason: SDUPTHER

## 2020-06-02 NOTE — TELEPHONE ENCOUNTER
Patient states the OxyContin was making his back hurt would like to switch to [place] told him it was fine with me to switch however we would have to switch when those the prescription is due his last PICC update was on 05/20/2020 I dated the [place]  for 06/17/2020

## 2020-06-03 ENCOUNTER — PATIENT MESSAGE (OUTPATIENT)
Dept: RHEUMATOLOGY | Facility: CLINIC | Age: 40
End: 2020-06-03

## 2020-06-19 ENCOUNTER — TELEPHONE (OUTPATIENT)
Dept: PHARMACY | Facility: CLINIC | Age: 40
End: 2020-06-19

## 2020-06-19 ENCOUNTER — TELEPHONE (OUTPATIENT)
Dept: RHEUMATOLOGY | Facility: CLINIC | Age: 40
End: 2020-06-19

## 2020-06-19 DIAGNOSIS — M45.9 ANKYLOSING SPONDYLITIS, UNSPECIFIED SITE OF SPINE: Primary | ICD-10-CM

## 2020-06-19 NOTE — TELEPHONE ENCOUNTER
----- Message from Estefany Lion PharmD sent at 6/19/2020 12:14 PM CDT -----  Regarding: Simponi - TB  Good afternoon,    Appears last TB collected on 5/30/19. May the patient have an updated TB ordered to complete with next labs? Thank you    Best,    Estefany Lion, PharmD  Clinical Pharmacist  [facility]  [phone]

## 2020-06-19 NOTE — TELEPHONE ENCOUNTER
Patient confirmed shipping of Simponi on  and . Address and  verified. $0 copay (004). Patient said he last injected . Next dose due . No missed doses. Patient reported no new medications or dose changes. He stated he has not developed any new allergies or health conditions. Patient had no further questions or concerns.

## 2020-06-19 NOTE — TELEPHONE ENCOUNTER
Contacted patient and informed TB Gold test is being added to up coming lab appointment. Patient verbalized understanding.

## 2020-06-25 DIAGNOSIS — M45.9 ANKYLOSING SPONDYLITIS, UNSPECIFIED SITE OF SPINE: ICD-10-CM

## 2020-06-25 DIAGNOSIS — M47.817 LUMBAR AND SACRAL ARTHRITIS: ICD-10-CM

## 2020-06-25 RX ORDER — DULOXETIN HYDROCHLORIDE 30 MG/1
30 CAPSULE, DELAYED RELEASE ORAL DAILY
Qty: 30 CAPSULE | Refills: 6 | Status: SHIPPED | OUTPATIENT
Start: 2020-06-25 | End: 2020-07-02

## 2020-07-01 ENCOUNTER — TELEPHONE (OUTPATIENT)
Dept: RHEUMATOLOGY | Facility: CLINIC | Age: 40
End: 2020-07-01

## 2020-07-01 DIAGNOSIS — M45.9 ANKYLOSING SPONDYLITIS, UNSPECIFIED SITE OF SPINE: ICD-10-CM

## 2020-07-01 DIAGNOSIS — G89.4 CHRONIC PAIN SYNDROME: ICD-10-CM

## 2020-07-01 DIAGNOSIS — L08.9 LOCAL SKIN INFECTION: ICD-10-CM

## 2020-07-01 DIAGNOSIS — M62.838 NECK MUSCLE SPASM: ICD-10-CM

## 2020-07-01 DIAGNOSIS — M46.46 LUMBAR DISCITIS: ICD-10-CM

## 2020-07-01 DIAGNOSIS — M47.817 LUMBAR AND SACRAL ARTHRITIS: ICD-10-CM

## 2020-07-01 NOTE — TELEPHONE ENCOUNTER
----- Message from Estefany Lion PharmD sent at 6/19/2020 12:14 PM CDT -----  Regarding: Simponi - TB  Good afternoon,    Appears last TB collected on 5/30/19. May the patient have an updated TB ordered to complete with next labs? Thank you    Best,    Estefany Lion, PharmD  Clinical Pharmacist  [facility]  [phone]

## 2020-07-02 ENCOUNTER — OFFICE VISIT (OUTPATIENT)
Dept: FAMILY MEDICINE | Facility: CLINIC | Age: 40
End: 2020-07-02
Payer: MEDICARE

## 2020-07-02 VITALS
HEIGHT: 73 IN | DIASTOLIC BLOOD PRESSURE: 86 MMHG | OXYGEN SATURATION: 97 % | SYSTOLIC BLOOD PRESSURE: 116 MMHG | HEART RATE: 94 BPM | BODY MASS INDEX: 23.64 KG/M2 | TEMPERATURE: 98 F | WEIGHT: 178.38 LBS

## 2020-07-02 DIAGNOSIS — F41.9 ANXIETY: ICD-10-CM

## 2020-07-02 DIAGNOSIS — M45.9 ANKYLOSING SPONDYLITIS, UNSPECIFIED SITE OF SPINE: ICD-10-CM

## 2020-07-02 DIAGNOSIS — M47.817 LUMBAR AND SACRAL ARTHRITIS: ICD-10-CM

## 2020-07-02 PROCEDURE — 99214 OFFICE O/P EST MOD 30 MIN: CPT | Mod: PBBFAC,PO | Performed by: FAMILY MEDICINE

## 2020-07-02 PROCEDURE — 99999 PR PBB SHADOW E&M-EST. PATIENT-LVL IV: ICD-10-PCS | Mod: PBBFAC,,, | Performed by: FAMILY MEDICINE

## 2020-07-02 PROCEDURE — 99213 PR OFFICE/OUTPT VISIT, EST, LEVL III, 20-29 MIN: ICD-10-PCS | Mod: S$PBB,,, | Performed by: FAMILY MEDICINE

## 2020-07-02 PROCEDURE — 99213 OFFICE O/P EST LOW 20 MIN: CPT | Mod: S$PBB,,, | Performed by: FAMILY MEDICINE

## 2020-07-02 PROCEDURE — 99999 PR PBB SHADOW E&M-EST. PATIENT-LVL IV: CPT | Mod: PBBFAC,,, | Performed by: FAMILY MEDICINE

## 2020-07-02 RX ORDER — ALPRAZOLAM 2 MG/1
2 TABLET ORAL 2 TIMES DAILY PRN
Qty: 60 TABLET | Refills: 2 | Status: SHIPPED | OUTPATIENT
Start: 2020-07-17 | End: 2020-10-09 | Stop reason: SDUPTHER

## 2020-07-02 NOTE — PROGRESS NOTES
Subjective:       Patient ID: Umang Greer is a 39 y.o. male.    Chief Complaint: Follow-up    Here for f/u anxiety and chronic medical issues. Doing well overall. Mood stable  Follows with rheumatology still.     Review of Systems   Constitutional: Negative for activity change, chills, fever and unexpected weight change.   HENT: Negative for hearing loss, rhinorrhea and trouble swallowing.    Eyes: Negative for discharge and visual disturbance.   Respiratory: Negative for cough, chest tightness, shortness of breath and wheezing.    Cardiovascular: Negative for chest pain, palpitations and leg swelling.   Gastrointestinal: Negative for blood in stool, constipation, diarrhea and vomiting.   Endocrine: Negative for cold intolerance, heat intolerance, polydipsia and polyuria.   Genitourinary: Negative for difficulty urinating, hematuria and urgency.   Musculoskeletal: Negative for arthralgias, joint swelling and neck pain.   Neurological: Negative for weakness and headaches.   Psychiatric/Behavioral: Negative for confusion, decreased concentration and dysphoric mood. The patient is not nervous/anxious.        Objective:      Physical Exam  Vitals signs and nursing note reviewed.   Constitutional:       Appearance: He is well-developed.   HENT:      Head: Normocephalic and atraumatic.   Cardiovascular:      Rate and Rhythm: Normal rate and regular rhythm.      Heart sounds: Normal heart sounds.   Pulmonary:      Effort: Pulmonary effort is normal.      Breath sounds: Normal breath sounds.         Assessment:       1. Ankylosing spondylitis, unspecified site of spine    2. Lumbar and sacral arthritis    3. Anxiety        Plan:       Ankylosing spondylitis, unspecified site of spine    Lumbar and sacral arthritis    Anxiety  -     ALPRAZolam (XANAX) 2 MG Tab; Take 1 tablet (2 mg total) by mouth 2 (two) times daily as needed.  Dispense: 60 tablet; Refill: 2    refill prn med and monitor  Ok in  database  Will  monitor chronic medical issues and continue current plan of care.      Follow up in about 3 months (around 10/2/2020), or if symptoms worsen or fail to improve.

## 2020-07-03 RX ORDER — HYDROCODONE BITARTRATE AND ACETAMINOPHEN 10; 325 MG/1; MG/1
1 TABLET ORAL EVERY 6 HOURS PRN
Qty: 120 TABLET | Refills: 0 | OUTPATIENT
Start: 2020-07-03 | End: 2020-08-02

## 2020-07-13 DIAGNOSIS — M45.9 ANKYLOSING SPONDYLITIS, UNSPECIFIED SITE OF SPINE: ICD-10-CM

## 2020-07-13 DIAGNOSIS — M46.46 LUMBAR DISCITIS: ICD-10-CM

## 2020-07-13 DIAGNOSIS — M62.838 NECK MUSCLE SPASM: ICD-10-CM

## 2020-07-13 DIAGNOSIS — G89.4 CHRONIC PAIN SYNDROME: ICD-10-CM

## 2020-07-13 DIAGNOSIS — L08.9 LOCAL SKIN INFECTION: ICD-10-CM

## 2020-07-13 DIAGNOSIS — M47.817 LUMBAR AND SACRAL ARTHRITIS: ICD-10-CM

## 2020-07-13 RX ORDER — HYDROCODONE BITARTRATE AND ACETAMINOPHEN 10; 325 MG/1; MG/1
1 TABLET ORAL EVERY 6 HOURS PRN
Qty: 120 TABLET | Refills: 0 | Status: CANCELLED | OUTPATIENT
Start: 2020-07-13 | End: 2020-08-12

## 2020-07-14 ENCOUNTER — PATIENT MESSAGE (OUTPATIENT)
Dept: RHEUMATOLOGY | Facility: CLINIC | Age: 40
End: 2020-07-14

## 2020-07-15 ENCOUNTER — TELEPHONE (OUTPATIENT)
Dept: PAIN MEDICINE | Facility: CLINIC | Age: 40
End: 2020-07-15
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
## 2020-07-17 ENCOUNTER — TELEPHONE (OUTPATIENT)
Dept: PHARMACY | Facility: CLINIC | Age: 40
End: 2020-07-17

## 2020-07-28 ENCOUNTER — LAB VISIT (OUTPATIENT)
Dept: LAB | Facility: HOSPITAL | Age: 40
End: 2020-07-28
Attending: INTERNAL MEDICINE
Payer: MEDICARE

## 2020-07-28 DIAGNOSIS — M47.817 LUMBAR AND SACRAL ARTHRITIS: ICD-10-CM

## 2020-07-28 DIAGNOSIS — M62.838 NECK MUSCLE SPASM: ICD-10-CM

## 2020-07-28 DIAGNOSIS — M45.9 ANKYLOSING SPONDYLITIS, UNSPECIFIED SITE OF SPINE: ICD-10-CM

## 2020-07-28 DIAGNOSIS — Z79.899 IMMUNOCOMPROMISED STATE DUE TO DRUG THERAPY: ICD-10-CM

## 2020-07-28 DIAGNOSIS — L02.212 ABSCESS OF LOWER BACK: ICD-10-CM

## 2020-07-28 DIAGNOSIS — D84.821 IMMUNOCOMPROMISED STATE DUE TO DRUG THERAPY: ICD-10-CM

## 2020-07-28 DIAGNOSIS — L08.9 LOCAL SKIN INFECTION: ICD-10-CM

## 2020-07-28 LAB
ALBUMIN SERPL BCP-MCNC: 3.7 G/DL (ref 3.5–5.2)
ALP SERPL-CCNC: 98 U/L (ref 55–135)
ALT SERPL W/O P-5'-P-CCNC: 18 U/L (ref 10–44)
ANION GAP SERPL CALC-SCNC: 8 MMOL/L (ref 8–16)
AST SERPL-CCNC: 18 U/L (ref 10–40)
BASOPHILS # BLD AUTO: 0.04 K/UL (ref 0–0.2)
BASOPHILS NFR BLD: 0.4 % (ref 0–1.9)
BILIRUB SERPL-MCNC: 0.3 MG/DL (ref 0.1–1)
BUN SERPL-MCNC: 8 MG/DL (ref 6–20)
CALCIUM SERPL-MCNC: 9.5 MG/DL (ref 8.7–10.5)
CHLORIDE SERPL-SCNC: 102 MMOL/L (ref 95–110)
CO2 SERPL-SCNC: 30 MMOL/L (ref 23–29)
CREAT SERPL-MCNC: 0.9 MG/DL (ref 0.5–1.4)
CRP SERPL-MCNC: 11.6 MG/L (ref 0–8.2)
DIFFERENTIAL METHOD: ABNORMAL
EOSINOPHIL # BLD AUTO: 0.1 K/UL (ref 0–0.5)
EOSINOPHIL NFR BLD: 0.5 % (ref 0–8)
ERYTHROCYTE [DISTWIDTH] IN BLOOD BY AUTOMATED COUNT: 13.2 % (ref 11.5–14.5)
ERYTHROCYTE [SEDIMENTATION RATE] IN BLOOD BY WESTERGREN METHOD: 17 MM/HR (ref 0–10)
EST. GFR  (AFRICAN AMERICAN): >60 ML/MIN/1.73 M^2
EST. GFR  (NON AFRICAN AMERICAN): >60 ML/MIN/1.73 M^2
GLUCOSE SERPL-MCNC: 101 MG/DL (ref 70–110)
HCT VFR BLD AUTO: 42 % (ref 40–54)
HGB BLD-MCNC: 13.8 G/DL (ref 14–18)
IMM GRANULOCYTES # BLD AUTO: 0.05 K/UL (ref 0–0.04)
IMM GRANULOCYTES NFR BLD AUTO: 0.5 % (ref 0–0.5)
LYMPHOCYTES # BLD AUTO: 2.7 K/UL (ref 1–4.8)
LYMPHOCYTES NFR BLD: 25.3 % (ref 18–48)
MCH RBC QN AUTO: 29.6 PG (ref 27–31)
MCHC RBC AUTO-ENTMCNC: 32.9 G/DL (ref 32–36)
MCV RBC AUTO: 90 FL (ref 82–98)
MONOCYTES # BLD AUTO: 0.5 K/UL (ref 0.3–1)
MONOCYTES NFR BLD: 4.4 % (ref 4–15)
NEUTROPHILS # BLD AUTO: 7.5 K/UL (ref 1.8–7.7)
NEUTROPHILS NFR BLD: 68.9 % (ref 38–73)
NRBC BLD-RTO: 0 /100 WBC
PLATELET # BLD AUTO: 322 K/UL (ref 150–350)
PMV BLD AUTO: 9.2 FL (ref 9.2–12.9)
POTASSIUM SERPL-SCNC: 4 MMOL/L (ref 3.5–5.1)
PROT SERPL-MCNC: 7.4 G/DL (ref 6–8.4)
RBC # BLD AUTO: 4.67 M/UL (ref 4.6–6.2)
SODIUM SERPL-SCNC: 140 MMOL/L (ref 136–145)
WBC # BLD AUTO: 10.85 K/UL (ref 3.9–12.7)

## 2020-07-28 PROCEDURE — 85651 RBC SED RATE NONAUTOMATED: CPT | Mod: PO

## 2020-07-28 PROCEDURE — 85025 COMPLETE CBC W/AUTO DIFF WBC: CPT

## 2020-07-28 PROCEDURE — 86480 TB TEST CELL IMMUN MEASURE: CPT

## 2020-07-28 PROCEDURE — 80053 COMPREHEN METABOLIC PANEL: CPT

## 2020-07-28 PROCEDURE — 86140 C-REACTIVE PROTEIN: CPT

## 2020-07-30 LAB
GAMMA INTERFERON BACKGROUND BLD IA-ACNC: 0.01 IU/ML
M TB IFN-G CD4+ BCKGRND COR BLD-ACNC: 0 IU/ML
MITOGEN IGNF BCKGRD COR BLD-ACNC: 6.22 IU/ML
TB GOLD PLUS: NEGATIVE
TB2 - NIL: 0 IU/ML

## 2020-08-03 ENCOUNTER — PATIENT MESSAGE (OUTPATIENT)
Dept: RHEUMATOLOGY | Facility: CLINIC | Age: 40
End: 2020-08-03

## 2020-08-03 ENCOUNTER — OFFICE VISIT (OUTPATIENT)
Dept: RHEUMATOLOGY | Facility: CLINIC | Age: 40
End: 2020-08-03
Payer: MEDICARE

## 2020-08-03 DIAGNOSIS — M62.838 NECK MUSCLE SPASM: ICD-10-CM

## 2020-08-03 DIAGNOSIS — G89.4 CHRONIC PAIN SYNDROME: ICD-10-CM

## 2020-08-03 DIAGNOSIS — M46.46 LUMBAR DISCITIS: ICD-10-CM

## 2020-08-03 DIAGNOSIS — M45.9 ANKYLOSING SPONDYLITIS, UNSPECIFIED SITE OF SPINE: Primary | ICD-10-CM

## 2020-08-03 DIAGNOSIS — L08.9 LOCAL SKIN INFECTION: ICD-10-CM

## 2020-08-03 DIAGNOSIS — D84.821 IMMUNOCOMPROMISED STATE DUE TO DRUG THERAPY: ICD-10-CM

## 2020-08-03 DIAGNOSIS — M47.817 LUMBAR AND SACRAL ARTHRITIS: ICD-10-CM

## 2020-08-03 DIAGNOSIS — Z79.899 IMMUNOCOMPROMISED STATE DUE TO DRUG THERAPY: ICD-10-CM

## 2020-08-03 PROCEDURE — 99214 OFFICE O/P EST MOD 30 MIN: CPT | Mod: 95,,, | Performed by: INTERNAL MEDICINE

## 2020-08-03 PROCEDURE — 99214 PR OFFICE/OUTPT VISIT, EST, LEVL IV, 30-39 MIN: ICD-10-PCS | Mod: 95,,, | Performed by: INTERNAL MEDICINE

## 2020-08-03 RX ORDER — MUPIROCIN 20 MG/G
OINTMENT TOPICAL
Qty: 30 G | Refills: 2 | Status: SHIPPED | OUTPATIENT
Start: 2020-08-03 | End: 2020-09-28

## 2020-08-03 RX ORDER — CLOTRIMAZOLE AND BETAMETHASONE DIPROPIONATE 10; .64 MG/G; MG/G
CREAM TOPICAL 2 TIMES DAILY
Qty: 45 G | Refills: 3 | Status: SHIPPED | OUTPATIENT
Start: 2020-08-03 | End: 2020-10-26

## 2020-08-03 RX ORDER — HYDROCODONE BITARTRATE AND ACETAMINOPHEN 10; 325 MG/1; MG/1
1 TABLET ORAL EVERY 6 HOURS PRN
Qty: 120 TABLET | Refills: 0 | Status: SHIPPED | OUTPATIENT
Start: 2020-08-13 | End: 2020-08-11 | Stop reason: SDUPTHER

## 2020-08-03 NOTE — PROGRESS NOTES
Subjective:       Patient ID: Umang Greer is a 39 y.o. male.    Chief Complaint: No chief complaint on file.    Follow up: 39 year old male  On  Ankylosing spondylitis and pustular dermatitis open healing wound under his left arm he went to Dermatology and it was I n D and packed.  He has been on antibiotics clindamycin 300 mg t.i.d. unfortunately he took his Simponi  injection while on antibiotics He has chronic thoracic and lumbar axial spine pain that is unchanged throughout the day. Baclofen and norco are helpful for providing relief and are not causing side effects. He has cut back on smoking to 1/2 pack per day. Prednisone 7.5 mg patient stated with the exception of this abscess he has been doing fairly well.  Patient's pain is well controlled on [place] 10 at the last visit we decided to discontinue the Percocet    Current tx:  1. Simponi  2. Prednisone prn  3. norco 10 mg    Review of Systems   Constitutional: Positive for activity change. Negative for appetite change, chills, diaphoresis, fatigue, fever and unexpected weight change.   HENT: Negative for congestion, ear pain, facial swelling, mouth sores, nosebleeds, postnasal drip, rhinorrhea, sinus pressure, sneezing, sore throat, tinnitus, trouble swallowing and voice change.    Eyes: Negative for pain, discharge, redness, itching and visual disturbance.   Respiratory: Negative for apnea, cough, chest tightness, shortness of breath and wheezing.    Cardiovascular: Negative for chest pain, palpitations and leg swelling.   Gastrointestinal: Negative for abdominal pain, constipation, diarrhea, nausea and vomiting.   Endocrine: Negative for cold intolerance, heat intolerance, polydipsia and polyuria.   Genitourinary: Negative for decreased urine volume, difficulty urinating, dysuria, flank pain, frequency, hematuria and urgency.   Musculoskeletal: Positive for arthralgias, back pain, gait problem, joint swelling, myalgias, neck pain and neck stiffness.    Skin: Positive for rash. Negative for pallor and wound.   Allergic/Immunologic: Positive for immunocompromised state.   Neurological: Negative for dizziness, tremors, seizures, syncope, weakness, numbness and headaches.   Hematological: Negative for adenopathy. Does not bruise/bleed easily.   Psychiatric/Behavioral: Negative for sleep disturbance and suicidal ideas. The patient is not nervous/anxious.          Objective:     There were no vitals filed for this visit.    Past Medical History:   Diagnosis Date    Anxiety     Asthma     Cardiomyopathy     CHF (congestive heart failure)     Emphysema of lung     Sickle cell anemia     only trait    Sickle cell trait      Past Surgical History:   Procedure Laterality Date    HERNIA REPAIR            Physical Exam   Skin: There is erythema.     Left arm axilla       Results for orders placed or performed in visit on 07/28/20   Comprehensive metabolic panel   Result Value Ref Range    Sodium 140 136 - 145 mmol/L    Potassium 4.0 3.5 - 5.1 mmol/L    Chloride 102 95 - 110 mmol/L    CO2 30 (H) 23 - 29 mmol/L    Glucose 101 70 - 110 mg/dL    BUN, Bld 8 6 - 20 mg/dL    Creatinine 0.9 0.5 - 1.4 mg/dL    Calcium 9.5 8.7 - 10.5 mg/dL    Total Protein 7.4 6.0 - 8.4 g/dL    Albumin 3.7 3.5 - 5.2 g/dL    Total Bilirubin 0.3 0.1 - 1.0 mg/dL    Alkaline Phosphatase 98 55 - 135 U/L    AST 18 10 - 40 U/L    ALT 18 10 - 44 U/L    Anion Gap 8 8 - 16 mmol/L    eGFR if African American >60.0 >60 mL/min/1.73 m^2    eGFR if non African American >60.0 >60 mL/min/1.73 m^2   CBC auto differential   Result Value Ref Range    WBC 10.85 3.90 - 12.70 K/uL    RBC 4.67 4.60 - 6.20 M/uL    Hemoglobin 13.8 (L) 14.0 - 18.0 g/dL    Hematocrit 42.0 40.0 - 54.0 %    Mean Corpuscular Volume 90 82 - 98 fL    Mean Corpuscular Hemoglobin 29.6 27.0 - 31.0 pg    Mean Corpuscular Hemoglobin Conc 32.9 32.0 - 36.0 g/dL    RDW 13.2 11.5 - 14.5 %    Platelets 322 150 - 350 K/uL    MPV 9.2 9.2 - 12.9 fL     Immature Granulocytes 0.5 0.0 - 0.5 %    Gran # (ANC) 7.5 1.8 - 7.7 K/uL    Immature Grans (Abs) 0.05 (H) 0.00 - 0.04 K/uL    Lymph # 2.7 1.0 - 4.8 K/uL    Mono # 0.5 0.3 - 1.0 K/uL    Eos # 0.1 0.0 - 0.5 K/uL    Baso # 0.04 0.00 - 0.20 K/uL    nRBC 0 0 /100 WBC    Gran% 68.9 38.0 - 73.0 %    Lymph% 25.3 18.0 - 48.0 %    Mono% 4.4 4.0 - 15.0 %    Eosinophil% 0.5 0.0 - 8.0 %    Basophil% 0.4 0.0 - 1.9 %    Differential Method Automated    C-Reactive Protein   Result Value Ref Range    CRP 11.6 (H) 0.0 - 8.2 mg/L   Sedimentation rate   Result Value Ref Range    Sed Rate 17 (H) 0 - 10 mm/Hr   Quantiferon Gold TB   Result Value Ref Range    NIL 0.010 See text IU/mL    TB1 - Nil 0.000 See text IU/mL    TB2 - Nil 0.000 See text IU/mL    Mitogen - Nil 6.220 See text IU/mL    TB Gold Plus Negative              reviewed labs with patient during this visit      Assessment:       1. Ankylosing spondylitis, unspecified site of spine    2. Lumbar and sacral arthritis    3. Neck muscle spasm    4. Chronic pain syndrome    5. Immunocompromised state due to drug therapy    6. Local skin infection            Plan:       Ankylosing spondylitis, unspecified site of spine  -     CBC auto differential; Future; Expected date: 08/03/2020  -     Comprehensive metabolic panel; Future; Expected date: 08/03/2020  -     Sedimentation rate; Future; Expected date: 08/03/2020  -     C-Reactive Protein; Future; Expected date: 08/03/2020    Lumbar and sacral arthritis  -     CBC auto differential; Future; Expected date: 08/03/2020  -     Comprehensive metabolic panel; Future; Expected date: 08/03/2020  -     Sedimentation rate; Future; Expected date: 08/03/2020  -     C-Reactive Protein; Future; Expected date: 08/03/2020    Neck muscle spasm  -     CBC auto differential; Future; Expected date: 08/03/2020  -     Comprehensive metabolic panel; Future; Expected date: 08/03/2020  -     Sedimentation rate; Future; Expected date: 08/03/2020  -      C-Reactive Protein; Future; Expected date: 08/03/2020    Chronic pain syndrome  -     CBC auto differential; Future; Expected date: 08/03/2020  -     Comprehensive metabolic panel; Future; Expected date: 08/03/2020  -     Sedimentation rate; Future; Expected date: 08/03/2020  -     C-Reactive Protein; Future; Expected date: 08/03/2020    Immunocompromised state due to drug therapy  -     CBC auto differential; Future; Expected date: 08/03/2020  -     Comprehensive metabolic panel; Future; Expected date: 08/03/2020  -     Sedimentation rate; Future; Expected date: 08/03/2020  -     C-Reactive Protein; Future; Expected date: 08/03/2020    Local skin infection  -     CBC auto differential; Future; Expected date: 08/03/2020  -     Comprehensive metabolic panel; Future; Expected date: 08/03/2020  -     Sedimentation rate; Future; Expected date: 08/03/2020  -     C-Reactive Protein; Future; Expected date: 08/03/2020        At this point I recommend holding Simponi when on antibiotics I recommend continuing the antibiotics and applying Bactroban ointment to the area as well as continuing to use Hibiclens during bathing and to only use peroxide on a p.r.n. basis.   I have  check [place] prescription monitoring program site and no unusual or abnormal behavior has occurred pt understand the risk and benefits of taking opioid medications and has decided to continue the  medication       The patient location is: home  The chief complaint leading to consultation is: AS, abscess   Visit type: Virtual visit with synchronous audio and video  Total time spent with patient:30 min  Each patient to whom he or she provides medical services by telemedicine is:  (1) informed of the relationship between the physician and patient and the respective role of any other health care provider with respect to management of the patient; and (2) notified that he or she may decline to receive medical services by telemedicine and may withdraw from  such care at any time.

## 2020-08-11 ENCOUNTER — PATIENT MESSAGE (OUTPATIENT)
Dept: RHEUMATOLOGY | Facility: CLINIC | Age: 40
End: 2020-08-11

## 2020-08-11 ENCOUNTER — NURSE TRIAGE (OUTPATIENT)
Dept: ADMINISTRATIVE | Facility: CLINIC | Age: 40
End: 2020-08-11

## 2020-08-11 DIAGNOSIS — M46.46 LUMBAR DISCITIS: ICD-10-CM

## 2020-08-11 DIAGNOSIS — D84.821 IMMUNOCOMPROMISED STATE DUE TO DRUG THERAPY: ICD-10-CM

## 2020-08-11 DIAGNOSIS — M62.838 NECK MUSCLE SPASM: ICD-10-CM

## 2020-08-11 DIAGNOSIS — M45.9 ANKYLOSING SPONDYLITIS, UNSPECIFIED SITE OF SPINE: ICD-10-CM

## 2020-08-11 DIAGNOSIS — M47.817 LUMBAR AND SACRAL ARTHRITIS: ICD-10-CM

## 2020-08-11 DIAGNOSIS — G89.4 CHRONIC PAIN SYNDROME: ICD-10-CM

## 2020-08-11 DIAGNOSIS — Z79.899 IMMUNOCOMPROMISED STATE DUE TO DRUG THERAPY: ICD-10-CM

## 2020-08-11 DIAGNOSIS — L08.9 LOCAL SKIN INFECTION: ICD-10-CM

## 2020-08-11 RX ORDER — HYDROCODONE BITARTRATE AND ACETAMINOPHEN 10; 325 MG/1; MG/1
1 TABLET ORAL EVERY 6 HOURS PRN
Qty: 120 TABLET | Refills: 0 | Status: SHIPPED | OUTPATIENT
Start: 2020-08-12 | End: 2020-09-08 | Stop reason: SDUPTHER

## 2020-08-11 NOTE — TELEPHONE ENCOUNTER
Patient states that he has already spoke to the doctor.  States that he does no need any further assistance.  No triage needed.     Reason for Disposition   Caller has already spoken with the PCP and has no further questions.    Protocols used: NO CONTACT OR DUPLICATE CONTACT CALL-A-AH

## 2020-08-20 DIAGNOSIS — M45.9 ANKYLOSING SPONDYLITIS, UNSPECIFIED SITE OF SPINE: ICD-10-CM

## 2020-08-21 RX ORDER — GOLIMUMAB 50 MG/.5ML
50 INJECTION, SOLUTION SUBCUTANEOUS
Qty: 0.5 ML | Refills: 6 | Status: SHIPPED | OUTPATIENT
Start: 2020-08-21 | End: 2021-03-19 | Stop reason: SDUPTHER

## 2020-08-22 ENCOUNTER — TELEPHONE (OUTPATIENT)
Dept: PHARMACY | Facility: CLINIC | Age: 40
End: 2020-08-22

## 2020-08-22 NOTE — TELEPHONE ENCOUNTER
Patient confirmed shipping of Simponi on  and delivery .  Address and  verified. $0 copay (004). Patient said he last injected at the end of last month (did not have exact date but stated he injects at the very end of the month, every month) . Zero doses on hand. No missed doses. Patient reported no new medications or dose changes. He stated he has not developed any new allergies. He is no longer on antibiotics for skin abscess and has been cleared by a dermatologist. Patient had no further questions or concerns.

## 2020-09-04 DIAGNOSIS — G89.4 CHRONIC PAIN SYNDROME: ICD-10-CM

## 2020-09-04 DIAGNOSIS — M62.838 NECK MUSCLE SPASM: ICD-10-CM

## 2020-09-04 DIAGNOSIS — Z79.899 IMMUNOCOMPROMISED STATE DUE TO DRUG THERAPY: ICD-10-CM

## 2020-09-04 DIAGNOSIS — D84.821 IMMUNOCOMPROMISED STATE DUE TO DRUG THERAPY: ICD-10-CM

## 2020-09-04 DIAGNOSIS — M46.46 LUMBAR DISCITIS: ICD-10-CM

## 2020-09-04 DIAGNOSIS — M47.817 LUMBAR AND SACRAL ARTHRITIS: ICD-10-CM

## 2020-09-04 DIAGNOSIS — L08.9 LOCAL SKIN INFECTION: ICD-10-CM

## 2020-09-04 DIAGNOSIS — M45.9 ANKYLOSING SPONDYLITIS, UNSPECIFIED SITE OF SPINE: ICD-10-CM

## 2020-09-08 ENCOUNTER — PATIENT MESSAGE (OUTPATIENT)
Dept: RHEUMATOLOGY | Facility: CLINIC | Age: 40
End: 2020-09-08

## 2020-09-08 ENCOUNTER — TELEPHONE (OUTPATIENT)
Dept: RHEUMATOLOGY | Facility: CLINIC | Age: 40
End: 2020-09-08

## 2020-09-08 RX ORDER — HYDROCODONE BITARTRATE AND ACETAMINOPHEN 10; 325 MG/1; MG/1
1 TABLET ORAL EVERY 6 HOURS PRN
Qty: 120 TABLET | Refills: 0 | Status: CANCELLED | OUTPATIENT
Start: 2020-09-08 | End: 2020-10-08

## 2020-09-08 NOTE — TELEPHONE ENCOUNTER
----- Message from En Artis sent at 9/8/2020 10:23 AM CDT -----  Type: Needs Medical Advice  Who Called:  Patient    Best Call Back Number: [phone]  Additional Information:Patient state that he would like a callback regarding questions about his medications and small lump under his right arm.

## 2020-09-08 NOTE — TELEPHONE ENCOUNTER
Pt sent 14 messages on 9/8 regarding his pain medication refill request and antibiotics. Messages sent to Dr. Locke for approval. Pt notified that MD is in clinic at the moment.

## 2020-09-09 ENCOUNTER — PATIENT MESSAGE (OUTPATIENT)
Dept: RHEUMATOLOGY | Facility: CLINIC | Age: 40
End: 2020-09-09

## 2020-09-09 RX ORDER — DOXYCYCLINE HYCLATE 100 MG
100 TABLET ORAL EVERY 12 HOURS
Qty: 60 TABLET | Refills: 0 | Status: SHIPPED | OUTPATIENT
Start: 2020-09-09 | End: 2020-10-09

## 2020-09-11 DIAGNOSIS — J44.9 CHRONIC OBSTRUCTIVE PULMONARY DISEASE, UNSPECIFIED COPD TYPE: ICD-10-CM

## 2020-09-11 NOTE — TELEPHONE ENCOUNTER
No new care gaps identified.  Powered by [employer]. Reference number: [id number]. 9/11/2020 3:23:50 AM NEYDAT

## 2020-09-14 RX ORDER — BUDESONIDE AND FORMOTEROL FUMARATE DIHYDRATE 160; 4.5 UG/1; UG/1
AEROSOL RESPIRATORY (INHALATION)
Qty: 30.6 G | Refills: 3 | Status: SHIPPED | OUTPATIENT
Start: 2020-09-14 | End: 2021-09-17

## 2020-09-14 NOTE — TELEPHONE ENCOUNTER
Refill Authorization Note    is requesting a refill authorization.    Brief assessment and rationale for refill: APPROVE: prr          Medication Therapy Plan: [facility]    Medication reconciliation completed: No                    Comments:      Orders Placed This Encounter    budesonide-formoterol 160-4.5 mcg (SYMBICORT) 160-4.5 mcg/actuation HFAA      Requested Prescriptions   Signed Prescriptions Disp Refills    budesonide-formoterol 160-4.5 mcg (SYMBICORT) 160-4.5 mcg/actuation HFAA 30.6 g 3     Sig: INHALE 2 PUFFS INTO THE LUNGS EVERY 12 HOURS       Pulmonology:  Combination Products Failed - 9/11/2020  3:23 AM        Failed - There is a short-acting beta agonist medication on the active medication list        Passed - Patient is at least 18 years old        Passed - Patient has applicable pulmonary diagnosis on their problem list        Passed - Last Heart Rate in normal range within 360 days.     Pulse Readings from Last 3 Encounters:   07/02/20 94   01/27/20 97   01/22/20 106             Passed - Office visit in past 12 months or future 90 days.     Recent Outpatient Visits            1 month ago Ankylosing spondylitis, unspecified site of spine    [facility] Luis Locke MD    2 months ago Ankylosing spondylitis, unspecified site of spine    [facility] LUPILLO Khan MD    3 months ago Immunocompromised state due to drug therapy    [facility] Luis Locke MD    4 months ago DDD (degenerative disc disease), lumbar    [facility] Hayden Sellers MD    5 months ago Ankylosing spondylitis, unspecified site of spine    [facility] Luis Locke MD          Future Appointments              In 3 weeks [facility]    In 1 month Mindy Tolbert PA-C [facility]    In 2 months [facility]emberto Bah [facility]    In 3  months MD [facility] - [facility], [place]    In 5 months Luis Locke MD [facility] [place]                    Appointments  past 12m or future 3m with PCP    Date Provider   Last Visit   7/2/2020 LUPILLO Khan MD   Next Visit   10/5/2020 LUPILLO Khan MD   ED visits in past 90 days: [unfilled]     Note composed:1:02 PM 09/14/2020

## 2020-09-15 RX ORDER — PREDNISONE 2.5 MG/1
2.5 TABLET ORAL DAILY
Qty: 90 TABLET | Refills: 1 | Status: CANCELLED | OUTPATIENT
Start: 2020-09-15

## 2020-09-22 ENCOUNTER — PATIENT MESSAGE (OUTPATIENT)
Dept: RESEARCH | Facility: OTHER | Age: 40
End: 2020-09-22

## 2020-09-22 DIAGNOSIS — M45.9 ANKYLOSING SPONDYLITIS, UNSPECIFIED SITE OF SPINE: Primary | ICD-10-CM

## 2020-09-25 ENCOUNTER — TELEPHONE (OUTPATIENT)
Dept: PHARMACY | Facility: CLINIC | Age: 40
End: 2020-09-25

## 2020-09-27 DIAGNOSIS — M47.817 LUMBAR AND SACRAL ARTHRITIS: ICD-10-CM

## 2020-09-27 DIAGNOSIS — L08.9 LOCAL SKIN INFECTION: ICD-10-CM

## 2020-09-27 DIAGNOSIS — G89.4 CHRONIC PAIN SYNDROME: ICD-10-CM

## 2020-09-27 DIAGNOSIS — M46.46 LUMBAR DISCITIS: ICD-10-CM

## 2020-09-27 DIAGNOSIS — D84.821 IMMUNOCOMPROMISED STATE DUE TO DRUG THERAPY: ICD-10-CM

## 2020-09-27 DIAGNOSIS — Z79.899 IMMUNOCOMPROMISED STATE DUE TO DRUG THERAPY: ICD-10-CM

## 2020-09-27 DIAGNOSIS — M62.838 NECK MUSCLE SPASM: ICD-10-CM

## 2020-09-28 RX ORDER — MUPIROCIN 20 MG/G
OINTMENT TOPICAL
Qty: 22 G | Refills: 3 | Status: SHIPPED | OUTPATIENT
Start: 2020-09-28 | End: 2020-12-13 | Stop reason: SDUPTHER

## 2020-09-28 RX ORDER — PREDNISONE 2.5 MG/1
5 TABLET ORAL DAILY PRN
Qty: 60 TABLET | Refills: 3 | Status: SHIPPED | OUTPATIENT
Start: 2020-09-28 | End: 2021-01-19 | Stop reason: SDUPTHER

## 2020-10-04 ENCOUNTER — PATIENT MESSAGE (OUTPATIENT)
Dept: RHEUMATOLOGY | Facility: CLINIC | Age: 40
End: 2020-10-04

## 2020-10-04 ENCOUNTER — PATIENT MESSAGE (OUTPATIENT)
Dept: FAMILY MEDICINE | Facility: CLINIC | Age: 40
End: 2020-10-04

## 2020-10-05 ENCOUNTER — PATIENT MESSAGE (OUTPATIENT)
Dept: RHEUMATOLOGY | Facility: CLINIC | Age: 40
End: 2020-10-05

## 2020-10-05 ENCOUNTER — PATIENT MESSAGE (OUTPATIENT)
Dept: FAMILY MEDICINE | Facility: CLINIC | Age: 40
End: 2020-10-05

## 2020-10-05 DIAGNOSIS — M45.9 ANKYLOSING SPONDYLITIS, UNSPECIFIED SITE OF SPINE: ICD-10-CM

## 2020-10-05 DIAGNOSIS — L08.9 LOCAL SKIN INFECTION: ICD-10-CM

## 2020-10-05 DIAGNOSIS — Z79.899 IMMUNOCOMPROMISED STATE DUE TO DRUG THERAPY: ICD-10-CM

## 2020-10-05 DIAGNOSIS — M62.838 NECK MUSCLE SPASM: ICD-10-CM

## 2020-10-05 DIAGNOSIS — M47.817 LUMBAR AND SACRAL ARTHRITIS: ICD-10-CM

## 2020-10-05 DIAGNOSIS — D84.821 IMMUNOCOMPROMISED STATE DUE TO DRUG THERAPY: ICD-10-CM

## 2020-10-05 DIAGNOSIS — M46.46 LUMBAR DISCITIS: ICD-10-CM

## 2020-10-05 DIAGNOSIS — G89.4 CHRONIC PAIN SYNDROME: ICD-10-CM

## 2020-10-06 RX ORDER — HYDROCODONE BITARTRATE AND ACETAMINOPHEN 10; 325 MG/1; MG/1
1 TABLET ORAL EVERY 6 HOURS PRN
Qty: 120 TABLET | Refills: 0 | Status: SHIPPED | OUTPATIENT
Start: 2020-10-09 | End: 2020-11-03 | Stop reason: SDUPTHER

## 2020-10-06 RX ORDER — HYDROCODONE BITARTRATE AND ACETAMINOPHEN 10; 325 MG/1; MG/1
1 TABLET ORAL EVERY 6 HOURS PRN
Qty: 120 TABLET | Refills: 0 | OUTPATIENT
Start: 2020-10-06 | End: 2020-11-05

## 2020-10-09 ENCOUNTER — OFFICE VISIT (OUTPATIENT)
Dept: FAMILY MEDICINE | Facility: CLINIC | Age: 40
End: 2020-10-09
Payer: MEDICARE

## 2020-10-09 DIAGNOSIS — F41.9 ANXIETY: ICD-10-CM

## 2020-10-09 PROCEDURE — 99213 PR OFFICE/OUTPT VISIT, EST, LEVL III, 20-29 MIN: ICD-10-PCS | Mod: 95,,, | Performed by: FAMILY MEDICINE

## 2020-10-09 PROCEDURE — 99213 OFFICE O/P EST LOW 20 MIN: CPT | Mod: 95,,, | Performed by: FAMILY MEDICINE

## 2020-10-09 RX ORDER — ALPRAZOLAM 2 MG/1
2 TABLET ORAL 2 TIMES DAILY PRN
Qty: 60 TABLET | Refills: 2 | Status: SHIPPED | OUTPATIENT
Start: 2020-10-12 | End: 2020-10-14 | Stop reason: SDUPTHER

## 2020-10-09 RX ORDER — FLUTICASONE PROPIONATE 50 MCG
1 SPRAY, SUSPENSION (ML) NASAL DAILY
Qty: 16 G | Refills: 2 | Status: SHIPPED | OUTPATIENT
Start: 2020-10-09 | End: 2021-04-06

## 2020-10-09 NOTE — PROGRESS NOTES
Subjective:       Patient ID: Umang Greer is a 40 y.o. male.    Chief Complaint: No chief complaint on file.    Video visit for f/u anxiety. meds still working well with no major complaints today.  The patient location is: home in [place]  The chief complaint leading to consultation is: anxiety and AR    Visit type: audiovisual    Face to Face time with patient: 5 minutes  8 minutes minutes of total time spent on the encounter, which includes face to face time and non-face to face time preparing to see the patient (eg, review of tests), Obtaining and/or reviewing separately obtained history, Documenting clinical information in the electronic or other health record, Independently interpreting results (not separately reported) and communicating results to the patient/family/caregiver, or Care coordination (not separately reported).         Each patient to whom he or she provides medical services by telemedicine is:  (1) informed of the relationship between the physician and patient and the respective role of any other health care provider with respect to management of the patient; and (2) notified that he or she may decline to receive medical services by telemedicine and may withdraw from such care at any time.    Notes:   Review of Systems   Constitutional: Negative for chills and fever.   Respiratory: Negative for cough, chest tightness and shortness of breath.    Cardiovascular: Negative for chest pain, palpitations and leg swelling.   Endocrine: Negative for cold intolerance and heat intolerance.   Psychiatric/Behavioral: Negative for decreased concentration. The patient is nervous/anxious.        Objective:      Physical Exam  Vitals signs and nursing note reviewed.   Constitutional:       Appearance: He is well-developed.   Psychiatric:         Mood and Affect: Mood normal.         Behavior: Behavior normal.         Assessment:       1. Anxiety        Plan:       Anxiety  -     ALPRAZolam (XANAX) 2 MG Tab; Take  1 tablet (2 mg total) by mouth 2 (two) times daily as needed.  Dispense: 60 tablet; Refill: 2    Other orders  -     fluticasone propionate (FLONASE) 50 mcg/actuation nasal spray; 1 spray (50 mcg total) by Each Nostril route once daily.  Dispense: 16 g; Refill: 2      Ok in  database  flonase for AR  Will monitor chronic medical issues and continue current plan of care.      Follow up in about 3 months (around 1/9/2021), or if symptoms worsen or fail to improve.

## 2020-10-13 ENCOUNTER — PATIENT MESSAGE (OUTPATIENT)
Dept: FAMILY MEDICINE | Facility: CLINIC | Age: 40
End: 2020-10-13

## 2020-10-14 ENCOUNTER — TELEPHONE (OUTPATIENT)
Dept: FAMILY MEDICINE | Facility: CLINIC | Age: 40
End: 2020-10-14

## 2020-10-14 ENCOUNTER — PATIENT MESSAGE (OUTPATIENT)
Dept: FAMILY MEDICINE | Facility: CLINIC | Age: 40
End: 2020-10-14

## 2020-10-14 DIAGNOSIS — F41.9 ANXIETY: ICD-10-CM

## 2020-10-14 RX ORDER — ALPRAZOLAM 1 MG/1
2 TABLET ORAL 2 TIMES DAILY PRN
Qty: 120 TABLET | Refills: 0 | Status: SHIPPED | OUTPATIENT
Start: 2020-10-14 | End: 2020-11-13

## 2020-10-14 NOTE — TELEPHONE ENCOUNTER
----- Message from Shagufta Cheng sent at 10/14/2020  9:37 AM CDT -----  Regarding: Hector with [facility]  Type: Needs Medical Advice  Who Called:  Tony  Symptoms (please be specific):   How long has patient had these symptoms:    Pharmacy name and phone #:   [id number] [place] [address] AT [facility] & [address] [address]  Phone: [phone] Fax: [phone]    Best Call Back Number:   Additional Information: Tony with [facility] medication ALPRAZolam (XANAX) 2 MG Tab on back order what to know if they can change the dosage, thanks

## 2020-10-14 NOTE — TELEPHONE ENCOUNTER
Dorie states ALPRAZolam (XANAX) 2 MG Tab is on back order and would like to know if they can change the dosage. Please advise.

## 2020-10-29 ENCOUNTER — SPECIALTY PHARMACY (OUTPATIENT)
Dept: PHARMACY | Facility: CLINIC | Age: 40
End: 2020-10-29

## 2020-10-29 NOTE — TELEPHONE ENCOUNTER
Specialty Pharmacy - Refill Coordination    Specialty Medication Orders Linked to Encounter      Most Recent Value   Medication #1  golimumab (SIMPONI) 50 mg/0.5 mL PnIj (Order#[id number], Rx#[id number])          Refill Questions - Documented Responses      Most Recent Value   Relationship to patient of person spoken to?  Self   HIPAA/medical authority confirmed?  Yes   Any changes in contact preferences or allowed representatives?  No   Has the patient had any insurance changes?  No   Has the patient had any changes to specialty medication, dose, or instructions?  No   Has the patient started taking any new medications, herbals, or supplements?  No   Has the patient been diagnosed with any new medical conditions?  No   Does the patient have any new allergies to medications or foods?  No   Does the patient have any concerns about side effects?  No   Can the patient store medication/sharps container properly (at the correct temperature, away from children/pets, etc.)?  Yes   Can the patient call emergency services (911) in the event of an emergency?  Yes   Does the patient have any concerns or questions about taking or administering this medication as prescribed?  No   How many doses did the patient miss in the past 4 weeks or since the last fill?  0   How many doses does the patient have on hand?  0   How many days does the patient report on hand quantity will last?  5   Does the number of doses/days supply remaining match pharmacy expected amounts?  Yes   How will the patient receive the medication?  Mail   When does the patient need to receive the medication?  11/02/20   Address in [facility] confirmed and updated if neccessary?  Yes   Expected Copay ($)  0   Is the patient able to afford the medication copay?  Yes   Payment Method  zero copay   Days supply of Refill  30   Would patient like to speak to a pharmacist?  No   Do you want to trigger an intervention?  No   Do you want to trigger an additional  referral task?  No   Refill activity completed?  Yes   Refill activity plan  Refill scheduled   Shipment/Pickup Date:  11/02/20          Current Outpatient Medications   Medication Sig    ALPRAZolam (XANAX) 1 MG tablet Take 2 tablets (2 mg total) by mouth 2 (two) times daily as needed.    baclofen (LIORESAL) 20 MG tablet TAKE 1/2 TABLET(10 MG) BY MOUTH THREE TIMES DAILY    budesonide-formoterol 160-4.5 mcg (SYMBICORT) 160-4.5 mcg/actuation HFAA INHALE 2 PUFFS INTO THE LUNGS EVERY 12 HOURS    carvedilol (COREG) 3.125 MG tablet TAKE 1 TABLET(3.125 MG) BY MOUTH TWICE DAILY    cetirizine (ZYRTEC) 10 MG tablet Take 10 mg by mouth once daily.    clindamycin (CLEOCIN T) 1 % external solution     clotrimazole-betamethasone 1-0.05% (LOTRISONE) cream APPLY EXTERNALLY TO THE AFFECTED AREA TWICE DAILY    DULoxetine (CYMBALTA) 30 MG capsule TAKE 1 CAPSULE(30 MG) BY MOUTH EVERY DAY FOR BACK PAIN    epinephrine (EPIPEN JR) 0.15 mg/0.3 mL pen injection Inject 0.3 mg into the muscle as needed for Anaphylaxis.    fluticasone propionate (FLONASE) 50 mcg/actuation nasal spray 1 spray (50 mcg total) by Each Nostril route once daily.    golimumab (SIMPONI) 50 mg/0.5 mL PnIj Inject 50 mg into the skin every 30 days.    HYDROcodone-acetaminophen (NORCO)  mg per tablet Take 1 tablet by mouth every 6 (six) hours as needed (fill when due).    mupirocin (BACTROBAN) 2 % ointment APPLY EXTERNALLY TO THE AFFECTED AREA THREE TIMES DAILY    NARCAN 4 mg/actuation Spry     predniSONE (DELTASONE) 2.5 MG tablet Take 2 tablets (5 mg total) by mouth daily as needed.   Last reviewed on 10/29/2020 12:14 PM by Judi Caldwell    Review of patient's allergies indicates:   Allergen Reactions    Asa [aspirin] Anaphylaxis    Azulfidine [sulfasalazine] Anaphylaxis    Ibuprofen Anaphylaxis    Gabapentin      Lip swelling, tongue swelling    Last reviewed on  10/29/2020 12:14 PM by Judi Caldwell      Tasks added this encounter   No tasks  added.   Tasks due within next 3 months   12/2/2020 - Clinical - Follow Up Assesement (90 day)  10/21/2020 - Refill Call     JACINDA RYAN  [facility]  [address] [place] [address]  Phone: [phone]  Fax: [phone]

## 2020-10-31 ENCOUNTER — PATIENT OUTREACH (OUTPATIENT)
Dept: ADMINISTRATIVE | Facility: OTHER | Age: 40
End: 2020-10-31

## 2020-10-31 NOTE — PROGRESS NOTES
Health Maintenance Due   Topic Date Due    HIV Screening  09/06/1994    TETANUS VACCINE  09/06/1997    Pneumococcal Vaccine (Highest Risk) (1 of 3 - PCV13) 09/06/1998    Influenza Vaccine (1) 08/01/2020     Updates were requested from care everywhere.  Chart was reviewed for overdue Proactive [facility] Encounters ([facility]) topics (CRS, Breast Cancer Screening, Eye exam)  Health Maintenance has been updated.  LINKS immunization registry triggered.  Immunizations were not able to be reconciled. Patient not found in LINKS.       Removed using curette.  No complications

## 2020-11-03 ENCOUNTER — OFFICE VISIT (OUTPATIENT)
Dept: CARDIOLOGY | Facility: CLINIC | Age: 40
End: 2020-11-03
Payer: MEDICARE

## 2020-11-03 ENCOUNTER — OFFICE VISIT (OUTPATIENT)
Dept: RHEUMATOLOGY | Facility: CLINIC | Age: 40
End: 2020-11-03
Payer: MEDICARE

## 2020-11-03 VITALS
HEART RATE: 104 BPM | WEIGHT: 176.38 LBS | DIASTOLIC BLOOD PRESSURE: 92 MMHG | BODY MASS INDEX: 23.37 KG/M2 | SYSTOLIC BLOOD PRESSURE: 132 MMHG | HEIGHT: 73 IN

## 2020-11-03 VITALS
HEART RATE: 98 BPM | WEIGHT: 177 LBS | BODY MASS INDEX: 23.46 KG/M2 | DIASTOLIC BLOOD PRESSURE: 82 MMHG | HEIGHT: 73 IN | SYSTOLIC BLOOD PRESSURE: 142 MMHG

## 2020-11-03 DIAGNOSIS — D84.821 IMMUNOCOMPROMISED STATE DUE TO DRUG THERAPY: ICD-10-CM

## 2020-11-03 DIAGNOSIS — M45.9 ANKYLOSING SPONDYLITIS, UNSPECIFIED SITE OF SPINE: Primary | ICD-10-CM

## 2020-11-03 DIAGNOSIS — Z79.899 IMMUNOCOMPROMISED STATE DUE TO DRUG THERAPY: ICD-10-CM

## 2020-11-03 DIAGNOSIS — I42.6 CARDIOMYOPATHY, ALCOHOLIC: Primary | ICD-10-CM

## 2020-11-03 DIAGNOSIS — F17.200 TOBACCO DEPENDENCE: ICD-10-CM

## 2020-11-03 DIAGNOSIS — E78.5 HYPERLIPIDEMIA, UNSPECIFIED HYPERLIPIDEMIA TYPE: ICD-10-CM

## 2020-11-03 DIAGNOSIS — M47.816 LUMBAR SPONDYLOSIS: ICD-10-CM

## 2020-11-03 DIAGNOSIS — I42.9 CARDIOMYOPATHY, UNSPECIFIED TYPE: ICD-10-CM

## 2020-11-03 DIAGNOSIS — G89.4 CHRONIC PAIN SYNDROME: ICD-10-CM

## 2020-11-03 PROCEDURE — 99214 PR OFFICE/OUTPT VISIT, EST, LEVL IV, 30-39 MIN: ICD-10-PCS | Mod: S$PBB,,, | Performed by: PHYSICIAN ASSISTANT

## 2020-11-03 PROCEDURE — 99214 OFFICE O/P EST MOD 30 MIN: CPT | Mod: PBBFAC,27,PO | Performed by: INTERNAL MEDICINE

## 2020-11-03 PROCEDURE — 99999 PR PBB SHADOW E&M-EST. PATIENT-LVL IV: CPT | Mod: PBBFAC,,, | Performed by: PHYSICIAN ASSISTANT

## 2020-11-03 PROCEDURE — 99214 OFFICE O/P EST MOD 30 MIN: CPT | Mod: S$PBB,,, | Performed by: INTERNAL MEDICINE

## 2020-11-03 PROCEDURE — 99214 OFFICE O/P EST MOD 30 MIN: CPT | Mod: S$PBB,,, | Performed by: PHYSICIAN ASSISTANT

## 2020-11-03 PROCEDURE — 99214 OFFICE O/P EST MOD 30 MIN: CPT | Mod: PBBFAC,PO | Performed by: PHYSICIAN ASSISTANT

## 2020-11-03 PROCEDURE — 99999 PR PBB SHADOW E&M-EST. PATIENT-LVL IV: ICD-10-PCS | Mod: PBBFAC,,, | Performed by: INTERNAL MEDICINE

## 2020-11-03 PROCEDURE — 99214 PR OFFICE/OUTPT VISIT, EST, LEVL IV, 30-39 MIN: ICD-10-PCS | Mod: S$PBB,,, | Performed by: INTERNAL MEDICINE

## 2020-11-03 PROCEDURE — 99999 PR PBB SHADOW E&M-EST. PATIENT-LVL IV: CPT | Mod: PBBFAC,,, | Performed by: INTERNAL MEDICINE

## 2020-11-03 PROCEDURE — 99999 PR PBB SHADOW E&M-EST. PATIENT-LVL IV: ICD-10-PCS | Mod: PBBFAC,,, | Performed by: PHYSICIAN ASSISTANT

## 2020-11-03 RX ORDER — CARVEDILOL 3.12 MG/1
TABLET ORAL
Qty: 183 TABLET | Refills: 3 | Status: SHIPPED | OUTPATIENT
Start: 2020-11-03 | End: 2021-10-08 | Stop reason: SDUPTHER

## 2020-11-03 RX ORDER — BACLOFEN 20 MG/1
10 TABLET ORAL 3 TIMES DAILY
Qty: 120 TABLET | Refills: 3 | Status: SHIPPED | OUTPATIENT
Start: 2020-11-03 | End: 2021-09-07 | Stop reason: SDUPTHER

## 2020-11-03 ASSESSMENT — ROUTINE ASSESSMENT OF PATIENT INDEX DATA (RAPID3)
PAIN SCORE: 6
PSYCHOLOGICAL DISTRESS SCORE: 0
PATIENT GLOBAL ASSESSMENT SCORE: 6
FATIGUE SCORE: 1.1
MDHAQ FUNCTION SCORE: 1.2
TOTAL RAPID3 SCORE: 5.33

## 2020-11-03 NOTE — PROGRESS NOTES
Subjective:    Patient ID:  Umang Greer is a 40 y.o. male who presents for follow-up of CMP    HPI  He comes for follow up with no major problems, no chest pain, no shortness of breath.  FC II  Still smoking    Review of Systems   Constitution: Negative for decreased appetite, malaise/fatigue, weight gain and weight loss.   Cardiovascular: Negative for chest pain, dyspnea on exertion, leg swelling, palpitations and syncope.   Respiratory: Negative for cough and shortness of breath.    Gastrointestinal: Negative.    Neurological: Negative for weakness.   All other systems reviewed and are negative.       Objective:      Physical Exam   Constitutional: He is oriented to person, place, and time. He appears well-developed and well-nourished.   HENT:   Head: Normocephalic.   Eyes: Pupils are equal, round, and reactive to light.   Neck: Normal range of motion. Neck supple. No JVD present. Carotid bruit is not present. No thyromegaly present.   Cardiovascular: Normal rate, regular rhythm, normal heart sounds, intact distal pulses and normal pulses. PMI is not displaced. Exam reveals no gallop.   No murmur heard.  Pulmonary/Chest: Effort normal and breath sounds normal.   Abdominal: Soft. Normal appearance. He exhibits no mass. There is no hepatosplenomegaly. There is no abdominal tenderness.   Musculoskeletal: Normal range of motion.         General: No edema.   Neurological: He is alert and oriented to person, place, and time. He has normal strength and normal reflexes. No sensory deficit.   Skin: Skin is warm and intact.   Psychiatric: He has a normal mood and affect.   Nursing note and vitals reviewed.        Assessment:       1. Cardiomyopathy, alcoholic    2. Hyperlipidemia, unspecified hyperlipidemia type    3. Tobacco dependence    4. Cardiomyopathy, unspecified type         Plan:     Continue all cardiac medications  Regular exercise program  1 yr f/u with dhiraj abreu

## 2020-11-03 NOTE — PROGRESS NOTES
Subjective:       Patient ID: Umang Greer is a 40 y.o. male.    Chief Complaint: Disease Management    Mr. Greer is a 40 year old male who presents to clinic for follow up on Ankylosing spondylitis. He has been doing fair since his last visit. He completed course of doxycycline for axillary abscess. He is using hibiclens and topical cleocin for cystic acne on his back followed by [facility]. He complains of arthritis flare with the cold weather involving his knees and low back. He reports good improvement overall with his arthritis on simponi monthly. Injection is due now. He is using prednisone 2.5-5 mg daily PRN. We reviewed labs from 7/2020, which were stable. Pain management recommended epidural steroid injection, which he is considering however, this provided only temporarily relief in the past.    Current tx:  1. Simponi  2. Prednisone prn  3. norco 10/325 q6    Review of Systems   Constitutional: Positive for activity change. Negative for chills, fatigue and fever.   Eyes: Negative for visual disturbance.   Respiratory: Negative for cough, shortness of breath and wheezing.    Cardiovascular: Negative for chest pain, palpitations and leg swelling.   Gastrointestinal: Negative for abdominal pain, constipation, diarrhea, nausea and vomiting.   Musculoskeletal: Positive for arthralgias, back pain, joint swelling and myalgias.   Allergic/Immunologic: Positive for immunocompromised state.   Neurological: Negative for dizziness, syncope and headaches.   Hematological: Negative for adenopathy.         Objective:     Vitals:    11/03/20 0845   BP: (!) 132/92   Pulse: 104       Past Medical History:   Diagnosis Date    Anxiety     Asthma     Cardiomyopathy     CHF (congestive heart failure)     Emphysema of lung     Sickle cell anemia     only trait    Sickle cell trait      Past Surgical History:   Procedure Laterality Date    HERNIA REPAIR            Physical Exam   Constitutional: He is  well-developed, well-nourished, and in no distress.   Eyes: Right conjunctiva is not injected. Left conjunctiva is not injected. Right eye exhibits normal extraocular motion. Left eye exhibits normal extraocular motion.   Neck: No JVD present. No thyromegaly present.   Cardiovascular: Normal rate and regular rhythm.  Exam reveals no decreased pulses.    Pulmonary/Chest: He has no wheezes. He has no rhonchi. He has no rales.       Right Side Rheumatological Exam     Examination finds the shoulder, elbow, wrist, knee, 1st PIP, 1st MCP, 2nd PIP, 2nd MCP, 3rd PIP, 3rd MCP, 4th PIP, 4th MCP, 5th PIP and 5th MCP normal.    Left Side Rheumatological Exam     Examination finds the shoulder, elbow, wrist, knee, 1st PIP, 1st MCP, 2nd PIP, 2nd MCP, 3rd PIP, 3rd MCP, 4th PIP, 4th MCP, 5th PIP and 5th MCP normal.      Back/Neck Exam   Tenderness Right paramedian tenderness of the Upper T-Spine, Lower T-Spine, Upper L-Spine, Lower L-Spine and SI Joint.Left paramedian tenderness of the Upper T-Spine, Lower T-Spine, Upper L-Spine, Lower L-Spine and SI Joint.      Lymphadenopathy:     He has no cervical adenopathy.   Neurological: Gait normal.   Skin: Lesion (cystic acne back) noted. No rash noted.     Psychiatric: Mood and affect normal.       Recent labs reviewed:  Component      Latest Ref Rng & Units 7/28/2020 1/17/2020   WBC      3.90 - 12.70 K/uL 10.85    RBC      4.60 - 6.20 M/uL 4.67    Hemoglobin      14.0 - 18.0 g/dL 13.8 (L)    Hematocrit      40.0 - 54.0 % 42.0    MCV      82 - 98 fL 90    MCH      27.0 - 31.0 pg 29.6    MCHC      32.0 - 36.0 g/dL 32.9    RDW      11.5 - 14.5 % 13.2    Platelets      150 - 350 K/uL 322    MPV      9.2 - 12.9 fL 9.2    Immature Granulocytes      0.0 - 0.5 % 0.5    Gran # (ANC)      1.8 - 7.7 K/uL 7.5    Immature Grans (Abs)      0.00 - 0.04 K/uL 0.05 (H)    Lymph #      1.0 - 4.8 K/uL 2.7    Mono #      0.3 - 1.0 K/uL 0.5    Eos #      0.0 - 0.5 K/uL 0.1    Baso #      0.00 - 0.20 K/uL  0.04    nRBC      0 /100 WBC 0    Gran %      38.0 - 73.0 % 68.9    Lymph %      18.0 - 48.0 % 25.3    Mono %      4.0 - 15.0 % 4.4    Eosinophil %      0.0 - 8.0 % 0.5    Basophil %      0.0 - 1.9 % 0.4    Differential Method       Automated    Sodium      136 - 145 mmol/L 140    Potassium      3.5 - 5.1 mmol/L 4.0    Chloride      95 - 110 mmol/L 102    CO2      23 - 29 mmol/L 30 (H)    Glucose      70 - 110 mg/dL 101    BUN      6 - 20 mg/dL 8    Creatinine      0.5 - 1.4 mg/dL 0.9    Calcium      8.7 - 10.5 mg/dL 9.5    PROTEIN TOTAL      6.0 - 8.4 g/dL 7.4    Albumin      3.5 - 5.2 g/dL 3.7    BILIRUBIN TOTAL      0.1 - 1.0 mg/dL 0.3    Alkaline Phosphatase      55 - 135 U/L 98    AST      10 - 40 U/L 18    ALT      10 - 44 U/L 18    Anion Gap      8 - 16 mmol/L 8    eGFR if African American      >60 mL/min/1.73 m:2 >60.0    eGFR if non African American      >60 mL/min/1.73 m:2 >60.0    NIL      See text IU/mL 0.010    TB1 - Nil      See text IU/mL 0.000    TB2 - Nil      See text IU/mL 0.000    Mitogen - Nil      See text IU/mL 6.220    TB Gold Plus       Negative    Hemoglobin A1C External      4.0 - 5.6 %  5.3   Estimated Avg Glucose      68 - 131 mg/dL  105   Vit D, 25-Hydroxy      30 - 96 ng/mL  45   CRP      0.0 - 8.2 mg/L 11.6 (H)    Sed Rate      0 - 10 mm/Hr 17 (H)      Assessment:       1. Ankylosing spondylitis, unspecified site of spine    2. Lumbar spondylosis    3. Chronic pain syndrome    4. Immunocompromised state due to drug therapy            Plan:       Ankylosing spondylitis, unspecified site of spine  -     baclofen (LIORESAL) 20 MG tablet; Take 0.5 tablets (10 mg total) by mouth 3 (three) times daily.  Dispense: 120 tablet; Refill: 3    Lumbar spondylosis    Chronic pain syndrome    Immunocompromised state due to drug therapy        Assessment:  40 year old male with  Ankylosing spondylitis, elevated ESR/CRP on simponi  --chronic pain syndrome on norco 10/325 Q6  --alcoholic  cardiomyopathy, EF 55% followed by Cardiology  --COPD, tobacco use  --chronic anxiety on xanax PRN per PCP  --allergy to ASA, NSAIDs    Plan:  1. Cont. simponi monthly. Hold for infection in the future.  2. Cont. Baclofen PRN, prednisone PRN  3. Cont. norco q6 per Dr. Locke. I have checked [place] prescription monitoring program site and no unusual or abnormal behavior has occurred pt understand the risk and benefits of taking opioid medications and has decided to continue the  Medication.  4. Monitor BP at home and keep a log. Notify clinic if BP is persistently >140/90.        The patient is aware that there is a COVID-19 pandemic and that the immunosuppressants being taken to treat arthritis can increased the  risk for infection/Viruses.  This patient understands  to hold (not to take) all DMARD/Immunsuppressants with the exception of Plaquenil,  if having fever chills, cough, shortness of breath loss of sense of smell and or myalgias.  It is understood that the patient is to call the office as well as call their primary care physician and observe  social isolation    Follow up:  3-4 months Dr. Locke w/labs prior

## 2020-11-11 ENCOUNTER — PATIENT MESSAGE (OUTPATIENT)
Dept: FAMILY MEDICINE | Facility: CLINIC | Age: 40
End: 2020-11-11

## 2020-11-13 ENCOUNTER — PATIENT MESSAGE (OUTPATIENT)
Dept: FAMILY MEDICINE | Facility: CLINIC | Age: 40
End: 2020-11-13

## 2020-11-13 RX ORDER — ALPRAZOLAM 2 MG/1
2 TABLET ORAL 2 TIMES DAILY PRN
Qty: 60 TABLET | Refills: 0 | Status: SHIPPED | OUTPATIENT
Start: 2020-11-13 | End: 2020-12-15 | Stop reason: SDUPTHER

## 2020-11-24 ENCOUNTER — PATIENT MESSAGE (OUTPATIENT)
Dept: PHARMACY | Facility: CLINIC | Age: 40
End: 2020-11-24

## 2020-11-24 ENCOUNTER — SPECIALTY PHARMACY (OUTPATIENT)
Dept: PHARMACY | Facility: CLINIC | Age: 40
End: 2020-11-24

## 2020-11-24 NOTE — TELEPHONE ENCOUNTER
Specialty Pharmacy - Refill Coordination    Specialty Medication Orders Linked to Encounter      Most Recent Value   Medication #1  golimumab (SIMPONI) 50 mg/0.5 mL PnIj (Order#[id number], Rx#[id number])          Refill Questions - Documented Responses      Most Recent Value   Relationship to patient of person spoken to?  Self   HIPAA/medical authority confirmed?  Yes   Any changes in contact preferences or allowed representatives?  No   Has the patient had any insurance changes?  No   Has the patient had any changes to specialty medication, dose, or instructions?  No   Has the patient started taking any new medications, herbals, or supplements?  No   Has the patient been diagnosed with any new medical conditions?  No   Does the patient have any new allergies to medications or foods?  No   Does the patient have any concerns about side effects?  No   Can the patient store medication/sharps container properly (at the correct temperature, away from children/pets, etc.)?  Yes   Can the patient call emergency services (961) in the event of an emergency?  Yes   Does the patient have any concerns or questions about taking or administering this medication as prescribed?  No   How many doses did the patient miss in the past 4 weeks or since the last fill?  0   How many doses does the patient have on hand?  0   How many days does the patient report on hand quantity will last?  0   Does the number of doses/days supply remaining match pharmacy expected amounts?  Yes   Does the patient feel that this medication is effective?  Yes   How will the patient receive the medication?  Mail   When does the patient need to receive the medication?  12/01/20   Shipping Address  Home   Address in [facility] confirmed and updated if neccessary?  Yes   Expected Copay ($)  0   Is the patient able to afford the medication copay?  Yes   Payment Method  zero copay   Days supply of Refill  30   Would patient like to speak to a pharmacist?   No   Do you want to trigger an intervention?  No   Do you want to trigger an additional referral task?  No   Refill activity completed?  Yes   Refill activity plan  Refill scheduled   Shipment/Pickup Date:  11/30/20          Current Outpatient Medications   Medication Sig    ALPRAZolam (XANAX) 2 MG Tab Take 1 tablet (2 mg total) by mouth 2 (two) times daily as needed.    baclofen (LIORESAL) 20 MG tablet Take 0.5 tablets (10 mg total) by mouth 3 (three) times daily.    budesonide-formoterol 160-4.5 mcg (SYMBICORT) 160-4.5 mcg/actuation HFAA INHALE 2 PUFFS INTO THE LUNGS EVERY 12 HOURS    carvediloL (COREG) 3.125 MG tablet TAKE 1 TABLET(3.125 MG) BY MOUTH TWICE DAILY    cetirizine (ZYRTEC) 10 MG tablet Take 10 mg by mouth once daily.    clindamycin (CLEOCIN T) 1 % external solution     clotrimazole-betamethasone 1-0.05% (LOTRISONE) cream APPLY EXTERNALLY TO THE AFFECTED AREA TWICE DAILY    DULoxetine (CYMBALTA) 30 MG capsule TAKE 1 CAPSULE(30 MG) BY MOUTH EVERY DAY FOR BACK PAIN    epinephrine (EPIPEN JR) 0.15 mg/0.3 mL pen injection Inject 0.3 mg into the muscle as needed for Anaphylaxis.    fluticasone propionate (FLONASE) 50 mcg/actuation nasal spray 1 spray (50 mcg total) by Each Nostril route once daily.    golimumab (SIMPONI) 50 mg/0.5 mL PnIj Inject 50 mg into the skin every 30 days.    HYDROcodone-acetaminophen (NORCO)  mg per tablet Take 1 tablet by mouth every 6 (six) hours as needed.    mupirocin (BACTROBAN) 2 % ointment APPLY EXTERNALLY TO THE AFFECTED AREA THREE TIMES DAILY    NARCAN 4 mg/actuation Spry     predniSONE (DELTASONE) 2.5 MG tablet Take 2 tablets (5 mg total) by mouth daily as needed.   Last reviewed on 11/24/2020  2:39 PM by Derek Gorman    Review of patient's allergies indicates:   Allergen Reactions    Asa [aspirin] Anaphylaxis    Azulfidine [sulfasalazine] Anaphylaxis    Ibuprofen Anaphylaxis    Gabapentin      Lip swelling, tongue swelling    Last reviewed  on  11/24/2020 2:39 PM by Derek Gorman      Tasks added this encounter   No tasks added.   Tasks due within next 3 months   12/2/2020 - Clinical - Follow Up Assesement (90 day)  11/21/2020 - Refill Call (Auto Added)     Derek Gorman  [facility]  [address] [place] [id number]  Phone: [phone]  Fax: [phone]

## 2020-12-02 ENCOUNTER — SPECIALTY PHARMACY (OUTPATIENT)
Dept: PHARMACY | Facility: CLINIC | Age: 40
End: 2020-12-02

## 2020-12-04 ENCOUNTER — PATIENT MESSAGE (OUTPATIENT)
Dept: RHEUMATOLOGY | Facility: CLINIC | Age: 40
End: 2020-12-04

## 2020-12-04 DIAGNOSIS — M62.838 NECK MUSCLE SPASM: ICD-10-CM

## 2020-12-04 DIAGNOSIS — D84.821 IMMUNOCOMPROMISED STATE DUE TO DRUG THERAPY: ICD-10-CM

## 2020-12-04 DIAGNOSIS — M47.817 LUMBAR AND SACRAL ARTHRITIS: ICD-10-CM

## 2020-12-04 DIAGNOSIS — L08.9 LOCAL SKIN INFECTION: ICD-10-CM

## 2020-12-04 DIAGNOSIS — Z79.899 IMMUNOCOMPROMISED STATE DUE TO DRUG THERAPY: ICD-10-CM

## 2020-12-04 DIAGNOSIS — M45.9 ANKYLOSING SPONDYLITIS, UNSPECIFIED SITE OF SPINE: ICD-10-CM

## 2020-12-04 DIAGNOSIS — G89.4 CHRONIC PAIN SYNDROME: ICD-10-CM

## 2020-12-04 DIAGNOSIS — M46.46 LUMBAR DISCITIS: ICD-10-CM

## 2020-12-04 RX ORDER — DOXYCYCLINE 100 MG/1
100 CAPSULE ORAL 2 TIMES DAILY
Qty: 20 CAPSULE | Refills: 3 | Status: SHIPPED | OUTPATIENT
Start: 2020-12-04 | End: 2020-12-14

## 2020-12-04 RX ORDER — HYDROCODONE BITARTRATE AND ACETAMINOPHEN 10; 325 MG/1; MG/1
1 TABLET ORAL EVERY 6 HOURS PRN
Qty: 120 TABLET | Refills: 0 | Status: SHIPPED | OUTPATIENT
Start: 2020-12-04 | End: 2020-12-30 | Stop reason: SDUPTHER

## 2020-12-04 NOTE — TELEPHONE ENCOUNTER
"Patient is requesting refill for Doxycycline. When asked what  Kind of symptoms was he having. He stated " Dr. Locke know what it is for."   "

## 2020-12-09 NOTE — TELEPHONE ENCOUNTER
Specialty Pharmacy - Clinical Reassessment    Specialty Medication Orders Linked to Encounter      Most Recent Value   Medication #1  golimumab (SIMPONI) 50 mg/0.5 mL PnIj (Order#[id number], Rx#[id number])        Subjective    Umang Greer is a 40 y.o. male, who is followed by the specialty pharmacy service for management and education.    Recent Encounters     Date Type Provider Description    12/02/2020 Specialty Pharmacy Melodie Moreau PharmD Follow-up Clinical Reassessment    11/24/2020 Specialty Pharmacy Derek Gorman Refill Coordination    10/29/2020 Specialty Pharmacy JACINDA RYAN Refill Coordination        Clinical call attempts since last clinical assessment   No call attempts found.     Today he received follow up education for his specialty medication(s).    Current Outpatient Medications   Medication Sig    ALPRAZolam (XANAX) 2 MG Tab Take 1 tablet (2 mg total) by mouth 2 (two) times daily as needed.    baclofen (LIORESAL) 20 MG tablet Take 0.5 tablets (10 mg total) by mouth 3 (three) times daily.    budesonide-formoterol 160-4.5 mcg (SYMBICORT) 160-4.5 mcg/actuation HFAA INHALE 2 PUFFS INTO THE LUNGS EVERY 12 HOURS    carvediloL (COREG) 3.125 MG tablet TAKE 1 TABLET(3.125 MG) BY MOUTH TWICE DAILY    cetirizine (ZYRTEC) 10 MG tablet Take 10 mg by mouth once daily.    clindamycin (CLEOCIN T) 1 % external solution     clotrimazole-betamethasone 1-0.05% (LOTRISONE) cream APPLY EXTERNALLY TO THE AFFECTED AREA TWICE DAILY    doxycycline (MONODOX) 100 MG capsule Take 1 capsule (100 mg total) by mouth 2 (two) times daily. for 10 days    DULoxetine (CYMBALTA) 30 MG capsule TAKE 1 CAPSULE(30 MG) BY MOUTH EVERY DAY FOR BACK PAIN    epinephrine (EPIPEN JR) 0.15 mg/0.3 mL pen injection Inject 0.3 mg into the muscle as needed for Anaphylaxis.    fluticasone propionate (FLONASE) 50 mcg/actuation nasal spray 1 spray (50 mcg total) by Each Nostril route once daily.    golimumab (SIMPONI) 50 mg/0.5 mL  PnIj Inject 50 mg into the skin every 30 days.    HYDROcodone-acetaminophen (NORCO)  mg per tablet Take 1 tablet by mouth every 6 (six) hours as needed.    mupirocin (BACTROBAN) 2 % ointment APPLY EXTERNALLY TO THE AFFECTED AREA THREE TIMES DAILY    NARCAN 4 mg/actuation Spry     predniSONE (DELTASONE) 2.5 MG tablet Take 2 tablets (5 mg total) by mouth daily as needed.   Last reviewed on 11/24/2020  2:39 PM by Derek Gorman    Review of patient's allergies indicates:   Allergen Reactions    Asa [aspirin] Anaphylaxis    Azulfidine [sulfasalazine] Anaphylaxis    Ibuprofen Anaphylaxis    Gabapentin      Lip swelling, tongue swelling   Last reviewed on  12/4/2020 2:46 PM by Radha Fonseca    Drug Interactions    Drug interactions evaluated: yes  Clinically relevant drug interactions identified: no  Provided the patient with educational material regarding drug interactions: not applicable       Medication Adherence    Patient reported X missed doses in the last month: 0  Any gaps in refill history greater than 2 weeks in the last 3 months: no  Demonstrates understanding of importance of adherence: no  Informant: patient  Reliability of informant: reliable  Provider-estimated medication adherence level: good  Reasons for non-adherence: no problems identified  Adherence tools used: calendar  Support network for adherence: family member  Confirmed plan for next specialty medication refill: not applicable  Refills needed for supportive medications: not needed       Adverse Effects    *All other systems reviewed and are negative       Assessment Questions - Documented Responses      Most Recent Value   Assessment   Medication Reconciliation completed for patient  Yes   During the past 4 weeks, has patient missed any activities due to condition or medication?  No   During the past 4 weeks, did patient have any of the following urgent care visits?  None   Goals of Therapy Status  Achieving   Plan  Therapy  "continued   Do you need to open a clinical intervention (i-vent)?  No   Do you want to schedule first shipment?  No          Objective    He has a past medical history of Anxiety, Asthma, Cardiomyopathy, CHF (congestive heart failure), Emphysema of lung, Sickle cell anemia, and Sickle cell trait.        BP Readings from Last 4 Encounters:   11/03/20 (!) 142/82   11/03/20 (!) 132/92   07/02/20 116/86   01/27/20 104/84     Ht Readings from Last 4 Encounters:   11/03/20 6' 1" (1.854 m)   11/03/20 6' 1" (1.854 m)   07/02/20 6' 1" (1.854 m)   05/20/20 6' 1" (1.854 m)     Wt Readings from Last 4 Encounters:   11/03/20 80.3 kg (177 lb 0.5 oz)   11/03/20 80 kg (176 lb 5.9 oz)   07/02/20 80.9 kg (178 lb 5.6 oz)   05/20/20 78.9 kg (174 lb)     No results for input(s): CREATININE, ALT, AST, HEPBSAG, HEPBSAB, HEPBCAB in the last 2160 hours.  The goals of prescribed drug therapy management include:  · Supporting patient to meet the prescriber's medical treatment objectives  · Improving or maintaining quality of life  · Maintaining optimal therapy adherence  · Minimizing and managing side effects      Goals of Therapy Status: Achieving    Assessment/Plan  Patient plans to continue therapy without changes      Simponi indication, dosage, appropriateness, effectiveness, safety and convenience of his specialty medication(s) were reviewed today.     Patient Counseling    Counseled the patient on the following: doses and administration discussed, safe handling, storage, and disposal discussed, possible adverse effects and management discussed, possible drug and prescription drug interactions discussed, possible drug and OTC drug and food interactions discussed, lab monitoring and follow-up discussed, therapeutic rationale discussed, cost of medications and cost implications discussed, adherence and missed doses discussed, pharmacy contact information discussed     Follow up assessment Simponi for AS complete. Pt reports no missed any " doses, no issues or SE with Simponi and continues to tolerate well. He administers Simponi into his thighs and confident with injection process. Pt stated he started back on Doxycycline for acne on back. Pt denies infections at this time. Pt on disability. No ER/UC visits in the last month. Pt reports Simponi continues to manage AS symptoms. Average pain rated 5.5/10. He stated he has pain in lower to mid back. He stated he  has some difficulty bending down to pick thing up from ground at times. He reports occasional morning stiffness lasting a few minutes. Pt had no further questions or concerns. Labs reviewed (7/28/2020): CBC WNL except Hgb slightly low; LFTs WNL; CrCl 123.6 mL/min. Reassessment in 6 months.     Tasks added this encounter   6/1/2021 - Clinical - Follow Up Assesement (90 day)   Tasks due within next 3 months   12/22/2020 - Refill Call (Auto Added)     Melodie oMreau, PharmD  [facility] - [facility]  [address] [place] [id number]  Phone: [phone]  Fax: [phone]

## 2020-12-11 ENCOUNTER — PATIENT MESSAGE (OUTPATIENT)
Dept: FAMILY MEDICINE | Facility: CLINIC | Age: 40
End: 2020-12-11

## 2020-12-13 DIAGNOSIS — L08.9 LOCAL SKIN INFECTION: ICD-10-CM

## 2020-12-13 DIAGNOSIS — G89.4 CHRONIC PAIN SYNDROME: ICD-10-CM

## 2020-12-13 DIAGNOSIS — Z79.899 IMMUNOCOMPROMISED STATE DUE TO DRUG THERAPY: ICD-10-CM

## 2020-12-13 DIAGNOSIS — M47.817 LUMBAR AND SACRAL ARTHRITIS: ICD-10-CM

## 2020-12-13 DIAGNOSIS — M46.46 LUMBAR DISCITIS: ICD-10-CM

## 2020-12-13 DIAGNOSIS — D84.821 IMMUNOCOMPROMISED STATE DUE TO DRUG THERAPY: ICD-10-CM

## 2020-12-13 DIAGNOSIS — M62.838 NECK MUSCLE SPASM: ICD-10-CM

## 2020-12-14 RX ORDER — MUPIROCIN 20 MG/G
OINTMENT TOPICAL
Qty: 22 G | Refills: 3 | Status: SHIPPED | OUTPATIENT
Start: 2020-12-14 | End: 2021-02-11 | Stop reason: SDUPTHER

## 2020-12-15 ENCOUNTER — PATIENT MESSAGE (OUTPATIENT)
Dept: FAMILY MEDICINE | Facility: CLINIC | Age: 40
End: 2020-12-15

## 2020-12-15 RX ORDER — ALPRAZOLAM 2 MG/1
2 TABLET ORAL 2 TIMES DAILY PRN
Qty: 60 TABLET | Refills: 2 | Status: SHIPPED | OUTPATIENT
Start: 2020-12-15 | End: 2021-03-11 | Stop reason: SDUPTHER

## 2020-12-15 RX ORDER — ALPRAZOLAM 2 MG/1
2 TABLET ORAL 2 TIMES DAILY PRN
Qty: 60 TABLET | Refills: 0 | OUTPATIENT
Start: 2020-12-15 | End: 2021-01-14

## 2020-12-30 ENCOUNTER — PATIENT MESSAGE (OUTPATIENT)
Dept: RHEUMATOLOGY | Facility: CLINIC | Age: 40
End: 2020-12-30

## 2020-12-30 DIAGNOSIS — G89.4 CHRONIC PAIN SYNDROME: ICD-10-CM

## 2020-12-30 DIAGNOSIS — M62.838 NECK MUSCLE SPASM: ICD-10-CM

## 2020-12-30 DIAGNOSIS — D84.821 IMMUNOCOMPROMISED STATE DUE TO DRUG THERAPY: ICD-10-CM

## 2020-12-30 DIAGNOSIS — L08.9 LOCAL SKIN INFECTION: ICD-10-CM

## 2020-12-30 DIAGNOSIS — M45.9 ANKYLOSING SPONDYLITIS, UNSPECIFIED SITE OF SPINE: ICD-10-CM

## 2020-12-30 DIAGNOSIS — Z79.899 IMMUNOCOMPROMISED STATE DUE TO DRUG THERAPY: ICD-10-CM

## 2020-12-30 DIAGNOSIS — M47.817 LUMBAR AND SACRAL ARTHRITIS: ICD-10-CM

## 2020-12-30 DIAGNOSIS — M46.46 LUMBAR DISCITIS: ICD-10-CM

## 2020-12-31 ENCOUNTER — SPECIALTY PHARMACY (OUTPATIENT)
Dept: PHARMACY | Facility: CLINIC | Age: 40
End: 2020-12-31

## 2020-12-31 ENCOUNTER — PATIENT MESSAGE (OUTPATIENT)
Dept: RHEUMATOLOGY | Facility: CLINIC | Age: 40
End: 2020-12-31

## 2020-12-31 RX ORDER — HYDROCODONE BITARTRATE AND ACETAMINOPHEN 10; 325 MG/1; MG/1
1 TABLET ORAL EVERY 6 HOURS PRN
Qty: 120 TABLET | Refills: 0 | Status: SHIPPED | OUTPATIENT
Start: 2020-12-31 | End: 2021-01-27 | Stop reason: SDUPTHER

## 2020-12-31 NOTE — TELEPHONE ENCOUNTER
Specialty Pharmacy - Refill Coordination    Specialty Medication Orders Linked to Encounter      Most Recent Value   Medication #1  golimumab (SIMPONI) 50 mg/0.5 mL PnIj (Order#[id number], Rx#[id number])          Refill Questions - Documented Responses      Most Recent Value   Relationship to patient of person spoken to?  Self   HIPAA/medical authority confirmed?  Yes   Any changes in contact preferences or allowed representatives?  No   Has the patient had any insurance changes?  No   Has the patient had any changes to specialty medication, dose, or instructions?  No   Has the patient started taking any new medications, herbals, or supplements?  No   Has the patient been diagnosed with any new medical conditions?  No   Does the patient have any new allergies to medications or foods?  No   Does the patient have any concerns about side effects?  No   Can the patient store medication/sharps container properly (at the correct temperature, away from children/pets, etc.)?  Yes   Can the patient call emergency services (911) in the event of an emergency?  Yes   Does the patient have any concerns or questions about taking or administering this medication as prescribed?  No   How many doses did the patient miss in the past 4 weeks or since the last fill?  -- [Patient reported he was due to inject today but will inject 1/5 instead when he receives his shipment]   How many doses does the patient have on hand?  0   How many days does the patient report on hand quantity will last?  0   Does the number of doses/days supply remaining match pharmacy expected amounts?  Yes   How will the patient receive the medication?  Mail   When does the patient need to receive the medication?  01/05/21   Shipping Address  Home   Address in [facility] confirmed and updated if neccessary?  Yes   Expected Copay ($)  0   Is the patient able to afford the medication copay?  Yes   Payment Method  zero copay   Days supply of Refill  30   Would  patient like to speak to a pharmacist?  No   Do you want to trigger an intervention?  No   Do you want to trigger an additional referral task?  No   Refill activity completed?  Yes   Refill activity plan  Refill scheduled   Shipment/Pickup Date:  01/04/21          Current Outpatient Medications   Medication Sig    ALPRAZolam (XANAX) 2 MG Tab Take 1 tablet (2 mg total) by mouth 2 (two) times daily as needed.    baclofen (LIORESAL) 20 MG tablet Take 0.5 tablets (10 mg total) by mouth 3 (three) times daily.    budesonide-formoterol 160-4.5 mcg (SYMBICORT) 160-4.5 mcg/actuation HFAA INHALE 2 PUFFS INTO THE LUNGS EVERY 12 HOURS    carvediloL (COREG) 3.125 MG tablet TAKE 1 TABLET(3.125 MG) BY MOUTH TWICE DAILY    cetirizine (ZYRTEC) 10 MG tablet Take 10 mg by mouth once daily.    clindamycin (CLEOCIN T) 1 % external solution     clotrimazole-betamethasone 1-0.05% (LOTRISONE) cream APPLY EXTERNALLY TO THE AFFECTED AREA TWICE DAILY    DULoxetine (CYMBALTA) 30 MG capsule TAKE 1 CAPSULE(30 MG) BY MOUTH EVERY DAY FOR BACK PAIN    epinephrine (EPIPEN JR) 0.15 mg/0.3 mL pen injection Inject 0.3 mg into the muscle as needed for Anaphylaxis.    fluticasone propionate (FLONASE) 50 mcg/actuation nasal spray 1 spray (50 mcg total) by Each Nostril route once daily.    golimumab (SIMPONI) 50 mg/0.5 mL PnIj Inject 50 mg into the skin every 30 days.    HYDROcodone-acetaminophen (NORCO)  mg per tablet Take 1 tablet by mouth every 6 (six) hours as needed.    mupirocin (BACTROBAN) 2 % ointment APPLY EXTERNALLY TO THE AFFECTED AREA THREE TIMES DAILY    NARCAN 4 mg/actuation Spry     predniSONE (DELTASONE) 2.5 MG tablet Take 2 tablets (5 mg total) by mouth daily as needed.   Last reviewed on 11/24/2020  2:39 PM by Derek Gorman    Review of patient's allergies indicates:   Allergen Reactions    Asa [aspirin] Anaphylaxis    Azulfidine [sulfasalazine] Anaphylaxis    Ibuprofen Anaphylaxis    Gabapentin      Lip  swelling, tongue swelling    Last reviewed on  12/31/2020 9:33 AM by Sima Fofana      Tasks added this encounter   1/28/2021 - Refill Call (Auto Added)   Tasks due within next 3 months   No tasks due.     [facility] - Specialty Pharm[facility]  [address] [place] [id number]  Phone: [phone]  Fax: [phone]

## 2021-01-04 RX ORDER — DULOXETIN HYDROCHLORIDE 30 MG/1
30 CAPSULE, DELAYED RELEASE ORAL DAILY
Qty: 30 CAPSULE | Refills: 5 | Status: SHIPPED | OUTPATIENT
Start: 2021-01-04 | End: 2021-06-09 | Stop reason: SDUPTHER

## 2021-01-07 ENCOUNTER — OFFICE VISIT (OUTPATIENT)
Dept: FAMILY MEDICINE | Facility: CLINIC | Age: 41
End: 2021-01-07
Payer: MEDICARE

## 2021-01-07 VITALS
HEIGHT: 73 IN | SYSTOLIC BLOOD PRESSURE: 110 MMHG | TEMPERATURE: 98 F | OXYGEN SATURATION: 98 % | HEART RATE: 94 BPM | BODY MASS INDEX: 22.56 KG/M2 | DIASTOLIC BLOOD PRESSURE: 80 MMHG | WEIGHT: 170.19 LBS

## 2021-01-07 DIAGNOSIS — F41.9 ANXIETY: Primary | ICD-10-CM

## 2021-01-07 PROCEDURE — 99999 PR PBB SHADOW E&M-EST. PATIENT-LVL IV: CPT | Mod: PBBFAC,,, | Performed by: FAMILY MEDICINE

## 2021-01-07 PROCEDURE — 99213 OFFICE O/P EST LOW 20 MIN: CPT | Mod: S$PBB,,, | Performed by: FAMILY MEDICINE

## 2021-01-07 PROCEDURE — 99213 PR OFFICE/OUTPT VISIT, EST, LEVL III, 20-29 MIN: ICD-10-PCS | Mod: S$PBB,,, | Performed by: FAMILY MEDICINE

## 2021-01-07 PROCEDURE — 99214 OFFICE O/P EST MOD 30 MIN: CPT | Mod: PBBFAC,PO | Performed by: FAMILY MEDICINE

## 2021-01-07 PROCEDURE — 99999 PR PBB SHADOW E&M-EST. PATIENT-LVL IV: ICD-10-PCS | Mod: PBBFAC,,, | Performed by: FAMILY MEDICINE

## 2021-01-19 DIAGNOSIS — M45.9 ANKYLOSING SPONDYLITIS, UNSPECIFIED SITE OF SPINE: ICD-10-CM

## 2021-01-20 RX ORDER — PREDNISONE 2.5 MG/1
5 TABLET ORAL DAILY PRN
Qty: 60 TABLET | Refills: 3 | Status: SHIPPED | OUTPATIENT
Start: 2021-01-20 | End: 2021-06-09 | Stop reason: SDUPTHER

## 2021-01-20 RX ORDER — PREDNISONE 2.5 MG/1
5 TABLET ORAL DAILY PRN
Qty: 60 TABLET | Refills: 3 | OUTPATIENT
Start: 2021-01-20

## 2021-01-26 ENCOUNTER — PATIENT MESSAGE (OUTPATIENT)
Dept: RHEUMATOLOGY | Facility: CLINIC | Age: 41
End: 2021-01-26

## 2021-01-27 ENCOUNTER — PATIENT MESSAGE (OUTPATIENT)
Dept: RHEUMATOLOGY | Facility: CLINIC | Age: 41
End: 2021-01-27

## 2021-01-27 ENCOUNTER — DOCUMENTATION ONLY (OUTPATIENT)
Dept: RHEUMATOLOGY | Facility: CLINIC | Age: 41
End: 2021-01-27

## 2021-01-27 DIAGNOSIS — M46.46 LUMBAR DISCITIS: ICD-10-CM

## 2021-01-27 DIAGNOSIS — M62.838 NECK MUSCLE SPASM: ICD-10-CM

## 2021-01-27 DIAGNOSIS — Z79.899 IMMUNOCOMPROMISED STATE DUE TO DRUG THERAPY: ICD-10-CM

## 2021-01-27 DIAGNOSIS — M47.817 LUMBAR AND SACRAL ARTHRITIS: ICD-10-CM

## 2021-01-27 DIAGNOSIS — M45.9 ANKYLOSING SPONDYLITIS, UNSPECIFIED SITE OF SPINE: ICD-10-CM

## 2021-01-27 DIAGNOSIS — G89.4 CHRONIC PAIN SYNDROME: ICD-10-CM

## 2021-01-27 DIAGNOSIS — L08.9 LOCAL SKIN INFECTION: ICD-10-CM

## 2021-01-27 DIAGNOSIS — D84.821 IMMUNOCOMPROMISED STATE DUE TO DRUG THERAPY: ICD-10-CM

## 2021-01-29 ENCOUNTER — PATIENT MESSAGE (OUTPATIENT)
Dept: RHEUMATOLOGY | Facility: CLINIC | Age: 41
End: 2021-01-29

## 2021-01-29 ENCOUNTER — PATIENT MESSAGE (OUTPATIENT)
Dept: PHARMACY | Facility: CLINIC | Age: 41
End: 2021-01-29

## 2021-01-29 ENCOUNTER — SPECIALTY PHARMACY (OUTPATIENT)
Dept: PHARMACY | Facility: CLINIC | Age: 41
End: 2021-01-29

## 2021-01-29 RX ORDER — DOXYCYCLINE 100 MG/1
100 CAPSULE ORAL 2 TIMES DAILY
Qty: 20 CAPSULE | Refills: 3 | Status: SHIPPED | OUTPATIENT
Start: 2021-01-29 | End: 2021-02-08

## 2021-01-29 RX ORDER — HYDROCODONE BITARTRATE AND ACETAMINOPHEN 10; 325 MG/1; MG/1
1 TABLET ORAL EVERY 6 HOURS PRN
Qty: 120 TABLET | Refills: 0 | Status: SHIPPED | OUTPATIENT
Start: 2021-01-29 | End: 2021-02-11

## 2021-02-01 ENCOUNTER — LAB VISIT (OUTPATIENT)
Dept: LAB | Facility: HOSPITAL | Age: 41
End: 2021-02-01
Attending: INTERNAL MEDICINE
Payer: MEDICARE

## 2021-02-01 DIAGNOSIS — M62.838 NECK MUSCLE SPASM: ICD-10-CM

## 2021-02-01 DIAGNOSIS — L08.9 LOCAL SKIN INFECTION: ICD-10-CM

## 2021-02-01 DIAGNOSIS — M47.817 LUMBAR AND SACRAL ARTHRITIS: ICD-10-CM

## 2021-02-01 DIAGNOSIS — D84.821 IMMUNOCOMPROMISED STATE DUE TO DRUG THERAPY: ICD-10-CM

## 2021-02-01 DIAGNOSIS — Z79.899 IMMUNOCOMPROMISED STATE DUE TO DRUG THERAPY: ICD-10-CM

## 2021-02-01 DIAGNOSIS — G89.4 CHRONIC PAIN SYNDROME: ICD-10-CM

## 2021-02-01 DIAGNOSIS — M45.9 ANKYLOSING SPONDYLITIS, UNSPECIFIED SITE OF SPINE: ICD-10-CM

## 2021-02-01 LAB
ALBUMIN SERPL BCP-MCNC: 3.7 G/DL (ref 3.5–5.2)
ALP SERPL-CCNC: 85 U/L (ref 55–135)
ALT SERPL W/O P-5'-P-CCNC: 10 U/L (ref 10–44)
ANION GAP SERPL CALC-SCNC: 10 MMOL/L (ref 8–16)
AST SERPL-CCNC: 12 U/L (ref 10–40)
BASOPHILS # BLD AUTO: 0.05 K/UL (ref 0–0.2)
BASOPHILS NFR BLD: 0.6 % (ref 0–1.9)
BILIRUB SERPL-MCNC: 0.5 MG/DL (ref 0.1–1)
BUN SERPL-MCNC: 14 MG/DL (ref 6–20)
CALCIUM SERPL-MCNC: 8.7 MG/DL (ref 8.7–10.5)
CHLORIDE SERPL-SCNC: 106 MMOL/L (ref 95–110)
CO2 SERPL-SCNC: 24 MMOL/L (ref 23–29)
CREAT SERPL-MCNC: 0.9 MG/DL (ref 0.5–1.4)
CRP SERPL-MCNC: 2.2 MG/L (ref 0–8.2)
DIFFERENTIAL METHOD: ABNORMAL
EOSINOPHIL # BLD AUTO: 0.1 K/UL (ref 0–0.5)
EOSINOPHIL NFR BLD: 0.8 % (ref 0–8)
ERYTHROCYTE [DISTWIDTH] IN BLOOD BY AUTOMATED COUNT: 13 % (ref 11.5–14.5)
ERYTHROCYTE [SEDIMENTATION RATE] IN BLOOD BY WESTERGREN METHOD: 4 MM/HR (ref 0–10)
EST. GFR  (AFRICAN AMERICAN): >60 ML/MIN/1.73 M^2
EST. GFR  (NON AFRICAN AMERICAN): >60 ML/MIN/1.73 M^2
GLUCOSE SERPL-MCNC: 153 MG/DL (ref 70–110)
HCT VFR BLD AUTO: 41 % (ref 40–54)
HGB BLD-MCNC: 13.6 G/DL (ref 14–18)
IMM GRANULOCYTES # BLD AUTO: 0.02 K/UL (ref 0–0.04)
IMM GRANULOCYTES NFR BLD AUTO: 0.2 % (ref 0–0.5)
LYMPHOCYTES # BLD AUTO: 3.8 K/UL (ref 1–4.8)
LYMPHOCYTES NFR BLD: 43.9 % (ref 18–48)
MCH RBC QN AUTO: 30.1 PG (ref 27–31)
MCHC RBC AUTO-ENTMCNC: 33.2 G/DL (ref 32–36)
MCV RBC AUTO: 91 FL (ref 82–98)
MONOCYTES # BLD AUTO: 0.4 K/UL (ref 0.3–1)
MONOCYTES NFR BLD: 4 % (ref 4–15)
NEUTROPHILS # BLD AUTO: 4.4 K/UL (ref 1.8–7.7)
NEUTROPHILS NFR BLD: 50.5 % (ref 38–73)
NRBC BLD-RTO: 0 /100 WBC
PLATELET # BLD AUTO: 308 K/UL (ref 150–350)
PMV BLD AUTO: 9.6 FL (ref 9.2–12.9)
POTASSIUM SERPL-SCNC: 3.5 MMOL/L (ref 3.5–5.1)
PROT SERPL-MCNC: 6.8 G/DL (ref 6–8.4)
RBC # BLD AUTO: 4.52 M/UL (ref 4.6–6.2)
SODIUM SERPL-SCNC: 140 MMOL/L (ref 136–145)
WBC # BLD AUTO: 8.68 K/UL (ref 3.9–12.7)

## 2021-02-01 PROCEDURE — 36415 COLL VENOUS BLD VENIPUNCTURE: CPT | Mod: PO

## 2021-02-01 PROCEDURE — 85025 COMPLETE CBC W/AUTO DIFF WBC: CPT

## 2021-02-01 PROCEDURE — 86140 C-REACTIVE PROTEIN: CPT

## 2021-02-01 PROCEDURE — 80053 COMPREHEN METABOLIC PANEL: CPT

## 2021-02-01 PROCEDURE — 85651 RBC SED RATE NONAUTOMATED: CPT | Mod: PO

## 2021-02-09 ENCOUNTER — PATIENT OUTREACH (OUTPATIENT)
Dept: ADMINISTRATIVE | Facility: OTHER | Age: 41
End: 2021-02-09

## 2021-02-11 ENCOUNTER — OFFICE VISIT (OUTPATIENT)
Dept: RHEUMATOLOGY | Facility: CLINIC | Age: 41
End: 2021-02-11
Payer: MEDICARE

## 2021-02-11 VITALS
WEIGHT: 169 LBS | HEIGHT: 73 IN | BODY MASS INDEX: 22.4 KG/M2 | DIASTOLIC BLOOD PRESSURE: 82 MMHG | HEART RATE: 96 BPM | SYSTOLIC BLOOD PRESSURE: 126 MMHG

## 2021-02-11 DIAGNOSIS — M62.838 NECK MUSCLE SPASM: ICD-10-CM

## 2021-02-11 DIAGNOSIS — M45.9 ANKYLOSING SPONDYLITIS, UNSPECIFIED SITE OF SPINE: Primary | ICD-10-CM

## 2021-02-11 DIAGNOSIS — M47.817 LUMBAR AND SACRAL ARTHRITIS: ICD-10-CM

## 2021-02-11 DIAGNOSIS — L08.9 LOCAL SKIN INFECTION: ICD-10-CM

## 2021-02-11 DIAGNOSIS — M47.816 LUMBAR SPONDYLOSIS: ICD-10-CM

## 2021-02-11 DIAGNOSIS — Z79.899 IMMUNOCOMPROMISED STATE DUE TO DRUG THERAPY: ICD-10-CM

## 2021-02-11 DIAGNOSIS — M46.46 LUMBAR DISCITIS: ICD-10-CM

## 2021-02-11 DIAGNOSIS — G89.4 CHRONIC PAIN SYNDROME: ICD-10-CM

## 2021-02-11 DIAGNOSIS — D84.821 IMMUNOCOMPROMISED STATE DUE TO DRUG THERAPY: ICD-10-CM

## 2021-02-11 PROCEDURE — 96372 THER/PROPH/DIAG INJ SC/IM: CPT | Mod: PBBFAC,PO

## 2021-02-11 PROCEDURE — 99999 PR PBB SHADOW E&M-EST. PATIENT-LVL III: ICD-10-PCS | Mod: PBBFAC,,, | Performed by: INTERNAL MEDICINE

## 2021-02-11 PROCEDURE — 99215 PR OFFICE/OUTPT VISIT, EST, LEVL V, 40-54 MIN: ICD-10-PCS | Mod: 25,S$PBB,, | Performed by: INTERNAL MEDICINE

## 2021-02-11 PROCEDURE — 99215 OFFICE O/P EST HI 40 MIN: CPT | Mod: 25,S$PBB,, | Performed by: INTERNAL MEDICINE

## 2021-02-11 PROCEDURE — 99999 PR PBB SHADOW E&M-EST. PATIENT-LVL III: CPT | Mod: PBBFAC,,, | Performed by: INTERNAL MEDICINE

## 2021-02-11 PROCEDURE — 99213 OFFICE O/P EST LOW 20 MIN: CPT | Mod: PBBFAC,PO | Performed by: INTERNAL MEDICINE

## 2021-02-11 RX ORDER — OXYCODONE AND ACETAMINOPHEN 10; 325 MG/1; MG/1
1 TABLET ORAL EVERY 8 HOURS PRN
Qty: 90 TABLET | Refills: 0 | Status: SHIPPED | OUTPATIENT
Start: 2021-03-23 | End: 2021-04-19 | Stop reason: SDUPTHER

## 2021-02-11 RX ORDER — METHYLPREDNISOLONE ACETATE 80 MG/ML
80 INJECTION, SUSPENSION INTRA-ARTICULAR; INTRALESIONAL; INTRAMUSCULAR; SOFT TISSUE
Status: COMPLETED | OUTPATIENT
Start: 2021-02-11 | End: 2021-02-11

## 2021-02-11 RX ORDER — DOXYCYCLINE 100 MG/1
CAPSULE ORAL
COMMUNITY
Start: 2021-01-27 | End: 2021-08-12 | Stop reason: SDUPTHER

## 2021-02-11 RX ORDER — FLUCONAZOLE 150 MG/1
TABLET ORAL
COMMUNITY
Start: 2021-01-31 | End: 2023-05-26

## 2021-02-11 RX ORDER — CLOTRIMAZOLE AND BETAMETHASONE DIPROPIONATE 10; .64 MG/G; MG/G
CREAM TOPICAL
Qty: 45 G | Refills: 3 | Status: SHIPPED | OUTPATIENT
Start: 2021-02-11 | End: 2021-06-09 | Stop reason: SDUPTHER

## 2021-02-11 RX ORDER — MUPIROCIN 20 MG/G
OINTMENT TOPICAL
Qty: 22 G | Refills: 3 | Status: SHIPPED | OUTPATIENT
Start: 2021-02-11 | End: 2021-06-09 | Stop reason: SDUPTHER

## 2021-02-11 RX ORDER — DEXAMETHASONE SODIUM PHOSPHATE 4 MG/ML
4 INJECTION, SOLUTION INTRA-ARTICULAR; INTRALESIONAL; INTRAMUSCULAR; INTRAVENOUS; SOFT TISSUE
Status: COMPLETED | OUTPATIENT
Start: 2021-02-11 | End: 2021-02-11

## 2021-02-11 RX ORDER — CYANOCOBALAMIN 1000 UG/ML
1000 INJECTION, SOLUTION INTRAMUSCULAR; SUBCUTANEOUS
Status: COMPLETED | OUTPATIENT
Start: 2021-02-11 | End: 2021-02-11

## 2021-02-11 RX ORDER — OXYCODONE AND ACETAMINOPHEN 10; 325 MG/1; MG/1
1 TABLET ORAL EVERY 8 HOURS PRN
Qty: 90 TABLET | Refills: 0 | Status: SHIPPED | OUTPATIENT
Start: 2021-02-23 | End: 2021-02-11 | Stop reason: SDUPTHER

## 2021-02-11 RX ADMIN — METHYLPREDNISOLONE ACETATE 80 MG: 80 INJECTION, SUSPENSION INTRA-ARTICULAR; INTRALESIONAL; INTRAMUSCULAR; SOFT TISSUE at 03:02

## 2021-02-11 RX ADMIN — DEXAMETHASONE SODIUM PHOSPHATE 4 MG: 4 INJECTION, SOLUTION INTRA-ARTICULAR; INTRALESIONAL; INTRAMUSCULAR; INTRAVENOUS; SOFT TISSUE at 03:02

## 2021-02-11 RX ADMIN — CYANOCOBALAMIN 1000 MCG: 1000 INJECTION, SOLUTION INTRAMUSCULAR at 03:02

## 2021-02-25 ENCOUNTER — SPECIALTY PHARMACY (OUTPATIENT)
Dept: PHARMACY | Facility: CLINIC | Age: 41
End: 2021-02-25

## 2021-03-12 RX ORDER — ALPRAZOLAM 2 MG/1
2 TABLET ORAL 2 TIMES DAILY PRN
Qty: 60 TABLET | Refills: 2 | Status: SHIPPED | OUTPATIENT
Start: 2021-03-12 | End: 2021-05-10 | Stop reason: SDUPTHER

## 2021-03-19 DIAGNOSIS — M45.9 ANKYLOSING SPONDYLITIS, UNSPECIFIED SITE OF SPINE: ICD-10-CM

## 2021-03-22 ENCOUNTER — SPECIALTY PHARMACY (OUTPATIENT)
Dept: PHARMACY | Facility: CLINIC | Age: 41
End: 2021-03-22

## 2021-03-22 RX ORDER — GOLIMUMAB 50 MG/.5ML
50 INJECTION, SOLUTION SUBCUTANEOUS
Qty: 0.5 ML | Refills: 6 | Status: SHIPPED | OUTPATIENT
Start: 2021-03-22 | End: 2021-10-21 | Stop reason: SDUPTHER

## 2021-04-19 DIAGNOSIS — G89.4 CHRONIC PAIN SYNDROME: ICD-10-CM

## 2021-04-19 DIAGNOSIS — M45.9 ANKYLOSING SPONDYLITIS, UNSPECIFIED SITE OF SPINE: ICD-10-CM

## 2021-04-19 DIAGNOSIS — M62.838 NECK MUSCLE SPASM: ICD-10-CM

## 2021-04-19 DIAGNOSIS — Z79.899 IMMUNOCOMPROMISED STATE DUE TO DRUG THERAPY: ICD-10-CM

## 2021-04-19 DIAGNOSIS — L08.9 LOCAL SKIN INFECTION: ICD-10-CM

## 2021-04-19 DIAGNOSIS — M47.817 LUMBAR AND SACRAL ARTHRITIS: ICD-10-CM

## 2021-04-19 DIAGNOSIS — D84.821 IMMUNOCOMPROMISED STATE DUE TO DRUG THERAPY: ICD-10-CM

## 2021-04-19 DIAGNOSIS — M46.46 LUMBAR DISCITIS: ICD-10-CM

## 2021-04-19 DIAGNOSIS — M47.816 LUMBAR SPONDYLOSIS: ICD-10-CM

## 2021-04-20 RX ORDER — OXYCODONE AND ACETAMINOPHEN 10; 325 MG/1; MG/1
1 TABLET ORAL EVERY 8 HOURS PRN
Qty: 90 TABLET | Refills: 0 | Status: SHIPPED | OUTPATIENT
Start: 2021-04-20 | End: 2021-05-17 | Stop reason: SDUPTHER

## 2021-04-22 ENCOUNTER — SPECIALTY PHARMACY (OUTPATIENT)
Dept: PHARMACY | Facility: CLINIC | Age: 41
End: 2021-04-22

## 2021-05-04 ENCOUNTER — PATIENT MESSAGE (OUTPATIENT)
Dept: RHEUMATOLOGY | Facility: CLINIC | Age: 41
End: 2021-05-04

## 2021-05-10 ENCOUNTER — OFFICE VISIT (OUTPATIENT)
Dept: FAMILY MEDICINE | Facility: CLINIC | Age: 41
End: 2021-05-10
Payer: MEDICARE

## 2021-05-10 DIAGNOSIS — F41.9 ANXIETY: Primary | ICD-10-CM

## 2021-05-10 PROCEDURE — 99213 PR OFFICE/OUTPT VISIT, EST, LEVL III, 20-29 MIN: ICD-10-PCS | Mod: 95,,, | Performed by: FAMILY MEDICINE

## 2021-05-10 PROCEDURE — 99213 OFFICE O/P EST LOW 20 MIN: CPT | Mod: 95,,, | Performed by: FAMILY MEDICINE

## 2021-05-10 RX ORDER — ALPRAZOLAM 2 MG/1
2 TABLET ORAL 2 TIMES DAILY PRN
Qty: 60 TABLET | Refills: 3 | Status: SHIPPED | OUTPATIENT
Start: 2021-05-10 | End: 2021-09-21 | Stop reason: SDUPTHER

## 2021-05-21 ENCOUNTER — SPECIALTY PHARMACY (OUTPATIENT)
Dept: PHARMACY | Facility: CLINIC | Age: 41
End: 2021-05-21

## 2021-06-08 ENCOUNTER — PATIENT OUTREACH (OUTPATIENT)
Dept: ADMINISTRATIVE | Facility: OTHER | Age: 41
End: 2021-06-08

## 2021-06-09 ENCOUNTER — OFFICE VISIT (OUTPATIENT)
Dept: RHEUMATOLOGY | Facility: CLINIC | Age: 41
End: 2021-06-09
Payer: MEDICARE

## 2021-06-09 DIAGNOSIS — Z79.899 IMMUNOCOMPROMISED STATE DUE TO DRUG THERAPY: ICD-10-CM

## 2021-06-09 DIAGNOSIS — L08.9 LOCAL SKIN INFECTION: ICD-10-CM

## 2021-06-09 DIAGNOSIS — M46.46 LUMBAR DISCITIS: ICD-10-CM

## 2021-06-09 DIAGNOSIS — E55.9 VITAMIN D DEFICIENCY, UNSPECIFIED: ICD-10-CM

## 2021-06-09 DIAGNOSIS — M47.817 LUMBAR AND SACRAL ARTHRITIS: Primary | ICD-10-CM

## 2021-06-09 DIAGNOSIS — M47.816 LUMBAR SPONDYLOSIS: ICD-10-CM

## 2021-06-09 DIAGNOSIS — G89.4 CHRONIC PAIN SYNDROME: ICD-10-CM

## 2021-06-09 DIAGNOSIS — R79.82 ELEVATED C-REACTIVE PROTEIN (CRP): ICD-10-CM

## 2021-06-09 DIAGNOSIS — D84.821 IMMUNOCOMPROMISED STATE DUE TO DRUG THERAPY: ICD-10-CM

## 2021-06-09 DIAGNOSIS — M45.9 ANKYLOSING SPONDYLITIS, UNSPECIFIED SITE OF SPINE: ICD-10-CM

## 2021-06-09 DIAGNOSIS — M62.838 NECK MUSCLE SPASM: ICD-10-CM

## 2021-06-09 PROCEDURE — 99215 PR OFFICE/OUTPT VISIT, EST, LEVL V, 40-54 MIN: ICD-10-PCS | Mod: 95,,, | Performed by: INTERNAL MEDICINE

## 2021-06-09 PROCEDURE — 99215 OFFICE O/P EST HI 40 MIN: CPT | Mod: 95,,, | Performed by: INTERNAL MEDICINE

## 2021-06-09 RX ORDER — CLOTRIMAZOLE AND BETAMETHASONE DIPROPIONATE 10; .64 MG/G; MG/G
CREAM TOPICAL
Qty: 45 G | Refills: 3 | Status: SHIPPED | OUTPATIENT
Start: 2021-06-09 | End: 2022-01-20

## 2021-06-09 RX ORDER — OXYCODONE AND ACETAMINOPHEN 10; 325 MG/1; MG/1
1 TABLET ORAL EVERY 8 HOURS PRN
Qty: 90 TABLET | Refills: 0 | Status: SHIPPED | OUTPATIENT
Start: 2021-06-09 | End: 2021-07-13 | Stop reason: SDUPTHER

## 2021-06-09 RX ORDER — MUPIROCIN 20 MG/G
OINTMENT TOPICAL
Qty: 22 G | Refills: 3 | Status: SHIPPED | OUTPATIENT
Start: 2021-06-09 | End: 2023-01-27 | Stop reason: SDUPTHER

## 2021-06-09 RX ORDER — DULOXETIN HYDROCHLORIDE 30 MG/1
30 CAPSULE, DELAYED RELEASE ORAL DAILY
Qty: 30 CAPSULE | Refills: 5 | Status: SHIPPED | OUTPATIENT
Start: 2021-06-09 | End: 2021-12-28 | Stop reason: SDUPTHER

## 2021-06-09 RX ORDER — PREDNISONE 2.5 MG/1
5 TABLET ORAL DAILY PRN
Qty: 60 TABLET | Refills: 3 | Status: SHIPPED | OUTPATIENT
Start: 2021-06-09 | End: 2021-10-27

## 2021-06-18 ENCOUNTER — SPECIALTY PHARMACY (OUTPATIENT)
Dept: PHARMACY | Facility: CLINIC | Age: 41
End: 2021-06-18

## 2021-07-07 ENCOUNTER — PATIENT MESSAGE (OUTPATIENT)
Dept: DERMATOLOGY | Facility: CLINIC | Age: 41
End: 2021-07-07

## 2021-07-07 ENCOUNTER — TELEPHONE (OUTPATIENT)
Dept: DERMATOLOGY | Facility: CLINIC | Age: 41
End: 2021-07-07

## 2021-07-13 ENCOUNTER — PATIENT MESSAGE (OUTPATIENT)
Dept: RHEUMATOLOGY | Facility: CLINIC | Age: 41
End: 2021-07-13

## 2021-07-13 DIAGNOSIS — M46.46 LUMBAR DISCITIS: ICD-10-CM

## 2021-07-13 DIAGNOSIS — M62.838 NECK MUSCLE SPASM: ICD-10-CM

## 2021-07-13 DIAGNOSIS — R79.82 ELEVATED C-REACTIVE PROTEIN (CRP): ICD-10-CM

## 2021-07-13 DIAGNOSIS — G89.4 CHRONIC PAIN SYNDROME: ICD-10-CM

## 2021-07-13 DIAGNOSIS — Z79.899 IMMUNOCOMPROMISED STATE DUE TO DRUG THERAPY: ICD-10-CM

## 2021-07-13 DIAGNOSIS — M47.816 LUMBAR SPONDYLOSIS: ICD-10-CM

## 2021-07-13 DIAGNOSIS — D84.821 IMMUNOCOMPROMISED STATE DUE TO DRUG THERAPY: ICD-10-CM

## 2021-07-13 DIAGNOSIS — M45.9 ANKYLOSING SPONDYLITIS, UNSPECIFIED SITE OF SPINE: ICD-10-CM

## 2021-07-13 DIAGNOSIS — M47.817 LUMBAR AND SACRAL ARTHRITIS: ICD-10-CM

## 2021-07-13 DIAGNOSIS — E55.9 VITAMIN D DEFICIENCY, UNSPECIFIED: ICD-10-CM

## 2021-07-13 DIAGNOSIS — L08.9 LOCAL SKIN INFECTION: ICD-10-CM

## 2021-07-14 ENCOUNTER — PATIENT MESSAGE (OUTPATIENT)
Dept: RHEUMATOLOGY | Facility: CLINIC | Age: 41
End: 2021-07-14

## 2021-07-14 RX ORDER — OXYCODONE AND ACETAMINOPHEN 10; 325 MG/1; MG/1
1 TABLET ORAL EVERY 8 HOURS PRN
Qty: 90 TABLET | Refills: 0 | Status: SHIPPED | OUTPATIENT
Start: 2021-07-14 | End: 2021-08-11 | Stop reason: SDUPTHER

## 2021-07-21 ENCOUNTER — SPECIALTY PHARMACY (OUTPATIENT)
Dept: PHARMACY | Facility: CLINIC | Age: 41
End: 2021-07-21

## 2021-08-11 ENCOUNTER — PATIENT MESSAGE (OUTPATIENT)
Dept: RHEUMATOLOGY | Facility: CLINIC | Age: 41
End: 2021-08-11

## 2021-08-11 DIAGNOSIS — M47.816 LUMBAR SPONDYLOSIS: ICD-10-CM

## 2021-08-11 DIAGNOSIS — M45.9 ANKYLOSING SPONDYLITIS, UNSPECIFIED SITE OF SPINE: ICD-10-CM

## 2021-08-11 DIAGNOSIS — D84.821 IMMUNOCOMPROMISED STATE DUE TO DRUG THERAPY: ICD-10-CM

## 2021-08-11 DIAGNOSIS — R79.82 ELEVATED C-REACTIVE PROTEIN (CRP): ICD-10-CM

## 2021-08-11 DIAGNOSIS — L08.9 LOCAL SKIN INFECTION: ICD-10-CM

## 2021-08-11 DIAGNOSIS — M47.817 LUMBAR AND SACRAL ARTHRITIS: ICD-10-CM

## 2021-08-11 DIAGNOSIS — M62.838 NECK MUSCLE SPASM: ICD-10-CM

## 2021-08-11 DIAGNOSIS — G89.4 CHRONIC PAIN SYNDROME: ICD-10-CM

## 2021-08-11 DIAGNOSIS — Z79.899 IMMUNOCOMPROMISED STATE DUE TO DRUG THERAPY: ICD-10-CM

## 2021-08-11 DIAGNOSIS — E55.9 VITAMIN D DEFICIENCY, UNSPECIFIED: ICD-10-CM

## 2021-08-11 DIAGNOSIS — M46.46 LUMBAR DISCITIS: ICD-10-CM

## 2021-08-12 ENCOUNTER — PATIENT MESSAGE (OUTPATIENT)
Dept: RHEUMATOLOGY | Facility: CLINIC | Age: 41
End: 2021-08-12

## 2021-08-12 DIAGNOSIS — L08.9 LOCAL SKIN INFECTION: Primary | ICD-10-CM

## 2021-08-12 RX ORDER — DOXYCYCLINE 100 MG/1
100 CAPSULE ORAL EVERY 12 HOURS
Qty: 20 CAPSULE | Refills: 3 | Status: SHIPPED | OUTPATIENT
Start: 2021-08-12 | End: 2021-08-22

## 2021-08-12 RX ORDER — OXYCODONE AND ACETAMINOPHEN 10; 325 MG/1; MG/1
1 TABLET ORAL EVERY 8 HOURS PRN
Qty: 90 TABLET | Refills: 0 | Status: SHIPPED | OUTPATIENT
Start: 2021-08-12 | End: 2021-09-07 | Stop reason: SDUPTHER

## 2021-08-23 ENCOUNTER — SPECIALTY PHARMACY (OUTPATIENT)
Dept: PHARMACY | Facility: CLINIC | Age: 41
End: 2021-08-23

## 2021-08-25 ENCOUNTER — SPECIALTY PHARMACY (OUTPATIENT)
Dept: PHARMACY | Facility: CLINIC | Age: 41
End: 2021-08-25

## 2021-09-06 ENCOUNTER — PATIENT MESSAGE (OUTPATIENT)
Dept: RHEUMATOLOGY | Facility: CLINIC | Age: 41
End: 2021-09-06

## 2021-09-06 DIAGNOSIS — R79.82 ELEVATED C-REACTIVE PROTEIN (CRP): ICD-10-CM

## 2021-09-06 DIAGNOSIS — M47.817 LUMBAR AND SACRAL ARTHRITIS: ICD-10-CM

## 2021-09-06 DIAGNOSIS — D84.821 IMMUNOCOMPROMISED STATE DUE TO DRUG THERAPY: ICD-10-CM

## 2021-09-06 DIAGNOSIS — L08.9 LOCAL SKIN INFECTION: ICD-10-CM

## 2021-09-06 DIAGNOSIS — M47.816 LUMBAR SPONDYLOSIS: ICD-10-CM

## 2021-09-06 DIAGNOSIS — Z79.899 IMMUNOCOMPROMISED STATE DUE TO DRUG THERAPY: ICD-10-CM

## 2021-09-06 DIAGNOSIS — E55.9 VITAMIN D DEFICIENCY, UNSPECIFIED: ICD-10-CM

## 2021-09-06 DIAGNOSIS — M46.46 LUMBAR DISCITIS: ICD-10-CM

## 2021-09-06 DIAGNOSIS — M62.838 NECK MUSCLE SPASM: ICD-10-CM

## 2021-09-06 DIAGNOSIS — M45.9 ANKYLOSING SPONDYLITIS, UNSPECIFIED SITE OF SPINE: ICD-10-CM

## 2021-09-06 DIAGNOSIS — G89.4 CHRONIC PAIN SYNDROME: ICD-10-CM

## 2021-09-08 RX ORDER — OXYCODONE AND ACETAMINOPHEN 10; 325 MG/1; MG/1
1 TABLET ORAL EVERY 8 HOURS PRN
Qty: 90 TABLET | Refills: 0 | Status: SHIPPED | OUTPATIENT
Start: 2021-09-08 | End: 2021-10-08 | Stop reason: SDUPTHER

## 2021-09-08 RX ORDER — BACLOFEN 20 MG/1
10 TABLET ORAL 3 TIMES DAILY
Qty: 120 TABLET | Refills: 3 | Status: SHIPPED | OUTPATIENT
Start: 2021-09-08 | End: 2022-08-28

## 2021-09-13 ENCOUNTER — PATIENT MESSAGE (OUTPATIENT)
Dept: DERMATOLOGY | Facility: CLINIC | Age: 41
End: 2021-09-13

## 2021-09-13 ENCOUNTER — OFFICE VISIT (OUTPATIENT)
Dept: DERMATOLOGY | Facility: CLINIC | Age: 41
End: 2021-09-13
Payer: MEDICARE

## 2021-09-13 VITALS — BODY MASS INDEX: 22.4 KG/M2 | HEIGHT: 73 IN | WEIGHT: 169 LBS

## 2021-09-13 DIAGNOSIS — R21 RASH AND NONSPECIFIC SKIN ERUPTION: Primary | ICD-10-CM

## 2021-09-13 DIAGNOSIS — B35.4 TINEA CORPORIS: ICD-10-CM

## 2021-09-13 DIAGNOSIS — L70.0 ACNE VULGARIS: ICD-10-CM

## 2021-09-13 PROCEDURE — 99204 OFFICE O/P NEW MOD 45 MIN: CPT | Mod: 25,S$PBB,, | Performed by: DERMATOLOGY

## 2021-09-13 PROCEDURE — 99204 PR OFFICE/OUTPT VISIT, NEW, LEVL IV, 45-59 MIN: ICD-10-PCS | Mod: 25,S$PBB,, | Performed by: DERMATOLOGY

## 2021-09-13 PROCEDURE — 87220 TISSUE EXAM FOR FUNGI: CPT | Mod: PBBFAC,PO | Performed by: DERMATOLOGY

## 2021-09-13 PROCEDURE — 99999 PR PBB SHADOW E&M-EST. PATIENT-LVL III: ICD-10-PCS | Mod: PBBFAC,,, | Performed by: DERMATOLOGY

## 2021-09-13 PROCEDURE — 99213 OFFICE O/P EST LOW 20 MIN: CPT | Mod: PBBFAC,PO | Performed by: DERMATOLOGY

## 2021-09-13 PROCEDURE — 99999 PR PBB SHADOW E&M-EST. PATIENT-LVL III: CPT | Mod: PBBFAC,,, | Performed by: DERMATOLOGY

## 2021-09-13 RX ORDER — DOXYCYCLINE 100 MG/1
100 CAPSULE ORAL 2 TIMES DAILY
COMMUNITY
Start: 2021-09-08 | End: 2022-01-31 | Stop reason: SDUPTHER

## 2021-09-13 RX ORDER — KETOCONAZOLE 20 MG/G
CREAM TOPICAL 2 TIMES DAILY
Qty: 30 G | Refills: 1 | Status: SHIPPED | OUTPATIENT
Start: 2021-09-13 | End: 2022-05-13

## 2021-09-13 RX ORDER — TERBINAFINE HYDROCHLORIDE 250 MG/1
250 TABLET ORAL DAILY
Qty: 14 TABLET | Refills: 0 | Status: SHIPPED | OUTPATIENT
Start: 2021-09-13 | End: 2021-09-27

## 2021-09-15 DIAGNOSIS — J44.9 CHRONIC OBSTRUCTIVE PULMONARY DISEASE, UNSPECIFIED COPD TYPE: ICD-10-CM

## 2021-09-17 RX ORDER — BUDESONIDE AND FORMOTEROL FUMARATE DIHYDRATE 160; 4.5 UG/1; UG/1
AEROSOL RESPIRATORY (INHALATION)
Qty: 30.6 G | Refills: 2 | Status: SHIPPED | OUTPATIENT
Start: 2021-09-17 | End: 2022-05-13 | Stop reason: SDUPTHER

## 2021-09-21 ENCOUNTER — OFFICE VISIT (OUTPATIENT)
Dept: FAMILY MEDICINE | Facility: CLINIC | Age: 41
End: 2021-09-21
Payer: MEDICARE

## 2021-09-21 ENCOUNTER — PATIENT MESSAGE (OUTPATIENT)
Dept: FAMILY MEDICINE | Facility: CLINIC | Age: 41
End: 2021-09-21

## 2021-09-21 VITALS
HEIGHT: 73 IN | OXYGEN SATURATION: 97 % | WEIGHT: 177 LBS | BODY MASS INDEX: 23.46 KG/M2 | TEMPERATURE: 98 F | DIASTOLIC BLOOD PRESSURE: 76 MMHG | HEART RATE: 99 BPM | SYSTOLIC BLOOD PRESSURE: 120 MMHG

## 2021-09-21 DIAGNOSIS — N52.9 ERECTILE DYSFUNCTION, UNSPECIFIED ERECTILE DYSFUNCTION TYPE: ICD-10-CM

## 2021-09-21 DIAGNOSIS — F41.9 ANXIETY: ICD-10-CM

## 2021-09-21 DIAGNOSIS — E78.5 HYPERLIPIDEMIA, UNSPECIFIED HYPERLIPIDEMIA TYPE: Primary | ICD-10-CM

## 2021-09-21 PROCEDURE — 99213 PR OFFICE/OUTPT VISIT, EST, LEVL III, 20-29 MIN: ICD-10-PCS | Mod: S$PBB,,, | Performed by: FAMILY MEDICINE

## 2021-09-21 PROCEDURE — 99213 OFFICE O/P EST LOW 20 MIN: CPT | Mod: S$PBB,,, | Performed by: FAMILY MEDICINE

## 2021-09-21 PROCEDURE — 99214 OFFICE O/P EST MOD 30 MIN: CPT | Mod: PBBFAC,PO | Performed by: FAMILY MEDICINE

## 2021-09-21 PROCEDURE — 99999 PR PBB SHADOW E&M-EST. PATIENT-LVL IV: ICD-10-PCS | Mod: PBBFAC,,, | Performed by: FAMILY MEDICINE

## 2021-09-21 PROCEDURE — 99999 PR PBB SHADOW E&M-EST. PATIENT-LVL IV: CPT | Mod: PBBFAC,,, | Performed by: FAMILY MEDICINE

## 2021-09-21 RX ORDER — ALPRAZOLAM 2 MG/1
2 TABLET ORAL 2 TIMES DAILY PRN
Qty: 60 TABLET | Refills: 3 | Status: SHIPPED | OUTPATIENT
Start: 2021-09-21 | End: 2022-01-20

## 2021-09-21 RX ORDER — SILDENAFIL 100 MG/1
100 TABLET, FILM COATED ORAL DAILY PRN
Qty: 10 TABLET | Refills: 2 | Status: SHIPPED | OUTPATIENT
Start: 2021-09-21 | End: 2022-01-20

## 2021-09-22 DIAGNOSIS — M62.838 NECK MUSCLE SPASM: ICD-10-CM

## 2021-09-22 DIAGNOSIS — G89.4 CHRONIC PAIN SYNDROME: ICD-10-CM

## 2021-09-22 DIAGNOSIS — M45.9 ANKYLOSING SPONDYLITIS, UNSPECIFIED SITE OF SPINE: ICD-10-CM

## 2021-09-23 ENCOUNTER — SPECIALTY PHARMACY (OUTPATIENT)
Dept: PHARMACY | Facility: CLINIC | Age: 41
End: 2021-09-23

## 2021-09-23 ENCOUNTER — PATIENT MESSAGE (OUTPATIENT)
Dept: RHEUMATOLOGY | Facility: CLINIC | Age: 41
End: 2021-09-23

## 2021-09-23 RX ORDER — BACLOFEN 20 MG/1
10 TABLET ORAL 3 TIMES DAILY
Qty: 120 TABLET | Refills: 3 | OUTPATIENT
Start: 2021-09-23

## 2021-09-30 RX ORDER — FLUTICASONE PROPIONATE 50 MCG
SPRAY, SUSPENSION (ML) NASAL
Qty: 48 G | Refills: 3 | Status: SHIPPED | OUTPATIENT
Start: 2021-09-30 | End: 2022-05-13

## 2021-10-05 ENCOUNTER — PATIENT OUTREACH (OUTPATIENT)
Dept: ADMINISTRATIVE | Facility: OTHER | Age: 41
End: 2021-10-05

## 2021-10-07 ENCOUNTER — PATIENT MESSAGE (OUTPATIENT)
Dept: RHEUMATOLOGY | Facility: CLINIC | Age: 41
End: 2021-10-07

## 2021-10-07 DIAGNOSIS — M62.838 NECK MUSCLE SPASM: ICD-10-CM

## 2021-10-07 DIAGNOSIS — E55.9 VITAMIN D DEFICIENCY, UNSPECIFIED: ICD-10-CM

## 2021-10-07 DIAGNOSIS — R79.82 ELEVATED C-REACTIVE PROTEIN (CRP): ICD-10-CM

## 2021-10-07 DIAGNOSIS — G89.4 CHRONIC PAIN SYNDROME: ICD-10-CM

## 2021-10-07 DIAGNOSIS — M45.9 ANKYLOSING SPONDYLITIS, UNSPECIFIED SITE OF SPINE: ICD-10-CM

## 2021-10-07 DIAGNOSIS — D84.821 IMMUNOCOMPROMISED STATE DUE TO DRUG THERAPY: ICD-10-CM

## 2021-10-07 DIAGNOSIS — Z79.899 IMMUNOCOMPROMISED STATE DUE TO DRUG THERAPY: ICD-10-CM

## 2021-10-07 DIAGNOSIS — M46.46 LUMBAR DISCITIS: ICD-10-CM

## 2021-10-07 DIAGNOSIS — M47.817 LUMBAR AND SACRAL ARTHRITIS: ICD-10-CM

## 2021-10-07 DIAGNOSIS — L08.9 LOCAL SKIN INFECTION: ICD-10-CM

## 2021-10-07 DIAGNOSIS — M47.816 LUMBAR SPONDYLOSIS: ICD-10-CM

## 2021-10-07 RX ORDER — OXYCODONE AND ACETAMINOPHEN 10; 325 MG/1; MG/1
1 TABLET ORAL EVERY 8 HOURS PRN
Qty: 90 TABLET | Refills: 0 | Status: CANCELLED | OUTPATIENT
Start: 2021-10-07 | End: 2021-11-06

## 2021-10-08 ENCOUNTER — PATIENT MESSAGE (OUTPATIENT)
Dept: RHEUMATOLOGY | Facility: CLINIC | Age: 41
End: 2021-10-08

## 2021-10-08 ENCOUNTER — LAB VISIT (OUTPATIENT)
Dept: LAB | Facility: HOSPITAL | Age: 41
End: 2021-10-08
Attending: INTERNAL MEDICINE
Payer: MEDICARE

## 2021-10-08 ENCOUNTER — OFFICE VISIT (OUTPATIENT)
Dept: CARDIOLOGY | Facility: CLINIC | Age: 41
End: 2021-10-08
Payer: MEDICARE

## 2021-10-08 VITALS
DIASTOLIC BLOOD PRESSURE: 85 MMHG | HEIGHT: 73 IN | BODY MASS INDEX: 23.7 KG/M2 | HEART RATE: 79 BPM | WEIGHT: 178.81 LBS | SYSTOLIC BLOOD PRESSURE: 118 MMHG

## 2021-10-08 DIAGNOSIS — M47.816 LUMBAR SPONDYLOSIS: ICD-10-CM

## 2021-10-08 DIAGNOSIS — E78.5 HYPERLIPIDEMIA, UNSPECIFIED HYPERLIPIDEMIA TYPE: ICD-10-CM

## 2021-10-08 DIAGNOSIS — Z79.899 IMMUNOCOMPROMISED STATE DUE TO DRUG THERAPY: ICD-10-CM

## 2021-10-08 DIAGNOSIS — M46.46 LUMBAR DISCITIS: ICD-10-CM

## 2021-10-08 DIAGNOSIS — I42.6 CARDIOMYOPATHY, ALCOHOLIC: Primary | ICD-10-CM

## 2021-10-08 DIAGNOSIS — R79.82 ELEVATED C-REACTIVE PROTEIN (CRP): ICD-10-CM

## 2021-10-08 DIAGNOSIS — J44.9 CHRONIC OBSTRUCTIVE PULMONARY DISEASE, UNSPECIFIED COPD TYPE: ICD-10-CM

## 2021-10-08 DIAGNOSIS — M45.9 ANKYLOSING SPONDYLITIS, UNSPECIFIED SITE OF SPINE: ICD-10-CM

## 2021-10-08 DIAGNOSIS — G89.4 CHRONIC PAIN SYNDROME: ICD-10-CM

## 2021-10-08 DIAGNOSIS — F17.200 TOBACCO DEPENDENCE: ICD-10-CM

## 2021-10-08 DIAGNOSIS — M47.817 LUMBAR AND SACRAL ARTHRITIS: ICD-10-CM

## 2021-10-08 DIAGNOSIS — L08.9 LOCAL SKIN INFECTION: ICD-10-CM

## 2021-10-08 DIAGNOSIS — E55.9 VITAMIN D DEFICIENCY, UNSPECIFIED: ICD-10-CM

## 2021-10-08 DIAGNOSIS — I42.9 CARDIOMYOPATHY, UNSPECIFIED TYPE: ICD-10-CM

## 2021-10-08 DIAGNOSIS — D84.821 IMMUNOCOMPROMISED STATE DUE TO DRUG THERAPY: ICD-10-CM

## 2021-10-08 DIAGNOSIS — M62.838 NECK MUSCLE SPASM: ICD-10-CM

## 2021-10-08 LAB
25(OH)D3+25(OH)D2 SERPL-MCNC: 67 NG/ML (ref 30–96)
ALBUMIN SERPL BCP-MCNC: 3.9 G/DL (ref 3.5–5.2)
ALP SERPL-CCNC: 112 U/L (ref 55–135)
ALT SERPL W/O P-5'-P-CCNC: 12 U/L (ref 10–44)
ANION GAP SERPL CALC-SCNC: 12 MMOL/L (ref 8–16)
AST SERPL-CCNC: 16 U/L (ref 10–40)
BASOPHILS # BLD AUTO: 0.07 K/UL (ref 0–0.2)
BASOPHILS NFR BLD: 0.6 % (ref 0–1.9)
BILIRUB SERPL-MCNC: 0.5 MG/DL (ref 0.1–1)
BUN SERPL-MCNC: 8 MG/DL (ref 6–20)
CALCIUM SERPL-MCNC: 9.8 MG/DL (ref 8.7–10.5)
CHLORIDE SERPL-SCNC: 104 MMOL/L (ref 95–110)
CO2 SERPL-SCNC: 23 MMOL/L (ref 23–29)
CREAT SERPL-MCNC: 0.8 MG/DL (ref 0.5–1.4)
CRP SERPL-MCNC: 7.3 MG/L (ref 0–8.2)
DIFFERENTIAL METHOD: ABNORMAL
EOSINOPHIL # BLD AUTO: 0.2 K/UL (ref 0–0.5)
EOSINOPHIL NFR BLD: 1.2 % (ref 0–8)
ERYTHROCYTE [DISTWIDTH] IN BLOOD BY AUTOMATED COUNT: 12.8 % (ref 11.5–14.5)
ERYTHROCYTE [SEDIMENTATION RATE] IN BLOOD BY WESTERGREN METHOD: 13 MM/HR (ref 0–10)
EST. GFR  (AFRICAN AMERICAN): >60 ML/MIN/1.73 M^2
EST. GFR  (NON AFRICAN AMERICAN): >60 ML/MIN/1.73 M^2
ESTIMATED AVG GLUCOSE: 97 MG/DL (ref 68–131)
FOLATE SERPL-MCNC: 14.4 NG/ML (ref 4–24)
GLUCOSE SERPL-MCNC: 107 MG/DL (ref 70–110)
HBA1C MFR BLD: 5 % (ref 4–5.6)
HCT VFR BLD AUTO: 41.6 % (ref 40–54)
HGB BLD-MCNC: 14.3 G/DL (ref 14–18)
IMM GRANULOCYTES # BLD AUTO: 0.05 K/UL (ref 0–0.04)
IMM GRANULOCYTES NFR BLD AUTO: 0.4 % (ref 0–0.5)
LYMPHOCYTES # BLD AUTO: 3.7 K/UL (ref 1–4.8)
LYMPHOCYTES NFR BLD: 30.7 % (ref 18–48)
MCH RBC QN AUTO: 30.2 PG (ref 27–31)
MCHC RBC AUTO-ENTMCNC: 34.4 G/DL (ref 32–36)
MCV RBC AUTO: 88 FL (ref 82–98)
MONOCYTES # BLD AUTO: 0.5 K/UL (ref 0.3–1)
MONOCYTES NFR BLD: 3.7 % (ref 4–15)
NEUTROPHILS # BLD AUTO: 7.7 K/UL (ref 1.8–7.7)
NEUTROPHILS NFR BLD: 63.4 % (ref 38–73)
NRBC BLD-RTO: 0 /100 WBC
PLATELET # BLD AUTO: 338 K/UL (ref 150–450)
PMV BLD AUTO: 9.6 FL (ref 9.2–12.9)
POTASSIUM SERPL-SCNC: 3.8 MMOL/L (ref 3.5–5.1)
PROT SERPL-MCNC: 7.7 G/DL (ref 6–8.4)
RBC # BLD AUTO: 4.73 M/UL (ref 4.6–6.2)
SODIUM SERPL-SCNC: 139 MMOL/L (ref 136–145)
T3FREE SERPL-MCNC: 3.1 PG/ML (ref 2.3–4.2)
T4 FREE SERPL-MCNC: 0.98 NG/DL (ref 0.71–1.51)
TESTOST SERPL-MCNC: 797 NG/DL (ref 304–1227)
TSH SERPL DL<=0.005 MIU/L-ACNC: 0.94 UIU/ML (ref 0.4–4)
VIT B12 SERPL-MCNC: 964 PG/ML (ref 210–950)
WBC # BLD AUTO: 12.08 K/UL (ref 3.9–12.7)

## 2021-10-08 PROCEDURE — 83036 HEMOGLOBIN GLYCOSYLATED A1C: CPT | Performed by: INTERNAL MEDICINE

## 2021-10-08 PROCEDURE — 99999 PR PBB SHADOW E&M-EST. PATIENT-LVL III: ICD-10-PCS | Mod: PBBFAC,,, | Performed by: PHYSICIAN ASSISTANT

## 2021-10-08 PROCEDURE — 80053 COMPREHEN METABOLIC PANEL: CPT | Performed by: INTERNAL MEDICINE

## 2021-10-08 PROCEDURE — 85025 COMPLETE CBC W/AUTO DIFF WBC: CPT | Performed by: INTERNAL MEDICINE

## 2021-10-08 PROCEDURE — 99214 OFFICE O/P EST MOD 30 MIN: CPT | Mod: S$PBB,,, | Performed by: PHYSICIAN ASSISTANT

## 2021-10-08 PROCEDURE — 99999 PR PBB SHADOW E&M-EST. PATIENT-LVL III: CPT | Mod: PBBFAC,,, | Performed by: PHYSICIAN ASSISTANT

## 2021-10-08 PROCEDURE — 99214 PR OFFICE/OUTPT VISIT, EST, LEVL IV, 30-39 MIN: ICD-10-PCS | Mod: S$PBB,,, | Performed by: PHYSICIAN ASSISTANT

## 2021-10-08 PROCEDURE — 82306 VITAMIN D 25 HYDROXY: CPT | Performed by: INTERNAL MEDICINE

## 2021-10-08 PROCEDURE — 82746 ASSAY OF FOLIC ACID SERUM: CPT | Performed by: INTERNAL MEDICINE

## 2021-10-08 PROCEDURE — 84630 ASSAY OF ZINC: CPT | Performed by: INTERNAL MEDICINE

## 2021-10-08 PROCEDURE — 84439 ASSAY OF FREE THYROXINE: CPT | Performed by: INTERNAL MEDICINE

## 2021-10-08 PROCEDURE — 84443 ASSAY THYROID STIM HORMONE: CPT | Performed by: INTERNAL MEDICINE

## 2021-10-08 PROCEDURE — 99213 OFFICE O/P EST LOW 20 MIN: CPT | Mod: PBBFAC,PO | Performed by: PHYSICIAN ASSISTANT

## 2021-10-08 PROCEDURE — 36415 COLL VENOUS BLD VENIPUNCTURE: CPT | Mod: PO | Performed by: INTERNAL MEDICINE

## 2021-10-08 PROCEDURE — 84403 ASSAY OF TOTAL TESTOSTERONE: CPT | Performed by: INTERNAL MEDICINE

## 2021-10-08 PROCEDURE — 84481 FREE ASSAY (FT-3): CPT | Performed by: INTERNAL MEDICINE

## 2021-10-08 PROCEDURE — 82607 VITAMIN B-12: CPT | Performed by: INTERNAL MEDICINE

## 2021-10-08 PROCEDURE — 85651 RBC SED RATE NONAUTOMATED: CPT | Mod: PO | Performed by: INTERNAL MEDICINE

## 2021-10-08 PROCEDURE — 86140 C-REACTIVE PROTEIN: CPT | Performed by: INTERNAL MEDICINE

## 2021-10-08 RX ORDER — CARVEDILOL 3.12 MG/1
TABLET ORAL
Qty: 180 TABLET | Refills: 3 | Status: SHIPPED | OUTPATIENT
Start: 2021-10-08 | End: 2021-11-29

## 2021-10-11 LAB — ZINC SERPL-MCNC: 65 UG/DL (ref 60–130)

## 2021-10-13 ENCOUNTER — OFFICE VISIT (OUTPATIENT)
Dept: RHEUMATOLOGY | Facility: CLINIC | Age: 41
End: 2021-10-13
Payer: MEDICARE

## 2021-10-13 VITALS
DIASTOLIC BLOOD PRESSURE: 90 MMHG | WEIGHT: 176 LBS | SYSTOLIC BLOOD PRESSURE: 135 MMHG | HEIGHT: 73 IN | HEART RATE: 85 BPM | BODY MASS INDEX: 23.33 KG/M2

## 2021-10-13 DIAGNOSIS — D84.821 IMMUNOCOMPROMISED STATE DUE TO DRUG THERAPY: ICD-10-CM

## 2021-10-13 DIAGNOSIS — M62.838 NECK MUSCLE SPASM: ICD-10-CM

## 2021-10-13 DIAGNOSIS — L08.9 LOCAL SKIN INFECTION: ICD-10-CM

## 2021-10-13 DIAGNOSIS — L70.0 ACNE VULGARIS: Primary | ICD-10-CM

## 2021-10-13 DIAGNOSIS — Z79.899 IMMUNOCOMPROMISED STATE DUE TO DRUG THERAPY: ICD-10-CM

## 2021-10-13 DIAGNOSIS — M47.817 LUMBAR AND SACRAL ARTHRITIS: ICD-10-CM

## 2021-10-13 DIAGNOSIS — M46.46 LUMBAR DISCITIS: ICD-10-CM

## 2021-10-13 DIAGNOSIS — G89.4 CHRONIC PAIN SYNDROME: ICD-10-CM

## 2021-10-13 DIAGNOSIS — R79.82 ELEVATED C-REACTIVE PROTEIN (CRP): ICD-10-CM

## 2021-10-13 DIAGNOSIS — M45.9 ANKYLOSING SPONDYLITIS, UNSPECIFIED SITE OF SPINE: ICD-10-CM

## 2021-10-13 DIAGNOSIS — E55.9 VITAMIN D DEFICIENCY, UNSPECIFIED: ICD-10-CM

## 2021-10-13 DIAGNOSIS — M47.816 LUMBAR SPONDYLOSIS: ICD-10-CM

## 2021-10-13 PROCEDURE — 99215 OFFICE O/P EST HI 40 MIN: CPT | Mod: S$PBB,,, | Performed by: INTERNAL MEDICINE

## 2021-10-13 PROCEDURE — 99999 PR PBB SHADOW E&M-EST. PATIENT-LVL III: CPT | Mod: PBBFAC,,, | Performed by: INTERNAL MEDICINE

## 2021-10-13 PROCEDURE — 99215 PR OFFICE/OUTPT VISIT, EST, LEVL V, 40-54 MIN: ICD-10-PCS | Mod: S$PBB,,, | Performed by: INTERNAL MEDICINE

## 2021-10-13 PROCEDURE — 99999 PR PBB SHADOW E&M-EST. PATIENT-LVL III: ICD-10-PCS | Mod: PBBFAC,,, | Performed by: INTERNAL MEDICINE

## 2021-10-13 PROCEDURE — 99213 OFFICE O/P EST LOW 20 MIN: CPT | Mod: PBBFAC,PN | Performed by: INTERNAL MEDICINE

## 2021-10-13 RX ORDER — OXYCODONE AND ACETAMINOPHEN 10; 325 MG/1; MG/1
1 TABLET ORAL EVERY 8 HOURS PRN
Qty: 90 TABLET | Refills: 0 | Status: SHIPPED | OUTPATIENT
Start: 2021-12-04 | End: 2021-10-13 | Stop reason: SDUPTHER

## 2021-10-13 RX ORDER — SILDENAFIL CITRATE 20 MG/1
20 TABLET ORAL 2 TIMES DAILY
Qty: 60 TABLET | Refills: 11 | Status: SHIPPED | OUTPATIENT
Start: 2021-10-13 | End: 2023-01-27 | Stop reason: SDUPTHER

## 2021-10-13 RX ORDER — OXYCODONE AND ACETAMINOPHEN 10; 325 MG/1; MG/1
1 TABLET ORAL EVERY 8 HOURS PRN
Qty: 90 TABLET | Refills: 0 | Status: SHIPPED | OUTPATIENT
Start: 2021-11-04 | End: 2021-10-13 | Stop reason: SDUPTHER

## 2021-10-13 RX ORDER — DAPSONE 25 MG/1
25 TABLET ORAL DAILY
Qty: 10 TABLET | Refills: 3 | Status: SHIPPED | OUTPATIENT
Start: 2021-10-13 | End: 2021-12-28 | Stop reason: SDUPTHER

## 2021-10-13 RX ORDER — OXYCODONE AND ACETAMINOPHEN 10; 325 MG/1; MG/1
1 TABLET ORAL EVERY 8 HOURS PRN
Qty: 90 TABLET | Refills: 0 | Status: SHIPPED | OUTPATIENT
Start: 2022-01-04 | End: 2022-01-31 | Stop reason: SDUPTHER

## 2021-10-21 DIAGNOSIS — M45.9 ANKYLOSING SPONDYLITIS, UNSPECIFIED SITE OF SPINE: ICD-10-CM

## 2021-10-21 RX ORDER — GOLIMUMAB 50 MG/.5ML
50 INJECTION, SOLUTION SUBCUTANEOUS
Qty: 0.5 ML | Refills: 6 | Status: SHIPPED | OUTPATIENT
Start: 2021-10-21 | End: 2022-06-23 | Stop reason: SDUPTHER

## 2021-10-22 ENCOUNTER — SPECIALTY PHARMACY (OUTPATIENT)
Dept: PHARMACY | Facility: CLINIC | Age: 41
End: 2021-10-22
Payer: MEDICARE

## 2021-11-22 ENCOUNTER — SPECIALTY PHARMACY (OUTPATIENT)
Dept: PHARMACY | Facility: CLINIC | Age: 41
End: 2021-11-22
Payer: MEDICARE

## 2021-11-22 DIAGNOSIS — M45.9 ANKYLOSING SPONDYLITIS, UNSPECIFIED SITE OF SPINE: Primary | ICD-10-CM

## 2021-12-21 ENCOUNTER — PATIENT MESSAGE (OUTPATIENT)
Dept: PHARMACY | Facility: CLINIC | Age: 41
End: 2021-12-21
Payer: MEDICARE

## 2021-12-23 ENCOUNTER — SPECIALTY PHARMACY (OUTPATIENT)
Dept: PHARMACY | Facility: CLINIC | Age: 41
End: 2021-12-23
Payer: MEDICARE

## 2021-12-23 ENCOUNTER — PATIENT MESSAGE (OUTPATIENT)
Dept: PHARMACY | Facility: CLINIC | Age: 41
End: 2021-12-23
Payer: MEDICARE

## 2021-12-28 DIAGNOSIS — M47.817 LUMBAR AND SACRAL ARTHRITIS: ICD-10-CM

## 2021-12-28 DIAGNOSIS — Z79.899 IMMUNOCOMPROMISED STATE DUE TO DRUG THERAPY: ICD-10-CM

## 2021-12-28 DIAGNOSIS — L70.0 ACNE VULGARIS: ICD-10-CM

## 2021-12-28 DIAGNOSIS — M45.9 ANKYLOSING SPONDYLITIS, UNSPECIFIED SITE OF SPINE: ICD-10-CM

## 2021-12-28 DIAGNOSIS — D84.821 IMMUNOCOMPROMISED STATE DUE TO DRUG THERAPY: ICD-10-CM

## 2021-12-28 DIAGNOSIS — M46.46 LUMBAR DISCITIS: ICD-10-CM

## 2022-01-02 RX ORDER — DAPSONE 25 MG/1
25 TABLET ORAL DAILY
Qty: 10 TABLET | Refills: 3 | Status: SHIPPED | OUTPATIENT
Start: 2022-01-02 | End: 2022-03-04 | Stop reason: SDUPTHER

## 2022-01-20 ENCOUNTER — OFFICE VISIT (OUTPATIENT)
Dept: FAMILY MEDICINE | Facility: CLINIC | Age: 42
End: 2022-01-20
Payer: MEDICARE

## 2022-01-20 VITALS
OXYGEN SATURATION: 98 % | BODY MASS INDEX: 23.96 KG/M2 | HEART RATE: 100 BPM | DIASTOLIC BLOOD PRESSURE: 70 MMHG | WEIGHT: 180.75 LBS | HEIGHT: 73 IN | SYSTOLIC BLOOD PRESSURE: 112 MMHG

## 2022-01-20 DIAGNOSIS — J44.9 CHRONIC OBSTRUCTIVE PULMONARY DISEASE, UNSPECIFIED COPD TYPE: ICD-10-CM

## 2022-01-20 DIAGNOSIS — F41.9 ANXIETY: Primary | ICD-10-CM

## 2022-01-20 PROCEDURE — 99214 OFFICE O/P EST MOD 30 MIN: CPT | Mod: PBBFAC,PO | Performed by: FAMILY MEDICINE

## 2022-01-20 PROCEDURE — 99213 OFFICE O/P EST LOW 20 MIN: CPT | Mod: S$PBB,,, | Performed by: FAMILY MEDICINE

## 2022-01-20 PROCEDURE — 99999 PR PBB SHADOW E&M-EST. PATIENT-LVL IV: CPT | Mod: PBBFAC,,, | Performed by: FAMILY MEDICINE

## 2022-01-20 PROCEDURE — 99213 PR OFFICE/OUTPT VISIT, EST, LEVL III, 20-29 MIN: ICD-10-PCS | Mod: S$PBB,,, | Performed by: FAMILY MEDICINE

## 2022-01-20 PROCEDURE — 99999 PR PBB SHADOW E&M-EST. PATIENT-LVL IV: ICD-10-PCS | Mod: PBBFAC,,, | Performed by: FAMILY MEDICINE

## 2022-01-20 RX ORDER — ALPRAZOLAM 2 MG/1
2 TABLET ORAL 2 TIMES DAILY PRN
Qty: 60 TABLET | Refills: 5 | Status: SHIPPED | OUTPATIENT
Start: 2022-01-20 | End: 2022-07-25 | Stop reason: SDUPTHER

## 2022-01-20 NOTE — PROGRESS NOTES
Subjective:       Patient ID: Umang Greer is a 41 y.o. male.    Chief Complaint: Medication Refill (xanax) and Back Pain    Here for f/u anxiety and chronic issues. Doing well overall. Had nasal septoplasty last month at [facility].  Mood good.    Review of Systems   Constitutional: Negative for activity change, chills, fever and unexpected weight change.   HENT: Negative for hearing loss, rhinorrhea and trouble swallowing.    Eyes: Negative for discharge and visual disturbance.   Respiratory: Negative for cough, chest tightness, shortness of breath and wheezing.    Cardiovascular: Negative for chest pain, palpitations and leg swelling.   Gastrointestinal: Negative for blood in stool, constipation, diarrhea and vomiting.   Endocrine: Negative for cold intolerance, heat intolerance, polydipsia and polyuria.   Genitourinary: Negative for difficulty urinating, hematuria and urgency.   Musculoskeletal: Positive for arthralgias. Negative for joint swelling and neck pain.   Neurological: Negative for weakness and headaches.   Psychiatric/Behavioral: Negative for confusion, decreased concentration and dysphoric mood. The patient is not nervous/anxious.        Objective:      Physical Exam  Vitals and nursing note reviewed.   Constitutional:       Appearance: He is well-developed and well-nourished.   HENT:      Head: Normocephalic and atraumatic.   Cardiovascular:      Rate and Rhythm: Normal rate and regular rhythm.      Heart sounds: Normal heart sounds.   Pulmonary:      Effort: Pulmonary effort is normal.      Breath sounds: Normal breath sounds.   Psychiatric:         Mood and Affect: Mood and affect normal.         Assessment:       1. Anxiety    2. Chronic obstructive pulmonary disease, unspecified COPD type        Plan:       Anxiety    Chronic obstructive pulmonary disease, unspecified COPD type    Other orders  -     ALPRAZolam (XANAX) 2 MG Tab; Take 1 tablet (2 mg total) by mouth 2 (two) times daily as needed  (anxiety).  Dispense: 60 tablet; Refill: 5    refill prn med; ok in  ; goal to wean as tolerated  Copd stable      Follow up in about 3 months (around 4/20/2022), or if symptoms worsen or fail to improve.

## 2022-01-26 ENCOUNTER — SPECIALTY PHARMACY (OUTPATIENT)
Dept: PHARMACY | Facility: CLINIC | Age: 42
End: 2022-01-26
Payer: MEDICARE

## 2022-01-26 NOTE — TELEPHONE ENCOUNTER
Specialty Pharmacy - Refill Coordination    Specialty Medication Orders Linked to Encounter    Flowsheet Row Most Recent Value   Medication #1 golimumab (SIMPONI) 50 mg/0.5 mL PnIj (Order#[id number], Rx#[id number])          Refill Questions - Documented Responses    Flowsheet Row Most Recent Value   Patient Availability and HIPAA Verification    Does patient want to proceed with activity? Yes   HIPAA/medical authority confirmed? Yes   Relationship to patient of person spoken to? Self   Refill Screening Questions    Changes to allergies? No   Changes to medications? No   New conditions since last clinic visit? No   Unplanned office visit, urgent care, ED, or hospital admission in the last 4 weeks? No   How does patient/caregiver feel medication is working? Good   Financial problems or insurance changes? No   How many doses of your specialty medications were missed in the last 4 weeks? 0   Would patient like to speak to a pharmacist? No   When does the patient need to receive the medication? 01/31/22   Refill Delivery Questions    How will the patient receive the medication? Delivery Allegra   When does the patient need to receive the medication? 01/31/22   Shipping Address Home   Address in [facility] confirmed and updated if neccessary? Yes   Expected Copay ($) 9.85   Is the patient able to afford the medication copay? Yes   Payment Method CC on file   Days supply of Refill 30   Supplies needed? No supplies needed   Refill activity completed? Yes   Refill activity plan Refill scheduled   Shipment/Pickup Date: 01/28/22          Current Outpatient Medications   Medication Sig    ALPRAZolam (XANAX) 2 MG Tab Take 1 tablet (2 mg total) by mouth 2 (two) times daily as needed (anxiety).    baclofen (LIORESAL) 20 MG tablet Take 0.5 tablets (10 mg total) by mouth 3 (three) times daily.    budesonide-formoterol 160-4.5 mcg (SYMBICORT) 160-4.5 mcg/actuation HFAA INHALE 2 PUFFS INTO THE LUNGS EVERY 12 HOURS     carvediloL (COREG) 3.125 MG tablet TAKE 1 TABLET(3.125 MG) BY MOUTH TWICE DAILY    cetirizine (ZYRTEC) 10 MG tablet Take 10 mg by mouth once daily.    clindamycin (CLEOCIN T) 1 % external solution     dapsone 25 MG Tab Take 1 tablet (25 mg total) by mouth once daily. for 10 days    doxycycline (VIBRAMYCIN) 100 MG Cap Take 100 mg by mouth 2 (two) times daily.    DULoxetine (CYMBALTA) 30 MG capsule Take 1 capsule (30 mg total) by mouth once daily.    epinephrine (EPIPEN JR) 0.15 mg/0.3 mL pen injection Inject 0.3 mg into the muscle as needed for Anaphylaxis.    fluconazole (DIFLUCAN) 150 MG Tab     fluticasone propionate (FLONASE) 50 mcg/actuation nasal spray SHAKE LIQUID AND USE 1 SPRAY(50 MCG) IN EACH NOSTRIL EVERY DAY (Patient not taking: Reported on 1/20/2022)    golimumab (SIMPONI) 50 mg/0.5 mL PnIj Inject 50 mg into the skin every 30 days.    ketoconazole (NIZORAL) 2 % cream Apply topically 2 (two) times daily. (Patient not taking: Reported on 1/20/2022)    mupirocin (BACTROBAN) 2 % ointment APPLY EXTERNALLY TO THE AFFECTED AREA THREE TIMES DAILY    NARCAN 4 mg/actuation Spry     oxyCODONE-acetaminophen (PERCOCET)  mg per tablet Take 1 tablet by mouth every 8 (eight) hours as needed for Pain.    predniSONE (DELTASONE) 2.5 MG tablet TAKE 2 TABLETS(5 MG) BY MOUTH DAILY AS NEEDED FOR PAIN    sildenafil (REVATIO) 20 mg Tab Take 1 tablet (20 mg total) by mouth 2 (two) times daily.   Last reviewed on 1/20/2022  3:24 PM by Nidia Nascimento LPN    Review of patient's allergies indicates:   Allergen Reactions    Asa [aspirin] Anaphylaxis    Azulfidine [sulfasalazine] Anaphylaxis    Ibuprofen Anaphylaxis    Gabapentin      Lip swelling, tongue swelling    Last reviewed on  1/20/2022 3:21 PM by Nidia Nascimento      Tasks added this encounter   No tasks added.   Tasks due within next 3 months   1/21/2022 - Refill Call (Auto Added)     Arsenio Adorno [facility]  [address] [place] [address]  [address]  [facility]  [facility] [address]  Phone: [phone]  Fax: [phone]

## 2022-01-31 ENCOUNTER — PATIENT MESSAGE (OUTPATIENT)
Dept: RHEUMATOLOGY | Facility: CLINIC | Age: 42
End: 2022-01-31

## 2022-01-31 ENCOUNTER — OFFICE VISIT (OUTPATIENT)
Dept: RHEUMATOLOGY | Facility: CLINIC | Age: 42
End: 2022-01-31
Payer: MEDICARE

## 2022-01-31 DIAGNOSIS — M47.817 LUMBAR AND SACRAL ARTHRITIS: ICD-10-CM

## 2022-01-31 DIAGNOSIS — M45.9 ANKYLOSING SPONDYLITIS, UNSPECIFIED SITE OF SPINE: Primary | ICD-10-CM

## 2022-01-31 DIAGNOSIS — E55.9 VITAMIN D DEFICIENCY, UNSPECIFIED: ICD-10-CM

## 2022-01-31 DIAGNOSIS — Z79.899 IMMUNOCOMPROMISED STATE DUE TO DRUG THERAPY: ICD-10-CM

## 2022-01-31 DIAGNOSIS — M46.46 LUMBAR DISCITIS: ICD-10-CM

## 2022-01-31 DIAGNOSIS — R79.82 ELEVATED C-REACTIVE PROTEIN (CRP): ICD-10-CM

## 2022-01-31 DIAGNOSIS — M62.838 NECK MUSCLE SPASM: ICD-10-CM

## 2022-01-31 DIAGNOSIS — L70.0 ACNE VULGARIS: ICD-10-CM

## 2022-01-31 DIAGNOSIS — L08.9 LOCAL SKIN INFECTION: ICD-10-CM

## 2022-01-31 DIAGNOSIS — M47.816 LUMBAR SPONDYLOSIS: ICD-10-CM

## 2022-01-31 DIAGNOSIS — G89.4 CHRONIC PAIN SYNDROME: ICD-10-CM

## 2022-01-31 DIAGNOSIS — D84.821 IMMUNOCOMPROMISED STATE DUE TO DRUG THERAPY: ICD-10-CM

## 2022-01-31 PROCEDURE — 99215 OFFICE O/P EST HI 40 MIN: CPT | Mod: 95,,, | Performed by: INTERNAL MEDICINE

## 2022-01-31 PROCEDURE — 99215 PR OFFICE/OUTPT VISIT, EST, LEVL V, 40-54 MIN: ICD-10-PCS | Mod: 95,,, | Performed by: INTERNAL MEDICINE

## 2022-01-31 RX ORDER — OXYCODONE AND ACETAMINOPHEN 10; 325 MG/1; MG/1
1 TABLET ORAL EVERY 8 HOURS PRN
Qty: 90 TABLET | Refills: 0 | Status: SHIPPED | OUTPATIENT
Start: 2022-01-31 | End: 2022-02-25 | Stop reason: SDUPTHER

## 2022-01-31 RX ORDER — DOXYCYCLINE 100 MG/1
100 CAPSULE ORAL 2 TIMES DAILY
Qty: 60 CAPSULE | Refills: 3 | Status: SHIPPED | OUTPATIENT
Start: 2022-01-31 | End: 2022-03-02

## 2022-01-31 RX ORDER — PREDNISONE 2.5 MG/1
TABLET ORAL
Qty: 60 TABLET | Refills: 5 | Status: SHIPPED | OUTPATIENT
Start: 2022-01-31 | End: 2022-03-08

## 2022-01-31 NOTE — PROGRESS NOTES
Subjective:       Patient ID: Umang Greer is a 41 y.o. male.    Chief Complaint: No chief complaint on file.    Follow up: 40 year old male who presents to clinic for follow up on Ankylosing spondylitis, He has had multiple skin eruptions on his back he started docycline and dapsone. He completed course of doxycycline f He is using hibiclens prn and panoxyl soap slightly improved. He complain  Dental issues he will premed with doxy. He reports good improvement overall with his arthritis on simponi monthly.  He is using prednisone 2.5-5 mg daily PRN.     Current tx:  1. Simponi  2. Prednisone prn  3. norco 10/325 q6            He complains of joint swelling. Associated symptoms include fatigue and myalgias. Pertinent negatives include no fever, trouble swallowing or headaches.         Review of Systems   Constitutional: Positive for activity change and fatigue. Negative for chills, fever and unexpected weight change.   HENT: Negative for mouth sores and trouble swallowing.    Eyes: Positive for redness. Negative for visual disturbance.   Respiratory: Negative for cough, shortness of breath and wheezing.    Cardiovascular: Negative for chest pain, palpitations and leg swelling.   Gastrointestinal: Negative for abdominal pain, constipation, diarrhea, nausea and vomiting.   Genitourinary: Negative for genital sores.   Musculoskeletal: Positive for arthralgias, back pain, joint swelling, myalgias and neck stiffness.   Skin: Positive for rash.   Allergic/Immunologic: Positive for immunocompromised state.   Neurological: Negative for dizziness, syncope and headaches.   Hematological: Negative for adenopathy. Bruises/bleeds easily.         Objective:     There were no vitals filed for this visit.    Past Medical History:   Diagnosis Date    Anxiety     Asthma     Cardiomyopathy     CHF (congestive heart failure)     Emphysema of lung     Sickle cell anemia     only trait    Sickle cell trait      Past Surgical  History:   Procedure Laterality Date    HERNIA REPAIR      NASAL SEPTOPLASTY  12/20/2021    Dr. Menjivar at [facility]          Physical Exam   Constitutional: He is oriented to person, place, and time and well-developed, well-nourished, and in no distress.   Neurological: He is alert and oriented to person, place, and time.   Psychiatric: Mood, affect and judgment normal.       Results for orders placed or performed during the hospital encounter of 10/26/21   POCT CBC   Result Value Ref Range    POC WBC 15.7 (H) 3.9 - 12.7 K/uL    POC GRA% 52.1 38.0 - 73.0 %    POC MID% 7.4 4.0 - 15.0 %    POC LYM% 40.5 18.0 - 48.0 %    POC Gran# 8.1 (H) 1.8 - 7.7 K/uL    POC MID# 1.3 (H) 0.3 - 1.0 K/uL    POC LYM# 6.3 (H) 1.0 - 4.8 K/uL    POC RBC 4.91 4.60 - 6.20 M/uL    POC HGB 14.5 14.0 - 18.0 g/dL    POC MCV 86.2 82.0 - 98.0 fl    POC HCT 42.4 40.0 - 54.0 %    POC MCH 29.5 27.0 - 31.0 pg    POC MCHC 34.3 32.0 - 36.0 g/dL    POC RDW% 11.7 11.5 - 14.5 %    POC  150 - 450 K/uL    POC MPV 7.3 (L) 9.2 - 12.9 fl   POCT CMP   Result Value Ref Range    POC Sodium 143 128 - 145 mmol/L    POC Potassium 4.1 3.6 - 5.1 mmol/L    POC Chloride 99 98 - 108 mmol/L    POC CO2 33 18 - 33 mmol/L    POC Glucose 91 73 - 118 mg/dL    POC BUN 14 7 - 22 mg/dL    POC Creatinine 0.9 0.6 - 1.2 mg/dL    Calcium, POC 9.7 8.0 - 10.3 mg/dL    Albumin, POC 3.5 3.3 - 5.5 g/dL    POC ALT 27 10 - 47 U/L    POC AST 23 11 - 38 U/L    Alkaline Phosphatase, POC 91 53 - 128 U/L    POC TOTAL PROTEIN 7.2 6.4 - 8.1 g/dL    POC TOTAL BILIRUBIN 0.6 0.2 - 1.6 mg/dL    POC eGFR >60 61 - 2,000 mL/min    POC Hemolysis 0     Specimen Type CELESTINO      reviewed labs with patient during this visit       Assessment:       1. Ankylosing spondylitis, unspecified site of spine    2. Acne vulgaris    3. Lumbar and sacral arthritis    4. Lumbar discitis    5. Immunocompromised state due to drug therapy    6. Neck muscle spasm    7. Chronic pain syndrome    8. Lumbar spondylosis    9.  Elevated C-reactive protein (CRP)    10. Vitamin D deficiency, unspecified     11. Local skin infection            Plan:       Ankylosing spondylitis, unspecified site of spine  -     oxyCODONE-acetaminophen (PERCOCET)  mg per tablet; Take 1 tablet by mouth every 8 (eight) hours as needed for Pain.  Dispense: 90 tablet; Refill: 0  -     predniSONE (DELTASONE) 2.5 MG tablet; TAKE 2 TABLETS(5 MG) BY MOUTH DAILY AS NEEDED FOR PAIN  Dispense: 60 tablet; Refill: 5  -     CBC Auto Differential; Future; Expected date: 02/06/2022  -     C-Reactive Protein; Future; Expected date: 02/06/2022  -     Sedimentation rate; Future; Expected date: 02/06/2022  -     Comprehensive Metabolic Panel; Future; Expected date: 02/06/2022    Acne vulgaris  -     doxycycline (VIBRAMYCIN) 100 MG Cap; Take 1 capsule (100 mg total) by mouth 2 (two) times daily.  Dispense: 60 capsule; Refill: 3  -     CBC Auto Differential; Future; Expected date: 02/06/2022  -     C-Reactive Protein; Future; Expected date: 02/06/2022  -     Sedimentation rate; Future; Expected date: 02/06/2022  -     Comprehensive Metabolic Panel; Future; Expected date: 02/06/2022    Lumbar and sacral arthritis  -     oxyCODONE-acetaminophen (PERCOCET)  mg per tablet; Take 1 tablet by mouth every 8 (eight) hours as needed for Pain.  Dispense: 90 tablet; Refill: 0  -     predniSONE (DELTASONE) 2.5 MG tablet; TAKE 2 TABLETS(5 MG) BY MOUTH DAILY AS NEEDED FOR PAIN  Dispense: 60 tablet; Refill: 5  -     CBC Auto Differential; Future; Expected date: 02/06/2022  -     C-Reactive Protein; Future; Expected date: 02/06/2022  -     Sedimentation rate; Future; Expected date: 02/06/2022  -     Comprehensive Metabolic Panel; Future; Expected date: 02/06/2022    Lumbar discitis  -     oxyCODONE-acetaminophen (PERCOCET)  mg per tablet; Take 1 tablet by mouth every 8 (eight) hours as needed for Pain.  Dispense: 90 tablet; Refill: 0  -     predniSONE (DELTASONE) 2.5 MG tablet;  TAKE 2 TABLETS(5 MG) BY MOUTH DAILY AS NEEDED FOR PAIN  Dispense: 60 tablet; Refill: 5  -     CBC Auto Differential; Future; Expected date: 02/06/2022  -     C-Reactive Protein; Future; Expected date: 02/06/2022  -     Sedimentation rate; Future; Expected date: 02/06/2022  -     Comprehensive Metabolic Panel; Future; Expected date: 02/06/2022    Immunocompromised state due to drug therapy  -     oxyCODONE-acetaminophen (PERCOCET)  mg per tablet; Take 1 tablet by mouth every 8 (eight) hours as needed for Pain.  Dispense: 90 tablet; Refill: 0  -     predniSONE (DELTASONE) 2.5 MG tablet; TAKE 2 TABLETS(5 MG) BY MOUTH DAILY AS NEEDED FOR PAIN  Dispense: 60 tablet; Refill: 5  -     CBC Auto Differential; Future; Expected date: 02/06/2022  -     C-Reactive Protein; Future; Expected date: 02/06/2022  -     Sedimentation rate; Future; Expected date: 02/06/2022  -     Comprehensive Metabolic Panel; Future; Expected date: 02/06/2022    Neck muscle spasm  -     oxyCODONE-acetaminophen (PERCOCET)  mg per tablet; Take 1 tablet by mouth every 8 (eight) hours as needed for Pain.  Dispense: 90 tablet; Refill: 0  -     predniSONE (DELTASONE) 2.5 MG tablet; TAKE 2 TABLETS(5 MG) BY MOUTH DAILY AS NEEDED FOR PAIN  Dispense: 60 tablet; Refill: 5  -     CBC Auto Differential; Future; Expected date: 02/06/2022  -     C-Reactive Protein; Future; Expected date: 02/06/2022  -     Sedimentation rate; Future; Expected date: 02/06/2022  -     Comprehensive Metabolic Panel; Future; Expected date: 02/06/2022    Chronic pain syndrome  -     oxyCODONE-acetaminophen (PERCOCET)  mg per tablet; Take 1 tablet by mouth every 8 (eight) hours as needed for Pain.  Dispense: 90 tablet; Refill: 0  -     predniSONE (DELTASONE) 2.5 MG tablet; TAKE 2 TABLETS(5 MG) BY MOUTH DAILY AS NEEDED FOR PAIN  Dispense: 60 tablet; Refill: 5  -     CBC Auto Differential; Future; Expected date: 02/06/2022  -     C-Reactive Protein; Future; Expected date:  02/06/2022  -     Sedimentation rate; Future; Expected date: 02/06/2022  -     Comprehensive Metabolic Panel; Future; Expected date: 02/06/2022    Lumbar spondylosis  -     oxyCODONE-acetaminophen (PERCOCET)  mg per tablet; Take 1 tablet by mouth every 8 (eight) hours as needed for Pain.  Dispense: 90 tablet; Refill: 0  -     predniSONE (DELTASONE) 2.5 MG tablet; TAKE 2 TABLETS(5 MG) BY MOUTH DAILY AS NEEDED FOR PAIN  Dispense: 60 tablet; Refill: 5  -     CBC Auto Differential; Future; Expected date: 02/06/2022  -     C-Reactive Protein; Future; Expected date: 02/06/2022  -     Sedimentation rate; Future; Expected date: 02/06/2022  -     Comprehensive Metabolic Panel; Future; Expected date: 02/06/2022    Elevated C-reactive protein (CRP)  -     oxyCODONE-acetaminophen (PERCOCET)  mg per tablet; Take 1 tablet by mouth every 8 (eight) hours as needed for Pain.  Dispense: 90 tablet; Refill: 0  -     predniSONE (DELTASONE) 2.5 MG tablet; TAKE 2 TABLETS(5 MG) BY MOUTH DAILY AS NEEDED FOR PAIN  Dispense: 60 tablet; Refill: 5  -     CBC Auto Differential; Future; Expected date: 02/06/2022  -     C-Reactive Protein; Future; Expected date: 02/06/2022  -     Sedimentation rate; Future; Expected date: 02/06/2022  -     Comprehensive Metabolic Panel; Future; Expected date: 02/06/2022    Vitamin D deficiency, unspecified   -     oxyCODONE-acetaminophen (PERCOCET)  mg per tablet; Take 1 tablet by mouth every 8 (eight) hours as needed for Pain.  Dispense: 90 tablet; Refill: 0  -     predniSONE (DELTASONE) 2.5 MG tablet; TAKE 2 TABLETS(5 MG) BY MOUTH DAILY AS NEEDED FOR PAIN  Dispense: 60 tablet; Refill: 5  -     CBC Auto Differential; Future; Expected date: 02/06/2022  -     C-Reactive Protein; Future; Expected date: 02/06/2022  -     Sedimentation rate; Future; Expected date: 02/06/2022  -     Comprehensive Metabolic Panel; Future; Expected date: 02/06/2022    Local skin infection  -     oxyCODONE-acetaminophen  (PERCOCET)  mg per tablet; Take 1 tablet by mouth every 8 (eight) hours as needed for Pain.  Dispense: 90 tablet; Refill: 0  -     predniSONE (DELTASONE) 2.5 MG tablet; TAKE 2 TABLETS(5 MG) BY MOUTH DAILY AS NEEDED FOR PAIN  Dispense: 60 tablet; Refill: 5  -     CBC Auto Differential; Future; Expected date: 02/06/2022  -     C-Reactive Protein; Future; Expected date: 02/06/2022  -     Sedimentation rate; Future; Expected date: 02/06/2022  -     Comprehensive Metabolic Panel; Future; Expected date: 02/06/2022        Assessment:  40 year old male with  Ankylosing spondylitis, elevated ESR/CRP on simponi  --chronic pain syndrome on norco 10/325 Q6  --alcoholic cardiomyopathy, EF 55% followed by Cardiology  --COPD, tobacco use  --chronic anxiety on xanax PRN per PCP  --allergy to ASA, NSAIDs    I have  check [place] prescription monitoring program site and no unusual or abnormal behavior has occurred pt understand the risk and benefits of taking opioid medications and has decided to continue the  medication     The patient location is: home  The chief complaint leading to consultation is: psa    Visit type: audiovisual    Face to Face time with patient: 40  minutes of total time spent on the encounter, which includes face to face time and non-face to face time preparing to see the patient (eg, review of tests), Obtaining and/or reviewing separately obtained history, Documenting clinical information in the electronic or other health record, Independently interpreting results (not separately reported) and communicating results to the patient/family/caregiver, or Care coordination (not separately reported).         Each patient to whom he or she provides medical services by telemedicine is:  (1) informed of the relationship between the physician and patient and the respective role of any other health care provider with respect to management of the patient; and (2) notified that he or she may decline to receive  medical services by telemedicine and may withdraw from such care at any time.    Notes:

## 2022-02-24 ENCOUNTER — SPECIALTY PHARMACY (OUTPATIENT)
Dept: PHARMACY | Facility: CLINIC | Age: 42
End: 2022-02-24
Payer: MEDICARE

## 2022-02-24 NOTE — TELEPHONE ENCOUNTER
Specialty Pharmacy - Refill Coordination    Specialty Medication Orders Linked to Encounter    Flowsheet Row Most Recent Value   Medication #1 golimumab (SIMPONI) 50 mg/0.5 mL PnIj (Order#[id number], Rx#[id number])          Refill Questions - Documented Responses    Flowsheet Row Most Recent Value   Patient Availability and HIPAA Verification    Does patient want to proceed with activity? Yes   HIPAA/medical authority confirmed? Yes   Relationship to patient of person spoken to? Self   Refill Screening Questions    Changes to allergies? No   Changes to medications? No   New conditions since last clinic visit? No   Unplanned office visit, urgent care, ED, or hospital admission in the last 4 weeks? No   How does patient/caregiver feel medication is working? Excellent   Financial problems or insurance changes? No   How many doses of your specialty medications were missed in the last 4 weeks? 0   Would patient like to speak to a pharmacist? No   When does the patient need to receive the medication? 02/28/22   Refill Delivery Questions    How will the patient receive the medication? Delivery Allegra   When does the patient need to receive the medication? 02/28/22   Shipping Address Home   Address in [facility] confirmed and updated if neccessary? Yes   Expected Copay ($) 9.85   Is the patient able to afford the medication copay? Yes   Payment Method CC on file   Days supply of Refill 30   Supplies needed? Alcohol Swabs   Refill activity completed? Yes   Refill activity plan Refill scheduled   Shipment/Pickup Date: 02/25/22          Current Outpatient Medications   Medication Sig    ALPRAZolam (XANAX) 2 MG Tab Take 1 tablet (2 mg total) by mouth 2 (two) times daily as needed (anxiety).    baclofen (LIORESAL) 20 MG tablet Take 0.5 tablets (10 mg total) by mouth 3 (three) times daily.    budesonide-formoterol 160-4.5 mcg (SYMBICORT) 160-4.5 mcg/actuation HFAA INHALE 2 PUFFS INTO THE LUNGS EVERY 12 HOURS     carvediloL (COREG) 3.125 MG tablet TAKE 1 TABLET(3.125 MG) BY MOUTH TWICE DAILY    cetirizine (ZYRTEC) 10 MG tablet Take 10 mg by mouth once daily.    clindamycin (CLEOCIN T) 1 % external solution     dapsone 25 MG Tab Take 1 tablet (25 mg total) by mouth once daily. for 10 days    doxycycline (VIBRAMYCIN) 100 MG Cap Take 1 capsule (100 mg total) by mouth 2 (two) times daily.    DULoxetine (CYMBALTA) 30 MG capsule Take 1 capsule (30 mg total) by mouth once daily.    epinephrine (EPIPEN JR) 0.15 mg/0.3 mL pen injection Inject 0.3 mg into the muscle as needed for Anaphylaxis.    fluconazole (DIFLUCAN) 150 MG Tab     fluticasone propionate (FLONASE) 50 mcg/actuation nasal spray SHAKE LIQUID AND USE 1 SPRAY(50 MCG) IN EACH NOSTRIL EVERY DAY (Patient not taking: Reported on 1/20/2022)    golimumab (SIMPONI) 50 mg/0.5 mL PnIj Inject 50 mg into the skin every 30 days.    ketoconazole (NIZORAL) 2 % cream Apply topically 2 (two) times daily. (Patient not taking: Reported on 1/20/2022)    mupirocin (BACTROBAN) 2 % ointment APPLY EXTERNALLY TO THE AFFECTED AREA THREE TIMES DAILY    NARCAN 4 mg/actuation Spry     oxyCODONE-acetaminophen (PERCOCET)  mg per tablet Take 1 tablet by mouth every 8 (eight) hours as needed for Pain.    predniSONE (DELTASONE) 2.5 MG tablet TAKE 2 TABLETS(5 MG) BY MOUTH DAILY AS NEEDED FOR PAIN    sildenafil (REVATIO) 20 mg Tab Take 1 tablet (20 mg total) by mouth 2 (two) times daily.   Last reviewed on 1/20/2022  3:24 PM by Nidia Nascimento LPN    Review of patient's allergies indicates:   Allergen Reactions    Asa [aspirin] Anaphylaxis    Azulfidine [sulfasalazine] Anaphylaxis    Ibuprofen Anaphylaxis    Gabapentin      Lip swelling, tongue swelling    Last reviewed on  1/31/2022 3:33 PM by Luis Locke      Tasks added this encounter   3/23/2022 - Refill Call (Auto Added)   Tasks due within next 3 months   No tasks due.     Miguel Angel Mccormick, PharmD  [facility]  [address] [facility] [address]  Phone: [phone]  Fax: [phone]

## 2022-02-25 DIAGNOSIS — E55.9 VITAMIN D DEFICIENCY, UNSPECIFIED: ICD-10-CM

## 2022-02-25 DIAGNOSIS — M47.816 LUMBAR SPONDYLOSIS: ICD-10-CM

## 2022-02-25 DIAGNOSIS — G89.4 CHRONIC PAIN SYNDROME: ICD-10-CM

## 2022-02-25 DIAGNOSIS — M46.46 LUMBAR DISCITIS: ICD-10-CM

## 2022-02-25 DIAGNOSIS — M45.9 ANKYLOSING SPONDYLITIS, UNSPECIFIED SITE OF SPINE: ICD-10-CM

## 2022-02-25 DIAGNOSIS — M47.817 LUMBAR AND SACRAL ARTHRITIS: ICD-10-CM

## 2022-02-25 DIAGNOSIS — D84.821 IMMUNOCOMPROMISED STATE DUE TO DRUG THERAPY: ICD-10-CM

## 2022-02-25 DIAGNOSIS — M62.838 NECK MUSCLE SPASM: ICD-10-CM

## 2022-02-25 DIAGNOSIS — Z79.899 IMMUNOCOMPROMISED STATE DUE TO DRUG THERAPY: ICD-10-CM

## 2022-02-25 DIAGNOSIS — L08.9 LOCAL SKIN INFECTION: ICD-10-CM

## 2022-02-25 DIAGNOSIS — R79.82 ELEVATED C-REACTIVE PROTEIN (CRP): ICD-10-CM

## 2022-03-02 ENCOUNTER — PATIENT MESSAGE (OUTPATIENT)
Dept: RHEUMATOLOGY | Facility: CLINIC | Age: 42
End: 2022-03-02
Payer: MEDICARE

## 2022-03-03 ENCOUNTER — PATIENT MESSAGE (OUTPATIENT)
Dept: RHEUMATOLOGY | Facility: CLINIC | Age: 42
End: 2022-03-03
Payer: MEDICARE

## 2022-03-03 DIAGNOSIS — E55.9 VITAMIN D DEFICIENCY, UNSPECIFIED: ICD-10-CM

## 2022-03-03 DIAGNOSIS — M47.817 LUMBAR AND SACRAL ARTHRITIS: ICD-10-CM

## 2022-03-03 DIAGNOSIS — M46.46 LUMBAR DISCITIS: ICD-10-CM

## 2022-03-03 DIAGNOSIS — R79.82 ELEVATED C-REACTIVE PROTEIN (CRP): ICD-10-CM

## 2022-03-03 DIAGNOSIS — M47.816 LUMBAR SPONDYLOSIS: ICD-10-CM

## 2022-03-03 DIAGNOSIS — Z79.899 IMMUNOCOMPROMISED STATE DUE TO DRUG THERAPY: ICD-10-CM

## 2022-03-03 DIAGNOSIS — M62.838 NECK MUSCLE SPASM: ICD-10-CM

## 2022-03-03 DIAGNOSIS — L08.9 LOCAL SKIN INFECTION: ICD-10-CM

## 2022-03-03 DIAGNOSIS — G89.4 CHRONIC PAIN SYNDROME: ICD-10-CM

## 2022-03-03 DIAGNOSIS — D84.821 IMMUNOCOMPROMISED STATE DUE TO DRUG THERAPY: ICD-10-CM

## 2022-03-03 DIAGNOSIS — M45.9 ANKYLOSING SPONDYLITIS, UNSPECIFIED SITE OF SPINE: ICD-10-CM

## 2022-03-06 RX ORDER — OXYCODONE AND ACETAMINOPHEN 10; 325 MG/1; MG/1
1 TABLET ORAL EVERY 8 HOURS PRN
Qty: 90 TABLET | Refills: 0 | Status: SHIPPED | OUTPATIENT
Start: 2022-03-06 | End: 2022-04-04 | Stop reason: SDUPTHER

## 2022-03-06 RX ORDER — OXYCODONE AND ACETAMINOPHEN 10; 325 MG/1; MG/1
1 TABLET ORAL EVERY 8 HOURS PRN
Qty: 90 TABLET | Refills: 0 | OUTPATIENT
Start: 2022-03-06 | End: 2022-04-05

## 2022-03-10 ENCOUNTER — PATIENT MESSAGE (OUTPATIENT)
Dept: RHEUMATOLOGY | Facility: CLINIC | Age: 42
End: 2022-03-10
Payer: MEDICARE

## 2022-03-15 ENCOUNTER — PES CALL (OUTPATIENT)
Dept: ADMINISTRATIVE | Facility: CLINIC | Age: 42
End: 2022-03-15
Payer: MEDICARE

## 2022-03-18 ENCOUNTER — TELEPHONE (OUTPATIENT)
Dept: ADMINISTRATIVE | Facility: CLINIC | Age: 42
End: 2022-03-18
Payer: MEDICARE

## 2022-03-21 ENCOUNTER — TELEPHONE (OUTPATIENT)
Dept: ADMINISTRATIVE | Facility: CLINIC | Age: 42
End: 2022-03-21
Payer: MEDICARE

## 2022-03-22 ENCOUNTER — OFFICE VISIT (OUTPATIENT)
Dept: FAMILY MEDICINE | Facility: CLINIC | Age: 42
End: 2022-03-22
Payer: MEDICARE

## 2022-03-22 ENCOUNTER — TELEPHONE (OUTPATIENT)
Dept: ADMINISTRATIVE | Facility: CLINIC | Age: 42
End: 2022-03-22
Payer: MEDICARE

## 2022-03-22 DIAGNOSIS — Z00.00 ENCOUNTER FOR PREVENTIVE HEALTH EXAMINATION: Primary | ICD-10-CM

## 2022-03-22 DIAGNOSIS — I42.6 CARDIOMYOPATHY, ALCOHOLIC: ICD-10-CM

## 2022-03-22 DIAGNOSIS — E78.5 HYPERLIPIDEMIA, UNSPECIFIED HYPERLIPIDEMIA TYPE: ICD-10-CM

## 2022-03-22 DIAGNOSIS — F17.200 TOBACCO DEPENDENCE: ICD-10-CM

## 2022-03-22 DIAGNOSIS — M45.9 ANKYLOSING SPONDYLITIS, UNSPECIFIED SITE OF SPINE: ICD-10-CM

## 2022-03-22 DIAGNOSIS — J44.9 CHRONIC OBSTRUCTIVE PULMONARY DISEASE, UNSPECIFIED COPD TYPE: ICD-10-CM

## 2022-03-22 PROCEDURE — G0439 PPPS, SUBSEQ VISIT: HCPCS | Mod: 95,,, | Performed by: NURSE PRACTITIONER

## 2022-03-22 PROCEDURE — G0439 PR MEDICARE ANNUAL WELLNESS SUBSEQUENT VISIT: ICD-10-PCS | Mod: 95,,, | Performed by: NURSE PRACTITIONER

## 2022-03-22 RX ORDER — DOXYCYCLINE 100 MG/1
100 CAPSULE ORAL 2 TIMES DAILY
COMMUNITY
Start: 2022-03-20 | End: 2022-05-27 | Stop reason: SDUPTHER

## 2022-03-22 RX ORDER — LANOLIN ALCOHOL/MO/W.PET/CERES
1000 CREAM (GRAM) TOPICAL
COMMUNITY

## 2022-03-22 NOTE — PROGRESS NOTES
The patient location is: [place]  The chief complaint leading to consultation is: ANNUAL WELLNESS    Visit type: audiovisual    Face to Face time with patient: 20 minutes  45 minutes of total time spent on the encounter, which includes face to face time and non-face to face time preparing to see the patient (eg, review of tests), Obtaining and/or reviewing separately obtained history, Documenting clinical information in the electronic or other health record, Independently interpreting results (not separately reported) and communicating results to the patient/family/caregiver, or Care coordination (not separately reported).     Each patient to whom he or she provides medical services by telemedicine is:  (1) informed of the relationship between the physician and patient and the respective role of any other health care provider with respect to management of the patient; and (2) notified that he or she may decline to receive medical services by telemedicine and may withdraw from such care at any time.    Notes:     Umang Greer presented for a  Medicare AWV and comprehensive Health Risk Assessment today. The following components were reviewed and updated:    · Medical history  · Family History  · Social history  · Allergies and Current Medications  · Health Risk Assessment  · Health Maintenance  · Care Team     ** See Completed Assessments for Annual Wellness Visit within the encounter summary.**     The following assessments were completed:  · Living Situation  · CAGE  · Depression Screening  · Fall Risk Assessment (MACH 10)  · Hearing Assessment(HHI)  · Cognitive Function Screening  · Nutrition Screening  · ADL Screening  · PAQ Screening      There were no vitals filed for this visit.  There is no height or weight on file to calculate BMI.  Physical Exam  Constitutional:       Appearance: Normal appearance.   Pulmonary:      Effort: Pulmonary effort is normal. No respiratory distress.   Neurological:      Mental  Status: He is alert and oriented to person, place, and time.   Psychiatric:         Mood and Affect: Mood normal.         Behavior: Behavior normal.       Diagnoses and health risks identified today and associated recommendations/orders:    1. Encounter for preventive health examination  Reviewed and discussed health maintenance.    Labs scheduled  - HIV 1/2 Ag/Ab (4th Gen); Future    2. Tobacco dependence  Encouraged to quit    3. Cardiomyopathy, alcoholic  Stable- continue current treatment and follow up routinely with PCP and cardiology ()    4. Hyperlipidemia, unspecified hyperlipidemia type  Stable- continue current treatment and follow up routinely with PCP and cardiology ()    5. Ankylosing spondylitis, unspecified site of spine  Stable- continue current treatment and follow up routinely with PCP and cardiology ()    6. Chronic obstructive pulmonary disease, unspecified COPD type  Stable- continue current treatment and follow up routinely with PCP     Provided Umang with a 5-10 year written screening schedule and personal prevention plan. Recommendations were developed using the USPSTF age appropriate recommendations. Education, counseling, and referrals were provided as needed. After Visit Summary printed and given to patient which includes a list of additional screenings\tests needed.    I offered to discuss advanced care planning, including how to pick a person who would make decisions for you if you were unable to make them for yourself, called a health care power of , and what kind of decisions you might make such as use of life sustaining treatments such as ventilators and tube feeding when faced with a life limiting illness recorded on a living will that they will need to know. (How you want to be cared for as you near the end of your natural life)     X Patient is interested in learning more about how to make advanced directives.  I provided them paperwork  and offered to discuss this with them.    Rosa Martinez, NP

## 2022-03-22 NOTE — PATIENT INSTRUCTIONS
Counseling and Referral of Other Preventative  (Italic type indicates deductible and co-insurance are waived)    Patient Name: Umang Greer  Today's Date: 3/22/2022    Health Maintenance       Date Due Completion Date    Pneumococcal Vaccines (Age 0-64) (1 of 4 - PCV13) Never done ---    HIV Screening Never done ---    TETANUS VACCINE Never done ---    Influenza Vaccine (1) Never done ---    COVID-19 Vaccine (3 - Booster for Moderna series) 09/10/2021 4/10/2021    Lipid Panel 01/17/2025 1/17/2020        No orders of the defined types were placed in this encounter.    The following information is provided to all patients.  This information is to help you find resources for any of the problems found today that may be affecting your health:                Living healthy guide: www.[facility].[place].gov      Understanding Diabetes: www.diabetes.org      Eating healthy: www.cdc.gov/healthyweight      CDC home safety checklist: www.cdc.gov/steadi/patient.html      Agency on Aging: www.goea.[place].gov      Alcoholics anonymous (AA): www.aa.org      Physical Activity: www.negro.nih.gov/bm7ernb      Tobacco use: www.quitwithusla.org

## 2022-03-23 ENCOUNTER — PATIENT MESSAGE (OUTPATIENT)
Dept: PHARMACY | Facility: CLINIC | Age: 42
End: 2022-03-23
Payer: MEDICARE

## 2022-03-26 ENCOUNTER — SPECIALTY PHARMACY (OUTPATIENT)
Dept: PHARMACY | Facility: CLINIC | Age: 42
End: 2022-03-26
Payer: MEDICARE

## 2022-03-26 NOTE — TELEPHONE ENCOUNTER
Specialty Pharmacy - Refill Coordination    Specialty Medication Orders Linked to Encounter    Flowsheet Row Most Recent Value   Medication #1 golimumab (SIMPONI) 50 mg/0.5 mL PnIj (Order#[id number], Rx#[id number])          Refill Questions - Documented Responses    Flowsheet Row Most Recent Value   Patient Availability and HIPAA Verification    Does patient want to proceed with activity? Yes   HIPAA/medical authority confirmed? Yes   Relationship to patient of person spoken to? Self   Refill Screening Questions    Changes to allergies? No   Changes to medications? No   New conditions since last clinic visit? No   Unplanned office visit, urgent care, ED, or hospital admission in the last 4 weeks? No   How does patient/caregiver feel medication is working? Excellent   Financial problems or insurance changes? No   How many doses of your specialty medications were missed in the last 4 weeks? 0   Would patient like to speak to a pharmacist? No   When does the patient need to receive the medication? 03/31/22   Refill Delivery Questions    How will the patient receive the medication? Delivery Allegra   When does the patient need to receive the medication? 03/31/22   Shipping Address Home   Address in [facility] confirmed and updated if neccessary? Yes   Expected Copay ($) 0   Is the patient able to afford the medication copay? Yes   Payment Method zero copay   Days supply of Refill 30   Supplies needed? No supplies needed   Refill activity completed? Yes   Refill activity plan Refill scheduled   Shipment/Pickup Date: 03/29/22          Current Outpatient Medications   Medication Sig    ALPRAZolam (XANAX) 2 MG Tab Take 1 tablet (2 mg total) by mouth 2 (two) times daily as needed (anxiety).    baclofen (LIORESAL) 20 MG tablet Take 0.5 tablets (10 mg total) by mouth 3 (three) times daily.    budesonide-formoterol 160-4.5 mcg (SYMBICORT) 160-4.5 mcg/actuation HFAA INHALE 2 PUFFS INTO THE LUNGS EVERY 12 HOURS     carvediloL (COREG) 3.125 MG tablet TAKE 1 TABLET(3.125 MG) BY MOUTH TWICE DAILY    cetirizine (ZYRTEC) 10 MG tablet Take 10 mg by mouth once daily.    clindamycin (CLEOCIN T) 1 % external solution     cyanocobalamin (VITAMIN B-12) 1000 MCG tablet 1,000 mcg.    dapsone 25 MG Tab Take 1 tablet (25 mg total) by mouth once daily. for 10 days    doxycycline (VIBRAMYCIN) 100 MG Cap Take 100 mg by mouth 2 (two) times daily.    DULoxetine (CYMBALTA) 30 MG capsule Take 1 capsule (30 mg total) by mouth once daily.    epinephrine (EPIPEN JR) 0.15 mg/0.3 mL pen injection Inject 0.3 mg into the muscle as needed for Anaphylaxis.    fluconazole (DIFLUCAN) 150 MG Tab     fluticasone propionate (FLONASE) 50 mcg/actuation nasal spray SHAKE LIQUID AND USE 1 SPRAY(50 MCG) IN EACH NOSTRIL EVERY DAY (Patient not taking: Reported on 1/20/2022)    golimumab (SIMPONI) 50 mg/0.5 mL PnIj Inject 50 mg into the skin every 30 days.    ketoconazole (NIZORAL) 2 % cream Apply topically 2 (two) times daily. (Patient not taking: Reported on 1/20/2022)    mupirocin (BACTROBAN) 2 % ointment APPLY EXTERNALLY TO THE AFFECTED AREA THREE TIMES DAILY    NARCAN 4 mg/actuation Spry     oxyCODONE-acetaminophen (PERCOCET)  mg per tablet Take 1 tablet by mouth every 8 (eight) hours as needed for Pain.    predniSONE (DELTASONE) 2.5 MG tablet TAKE 2 TABLETS(5 MG) BY MOUTH DAILY AS NEEDED FOR PAIN    sildenafil (REVATIO) 20 mg Tab Take 1 tablet (20 mg total) by mouth 2 (two) times daily.   Last reviewed on 3/22/2022 10:05 AM by Rosa Martinez NP    Review of patient's allergies indicates:   Allergen Reactions    Asa [aspirin] Anaphylaxis    Azulfidine [sulfasalazine] Anaphylaxis    Ibuprofen Anaphylaxis    Gabapentin      Lip swelling, tongue swelling    Last reviewed on  3/22/2022 9:58 AM by Emily Martinez      Tasks added this encounter   No tasks added.   Tasks due within next 3 months   3/23/2022 - Refill Call (Auto Added)     [facility]  [address]  [facility] [address]  Phone: [phone]  Fax: [phone]

## 2022-04-04 DIAGNOSIS — M62.838 NECK MUSCLE SPASM: ICD-10-CM

## 2022-04-04 DIAGNOSIS — Z79.899 IMMUNOCOMPROMISED STATE DUE TO DRUG THERAPY: ICD-10-CM

## 2022-04-04 DIAGNOSIS — L08.9 LOCAL SKIN INFECTION: ICD-10-CM

## 2022-04-04 DIAGNOSIS — M47.816 LUMBAR SPONDYLOSIS: ICD-10-CM

## 2022-04-04 DIAGNOSIS — M46.46 LUMBAR DISCITIS: ICD-10-CM

## 2022-04-04 DIAGNOSIS — R79.82 ELEVATED C-REACTIVE PROTEIN (CRP): ICD-10-CM

## 2022-04-04 DIAGNOSIS — E55.9 VITAMIN D DEFICIENCY, UNSPECIFIED: ICD-10-CM

## 2022-04-04 DIAGNOSIS — D84.821 IMMUNOCOMPROMISED STATE DUE TO DRUG THERAPY: ICD-10-CM

## 2022-04-04 DIAGNOSIS — G89.4 CHRONIC PAIN SYNDROME: ICD-10-CM

## 2022-04-04 DIAGNOSIS — M47.817 LUMBAR AND SACRAL ARTHRITIS: ICD-10-CM

## 2022-04-04 DIAGNOSIS — M45.9 ANKYLOSING SPONDYLITIS, UNSPECIFIED SITE OF SPINE: ICD-10-CM

## 2022-04-04 RX ORDER — OXYCODONE AND ACETAMINOPHEN 10; 325 MG/1; MG/1
1 TABLET ORAL EVERY 8 HOURS PRN
Qty: 90 TABLET | Refills: 0 | Status: SHIPPED | OUTPATIENT
Start: 2022-04-04 | End: 2022-05-02 | Stop reason: SDUPTHER

## 2022-04-18 ENCOUNTER — PATIENT MESSAGE (OUTPATIENT)
Dept: ADMINISTRATIVE | Facility: OTHER | Age: 42
End: 2022-04-18
Payer: MEDICARE

## 2022-04-23 ENCOUNTER — PATIENT MESSAGE (OUTPATIENT)
Dept: PHARMACY | Facility: CLINIC | Age: 42
End: 2022-04-23
Payer: MEDICARE

## 2022-04-26 ENCOUNTER — SPECIALTY PHARMACY (OUTPATIENT)
Dept: PHARMACY | Facility: CLINIC | Age: 42
End: 2022-04-26
Payer: MEDICARE

## 2022-04-26 NOTE — TELEPHONE ENCOUNTER
Specialty Pharmacy - Refill Coordination    Specialty Medication Orders Linked to Encounter    Flowsheet Row Most Recent Value   Medication #1 golimumab (SIMPONI) 50 mg/0.5 mL PnIj (Order#[id number], Rx#[id number])          Refill Questions - Documented Responses    Flowsheet Row Most Recent Value   Patient Availability and HIPAA Verification    Does patient want to proceed with activity? Yes   HIPAA/medical authority confirmed? Yes   Relationship to patient of person spoken to? Self   Refill Screening Questions    Changes to allergies? No   Changes to medications? No   New conditions since last clinic visit? No   Unplanned office visit, urgent care, ED, or hospital admission in the last 4 weeks? No   How does patient/caregiver feel medication is working? Very good   Financial problems or insurance changes? No   How many doses of your specialty medications were missed in the last 4 weeks? 0   Would patient like to speak to a pharmacist? No   When does the patient need to receive the medication? 04/30/22   Refill Delivery Questions    How will the patient receive the medication? Delivery Allegra   When does the patient need to receive the medication? 04/30/22   Shipping Address Home   Address in [facility] confirmed and updated if neccessary? Yes   Expected Copay ($) 0   Is the patient able to afford the medication copay? Yes   Payment Method zero copay   Days supply of Refill 30   Supplies needed? No supplies needed   Refill activity completed? Yes   Refill activity plan Refill scheduled   Shipment/Pickup Date: 04/28/22          Current Outpatient Medications   Medication Sig    ALPRAZolam (XANAX) 2 MG Tab Take 1 tablet (2 mg total) by mouth 2 (two) times daily as needed (anxiety).    baclofen (LIORESAL) 20 MG tablet Take 0.5 tablets (10 mg total) by mouth 3 (three) times daily.    budesonide-formoterol 160-4.5 mcg (SYMBICORT) 160-4.5 mcg/actuation HFAA INHALE 2 PUFFS INTO THE LUNGS EVERY 12 HOURS     carvediloL (COREG) 3.125 MG tablet TAKE 1 TABLET(3.125 MG) BY MOUTH TWICE DAILY    cetirizine (ZYRTEC) 10 MG tablet Take 10 mg by mouth once daily.    clindamycin (CLEOCIN T) 1 % external solution     cyanocobalamin (VITAMIN B-12) 1000 MCG tablet 1,000 mcg.    dapsone 25 MG Tab Take 1 tablet (25 mg total) by mouth once daily. for 10 days    doxycycline (VIBRAMYCIN) 100 MG Cap Take 100 mg by mouth 2 (two) times daily.    DULoxetine (CYMBALTA) 30 MG capsule Take 1 capsule (30 mg total) by mouth once daily.    epinephrine (EPIPEN JR) 0.15 mg/0.3 mL pen injection Inject 0.3 mg into the muscle as needed for Anaphylaxis.    fluconazole (DIFLUCAN) 150 MG Tab     fluticasone propionate (FLONASE) 50 mcg/actuation nasal spray SHAKE LIQUID AND USE 1 SPRAY(50 MCG) IN EACH NOSTRIL EVERY DAY (Patient not taking: Reported on 1/20/2022)    golimumab (SIMPONI) 50 mg/0.5 mL PnIj Inject 50 mg into the skin every 30 days.    ketoconazole (NIZORAL) 2 % cream Apply topically 2 (two) times daily. (Patient not taking: Reported on 1/20/2022)    mupirocin (BACTROBAN) 2 % ointment APPLY EXTERNALLY TO THE AFFECTED AREA THREE TIMES DAILY    NARCAN 4 mg/actuation Spry     oxyCODONE-acetaminophen (PERCOCET)  mg per tablet Take 1 tablet by mouth every 8 (eight) hours as needed for Pain.    predniSONE (DELTASONE) 2.5 MG tablet TAKE 2 TABLETS(5 MG) BY MOUTH DAILY AS NEEDED FOR PAIN    sildenafil (REVATIO) 20 mg Tab Take 1 tablet (20 mg total) by mouth 2 (two) times daily.   Last reviewed on 3/22/2022 10:05 AM by Rosa Martinez NP    Review of patient's allergies indicates:   Allergen Reactions    Asa [aspirin] Anaphylaxis    Azulfidine [sulfasalazine] Anaphylaxis    Ibuprofen Anaphylaxis    Gabapentin      Lip swelling, tongue swelling    Last reviewed on  4/4/2022 10:14 AM by Radha Fonseca      Tasks added this encounter   5/23/2022 - Refill Call (Auto Added)   Tasks due within next 3 months   No tasks due.      Russ Cody, PharmD  [facility]  [address]  [facility] [address]  Phone: [phone]  Fax: [phone]

## 2022-05-02 DIAGNOSIS — L08.9 LOCAL SKIN INFECTION: ICD-10-CM

## 2022-05-02 DIAGNOSIS — M45.9 ANKYLOSING SPONDYLITIS, UNSPECIFIED SITE OF SPINE: ICD-10-CM

## 2022-05-02 DIAGNOSIS — D84.821 IMMUNOCOMPROMISED STATE DUE TO DRUG THERAPY: ICD-10-CM

## 2022-05-02 DIAGNOSIS — G89.4 CHRONIC PAIN SYNDROME: ICD-10-CM

## 2022-05-02 DIAGNOSIS — M62.838 NECK MUSCLE SPASM: ICD-10-CM

## 2022-05-02 DIAGNOSIS — E55.9 VITAMIN D DEFICIENCY, UNSPECIFIED: ICD-10-CM

## 2022-05-02 DIAGNOSIS — R79.82 ELEVATED C-REACTIVE PROTEIN (CRP): ICD-10-CM

## 2022-05-02 DIAGNOSIS — M46.46 LUMBAR DISCITIS: ICD-10-CM

## 2022-05-02 DIAGNOSIS — Z79.899 IMMUNOCOMPROMISED STATE DUE TO DRUG THERAPY: ICD-10-CM

## 2022-05-02 DIAGNOSIS — M47.817 LUMBAR AND SACRAL ARTHRITIS: ICD-10-CM

## 2022-05-02 DIAGNOSIS — M47.816 LUMBAR SPONDYLOSIS: ICD-10-CM

## 2022-05-04 ENCOUNTER — PATIENT MESSAGE (OUTPATIENT)
Dept: RHEUMATOLOGY | Facility: CLINIC | Age: 42
End: 2022-05-04
Payer: MEDICARE

## 2022-05-06 ENCOUNTER — PATIENT MESSAGE (OUTPATIENT)
Dept: RHEUMATOLOGY | Facility: CLINIC | Age: 42
End: 2022-05-06
Payer: MEDICARE

## 2022-05-06 RX ORDER — OXYCODONE AND ACETAMINOPHEN 10; 325 MG/1; MG/1
1 TABLET ORAL EVERY 8 HOURS PRN
Qty: 90 TABLET | Refills: 0 | Status: SHIPPED | OUTPATIENT
Start: 2022-05-06 | End: 2022-05-27 | Stop reason: SDUPTHER

## 2022-05-13 ENCOUNTER — OFFICE VISIT (OUTPATIENT)
Dept: FAMILY MEDICINE | Facility: CLINIC | Age: 42
End: 2022-05-13
Payer: MEDICARE

## 2022-05-13 DIAGNOSIS — J44.9 CHRONIC OBSTRUCTIVE PULMONARY DISEASE, UNSPECIFIED COPD TYPE: ICD-10-CM

## 2022-05-13 DIAGNOSIS — F41.9 ANXIETY: Primary | ICD-10-CM

## 2022-05-13 DIAGNOSIS — D57.3 SICKLE CELL TRAIT: ICD-10-CM

## 2022-05-13 PROCEDURE — 99213 PR OFFICE/OUTPT VISIT, EST, LEVL III, 20-29 MIN: ICD-10-PCS | Mod: 95,,, | Performed by: FAMILY MEDICINE

## 2022-05-13 PROCEDURE — 99213 OFFICE O/P EST LOW 20 MIN: CPT | Mod: 95,,, | Performed by: FAMILY MEDICINE

## 2022-05-13 RX ORDER — BUDESONIDE AND FORMOTEROL FUMARATE DIHYDRATE 160; 4.5 UG/1; UG/1
AEROSOL RESPIRATORY (INHALATION)
Qty: 30.6 G | Refills: 2 | Status: SHIPPED | OUTPATIENT
Start: 2022-05-13 | End: 2022-07-25

## 2022-05-13 NOTE — PROGRESS NOTES
Subjective:       Patient ID: Umang Greer is a 41 y.o. male.    Chief Complaint: No chief complaint on file.    Here for f/u anxiety and copd. Needs refill copd med.  Anxiety stable.     The patient location is: home in [place]  The chief complaint leading to consultation is: anxiety    Visit type: audiovisual    Face to Face time with patient: 6 minutes  8 minutes of total time spent on the encounter, which includes face to face time and non-face to face time preparing to see the patient (eg, review of tests), Obtaining and/or reviewing separately obtained history, Documenting clinical information in the electronic or other health record, Independently interpreting results (not separately reported) and communicating results to the patient/family/caregiver, or Care coordination (not separately reported).         Each patient to whom he or she provides medical services by telemedicine is:  (1) informed of the relationship between the physician and patient and the respective role of any other health care provider with respect to management of the patient; and (2) notified that he or she may decline to receive medical services by telemedicine and may withdraw from such care at any time.    Notes:     Review of Systems   Constitutional: Negative for activity change, chills, fever and unexpected weight change.   HENT: Negative for hearing loss, rhinorrhea and trouble swallowing.    Eyes: Negative for discharge and visual disturbance.   Respiratory: Negative for cough, chest tightness, shortness of breath and wheezing.    Cardiovascular: Negative for chest pain, palpitations and leg swelling.   Gastrointestinal: Negative for blood in stool, constipation, diarrhea and vomiting.   Endocrine: Positive for polydipsia and polyuria. Negative for cold intolerance and heat intolerance.   Genitourinary: Negative for difficulty urinating, hematuria and urgency.   Musculoskeletal: Positive for arthralgias. Negative for joint  swelling and neck pain.   Neurological: Negative for weakness and headaches.   Psychiatric/Behavioral: Negative for confusion, decreased concentration and dysphoric mood. The patient is not nervous/anxious.        Objective:      Physical Exam  Constitutional:       Appearance: Normal appearance.   Neurological:      Mental Status: He is alert.   Psychiatric:         Mood and Affect: Mood normal.         Behavior: Behavior normal.         Assessment:       1. Anxiety    2. Sickle cell trait    3. Chronic obstructive pulmonary disease, unspecified COPD type        Plan:       Anxiety    Sickle cell trait    Chronic obstructive pulmonary disease, unspecified COPD type  -     budesonide-formoterol 160-4.5 mcg (SYMBICORT) 160-4.5 mcg/actuation HFAA; INHALE 2 PUFFS INTO THE LUNGS EVERY 12 HOURS  Dispense: 30.6 g; Refill: 2      Labs next week as planned  Copd stable  Prn med and monitor  Will monitor chronic medical issues and continue current plan of care.      Follow up in about 3 months (around 8/13/2022), or if symptoms worsen or fail to improve.

## 2022-05-20 ENCOUNTER — LAB VISIT (OUTPATIENT)
Dept: LAB | Facility: HOSPITAL | Age: 42
End: 2022-05-20
Attending: INTERNAL MEDICINE
Payer: MEDICARE

## 2022-05-20 DIAGNOSIS — E55.9 VITAMIN D DEFICIENCY, UNSPECIFIED: ICD-10-CM

## 2022-05-20 DIAGNOSIS — L08.9 LOCAL SKIN INFECTION: ICD-10-CM

## 2022-05-20 DIAGNOSIS — M47.817 LUMBAR AND SACRAL ARTHRITIS: ICD-10-CM

## 2022-05-20 DIAGNOSIS — M46.46 LUMBAR DISCITIS: ICD-10-CM

## 2022-05-20 DIAGNOSIS — Z00.00 ENCOUNTER FOR PREVENTIVE HEALTH EXAMINATION: ICD-10-CM

## 2022-05-20 DIAGNOSIS — G89.4 CHRONIC PAIN SYNDROME: ICD-10-CM

## 2022-05-20 DIAGNOSIS — D84.821 IMMUNOCOMPROMISED STATE DUE TO DRUG THERAPY: ICD-10-CM

## 2022-05-20 DIAGNOSIS — M47.816 LUMBAR SPONDYLOSIS: ICD-10-CM

## 2022-05-20 DIAGNOSIS — R79.82 ELEVATED C-REACTIVE PROTEIN (CRP): ICD-10-CM

## 2022-05-20 DIAGNOSIS — Z79.899 IMMUNOCOMPROMISED STATE DUE TO DRUG THERAPY: ICD-10-CM

## 2022-05-20 DIAGNOSIS — M62.838 NECK MUSCLE SPASM: ICD-10-CM

## 2022-05-20 DIAGNOSIS — M45.9 ANKYLOSING SPONDYLITIS, UNSPECIFIED SITE OF SPINE: ICD-10-CM

## 2022-05-20 DIAGNOSIS — E78.5 HYPERLIPIDEMIA, UNSPECIFIED HYPERLIPIDEMIA TYPE: ICD-10-CM

## 2022-05-20 DIAGNOSIS — L70.0 ACNE VULGARIS: ICD-10-CM

## 2022-05-20 LAB
ALBUMIN SERPL BCP-MCNC: 3.8 G/DL (ref 3.5–5.2)
ALP SERPL-CCNC: 111 U/L (ref 55–135)
ALT SERPL W/O P-5'-P-CCNC: 17 U/L (ref 10–44)
ANION GAP SERPL CALC-SCNC: 8 MMOL/L (ref 8–16)
AST SERPL-CCNC: 19 U/L (ref 10–40)
BASOPHILS # BLD AUTO: 0.06 K/UL (ref 0–0.2)
BASOPHILS NFR BLD: 0.5 % (ref 0–1.9)
BILIRUB SERPL-MCNC: 0.7 MG/DL (ref 0.1–1)
BUN SERPL-MCNC: 12 MG/DL (ref 6–20)
CALCIUM SERPL-MCNC: 10 MG/DL (ref 8.7–10.5)
CHLORIDE SERPL-SCNC: 105 MMOL/L (ref 95–110)
CHOLEST SERPL-MCNC: 200 MG/DL (ref 120–199)
CHOLEST/HDLC SERPL: 6.1 {RATIO} (ref 2–5)
CO2 SERPL-SCNC: 26 MMOL/L (ref 23–29)
CREAT SERPL-MCNC: 0.8 MG/DL (ref 0.5–1.4)
CRP SERPL-MCNC: 12.9 MG/L (ref 0–8.2)
DIFFERENTIAL METHOD: ABNORMAL
EOSINOPHIL # BLD AUTO: 0.2 K/UL (ref 0–0.5)
EOSINOPHIL NFR BLD: 2 % (ref 0–8)
ERYTHROCYTE [DISTWIDTH] IN BLOOD BY AUTOMATED COUNT: 12.6 % (ref 11.5–14.5)
ERYTHROCYTE [SEDIMENTATION RATE] IN BLOOD BY WESTERGREN METHOD: 21 MM/HR (ref 0–23)
EST. GFR  (AFRICAN AMERICAN): >60 ML/MIN/1.73 M^2
EST. GFR  (NON AFRICAN AMERICAN): >60 ML/MIN/1.73 M^2
GLUCOSE SERPL-MCNC: 98 MG/DL (ref 70–110)
HCT VFR BLD AUTO: 41.7 % (ref 40–54)
HDLC SERPL-MCNC: 33 MG/DL (ref 40–75)
HDLC SERPL: 16.5 % (ref 20–50)
HGB BLD-MCNC: 14.2 G/DL (ref 14–18)
IMM GRANULOCYTES # BLD AUTO: 0.06 K/UL (ref 0–0.04)
IMM GRANULOCYTES NFR BLD AUTO: 0.5 % (ref 0–0.5)
LDLC SERPL CALC-MCNC: 133.6 MG/DL (ref 63–159)
LYMPHOCYTES # BLD AUTO: 3.5 K/UL (ref 1–4.8)
LYMPHOCYTES NFR BLD: 28.2 % (ref 18–48)
MCH RBC QN AUTO: 29.4 PG (ref 27–31)
MCHC RBC AUTO-ENTMCNC: 34.1 G/DL (ref 32–36)
MCV RBC AUTO: 86 FL (ref 82–98)
MONOCYTES # BLD AUTO: 0.6 K/UL (ref 0.3–1)
MONOCYTES NFR BLD: 4.6 % (ref 4–15)
NEUTROPHILS # BLD AUTO: 7.9 K/UL (ref 1.8–7.7)
NEUTROPHILS NFR BLD: 64.2 % (ref 38–73)
NONHDLC SERPL-MCNC: 167 MG/DL
NRBC BLD-RTO: 0 /100 WBC
PLATELET # BLD AUTO: 309 K/UL (ref 150–450)
PMV BLD AUTO: 9.3 FL (ref 9.2–12.9)
POTASSIUM SERPL-SCNC: 4 MMOL/L (ref 3.5–5.1)
PROT SERPL-MCNC: 7.4 G/DL (ref 6–8.4)
RBC # BLD AUTO: 4.83 M/UL (ref 4.6–6.2)
SODIUM SERPL-SCNC: 139 MMOL/L (ref 136–145)
TRIGL SERPL-MCNC: 167 MG/DL (ref 30–150)
WBC # BLD AUTO: 12.29 K/UL (ref 3.9–12.7)

## 2022-05-20 PROCEDURE — 86140 C-REACTIVE PROTEIN: CPT | Performed by: INTERNAL MEDICINE

## 2022-05-20 PROCEDURE — 80061 LIPID PANEL: CPT | Performed by: PHYSICIAN ASSISTANT

## 2022-05-20 PROCEDURE — 85652 RBC SED RATE AUTOMATED: CPT | Performed by: INTERNAL MEDICINE

## 2022-05-20 PROCEDURE — 36415 COLL VENOUS BLD VENIPUNCTURE: CPT | Mod: PO | Performed by: PHYSICIAN ASSISTANT

## 2022-05-20 PROCEDURE — 80053 COMPREHEN METABOLIC PANEL: CPT | Performed by: INTERNAL MEDICINE

## 2022-05-20 PROCEDURE — 87389 HIV-1 AG W/HIV-1&-2 AB AG IA: CPT | Performed by: NURSE PRACTITIONER

## 2022-05-20 PROCEDURE — 85025 COMPLETE CBC W/AUTO DIFF WBC: CPT | Performed by: INTERNAL MEDICINE

## 2022-05-23 LAB — HIV 1+2 AB+HIV1 P24 AG SERPL QL IA: NEGATIVE

## 2022-05-24 ENCOUNTER — SPECIALTY PHARMACY (OUTPATIENT)
Dept: PHARMACY | Facility: CLINIC | Age: 42
End: 2022-05-24
Payer: MEDICARE

## 2022-05-24 NOTE — TELEPHONE ENCOUNTER
Specialty Pharmacy - Refill Coordination    Specialty Medication Orders Linked to Encounter    Flowsheet Row Most Recent Value   Medication #1 golimumab (SIMPONI) 50 mg/0.5 mL PnIj (Order#[id number], Rx#[id number])          Refill Questions - Documented Responses    Flowsheet Row Most Recent Value   Patient Availability and HIPAA Verification    Does patient want to proceed with activity? Yes   HIPAA/medical authority confirmed? Yes   Relationship to patient of person spoken to? Self   Refill Screening Questions    Changes to allergies? No   Changes to medications? No   New conditions since last clinic visit? No   Unplanned office visit, urgent care, ED, or hospital admission in the last 4 weeks? No   How does patient/caregiver feel medication is working? Good   Financial problems or insurance changes? No   How many doses of your specialty medications were missed in the last 4 weeks? 0   Would patient like to speak to a pharmacist? No   When does the patient need to receive the medication? 05/31/22   Refill Delivery Questions    How will the patient receive the medication? Delivery Allegra   When does the patient need to receive the medication? 05/31/22   Shipping Address Home   Address in [facility] confirmed and updated if neccessary? Yes   Expected Copay ($) 0   Is the patient able to afford the medication copay? Yes   Payment Method zero copay   Days supply of Refill 30   Supplies needed? No supplies needed   Refill activity completed? Yes   Refill activity plan Refill scheduled   Shipment/Pickup Date: 05/27/22          Current Outpatient Medications   Medication Sig    ALPRAZolam (XANAX) 2 MG Tab Take 1 tablet (2 mg total) by mouth 2 (two) times daily as needed (anxiety).    baclofen (LIORESAL) 20 MG tablet Take 0.5 tablets (10 mg total) by mouth 3 (three) times daily.    budesonide-formoterol 160-4.5 mcg (SYMBICORT) 160-4.5 mcg/actuation HFAA INHALE 2 PUFFS INTO THE LUNGS EVERY 12 HOURS    carvediloL  (COREG) 3.125 MG tablet TAKE 1 TABLET(3.125 MG) BY MOUTH TWICE DAILY    cetirizine (ZYRTEC) 10 MG tablet Take 10 mg by mouth once daily.    clindamycin (CLEOCIN T) 1 % external solution     cyanocobalamin (VITAMIN B-12) 1000 MCG tablet 1,000 mcg.    dapsone 25 MG Tab Take 1 tablet (25 mg total) by mouth once daily. for 10 days    doxycycline (VIBRAMYCIN) 100 MG Cap Take 100 mg by mouth 2 (two) times daily.    DULoxetine (CYMBALTA) 30 MG capsule Take 1 capsule (30 mg total) by mouth once daily.    epinephrine (EPIPEN JR) 0.15 mg/0.3 mL pen injection Inject 0.3 mg into the muscle as needed for Anaphylaxis.    fluconazole (DIFLUCAN) 150 MG Tab     golimumab (SIMPONI) 50 mg/0.5 mL PnIj Inject 50 mg into the skin every 30 days.    mupirocin (BACTROBAN) 2 % ointment APPLY EXTERNALLY TO THE AFFECTED AREA THREE TIMES DAILY    NARCAN 4 mg/actuation Spry     oxyCODONE-acetaminophen (PERCOCET)  mg per tablet Take 1 tablet by mouth every 8 (eight) hours as needed for Pain.    predniSONE (DELTASONE) 2.5 MG tablet TAKE 2 TABLETS(5 MG) BY MOUTH DAILY AS NEEDED FOR PAIN    sildenafil (REVATIO) 20 mg Tab Take 1 tablet (20 mg total) by mouth 2 (two) times daily.   Last reviewed on 3/22/2022 10:05 AM by Rosa Martinez NP    Review of patient's allergies indicates:   Allergen Reactions    Asa [aspirin] Anaphylaxis    Azulfidine [sulfasalazine] Anaphylaxis    Ibuprofen Anaphylaxis    Gabapentin      Lip swelling, tongue swelling    Last reviewed on  5/13/2022 10:28 AM by Andrea Khan      Tasks added this encounter   6/23/2022 - Refill Call (Auto Added)   Tasks due within next 3 months   No tasks due.     Oma Sullivan, Patient Care Assistant  [facility]  [address] [place] [address]  Phone: [phone]  Fax: [phone]

## 2022-05-27 ENCOUNTER — OFFICE VISIT (OUTPATIENT)
Dept: RHEUMATOLOGY | Facility: CLINIC | Age: 42
End: 2022-05-27
Payer: MEDICARE

## 2022-05-27 VITALS
WEIGHT: 189.38 LBS | BODY MASS INDEX: 25.1 KG/M2 | HEART RATE: 97 BPM | SYSTOLIC BLOOD PRESSURE: 135 MMHG | HEIGHT: 73 IN | DIASTOLIC BLOOD PRESSURE: 88 MMHG

## 2022-05-27 DIAGNOSIS — M47.817 LUMBAR AND SACRAL ARTHRITIS: ICD-10-CM

## 2022-05-27 DIAGNOSIS — M46.46 LUMBAR DISCITIS: ICD-10-CM

## 2022-05-27 DIAGNOSIS — M45.9 ANKYLOSING SPONDYLITIS, UNSPECIFIED SITE OF SPINE: Primary | ICD-10-CM

## 2022-05-27 DIAGNOSIS — M47.816 LUMBAR SPONDYLOSIS: ICD-10-CM

## 2022-05-27 DIAGNOSIS — L08.9 LOCAL SKIN INFECTION: ICD-10-CM

## 2022-05-27 DIAGNOSIS — E55.9 VITAMIN D DEFICIENCY, UNSPECIFIED: ICD-10-CM

## 2022-05-27 DIAGNOSIS — R74.8 LOW SERUM HDL: ICD-10-CM

## 2022-05-27 DIAGNOSIS — Z79.899 IMMUNOCOMPROMISED STATE DUE TO DRUG THERAPY: ICD-10-CM

## 2022-05-27 DIAGNOSIS — E78.9 LIPID DISORDER: ICD-10-CM

## 2022-05-27 DIAGNOSIS — R79.82 ELEVATED C-REACTIVE PROTEIN (CRP): ICD-10-CM

## 2022-05-27 DIAGNOSIS — G89.4 CHRONIC PAIN SYNDROME: ICD-10-CM

## 2022-05-27 DIAGNOSIS — M62.838 NECK MUSCLE SPASM: ICD-10-CM

## 2022-05-27 DIAGNOSIS — L70.0 ACNE VULGARIS: ICD-10-CM

## 2022-05-27 DIAGNOSIS — D84.821 IMMUNOCOMPROMISED STATE DUE TO DRUG THERAPY: ICD-10-CM

## 2022-05-27 PROCEDURE — 99215 OFFICE O/P EST HI 40 MIN: CPT | Mod: S$PBB,,, | Performed by: INTERNAL MEDICINE

## 2022-05-27 PROCEDURE — 99999 PR PBB SHADOW E&M-EST. PATIENT-LVL IV: ICD-10-PCS | Mod: PBBFAC,,, | Performed by: INTERNAL MEDICINE

## 2022-05-27 PROCEDURE — 99214 OFFICE O/P EST MOD 30 MIN: CPT | Mod: PBBFAC,PN | Performed by: INTERNAL MEDICINE

## 2022-05-27 PROCEDURE — 99215 PR OFFICE/OUTPT VISIT, EST, LEVL V, 40-54 MIN: ICD-10-PCS | Mod: S$PBB,,, | Performed by: INTERNAL MEDICINE

## 2022-05-27 PROCEDURE — 99999 PR PBB SHADOW E&M-EST. PATIENT-LVL IV: CPT | Mod: PBBFAC,,, | Performed by: INTERNAL MEDICINE

## 2022-05-27 RX ORDER — ICOSAPENT ETHYL 1000 MG/1
1 CAPSULE ORAL 2 TIMES DAILY
Qty: 180 CAPSULE | Refills: 3 | Status: SHIPPED | OUTPATIENT
Start: 2022-05-27 | End: 2023-01-27 | Stop reason: SDUPTHER

## 2022-05-27 RX ORDER — OXYCODONE AND ACETAMINOPHEN 10; 325 MG/1; MG/1
1 TABLET ORAL EVERY 8 HOURS PRN
Qty: 90 TABLET | Refills: 0 | Status: SHIPPED | OUTPATIENT
Start: 2022-06-03 | End: 2022-07-03

## 2022-05-27 RX ORDER — OXYCODONE AND ACETAMINOPHEN 10; 325 MG/1; MG/1
1 TABLET ORAL EVERY 8 HOURS PRN
Qty: 90 TABLET | Refills: 0 | Status: SHIPPED | OUTPATIENT
Start: 2022-07-02 | End: 2022-08-01

## 2022-05-27 RX ORDER — CLOTRIMAZOLE AND BETAMETHASONE DIPROPIONATE 10; .64 MG/G; MG/G
CREAM TOPICAL 2 TIMES DAILY
COMMUNITY
Start: 2022-03-26 | End: 2022-05-27 | Stop reason: SDUPTHER

## 2022-05-27 RX ORDER — OXYCODONE AND ACETAMINOPHEN 10; 325 MG/1; MG/1
1 TABLET ORAL EVERY 8 HOURS PRN
Qty: 90 TABLET | Refills: 0 | Status: SHIPPED | OUTPATIENT
Start: 2022-08-02 | End: 2022-08-29 | Stop reason: SDUPTHER

## 2022-05-27 RX ORDER — DOXYCYCLINE 100 MG/1
100 CAPSULE ORAL 2 TIMES DAILY
Qty: 30 CAPSULE | Refills: 3 | Status: SHIPPED | OUTPATIENT
Start: 2022-05-27 | End: 2022-06-11

## 2022-05-27 RX ORDER — CLOTRIMAZOLE AND BETAMETHASONE DIPROPIONATE 10; .64 MG/G; MG/G
CREAM TOPICAL 2 TIMES DAILY
Qty: 45 G | Refills: 6 | Status: SHIPPED | OUTPATIENT
Start: 2022-05-27

## 2022-05-27 NOTE — PROGRESS NOTES
Subjective:       Patient ID: Umang Greer is a 41 y.o. male.    Chief Complaint: Disease Management    Follow up: 40 year old male who presents to clinic for follow up on Ankylosing spondylitis, He has had multiple skin eruptions on his back he started docycline and dapsone. He completed course of doxycycline f He is using hibiclens prn and panoxyl soap slightly improved. He complain  Dental issues he will premed with doxy. He reports good improvement overall with his arthritis on simponi monthly.  He is using prednisone 2.5-5 mg daily PRN.     Current tx:  1. Simponi  2. Prednisone prn  3. norco 10/325 q6            He complains of joint swelling. Associated symptoms include fatigue and myalgias. Pertinent negatives include no fever, trouble swallowing or headaches.         Review of Systems   Constitutional: Positive for activity change and fatigue. Negative for chills, fever and unexpected weight change.   HENT: Negative for mouth sores and trouble swallowing.    Eyes: Positive for redness. Negative for visual disturbance.   Respiratory: Negative for cough, shortness of breath and wheezing.    Cardiovascular: Negative for chest pain, palpitations and leg swelling.   Gastrointestinal: Negative for abdominal pain, constipation, diarrhea, nausea and vomiting.   Genitourinary: Negative for genital sores.   Musculoskeletal: Positive for arthralgias, back pain, joint swelling, myalgias and neck stiffness.   Skin: Positive for rash.   Allergic/Immunologic: Positive for immunocompromised state.   Neurological: Negative for dizziness, syncope and headaches.   Hematological: Negative for adenopathy. Bruises/bleeds easily.         Objective:     Vitals:    05/27/22 0849   BP: 135/88   Pulse: 97       Past Medical History:   Diagnosis Date    Anxiety     Asthma     Cardiomyopathy     CHF (congestive heart failure)     Emphysema of lung     Sickle cell anemia     only trait    Sickle cell trait      Past Surgical  History:   Procedure Laterality Date    HERNIA REPAIR      NASAL SEPTOPLASTY  12/20/2021    Dr. Menjivar at [facility]          Physical Exam   Constitutional: He is oriented to person, place, and time.   HENT:   Head: Normocephalic and atraumatic.   Mouth/Throat: Oropharynx is clear and moist.   Eyes: Pupils are equal, round, and reactive to light.   Neck: No thyromegaly present.   Cardiovascular: Normal rate, regular rhythm and normal heart sounds. Exam reveals no gallop and no friction rub.   No murmur heard.  Pulmonary/Chest: Breath sounds normal. He has no wheezes. He has no rales. He exhibits no tenderness.   Abdominal: There is no abdominal tenderness. There is no rebound and no guarding.   Musculoskeletal:         General: Tenderness and deformity present.      Right shoulder: Tenderness present.      Left shoulder: Tenderness present.      Right elbow: Swelling present. Tenderness present.      Left elbow: Tenderness present.      Right wrist: Swelling and tenderness present.      Left wrist: Swelling and tenderness present.      Cervical back: Neck supple.      Right knee: Swelling and effusion present. Tenderness present.      Left knee: Swelling and effusion present. Tenderness present.   Lymphadenopathy:     He has no cervical adenopathy.   Neurological: He is alert and oriented to person, place, and time. Gait normal.   Skin: No rash noted. No erythema. No pallor.   Psychiatric: Mood, affect and judgment normal.   Vitals reviewed.      Right Side Rheumatological Exam     The patient is tender to palpation of the shoulder, elbow, wrist, knee, 1st PIP, 1st MCP, 2nd PIP, 2nd MCP, 3rd PIP, 3rd MCP, 4th PIP, 4th MCP and 5th PIP    He has swelling of the elbow, wrist, knee, 1st PIP, 1st MCP, 2nd PIP, 2nd MCP, 3rd PIP, 3rd MCP, 4th PIP, 4th MCP, 5th PIP and 5th MCP    The patient has an enlarged wrist and knee    Shoulder Exam   Tenderness Location: no tenderness    Range of Motion   Active abduction: abnormal    Adduction: abnormal  Sensation: normal    Knee Exam   Tenderness Location: medial joint line and LCL  Patellofemoral Crepitus: positive  Effusion: positive  Sensation: normal    Hip Exam   Tenderness Location: posterior and anterior  Sensation: normal    Elbow/Wrist Exam   Tenderness Location: no tenderness  Sensation: normal    Left Side Rheumatological Exam     The patient is tender to palpation of the shoulder, elbow, wrist, knee, 1st PIP, 1st MCP, 2nd PIP, 2nd MCP, 3rd PIP, 3rd MCP, 4th PIP, 4th MCP, 5th PIP and 5th MCP.    He has swelling of the wrist, knee, 1st PIP, 1st MCP, 2nd PIP, 2nd MCP, 3rd PIP, 3rd MCP, 4th PIP, 4th MCP, 5th PIP and 5th MCP    The patient has an enlarged knee.    Shoulder Exam   Tenderness Location: no tenderness    Range of Motion   Active abduction: abnormal   Sensation: normal    Knee Exam   Tenderness Location: lateral joint line and medial joint line    Patellofemoral Crepitus: positive  Effusion: positive  Sensation: normal    Hip Exam   Tenderness Location: posterior and anterior  Sensation: normal    Elbow/Wrist Exam   Sensation: normal      Back/Neck Exam   General Inspection   Gait: normal       Tenderness Right paramedian tenderness of the Upper C-Spine, Lower C-Spine, Lower L-Spine and SI Joint.Left paramedian tenderness of the Upper C-Spine, Lower L-Spine, Lower C-Spine and SI Joint.    Neck Range of Motion   Flexion: Limited  Extension: Limited      Results for orders placed or performed in visit on 05/20/22   LIPID PANEL   Result Value Ref Range    Cholesterol 200 (H) 120 - 199 mg/dL    Triglycerides 167 (H) 30 - 150 mg/dL    HDL 33 (L) 40 - 75 mg/dL    LDL Cholesterol 133.6 63.0 - 159.0 mg/dL    HDL/Cholesterol Ratio 16.5 (L) 20.0 - 50.0 %    Total Cholesterol/HDL Ratio 6.1 (H) 2.0 - 5.0    Non-HDL Cholesterol 167 mg/dL   CBC Auto Differential   Result Value Ref Range    WBC 12.29 3.90 - 12.70 K/uL    RBC 4.83 4.60 - 6.20 M/uL    Hemoglobin 14.2 14.0 - 18.0 g/dL     Hematocrit 41.7 40.0 - 54.0 %    MCV 86 82 - 98 fL    MCH 29.4 27.0 - 31.0 pg    MCHC 34.1 32.0 - 36.0 g/dL    RDW 12.6 11.5 - 14.5 %    Platelets 309 150 - 450 K/uL    MPV 9.3 9.2 - 12.9 fL    Immature Granulocytes 0.5 0.0 - 0.5 %    Gran # (ANC) 7.9 (H) 1.8 - 7.7 K/uL    Immature Grans (Abs) 0.06 (H) 0.00 - 0.04 K/uL    Lymph # 3.5 1.0 - 4.8 K/uL    Mono # 0.6 0.3 - 1.0 K/uL    Eos # 0.2 0.0 - 0.5 K/uL    Baso # 0.06 0.00 - 0.20 K/uL    nRBC 0 0 /100 WBC    Gran % 64.2 38.0 - 73.0 %    Lymph % 28.2 18.0 - 48.0 %    Mono % 4.6 4.0 - 15.0 %    Eosinophil % 2.0 0.0 - 8.0 %    Basophil % 0.5 0.0 - 1.9 %    Differential Method Automated    C-Reactive Protein   Result Value Ref Range    CRP 12.9 (H) 0.0 - 8.2 mg/L   Sedimentation rate   Result Value Ref Range    Sed Rate 21 0 - 23 mm/Hr   Comprehensive Metabolic Panel   Result Value Ref Range    Sodium 139 136 - 145 mmol/L    Potassium 4.0 3.5 - 5.1 mmol/L    Chloride 105 95 - 110 mmol/L    CO2 26 23 - 29 mmol/L    Glucose 98 70 - 110 mg/dL    BUN 12 6 - 20 mg/dL    Creatinine 0.8 0.5 - 1.4 mg/dL    Calcium 10.0 8.7 - 10.5 mg/dL    Total Protein 7.4 6.0 - 8.4 g/dL    Albumin 3.8 3.5 - 5.2 g/dL    Total Bilirubin 0.7 0.1 - 1.0 mg/dL    Alkaline Phosphatase 111 55 - 135 U/L    AST 19 10 - 40 U/L    ALT 17 10 - 44 U/L    Anion Gap 8 8 - 16 mmol/L    eGFR if African American >60.0 >60 mL/min/1.73 m^2    eGFR if non African American >60.0 >60 mL/min/1.73 m^2   HIV 1/2 Ag/Ab (4th Gen)   Result Value Ref Range    HIV 1/2 Ag/Ab Negative Negative     reviewed labs with patient during this visit       Assessment:       1. Ankylosing spondylitis, unspecified site of spine    2. Lipid disorder    3. Low serum HDL    4. Lumbar and sacral arthritis    5. Lumbar discitis    6. Immunocompromised state due to drug therapy    7. Neck muscle spasm    8. Chronic pain syndrome    9. Acne vulgaris    10. Lumbar spondylosis    11. Elevated C-reactive protein (CRP)    12. Vitamin D deficiency,  unspecified     13. Local skin infection            Plan:       Ankylosing spondylitis, unspecified site of spine  -     oxyCODONE-acetaminophen (PERCOCET)  mg per tablet; Take 1 tablet by mouth every 8 (eight) hours as needed for Pain.  Dispense: 90 tablet; Refill: 0  -     oxyCODONE-acetaminophen (PERCOCET)  mg per tablet; Take 1 tablet by mouth every 8 (eight) hours as needed for Pain.  Dispense: 90 tablet; Refill: 0  -     oxyCODONE-acetaminophen (PERCOCET)  mg per tablet; Take 1 tablet by mouth every 8 (eight) hours as needed for Pain.  Dispense: 90 tablet; Refill: 0  -     CBC Auto Differential; Future; Expected date: 05/27/2022  -     Comprehensive Metabolic Panel; Future; Expected date: 05/27/2022  -     Sedimentation rate; Future; Expected date: 05/27/2022  -     C-Reactive Protein; Future; Expected date: 05/27/2022  -     Hepatitis B Core Antibody, IgM; Future; Expected date: 05/27/2022  -     Hepatitis B Surface Ab, Qualitative; Future; Expected date: 05/27/2022  -     Hepatitis B Surface Antigen; Future; Expected date: 05/27/2022  -     Hepatitis C Antibody; Future; Expected date: 05/27/2022  -     Quantiferon Gold TB; Future; Expected date: 05/27/2022    Lipid disorder  -     icosapent ethyL (VASCEPA) 1 gram Cap; Take 1 capsule (1 g total) by mouth 2 (two) times daily.  Dispense: 180 capsule; Refill: 3    Low serum HDL  -     icosapent ethyL (VASCEPA) 1 gram Cap; Take 1 capsule (1 g total) by mouth 2 (two) times daily.  Dispense: 180 capsule; Refill: 3  -     CBC Auto Differential; Future; Expected date: 05/27/2022  -     Comprehensive Metabolic Panel; Future; Expected date: 05/27/2022  -     Sedimentation rate; Future; Expected date: 05/27/2022  -     C-Reactive Protein; Future; Expected date: 05/27/2022  -     Hepatitis B Core Antibody, IgM; Future; Expected date: 05/27/2022  -     Hepatitis B Surface Ab, Qualitative; Future; Expected date: 05/27/2022  -     Hepatitis B Surface  Antigen; Future; Expected date: 05/27/2022  -     Hepatitis C Antibody; Future; Expected date: 05/27/2022  -     Quantiferon Gold TB; Future; Expected date: 05/27/2022    Lumbar and sacral arthritis  -     oxyCODONE-acetaminophen (PERCOCET)  mg per tablet; Take 1 tablet by mouth every 8 (eight) hours as needed for Pain.  Dispense: 90 tablet; Refill: 0  -     oxyCODONE-acetaminophen (PERCOCET)  mg per tablet; Take 1 tablet by mouth every 8 (eight) hours as needed for Pain.  Dispense: 90 tablet; Refill: 0  -     oxyCODONE-acetaminophen (PERCOCET)  mg per tablet; Take 1 tablet by mouth every 8 (eight) hours as needed for Pain.  Dispense: 90 tablet; Refill: 0  -     CBC Auto Differential; Future; Expected date: 05/27/2022  -     Comprehensive Metabolic Panel; Future; Expected date: 05/27/2022  -     Sedimentation rate; Future; Expected date: 05/27/2022  -     C-Reactive Protein; Future; Expected date: 05/27/2022  -     Hepatitis B Core Antibody, IgM; Future; Expected date: 05/27/2022  -     Hepatitis B Surface Ab, Qualitative; Future; Expected date: 05/27/2022  -     Hepatitis B Surface Antigen; Future; Expected date: 05/27/2022  -     Hepatitis C Antibody; Future; Expected date: 05/27/2022  -     Quantiferon Gold TB; Future; Expected date: 05/27/2022    Lumbar discitis  -     oxyCODONE-acetaminophen (PERCOCET)  mg per tablet; Take 1 tablet by mouth every 8 (eight) hours as needed for Pain.  Dispense: 90 tablet; Refill: 0  -     oxyCODONE-acetaminophen (PERCOCET)  mg per tablet; Take 1 tablet by mouth every 8 (eight) hours as needed for Pain.  Dispense: 90 tablet; Refill: 0  -     oxyCODONE-acetaminophen (PERCOCET)  mg per tablet; Take 1 tablet by mouth every 8 (eight) hours as needed for Pain.  Dispense: 90 tablet; Refill: 0  -     CBC Auto Differential; Future; Expected date: 05/27/2022  -     Comprehensive Metabolic Panel; Future; Expected date: 05/27/2022  -     Sedimentation  rate; Future; Expected date: 05/27/2022  -     C-Reactive Protein; Future; Expected date: 05/27/2022  -     Hepatitis B Core Antibody, IgM; Future; Expected date: 05/27/2022  -     Hepatitis B Surface Ab, Qualitative; Future; Expected date: 05/27/2022  -     Hepatitis B Surface Antigen; Future; Expected date: 05/27/2022  -     Hepatitis C Antibody; Future; Expected date: 05/27/2022  -     Quantiferon Gold TB; Future; Expected date: 05/27/2022    Immunocompromised state due to drug therapy  -     oxyCODONE-acetaminophen (PERCOCET)  mg per tablet; Take 1 tablet by mouth every 8 (eight) hours as needed for Pain.  Dispense: 90 tablet; Refill: 0  -     oxyCODONE-acetaminophen (PERCOCET)  mg per tablet; Take 1 tablet by mouth every 8 (eight) hours as needed for Pain.  Dispense: 90 tablet; Refill: 0  -     oxyCODONE-acetaminophen (PERCOCET)  mg per tablet; Take 1 tablet by mouth every 8 (eight) hours as needed for Pain.  Dispense: 90 tablet; Refill: 0  -     CBC Auto Differential; Future; Expected date: 05/27/2022  -     Comprehensive Metabolic Panel; Future; Expected date: 05/27/2022  -     Sedimentation rate; Future; Expected date: 05/27/2022  -     C-Reactive Protein; Future; Expected date: 05/27/2022  -     Hepatitis B Core Antibody, IgM; Future; Expected date: 05/27/2022  -     Hepatitis B Surface Ab, Qualitative; Future; Expected date: 05/27/2022  -     Hepatitis B Surface Antigen; Future; Expected date: 05/27/2022  -     Hepatitis C Antibody; Future; Expected date: 05/27/2022  -     Quantiferon Gold TB; Future; Expected date: 05/27/2022    Neck muscle spasm  -     oxyCODONE-acetaminophen (PERCOCET)  mg per tablet; Take 1 tablet by mouth every 8 (eight) hours as needed for Pain.  Dispense: 90 tablet; Refill: 0  -     oxyCODONE-acetaminophen (PERCOCET)  mg per tablet; Take 1 tablet by mouth every 8 (eight) hours as needed for Pain.  Dispense: 90 tablet; Refill: 0  -      oxyCODONE-acetaminophen (PERCOCET)  mg per tablet; Take 1 tablet by mouth every 8 (eight) hours as needed for Pain.  Dispense: 90 tablet; Refill: 0  -     CBC Auto Differential; Future; Expected date: 05/27/2022  -     Comprehensive Metabolic Panel; Future; Expected date: 05/27/2022  -     Sedimentation rate; Future; Expected date: 05/27/2022  -     C-Reactive Protein; Future; Expected date: 05/27/2022  -     Hepatitis B Core Antibody, IgM; Future; Expected date: 05/27/2022  -     Hepatitis B Surface Ab, Qualitative; Future; Expected date: 05/27/2022  -     Hepatitis B Surface Antigen; Future; Expected date: 05/27/2022  -     Hepatitis C Antibody; Future; Expected date: 05/27/2022  -     Quantiferon Gold TB; Future; Expected date: 05/27/2022    Chronic pain syndrome  -     oxyCODONE-acetaminophen (PERCOCET)  mg per tablet; Take 1 tablet by mouth every 8 (eight) hours as needed for Pain.  Dispense: 90 tablet; Refill: 0  -     oxyCODONE-acetaminophen (PERCOCET)  mg per tablet; Take 1 tablet by mouth every 8 (eight) hours as needed for Pain.  Dispense: 90 tablet; Refill: 0  -     oxyCODONE-acetaminophen (PERCOCET)  mg per tablet; Take 1 tablet by mouth every 8 (eight) hours as needed for Pain.  Dispense: 90 tablet; Refill: 0  -     CBC Auto Differential; Future; Expected date: 05/27/2022  -     Comprehensive Metabolic Panel; Future; Expected date: 05/27/2022  -     Sedimentation rate; Future; Expected date: 05/27/2022  -     C-Reactive Protein; Future; Expected date: 05/27/2022  -     Hepatitis B Core Antibody, IgM; Future; Expected date: 05/27/2022  -     Hepatitis B Surface Ab, Qualitative; Future; Expected date: 05/27/2022  -     Hepatitis B Surface Antigen; Future; Expected date: 05/27/2022  -     Hepatitis C Antibody; Future; Expected date: 05/27/2022  -     Quantiferon Gold TB; Future; Expected date: 05/27/2022    Acne vulgaris  -     CBC Auto Differential; Future; Expected date:  05/27/2022  -     Comprehensive Metabolic Panel; Future; Expected date: 05/27/2022  -     Sedimentation rate; Future; Expected date: 05/27/2022  -     C-Reactive Protein; Future; Expected date: 05/27/2022  -     Hepatitis B Core Antibody, IgM; Future; Expected date: 05/27/2022  -     Hepatitis B Surface Ab, Qualitative; Future; Expected date: 05/27/2022  -     Hepatitis B Surface Antigen; Future; Expected date: 05/27/2022  -     Hepatitis C Antibody; Future; Expected date: 05/27/2022  -     Quantiferon Gold TB; Future; Expected date: 05/27/2022  -     doxycycline (VIBRAMYCIN) 100 MG Cap; Take 1 capsule (100 mg total) by mouth 2 (two) times daily. for 15 days  Dispense: 30 capsule; Refill: 3    Lumbar spondylosis  -     oxyCODONE-acetaminophen (PERCOCET)  mg per tablet; Take 1 tablet by mouth every 8 (eight) hours as needed for Pain.  Dispense: 90 tablet; Refill: 0  -     oxyCODONE-acetaminophen (PERCOCET)  mg per tablet; Take 1 tablet by mouth every 8 (eight) hours as needed for Pain.  Dispense: 90 tablet; Refill: 0  -     oxyCODONE-acetaminophen (PERCOCET)  mg per tablet; Take 1 tablet by mouth every 8 (eight) hours as needed for Pain.  Dispense: 90 tablet; Refill: 0  -     CBC Auto Differential; Future; Expected date: 05/27/2022  -     Comprehensive Metabolic Panel; Future; Expected date: 05/27/2022  -     Sedimentation rate; Future; Expected date: 05/27/2022  -     C-Reactive Protein; Future; Expected date: 05/27/2022  -     Hepatitis B Core Antibody, IgM; Future; Expected date: 05/27/2022  -     Hepatitis B Surface Ab, Qualitative; Future; Expected date: 05/27/2022  -     Hepatitis B Surface Antigen; Future; Expected date: 05/27/2022  -     Hepatitis C Antibody; Future; Expected date: 05/27/2022  -     Quantiferon Gold TB; Future; Expected date: 05/27/2022    Elevated C-reactive protein (CRP)  -     oxyCODONE-acetaminophen (PERCOCET)  mg per tablet; Take 1 tablet by mouth every 8 (eight)  hours as needed for Pain.  Dispense: 90 tablet; Refill: 0  -     oxyCODONE-acetaminophen (PERCOCET)  mg per tablet; Take 1 tablet by mouth every 8 (eight) hours as needed for Pain.  Dispense: 90 tablet; Refill: 0  -     oxyCODONE-acetaminophen (PERCOCET)  mg per tablet; Take 1 tablet by mouth every 8 (eight) hours as needed for Pain.  Dispense: 90 tablet; Refill: 0    Vitamin D deficiency, unspecified   -     oxyCODONE-acetaminophen (PERCOCET)  mg per tablet; Take 1 tablet by mouth every 8 (eight) hours as needed for Pain.  Dispense: 90 tablet; Refill: 0  -     oxyCODONE-acetaminophen (PERCOCET)  mg per tablet; Take 1 tablet by mouth every 8 (eight) hours as needed for Pain.  Dispense: 90 tablet; Refill: 0  -     oxyCODONE-acetaminophen (PERCOCET)  mg per tablet; Take 1 tablet by mouth every 8 (eight) hours as needed for Pain.  Dispense: 90 tablet; Refill: 0    Local skin infection  -     oxyCODONE-acetaminophen (PERCOCET)  mg per tablet; Take 1 tablet by mouth every 8 (eight) hours as needed for Pain.  Dispense: 90 tablet; Refill: 0  -     oxyCODONE-acetaminophen (PERCOCET)  mg per tablet; Take 1 tablet by mouth every 8 (eight) hours as needed for Pain.  Dispense: 90 tablet; Refill: 0  -     oxyCODONE-acetaminophen (PERCOCET)  mg per tablet; Take 1 tablet by mouth every 8 (eight) hours as needed for Pain.  Dispense: 90 tablet; Refill: 0    Other orders  -     clotrimazole-betamethasone 1-0.05% (LOTRISONE) cream; Apply topically 2 (two) times daily.  Dispense: 45 g; Refill: 6        Assessment:  40 year old male with  Ankylosing spondylitis, elevated ESR/CRP on simponi  --chronic pain syndrome on perocet 10/325  --alcoholic cardiomyopathy, EF 55% followed by Cardiology  --COPD, tobacco use  --chronic anxiety on xanax PRN per PCP  --allergy to ASA, NSAIDs    I

## 2022-06-21 ENCOUNTER — SPECIALTY PHARMACY (OUTPATIENT)
Dept: PHARMACY | Facility: CLINIC | Age: 42
End: 2022-06-21
Payer: MEDICARE

## 2022-06-21 NOTE — TELEPHONE ENCOUNTER
Specialty Pharmacy - Clinical Reassessment    Specialty Medication Orders Linked to Encounter    Flowsheet Row Most Recent Value   Medication #1 golimumab (SIMPONI) 50 mg/0.5 mL PnIj (Order#[id number], Rx#[id number])        Patient Diagnosis   M45.9 - Ankylosing spondylitis    Specialty clinical pharmacist review completed for an annual review of reassessment. Reviewed the following areas: current med list, reports of adverse effects, adherence and progress towards therapeutic goals.    Recommendations: none at this time.    Tasks added this encounter   3/21/2023 - Clinical - Follow Up Assesement (Annual)   Tasks due within next 3 months   6/23/2022 - Refill Call (Auto Added)     Makayla Cruz, PharmD  [facility]  [address] [facility] [address]  Phone: [phone]  Fax: [phone]

## 2022-06-23 ENCOUNTER — SPECIALTY PHARMACY (OUTPATIENT)
Dept: PHARMACY | Facility: CLINIC | Age: 42
End: 2022-06-23
Payer: MEDICARE

## 2022-06-23 DIAGNOSIS — M45.9 ANKYLOSING SPONDYLITIS, UNSPECIFIED SITE OF SPINE: ICD-10-CM

## 2022-06-23 NOTE — TELEPHONE ENCOUNTER
Spoke w pt regarding Simponi refill. Refill request sent to [facility]. Pt stated he is due at the end of the month (around the 30th). Will follow up once refills are received.

## 2022-06-24 RX ORDER — GOLIMUMAB 50 MG/.5ML
50 INJECTION, SOLUTION SUBCUTANEOUS
Qty: 0.5 ML | Refills: 6 | Status: SHIPPED | OUTPATIENT
Start: 2022-06-24 | End: 2023-01-24 | Stop reason: SDUPTHER

## 2022-06-24 NOTE — TELEPHONE ENCOUNTER
Specialty Pharmacy - Refill Coordination    Specialty Medication Orders Linked to Encounter    Flowsheet Row Most Recent Value   Medication #1 golimumab (SIMPONI) 50 mg/0.5 mL PnIj (Order#[id number], Rx#[id number])          Refill Questions - Documented Responses    Flowsheet Row Most Recent Value   Patient Availability and HIPAA Verification    Does patient want to proceed with activity? Yes   HIPAA/medical authority confirmed? Yes   Relationship to patient of person spoken to? Self   Refill Screening Questions    Changes to allergies? No   Changes to medications? No   New conditions since last clinic visit? No   Unplanned office visit, urgent care, ED, or hospital admission in the last 4 weeks? No   How does patient/caregiver feel medication is working? Good   Financial problems or insurance changes? No   How many doses of your specialty medications were missed in the last 4 weeks? 0   Would patient like to speak to a pharmacist? No   When does the patient need to receive the medication? 06/30/22   Refill Delivery Questions    How will the patient receive the medication? Delivery Allegra   When does the patient need to receive the medication? 06/30/22   Shipping Address Home   Address in [facility] confirmed and updated if neccessary? Yes   Expected Copay ($) 0   Is the patient able to afford the medication copay? Yes   Payment Method zero copay   Days supply of Refill 30   Supplies needed? No supplies needed   Refill activity completed? Yes   Refill activity plan Refill scheduled   Shipment/Pickup Date: 06/28/22          Current Outpatient Medications   Medication Sig    ALPRAZolam (XANAX) 2 MG Tab Take 1 tablet (2 mg total) by mouth 2 (two) times daily as needed (anxiety).    baclofen (LIORESAL) 20 MG tablet Take 0.5 tablets (10 mg total) by mouth 3 (three) times daily.    budesonide-formoterol 160-4.5 mcg (SYMBICORT) 160-4.5 mcg/actuation HFAA INHALE 2 PUFFS INTO THE LUNGS EVERY 12 HOURS    carvediloL  (COREG) 3.125 MG tablet TAKE 1 TABLET(3.125 MG) BY MOUTH TWICE DAILY    cetirizine (ZYRTEC) 10 MG tablet Take 10 mg by mouth once daily.    clindamycin (CLEOCIN T) 1 % external solution     clotrimazole-betamethasone 1-0.05% (LOTRISONE) cream Apply topically 2 (two) times daily.    cyanocobalamin (VITAMIN B-12) 1000 MCG tablet 1,000 mcg.    dapsone 25 MG Tab Take 1 tablet (25 mg total) by mouth once daily. for 10 days    DULoxetine (CYMBALTA) 30 MG capsule Take 1 capsule (30 mg total) by mouth once daily.    epinephrine (EPIPEN JR) 0.15 mg/0.3 mL pen injection Inject 0.3 mg into the muscle as needed for Anaphylaxis.    fluconazole (DIFLUCAN) 150 MG Tab     golimumab (SIMPONI) 50 mg/0.5 mL PnIj Inject 50 mg into the skin every 30 days.    icosapent ethyL (VASCEPA) 1 gram Cap Take 1 capsule (1 g total) by mouth 2 (two) times daily.    mupirocin (BACTROBAN) 2 % ointment APPLY EXTERNALLY TO THE AFFECTED AREA THREE TIMES DAILY    NARCAN 4 mg/actuation Spry     oxyCODONE-acetaminophen (PERCOCET)  mg per tablet Take 1 tablet by mouth every 8 (eight) hours as needed for Pain.    [START ON 7/2/2022] oxyCODONE-acetaminophen (PERCOCET)  mg per tablet Take 1 tablet by mouth every 8 (eight) hours as needed for Pain.    [START ON 8/2/2022] oxyCODONE-acetaminophen (PERCOCET)  mg per tablet Take 1 tablet by mouth every 8 (eight) hours as needed for Pain.    predniSONE (DELTASONE) 2.5 MG tablet TAKE 2 TABLETS(5 MG) BY MOUTH DAILY AS NEEDED FOR PAIN    sildenafil (REVATIO) 20 mg Tab Take 1 tablet (20 mg total) by mouth 2 (two) times daily.   Last reviewed on 5/27/2022  8:50 AM by Luzma Vera MA    Review of patient's allergies indicates:   Allergen Reactions    Asa [aspirin] Anaphylaxis    Azulfidine [sulfasalazine] Anaphylaxis    Ibuprofen Anaphylaxis    Gabapentin      Lip swelling, tongue swelling    Last reviewed on  6/23/2022 5:06 PM by Radha Fonseca      Tasks added this  encounter   No tasks added.   Tasks due within next 3 months   6/23/2022 - Refill Call (Auto Added)     [facility]  [address]  [facility] [address]  Phone: [phone]  Fax: [phone]

## 2022-07-25 ENCOUNTER — SPECIALTY PHARMACY (OUTPATIENT)
Dept: PHARMACY | Facility: CLINIC | Age: 42
End: 2022-07-25
Payer: MEDICARE

## 2022-07-25 NOTE — TELEPHONE ENCOUNTER
Specialty Pharmacy - Refill Coordination    Specialty Medication Orders Linked to Encounter    Flowsheet Row Most Recent Value   Medication #1 golimumab (SIMPONI) 50 mg/0.5 mL PnIj (Order#[id number], Rx#[id number])          Refill Questions - Documented Responses    Flowsheet Row Most Recent Value   Patient Availability and HIPAA Verification    Does patient want to proceed with activity? Yes   HIPAA/medical authority confirmed? Yes   Relationship to patient of person spoken to? Self   Refill Screening Questions    Changes to allergies? No   Changes to medications? No   New conditions since last clinic visit? No   Unplanned office visit, urgent care, ED, or hospital admission in the last 4 weeks? No   How does patient/caregiver feel medication is working? Good   Financial problems or insurance changes? No   How many doses of your specialty medications were missed in the last 4 weeks? 0   Would patient like to speak to a pharmacist? No   When does the patient need to receive the medication? 07/30/22   Refill Delivery Questions    How will the patient receive the medication? Delivery Allegra   When does the patient need to receive the medication? 07/30/22   Shipping Address Home   Address in [facility] confirmed and updated if neccessary? Yes   Expected Copay ($) 0   Is the patient able to afford the medication copay? Yes   Payment Method zero copay   Days supply of Refill 30   Supplies needed? No supplies needed   Refill activity completed? Yes   Refill activity plan Refill scheduled   Shipment/Pickup Date: 07/27/22          Current Outpatient Medications   Medication Sig    ALPRAZolam (XANAX) 2 MG Tab Take 1 tablet (2 mg total) by mouth 2 (two) times daily as needed (anxiety).    baclofen (LIORESAL) 20 MG tablet Take 0.5 tablets (10 mg total) by mouth 3 (three) times daily.    budesonide-formoterol 160-4.5 mcg (SYMBICORT) 160-4.5 mcg/actuation HFAA INHALE 2 PUFFS INTO THE LUNGS EVERY 12 HOURS    carvediloL  (COREG) 3.125 MG tablet TAKE 1 TABLET(3.125 MG) BY MOUTH TWICE DAILY    cetirizine (ZYRTEC) 10 MG tablet Take 10 mg by mouth once daily.    clindamycin (CLEOCIN T) 1 % external solution     clotrimazole-betamethasone 1-0.05% (LOTRISONE) cream Apply topically 2 (two) times daily.    cyanocobalamin (VITAMIN B-12) 1000 MCG tablet 1,000 mcg.    dapsone 25 MG Tab Take 1 tablet (25 mg total) by mouth once daily. for 10 days    DULoxetine (CYMBALTA) 30 MG capsule Take 1 capsule (30 mg total) by mouth once daily.    epinephrine (EPIPEN JR) 0.15 mg/0.3 mL pen injection Inject 0.3 mg into the muscle as needed for Anaphylaxis.    fluconazole (DIFLUCAN) 150 MG Tab     golimumab (SIMPONI) 50 mg/0.5 mL PnIj Inject 50 mg into the skin every 30 days.    icosapent ethyL (VASCEPA) 1 gram Cap Take 1 capsule (1 g total) by mouth 2 (two) times daily.    mupirocin (BACTROBAN) 2 % ointment APPLY EXTERNALLY TO THE AFFECTED AREA THREE TIMES DAILY    NARCAN 4 mg/actuation Spry     oxyCODONE-acetaminophen (PERCOCET)  mg per tablet Take 1 tablet by mouth every 8 (eight) hours as needed for Pain.    [START ON 8/2/2022] oxyCODONE-acetaminophen (PERCOCET)  mg per tablet Take 1 tablet by mouth every 8 (eight) hours as needed for Pain.    predniSONE (DELTASONE) 2.5 MG tablet TAKE 2 TABLETS(5 MG) BY MOUTH DAILY AS NEEDED FOR PAIN    sildenafil (REVATIO) 20 mg Tab Take 1 tablet (20 mg total) by mouth 2 (two) times daily.   Last reviewed on 5/27/2022  8:50 AM by Luzma Vera MA    Review of patient's allergies indicates:   Allergen Reactions    Asa [aspirin] Anaphylaxis    Azulfidine [sulfasalazine] Anaphylaxis    Ibuprofen Anaphylaxis    Gabapentin      Lip swelling, tongue swelling    Last reviewed on  6/23/2022 5:06 PM by Radha Fonseca      Tasks added this encounter   No tasks added.   Tasks due within next 3 months   7/23/2022 - Refill Call (Auto Added)     [facility]  [address] [facility] [address]  Phone: [phone]  Fax: [phone]

## 2022-08-22 ENCOUNTER — PATIENT MESSAGE (OUTPATIENT)
Dept: PHARMACY | Facility: CLINIC | Age: 42
End: 2022-08-22
Payer: MEDICARE

## 2022-08-25 ENCOUNTER — SPECIALTY PHARMACY (OUTPATIENT)
Dept: PHARMACY | Facility: CLINIC | Age: 42
End: 2022-08-25
Payer: MEDICARE

## 2022-08-25 DIAGNOSIS — M45.9 ANKYLOSING SPONDYLITIS, UNSPECIFIED SITE OF SPINE: Primary | ICD-10-CM

## 2022-08-25 NOTE — TELEPHONE ENCOUNTER
Simponi rx is requiring a new PA. Last pa  on . Patient is due to inject on . Routing to assigned pharmacist

## 2022-08-25 NOTE — TELEPHONE ENCOUNTER
Simponi for AS continuation of therapy PA submitted and approved through 8/25/2023. Called pt $0 copay @004, no answer, LVM.

## 2022-08-26 NOTE — TELEPHONE ENCOUNTER
Specialty Pharmacy - Refill Coordination  Specialty Pharmacy - Medication/Referral Authorization    Specialty Medication Orders Linked to Encounter    Flowsheet Row Most Recent Value   Medication #1 golimumab (SIMPONI) 50 mg/0.5 mL PnIj (Order#[id number], Rx#[id number])          Refill Questions - Documented Responses    Flowsheet Row Most Recent Value   Patient Availability and HIPAA Verification    Does patient want to proceed with activity? Yes   HIPAA/medical authority confirmed? Yes   Relationship to patient of person spoken to? Self   Refill Screening Questions    Changes to allergies? No   Changes to medications? No   New conditions since last clinic visit? No   Unplanned office visit, urgent care, ED, or hospital admission in the last 4 weeks? No   How does patient/caregiver feel medication is working? Good   Financial problems or insurance changes? No   How many doses of your specialty medications were missed in the last 4 weeks? 0   Would patient like to speak to a pharmacist? No   When does the patient need to receive the medication? 08/31/22   Refill Delivery Questions    How will the patient receive the medication? Delivery Allegra   When does the patient need to receive the medication? 08/31/22   Shipping Address Home   Address in [facility] confirmed and updated if neccessary? Yes   Expected Copay ($) 0   Is the patient able to afford the medication copay? Yes   Payment Method zero copay   Days supply of Refill 30   Supplies needed? No supplies needed   Refill activity completed? Yes   Refill activity plan Refill scheduled   Shipment/Pickup Date: 08/26/22          Current Outpatient Medications   Medication Sig    ALPRAZolam (XANAX) 2 MG Tab Take 1 tablet (2 mg total) by mouth 2 (two) times daily as needed (anxiety).    baclofen (LIORESAL) 20 MG tablet Take 0.5 tablets (10 mg total) by mouth 3 (three) times daily.    budesonide-formoterol 160-4.5 mcg (SYMBICORT) 160-4.5 mcg/actuation HFAA  INHALE 2 PUFFS INTO THE LUNGS EVERY 12 HOURS    carvediloL (COREG) 3.125 MG tablet TAKE 1 TABLET(3.125 MG) BY MOUTH TWICE DAILY    cetirizine (ZYRTEC) 10 MG tablet Take 10 mg by mouth once daily.    clindamycin (CLEOCIN T) 1 % external solution     clotrimazole-betamethasone 1-0.05% (LOTRISONE) cream Apply topically 2 (two) times daily.    cyanocobalamin (VITAMIN B-12) 1000 MCG tablet 1,000 mcg.    dapsone 25 MG Tab Take 1 tablet (25 mg total) by mouth once daily. for 10 days    DULoxetine (CYMBALTA) 30 MG capsule Take 1 capsule (30 mg total) by mouth once daily.    epinephrine (EPIPEN JR) 0.15 mg/0.3 mL pen injection Inject 0.3 mg into the muscle as needed for Anaphylaxis.    fluconazole (DIFLUCAN) 150 MG Tab     golimumab (SIMPONI) 50 mg/0.5 mL PnIj Inject 0.5 mL (50 mg) into the skin every 30 days.    icosapent ethyL (VASCEPA) 1 gram Cap Take 1 capsule (1 g total) by mouth 2 (two) times daily.    mupirocin (BACTROBAN) 2 % ointment APPLY EXTERNALLY TO THE AFFECTED AREA THREE TIMES DAILY    NARCAN 4 mg/actuation Spry     oxyCODONE-acetaminophen (PERCOCET)  mg per tablet Take 1 tablet by mouth every 8 (eight) hours as needed for Pain.    predniSONE (DELTASONE) 2.5 MG tablet TAKE 2 TABLETS(5 MG) BY MOUTH DAILY AS NEEDED FOR PAIN    sildenafil (REVATIO) 20 mg Tab Take 1 tablet (20 mg total) by mouth 2 (two) times daily.   Last reviewed on 5/27/2022  8:50 AM by Luzma Vera MA    Review of patient's allergies indicates:   Allergen Reactions    Asa [aspirin] Anaphylaxis    Azulfidine [sulfasalazine] Anaphylaxis    Ibuprofen Anaphylaxis    Gabapentin      Lip swelling, tongue swelling    Last reviewed on  7/25/2022 1:46 PM by Sophia Rizo      Tasks added this encounter   9/23/2022 - Refill Call (Auto Added)   Tasks due within next 3 months   No tasks due.     Melodie Moreau, PharmD  [facility] - [facility] Pharm[facility]  [address] [facility] [address]  Phone: [phone]  Fax:  [phone]

## 2022-08-29 DIAGNOSIS — L08.9 LOCAL SKIN INFECTION: ICD-10-CM

## 2022-08-29 DIAGNOSIS — G89.4 CHRONIC PAIN SYNDROME: ICD-10-CM

## 2022-08-29 DIAGNOSIS — M47.816 LUMBAR SPONDYLOSIS: ICD-10-CM

## 2022-08-29 DIAGNOSIS — M46.46 LUMBAR DISCITIS: ICD-10-CM

## 2022-08-29 DIAGNOSIS — M62.838 NECK MUSCLE SPASM: ICD-10-CM

## 2022-08-29 DIAGNOSIS — E55.9 VITAMIN D DEFICIENCY, UNSPECIFIED: ICD-10-CM

## 2022-08-29 DIAGNOSIS — D84.821 IMMUNOCOMPROMISED STATE DUE TO DRUG THERAPY: ICD-10-CM

## 2022-08-29 DIAGNOSIS — M45.9 ANKYLOSING SPONDYLITIS, UNSPECIFIED SITE OF SPINE: ICD-10-CM

## 2022-08-29 DIAGNOSIS — Z79.899 IMMUNOCOMPROMISED STATE DUE TO DRUG THERAPY: ICD-10-CM

## 2022-08-29 DIAGNOSIS — M47.817 LUMBAR AND SACRAL ARTHRITIS: ICD-10-CM

## 2022-08-29 DIAGNOSIS — R79.82 ELEVATED C-REACTIVE PROTEIN (CRP): ICD-10-CM

## 2022-08-29 RX ORDER — PREDNISONE 2.5 MG/1
TABLET ORAL
Qty: 60 TABLET | Refills: 5 | Status: CANCELLED | OUTPATIENT
Start: 2022-08-29

## 2022-09-01 ENCOUNTER — PATIENT MESSAGE (OUTPATIENT)
Dept: RHEUMATOLOGY | Facility: CLINIC | Age: 42
End: 2022-09-01
Payer: MEDICARE

## 2022-09-02 RX ORDER — OXYCODONE AND ACETAMINOPHEN 10; 325 MG/1; MG/1
1 TABLET ORAL EVERY 8 HOURS PRN
Qty: 90 TABLET | Refills: 0 | Status: SHIPPED | OUTPATIENT
Start: 2022-09-02 | End: 2022-09-26 | Stop reason: SDUPTHER

## 2022-09-02 RX ORDER — PREDNISONE 2.5 MG/1
5 TABLET ORAL DAILY PRN
Qty: 60 TABLET | Refills: 5 | Status: SHIPPED | OUTPATIENT
Start: 2022-09-02 | End: 2023-01-27 | Stop reason: SDUPTHER

## 2022-09-08 ENCOUNTER — OFFICE VISIT (OUTPATIENT)
Dept: FAMILY MEDICINE | Facility: CLINIC | Age: 42
End: 2022-09-08
Payer: MEDICARE

## 2022-09-08 VITALS
HEART RATE: 89 BPM | HEIGHT: 73 IN | DIASTOLIC BLOOD PRESSURE: 78 MMHG | BODY MASS INDEX: 24.69 KG/M2 | OXYGEN SATURATION: 98 % | WEIGHT: 186.31 LBS | SYSTOLIC BLOOD PRESSURE: 116 MMHG

## 2022-09-08 DIAGNOSIS — F41.9 ANXIETY: Primary | ICD-10-CM

## 2022-09-08 DIAGNOSIS — R07.81 RIB PAIN ON RIGHT SIDE: ICD-10-CM

## 2022-09-08 DIAGNOSIS — E78.5 HYPERLIPIDEMIA, UNSPECIFIED HYPERLIPIDEMIA TYPE: ICD-10-CM

## 2022-09-08 PROCEDURE — 99214 OFFICE O/P EST MOD 30 MIN: CPT | Mod: PBBFAC,PO | Performed by: FAMILY MEDICINE

## 2022-09-08 PROCEDURE — 99999 PR PBB SHADOW E&M-EST. PATIENT-LVL IV: ICD-10-PCS | Mod: PBBFAC,,, | Performed by: FAMILY MEDICINE

## 2022-09-08 PROCEDURE — 99214 OFFICE O/P EST MOD 30 MIN: CPT | Mod: S$PBB,,, | Performed by: FAMILY MEDICINE

## 2022-09-08 PROCEDURE — 99214 PR OFFICE/OUTPT VISIT, EST, LEVL IV, 30-39 MIN: ICD-10-PCS | Mod: S$PBB,,, | Performed by: FAMILY MEDICINE

## 2022-09-08 PROCEDURE — 99999 PR PBB SHADOW E&M-EST. PATIENT-LVL IV: CPT | Mod: PBBFAC,,, | Performed by: FAMILY MEDICINE

## 2022-09-08 RX ORDER — DOXYCYCLINE 100 MG/1
100 CAPSULE ORAL 2 TIMES DAILY
COMMUNITY
Start: 2022-09-01 | End: 2023-09-27 | Stop reason: SDUPTHER

## 2022-09-08 NOTE — PROGRESS NOTES
Subjective:       Patient ID: Umang Greer is a 42 y.o. male.    Chief Complaint: Follow-up (Medication/Ribs on right side )    Here for f/u anxiety and chronic medical issues. Doing well overall. Prn anxiety med working well still. Hit right rib on console of truck.    Follow-up  Associated symptoms include arthralgias and neck pain. Pertinent negatives include no chest pain, chills, coughing, fever, headaches, joint swelling, vomiting or weakness.   Review of Systems   Constitutional:  Positive for activity change. Negative for chills, fever and unexpected weight change.   HENT:  Negative for hearing loss, rhinorrhea and trouble swallowing.    Eyes:  Negative for discharge and visual disturbance.   Respiratory:  Negative for cough, chest tightness, shortness of breath and wheezing.    Cardiovascular:  Negative for chest pain, palpitations and leg swelling.   Gastrointestinal:  Negative for blood in stool, constipation, diarrhea and vomiting.   Endocrine: Negative for cold intolerance, heat intolerance, polydipsia and polyuria.   Genitourinary:  Negative for difficulty urinating, hematuria and urgency.   Musculoskeletal:  Positive for arthralgias and neck pain. Negative for joint swelling.   Neurological:  Negative for weakness and headaches.   Psychiatric/Behavioral:  Negative for confusion, decreased concentration and dysphoric mood. The patient is nervous/anxious.      Objective:      Physical Exam  Vitals and nursing note reviewed.   Constitutional:       Appearance: He is well-developed.   HENT:      Head: Normocephalic and atraumatic.   Cardiovascular:      Rate and Rhythm: Normal rate and regular rhythm.      Heart sounds: Normal heart sounds.   Pulmonary:      Effort: Pulmonary effort is normal.      Breath sounds: Normal breath sounds.       Assessment:       1. Anxiety    2. Hyperlipidemia, unspecified hyperlipidemia type    3. Rib pain on right side          Plan:       Anxiety    Hyperlipidemia,  unspecified hyperlipidemia type    Rib pain on right side    Goal to wean med as tolerated  Lipid stable on omega  3  Will monitor chronic medical issues and continue current plan of care.  Monitor rib pain    Follow up in about 3 months (around 12/8/2022), or if symptoms worsen or fail to improve, for Virtual Visit.

## 2022-09-15 ENCOUNTER — PATIENT MESSAGE (OUTPATIENT)
Dept: RHEUMATOLOGY | Facility: CLINIC | Age: 42
End: 2022-09-15
Payer: MEDICARE

## 2022-09-21 ENCOUNTER — LAB VISIT (OUTPATIENT)
Dept: LAB | Facility: HOSPITAL | Age: 42
End: 2022-09-21
Attending: INTERNAL MEDICINE
Payer: MEDICARE

## 2022-09-21 DIAGNOSIS — M47.817 LUMBAR AND SACRAL ARTHRITIS: ICD-10-CM

## 2022-09-21 DIAGNOSIS — G89.4 CHRONIC PAIN SYNDROME: ICD-10-CM

## 2022-09-21 DIAGNOSIS — D84.821 IMMUNOCOMPROMISED STATE DUE TO DRUG THERAPY: ICD-10-CM

## 2022-09-21 DIAGNOSIS — L70.0 ACNE VULGARIS: ICD-10-CM

## 2022-09-21 DIAGNOSIS — M62.838 NECK MUSCLE SPASM: ICD-10-CM

## 2022-09-21 DIAGNOSIS — M46.46 LUMBAR DISCITIS: ICD-10-CM

## 2022-09-21 DIAGNOSIS — M47.816 LUMBAR SPONDYLOSIS: ICD-10-CM

## 2022-09-21 DIAGNOSIS — Z79.899 IMMUNOCOMPROMISED STATE DUE TO DRUG THERAPY: ICD-10-CM

## 2022-09-21 DIAGNOSIS — M45.9 ANKYLOSING SPONDYLITIS, UNSPECIFIED SITE OF SPINE: ICD-10-CM

## 2022-09-21 DIAGNOSIS — R74.8 LOW SERUM HDL: ICD-10-CM

## 2022-09-21 LAB
ALBUMIN SERPL BCP-MCNC: 4.2 G/DL (ref 3.5–5.2)
ALP SERPL-CCNC: 118 U/L (ref 55–135)
ALT SERPL W/O P-5'-P-CCNC: 18 U/L (ref 10–44)
ANION GAP SERPL CALC-SCNC: 5 MMOL/L (ref 8–16)
AST SERPL-CCNC: 20 U/L (ref 10–40)
BASOPHILS # BLD AUTO: 0.07 K/UL (ref 0–0.2)
BASOPHILS NFR BLD: 0.5 % (ref 0–1.9)
BILIRUB SERPL-MCNC: 0.4 MG/DL (ref 0.1–1)
BUN SERPL-MCNC: 13 MG/DL (ref 6–20)
CALCIUM SERPL-MCNC: 9.9 MG/DL (ref 8.7–10.5)
CHLORIDE SERPL-SCNC: 102 MMOL/L (ref 95–110)
CO2 SERPL-SCNC: 27 MMOL/L (ref 23–29)
CREAT SERPL-MCNC: 0.9 MG/DL (ref 0.5–1.4)
CRP SERPL-MCNC: 7 MG/L (ref 0–8.2)
DIFFERENTIAL METHOD: ABNORMAL
EOSINOPHIL # BLD AUTO: 0.1 K/UL (ref 0–0.5)
EOSINOPHIL NFR BLD: 0.8 % (ref 0–8)
ERYTHROCYTE [DISTWIDTH] IN BLOOD BY AUTOMATED COUNT: 13 % (ref 11.5–14.5)
ERYTHROCYTE [SEDIMENTATION RATE] IN BLOOD BY PHOTOMETRIC METHOD: 26 MM/HR (ref 0–23)
EST. GFR  (NO RACE VARIABLE): >60 ML/MIN/1.73 M^2
GLUCOSE SERPL-MCNC: 105 MG/DL (ref 70–110)
HBV CORE IGM SERPL QL IA: NORMAL
HBV SURFACE AB SER-ACNC: <3 MIU/ML
HBV SURFACE AB SER-ACNC: NORMAL M[IU]/ML
HBV SURFACE AG SERPL QL IA: NORMAL
HCT VFR BLD AUTO: 43.3 % (ref 40–54)
HCV AB SERPL QL IA: NORMAL
HGB BLD-MCNC: 14.4 G/DL (ref 14–18)
IMM GRANULOCYTES # BLD AUTO: 0.04 K/UL (ref 0–0.04)
IMM GRANULOCYTES NFR BLD AUTO: 0.3 % (ref 0–0.5)
LYMPHOCYTES # BLD AUTO: 2.2 K/UL (ref 1–4.8)
LYMPHOCYTES NFR BLD: 15 % (ref 18–48)
MCH RBC QN AUTO: 30.2 PG (ref 27–31)
MCHC RBC AUTO-ENTMCNC: 33.3 G/DL (ref 32–36)
MCV RBC AUTO: 91 FL (ref 82–98)
MONOCYTES # BLD AUTO: 0.7 K/UL (ref 0.3–1)
MONOCYTES NFR BLD: 4.6 % (ref 4–15)
NEUTROPHILS # BLD AUTO: 11.8 K/UL (ref 1.8–7.7)
NEUTROPHILS NFR BLD: 78.8 % (ref 38–73)
NRBC BLD-RTO: 0 /100 WBC
PLATELET # BLD AUTO: 311 K/UL (ref 150–450)
PMV BLD AUTO: 10 FL (ref 9.2–12.9)
POTASSIUM SERPL-SCNC: 4 MMOL/L (ref 3.5–5.1)
PROT SERPL-MCNC: 7.9 G/DL (ref 6–8.4)
RBC # BLD AUTO: 4.77 M/UL (ref 4.6–6.2)
SODIUM SERPL-SCNC: 134 MMOL/L (ref 136–145)
WBC # BLD AUTO: 14.9 K/UL (ref 3.9–12.7)

## 2022-09-21 PROCEDURE — 86140 C-REACTIVE PROTEIN: CPT | Performed by: INTERNAL MEDICINE

## 2022-09-21 PROCEDURE — 85652 RBC SED RATE AUTOMATED: CPT | Performed by: INTERNAL MEDICINE

## 2022-09-21 PROCEDURE — 80053 COMPREHEN METABOLIC PANEL: CPT | Performed by: INTERNAL MEDICINE

## 2022-09-21 PROCEDURE — 86705 HEP B CORE ANTIBODY IGM: CPT | Performed by: INTERNAL MEDICINE

## 2022-09-21 PROCEDURE — 85025 COMPLETE CBC W/AUTO DIFF WBC: CPT | Performed by: INTERNAL MEDICINE

## 2022-09-21 PROCEDURE — 36415 COLL VENOUS BLD VENIPUNCTURE: CPT | Mod: PO | Performed by: INTERNAL MEDICINE

## 2022-09-21 PROCEDURE — 86803 HEPATITIS C AB TEST: CPT | Performed by: INTERNAL MEDICINE

## 2022-09-21 PROCEDURE — 87340 HEPATITIS B SURFACE AG IA: CPT | Performed by: INTERNAL MEDICINE

## 2022-09-21 PROCEDURE — 86706 HEP B SURFACE ANTIBODY: CPT | Mod: 91 | Performed by: INTERNAL MEDICINE

## 2022-09-23 ENCOUNTER — PATIENT MESSAGE (OUTPATIENT)
Dept: PHARMACY | Facility: CLINIC | Age: 42
End: 2022-09-23
Payer: MEDICARE

## 2022-09-23 ENCOUNTER — SPECIALTY PHARMACY (OUTPATIENT)
Dept: PHARMACY | Facility: CLINIC | Age: 42
End: 2022-09-23
Payer: MEDICARE

## 2022-09-23 NOTE — TELEPHONE ENCOUNTER
Specialty Pharmacy - Refill Coordination    Specialty Medication Orders Linked to Encounter      Flowsheet Row Most Recent Value   Medication #1 golimumab (SIMPONI) 50 mg/0.5 mL PnIj (Order#[id number], Rx#[id number])            Refill Questions - Documented Responses      Flowsheet Row Most Recent Value   Patient Availability and HIPAA Verification    Does patient want to proceed with activity? Yes   HIPAA/medical authority confirmed? Yes   Relationship to patient of person spoken to? Self   Refill Screening Questions    Changes to medications? No   New conditions since last clinic visit? No   Unplanned office visit, urgent care, ED, or hospital admission in the last 4 weeks? No   How does patient/caregiver feel medication is working? Very good   Financial problems or insurance changes? No   How many doses of your specialty medications were missed in the last 4 weeks? 0   Would patient like to speak to a pharmacist? No   When does the patient need to receive the medication? 09/30/22   Refill Delivery Questions    How will the patient receive the medication? Delivery Allegra   When does the patient need to receive the medication? 09/30/22   Shipping Address Home   Address in [facility] confirmed and updated if neccessary? Yes   Expected Copay ($) 0   Is the patient able to afford the medication copay? Yes   Payment Method zero copay   Days supply of Refill 30   Supplies needed? No supplies needed   Refill activity completed? Yes   Refill activity plan Refill scheduled   Shipment/Pickup Date: 09/26/22            Current Outpatient Medications   Medication Sig    ALPRAZolam (XANAX) 2 MG Tab Take 1 tablet (2 mg total) by mouth 2 (two) times daily as needed (anxiety).    baclofen (LIORESAL) 20 MG tablet TAKE 1/2 TABLET(10 MG) BY MOUTH THREE TIMES DAILY    budesonide-formoterol 160-4.5 mcg (SYMBICORT) 160-4.5 mcg/actuation HFAA INHALE 2 PUFFS INTO THE LUNGS EVERY 12 HOURS    carvediloL (COREG) 3.125 MG tablet TAKE 1  TABLET(3.125 MG) BY MOUTH TWICE DAILY    cetirizine (ZYRTEC) 10 MG tablet Take 10 mg by mouth once daily.    clindamycin (CLEOCIN T) 1 % external solution     clotrimazole-betamethasone 1-0.05% (LOTRISONE) cream Apply topically 2 (two) times daily.    cyanocobalamin (VITAMIN B-12) 1000 MCG tablet 1,000 mcg.    dapsone 25 MG Tab Take 1 tablet (25 mg total) by mouth once daily. for 10 days    doxycycline (VIBRAMYCIN) 100 MG Cap Take 100 mg by mouth 2 (two) times daily.    DULoxetine (CYMBALTA) 30 MG capsule Take 1 capsule (30 mg total) by mouth once daily.    epinephrine (EPIPEN JR) 0.15 mg/0.3 mL pen injection Inject 0.3 mg into the muscle as needed for Anaphylaxis.    fluconazole (DIFLUCAN) 150 MG Tab     golimumab (SIMPONI) 50 mg/0.5 mL PnIj Inject 0.5 mL (50 mg) into the skin every 30 days.    icosapent ethyL (VASCEPA) 1 gram Cap Take 1 capsule (1 g total) by mouth 2 (two) times daily.    mupirocin (BACTROBAN) 2 % ointment APPLY EXTERNALLY TO THE AFFECTED AREA THREE TIMES DAILY    NARCAN 4 mg/actuation Spry     oxyCODONE-acetaminophen (PERCOCET)  mg per tablet Take 1 tablet by mouth every 8 (eight) hours as needed for Pain.    predniSONE (DELTASONE) 2.5 MG tablet Take 2 tablets (5 mg total) by mouth daily as needed.    sildenafil (REVATIO) 20 mg Tab Take 1 tablet (20 mg total) by mouth 2 (two) times daily.   Last reviewed on 9/8/2022 10:44 AM by Carolyne Patton MA    Review of patient's allergies indicates:   Allergen Reactions    Asa [aspirin] Anaphylaxis    Azulfidine [sulfasalazine] Anaphylaxis    Ibuprofen Anaphylaxis    Gabapentin      Lip swelling, tongue swelling    Last reviewed on  9/8/2022 10:44 AM by Carolyne Patton      Tasks added this encounter   10/23/2022 - Refill Call (Auto Added)   Tasks due within next 3 months   No tasks due.     Dorothea Aly, PharmD  [facility]  [address] [place] [address]  Phone: [phone]  Fax: [phone]

## 2022-09-26 ENCOUNTER — OFFICE VISIT (OUTPATIENT)
Dept: RHEUMATOLOGY | Facility: CLINIC | Age: 42
End: 2022-09-26
Payer: MEDICARE

## 2022-09-26 VITALS
BODY MASS INDEX: 24.6 KG/M2 | HEIGHT: 73 IN | WEIGHT: 185.63 LBS | DIASTOLIC BLOOD PRESSURE: 83 MMHG | HEART RATE: 79 BPM | SYSTOLIC BLOOD PRESSURE: 126 MMHG

## 2022-09-26 DIAGNOSIS — M62.838 NECK MUSCLE SPASM: ICD-10-CM

## 2022-09-26 DIAGNOSIS — M47.817 LUMBAR AND SACRAL ARTHRITIS: ICD-10-CM

## 2022-09-26 DIAGNOSIS — L70.0 ACNE VULGARIS: ICD-10-CM

## 2022-09-26 DIAGNOSIS — M45.9 ANKYLOSING SPONDYLITIS, UNSPECIFIED SITE OF SPINE: Primary | ICD-10-CM

## 2022-09-26 DIAGNOSIS — D84.821 IMMUNOCOMPROMISED STATE DUE TO DRUG THERAPY: ICD-10-CM

## 2022-09-26 DIAGNOSIS — M46.46 LUMBAR DISCITIS: ICD-10-CM

## 2022-09-26 DIAGNOSIS — Z79.899 IMMUNOCOMPROMISED STATE DUE TO DRUG THERAPY: ICD-10-CM

## 2022-09-26 DIAGNOSIS — R79.82 ELEVATED C-REACTIVE PROTEIN (CRP): ICD-10-CM

## 2022-09-26 DIAGNOSIS — M47.816 LUMBAR SPONDYLOSIS: ICD-10-CM

## 2022-09-26 DIAGNOSIS — I42.6 CARDIOMYOPATHY, ALCOHOLIC: ICD-10-CM

## 2022-09-26 DIAGNOSIS — K04.7 DENTAL INFECTION: ICD-10-CM

## 2022-09-26 DIAGNOSIS — E55.9 VITAMIN D DEFICIENCY, UNSPECIFIED: ICD-10-CM

## 2022-09-26 DIAGNOSIS — G89.4 CHRONIC PAIN SYNDROME: ICD-10-CM

## 2022-09-26 DIAGNOSIS — L08.9 LOCAL SKIN INFECTION: ICD-10-CM

## 2022-09-26 PROCEDURE — 99999 PR PBB SHADOW E&M-EST. PATIENT-LVL III: CPT | Mod: PBBFAC,,, | Performed by: INTERNAL MEDICINE

## 2022-09-26 PROCEDURE — 99215 PR OFFICE/OUTPT VISIT, EST, LEVL V, 40-54 MIN: ICD-10-PCS | Mod: S$PBB,,, | Performed by: INTERNAL MEDICINE

## 2022-09-26 PROCEDURE — 99999 PR PBB SHADOW E&M-EST. PATIENT-LVL III: ICD-10-PCS | Mod: PBBFAC,,, | Performed by: INTERNAL MEDICINE

## 2022-09-26 PROCEDURE — 99215 OFFICE O/P EST HI 40 MIN: CPT | Mod: S$PBB,,, | Performed by: INTERNAL MEDICINE

## 2022-09-26 PROCEDURE — 99213 OFFICE O/P EST LOW 20 MIN: CPT | Mod: PBBFAC,PN | Performed by: INTERNAL MEDICINE

## 2022-09-26 RX ORDER — AMOXICILLIN 875 MG/1
875 TABLET, FILM COATED ORAL EVERY 12 HOURS
Qty: 14 TABLET | Refills: 0 | Status: SHIPPED | OUTPATIENT
Start: 2022-09-26 | End: 2022-10-03

## 2022-09-26 RX ORDER — OXYCODONE AND ACETAMINOPHEN 10; 325 MG/1; MG/1
1 TABLET ORAL EVERY 8 HOURS PRN
Qty: 90 TABLET | Refills: 0 | Status: SHIPPED | OUTPATIENT
Start: 2022-12-01 | End: 2022-12-23 | Stop reason: SDUPTHER

## 2022-09-26 RX ORDER — OXYCODONE AND ACETAMINOPHEN 10; 325 MG/1; MG/1
1 TABLET ORAL EVERY 8 HOURS PRN
Qty: 90 TABLET | Refills: 0 | Status: SHIPPED | OUTPATIENT
Start: 2022-11-01 | End: 2022-12-01

## 2022-09-26 RX ORDER — CHLORHEXIDINE GLUCONATE ORAL RINSE 1.2 MG/ML
15 SOLUTION DENTAL 2 TIMES DAILY
Qty: 473 ML | Refills: 6 | Status: SHIPPED | OUTPATIENT
Start: 2022-09-26 | End: 2022-10-10

## 2022-09-26 RX ORDER — FLUCONAZOLE 150 MG/1
150 TABLET ORAL DAILY
Qty: 3 TABLET | Refills: 0 | Status: SHIPPED | OUTPATIENT
Start: 2022-09-26 | End: 2022-09-29

## 2022-09-26 RX ORDER — OXYCODONE AND ACETAMINOPHEN 10; 325 MG/1; MG/1
1 TABLET ORAL EVERY 8 HOURS PRN
Qty: 90 TABLET | Refills: 0 | Status: SHIPPED | OUTPATIENT
Start: 2022-10-01 | End: 2022-10-31

## 2022-09-26 NOTE — PROGRESS NOTES
Subjective:       Patient ID: Umang Greer is a 42 y.o. male.    Chief Complaint: Disease Management    Follow up: 42 year old male who presents to clinic for follow up on Ankylosing spondylitis, He has had multiple skin eruptions on his back he started docycline and dapsone. He completed course of doxycycline f He is using hibiclens prn and panoxyl soap slightly improved. He complain  Dental issues he will premed with doxy. He reports good improvement overall with his arthritis on simponi monthly.  He is using prednisone 2.5-5 mg daily PRN.     Current tx:  1. Simponi  2. Prednisone prn  3. norco 10/325 q6    Review of Systems   Constitutional:  Positive for activity change and fatigue. Negative for chills and unexpected weight change.   HENT:  Negative for mouth sores.    Eyes:  Positive for redness. Negative for visual disturbance.   Respiratory:  Negative for cough, shortness of breath and wheezing.    Cardiovascular:  Negative for chest pain, palpitations and leg swelling.   Gastrointestinal:  Negative for abdominal pain, constipation, diarrhea, nausea and vomiting.   Genitourinary:  Negative for genital sores.   Musculoskeletal:  Positive for arthralgias, back pain, joint swelling and neck stiffness.   Skin:  Positive for rash.   Allergic/Immunologic: Positive for immunocompromised state.   Neurological:  Negative for dizziness and syncope.   Hematological:  Negative for adenopathy. Bruises/bleeds easily.       Objective:     Vitals:    09/26/22 1009   BP: 126/83   Pulse: 79       Past Medical History:   Diagnosis Date    Anxiety     Asthma     Cardiomyopathy     CHF (congestive heart failure)     Emphysema of lung     Sickle cell anemia     only trait    Sickle cell trait      Past Surgical History:   Procedure Laterality Date    HERNIA REPAIR      NASAL SEPTOPLASTY  12/20/2021    Dr. Menjivar at [facility]          Physical Exam   Constitutional: He is oriented to person, place, and time.   HENT:   Head:  Normocephalic and atraumatic.   Mouth/Throat: Oropharynx is clear and moist.   Eyes: Pupils are equal, round, and reactive to light.   Neck: No thyromegaly present.   Cardiovascular: Normal rate, regular rhythm and normal heart sounds. Exam reveals no gallop and no friction rub.   No murmur heard.  Pulmonary/Chest: Breath sounds normal. He has no wheezes. He has no rales. He exhibits no tenderness.   Abdominal: There is no abdominal tenderness. There is no rebound and no guarding.   Musculoskeletal:         General: Tenderness and deformity present.      Right shoulder: Tenderness present.      Left shoulder: Tenderness present.      Right elbow: Swelling present. Tenderness present.      Left elbow: Tenderness present.      Right wrist: Swelling and tenderness present.      Left wrist: Swelling and tenderness present.      Cervical back: Neck supple.      Right knee: Swelling and effusion present. Tenderness present.      Left knee: Swelling and effusion present. Tenderness present.   Lymphadenopathy:     He has no cervical adenopathy.   Neurological: He is alert and oriented to person, place, and time. Gait normal.   Skin: No rash noted. No erythema. No pallor.   Psychiatric: Mood, affect and judgment normal.   Vitals reviewed.      Right Side Rheumatological Exam     The patient is tender to palpation of the shoulder, elbow, wrist, knee, 1st PIP, 1st MCP, 2nd PIP, 2nd MCP, 3rd PIP, 3rd MCP, 4th PIP, 4th MCP and 5th PIP    He has swelling of the elbow, wrist, knee, 1st PIP, 1st MCP, 2nd PIP, 2nd MCP, 3rd PIP, 3rd MCP, 4th PIP, 4th MCP, 5th PIP and 5th MCP    The patient has an enlarged wrist and knee    Shoulder Exam   Tenderness Location: no tenderness    Range of Motion   Active abduction:  abnormal   Adduction: abnormal  Sensation: normal    Knee Exam   Tenderness Location: medial joint line and LCL  Patellofemoral Crepitus: positive  Effusion: positive  Sensation: normal    Hip Exam   Tenderness Location:  posterior and anterior  Sensation: normal    Elbow/Wrist Exam   Tenderness Location: no tenderness  Sensation: normal    Left Side Rheumatological Exam     The patient is tender to palpation of the shoulder, elbow, wrist, knee, 1st PIP, 1st MCP, 2nd PIP, 2nd MCP, 3rd PIP, 3rd MCP, 4th PIP, 4th MCP, 5th PIP and 5th MCP.    He has swelling of the wrist, knee, 1st PIP, 1st MCP, 2nd PIP, 2nd MCP, 3rd PIP, 3rd MCP, 4th PIP, 4th MCP, 5th PIP and 5th MCP    The patient has an enlarged knee.    Shoulder Exam   Tenderness Location: no tenderness    Range of Motion   Active abduction:  abnormal   Sensation: normal    Knee Exam   Tenderness Location: lateral joint line and medial joint line    Patellofemoral Crepitus: positive  Effusion: positive  Sensation: normal    Hip Exam   Tenderness Location: posterior and anterior  Sensation: normal    Elbow/Wrist Exam   Sensation: normal      Back/Neck Exam   General Inspection   Gait: normal       Tenderness Right paramedian tenderness of the Upper C-Spine, Lower C-Spine, Lower L-Spine and SI Joint.Left paramedian tenderness of the Upper C-Spine, Lower L-Spine, Lower C-Spine and SI Joint.    Neck Range of Motion   Flexion:  Limited  Extension:  Limited    Results for orders placed or performed in visit on 09/21/22   Quantiferon Gold TB   Result Value Ref Range    NIL 0.79579 IU/mL    TB1 - Nil 0.004 IU/mL    TB2 - Nil 0.007 IU/mL    Mitogen - Nil 0.785 IU/mL    TB Gold Plus Negative Negative     reviewed labs with patient during this visit       Assessment:       1. Ankylosing spondylitis, unspecified site of spine    2. Lumbar and sacral arthritis    3. Immunocompromised state due to drug therapy    4. Chronic pain syndrome    5. Acne vulgaris    6. Lumbar spondylosis    7. Cardiomyopathy, alcoholic    8. Dental infection    9. Lumbar discitis    10. Neck muscle spasm    11. Elevated C-reactive protein (CRP)    12. Vitamin D deficiency, unspecified     13. Local skin infection               Plan:       Ankylosing spondylitis, unspecified site of spine  -     oxyCODONE-acetaminophen (PERCOCET)  mg per tablet; Take 1 tablet by mouth every 8 (eight) hours as needed for Pain.  Dispense: 90 tablet; Refill: 0  -     oxyCODONE-acetaminophen (PERCOCET)  mg per tablet; Take 1 tablet by mouth every 8 (eight) hours as needed for Pain.  Dispense: 90 tablet; Refill: 0  -     oxyCODONE-acetaminophen (PERCOCET)  mg per tablet; Take 1 tablet by mouth every 8 (eight) hours as needed for Pain.  Dispense: 90 tablet; Refill: 0    Lumbar and sacral arthritis  -     oxyCODONE-acetaminophen (PERCOCET)  mg per tablet; Take 1 tablet by mouth every 8 (eight) hours as needed for Pain.  Dispense: 90 tablet; Refill: 0  -     oxyCODONE-acetaminophen (PERCOCET)  mg per tablet; Take 1 tablet by mouth every 8 (eight) hours as needed for Pain.  Dispense: 90 tablet; Refill: 0  -     oxyCODONE-acetaminophen (PERCOCET)  mg per tablet; Take 1 tablet by mouth every 8 (eight) hours as needed for Pain.  Dispense: 90 tablet; Refill: 0    Immunocompromised state due to drug therapy  -     oxyCODONE-acetaminophen (PERCOCET)  mg per tablet; Take 1 tablet by mouth every 8 (eight) hours as needed for Pain.  Dispense: 90 tablet; Refill: 0  -     oxyCODONE-acetaminophen (PERCOCET)  mg per tablet; Take 1 tablet by mouth every 8 (eight) hours as needed for Pain.  Dispense: 90 tablet; Refill: 0  -     oxyCODONE-acetaminophen (PERCOCET)  mg per tablet; Take 1 tablet by mouth every 8 (eight) hours as needed for Pain.  Dispense: 90 tablet; Refill: 0    Chronic pain syndrome  -     oxyCODONE-acetaminophen (PERCOCET)  mg per tablet; Take 1 tablet by mouth every 8 (eight) hours as needed for Pain.  Dispense: 90 tablet; Refill: 0  -     oxyCODONE-acetaminophen (PERCOCET)  mg per tablet; Take 1 tablet by mouth every 8 (eight) hours as needed for Pain.  Dispense: 90 tablet; Refill: 0  -      oxyCODONE-acetaminophen (PERCOCET)  mg per tablet; Take 1 tablet by mouth every 8 (eight) hours as needed for Pain.  Dispense: 90 tablet; Refill: 0    Acne vulgaris    Lumbar spondylosis  -     oxyCODONE-acetaminophen (PERCOCET)  mg per tablet; Take 1 tablet by mouth every 8 (eight) hours as needed for Pain.  Dispense: 90 tablet; Refill: 0  -     oxyCODONE-acetaminophen (PERCOCET)  mg per tablet; Take 1 tablet by mouth every 8 (eight) hours as needed for Pain.  Dispense: 90 tablet; Refill: 0  -     oxyCODONE-acetaminophen (PERCOCET)  mg per tablet; Take 1 tablet by mouth every 8 (eight) hours as needed for Pain.  Dispense: 90 tablet; Refill: 0    Cardiomyopathy, alcoholic    Dental infection  -     amoxicillin (AMOXIL) 875 MG tablet; Take 1 tablet (875 mg total) by mouth every 12 (twelve) hours. for 7 days  Dispense: 14 tablet; Refill: 0  -     CBC Auto Differential; Future; Expected date: 09/26/2022  -     Comprehensive Metabolic Panel; Future; Expected date: 09/26/2022  -     Sedimentation rate; Future; Expected date: 09/26/2022  -     C-Reactive Protein; Future; Expected date: 09/26/2022  -     fluconazole (DIFLUCAN) 150 MG Tab; Take 1 tablet (150 mg total) by mouth once daily. for 3 days  Dispense: 3 tablet; Refill: 0  -     chlorhexidine (PERIDEX) 0.12 % solution; Use as directed 15 mLs in the mouth or throat 2 (two) times daily. for 14 days  Dispense: 473 mL; Refill: 6    Lumbar discitis  -     oxyCODONE-acetaminophen (PERCOCET)  mg per tablet; Take 1 tablet by mouth every 8 (eight) hours as needed for Pain.  Dispense: 90 tablet; Refill: 0  -     oxyCODONE-acetaminophen (PERCOCET)  mg per tablet; Take 1 tablet by mouth every 8 (eight) hours as needed for Pain.  Dispense: 90 tablet; Refill: 0  -     oxyCODONE-acetaminophen (PERCOCET)  mg per tablet; Take 1 tablet by mouth every 8 (eight) hours as needed for Pain.  Dispense: 90 tablet; Refill: 0    Neck muscle spasm  -      oxyCODONE-acetaminophen (PERCOCET)  mg per tablet; Take 1 tablet by mouth every 8 (eight) hours as needed for Pain.  Dispense: 90 tablet; Refill: 0  -     oxyCODONE-acetaminophen (PERCOCET)  mg per tablet; Take 1 tablet by mouth every 8 (eight) hours as needed for Pain.  Dispense: 90 tablet; Refill: 0  -     oxyCODONE-acetaminophen (PERCOCET)  mg per tablet; Take 1 tablet by mouth every 8 (eight) hours as needed for Pain.  Dispense: 90 tablet; Refill: 0    Elevated C-reactive protein (CRP)  -     oxyCODONE-acetaminophen (PERCOCET)  mg per tablet; Take 1 tablet by mouth every 8 (eight) hours as needed for Pain.  Dispense: 90 tablet; Refill: 0  -     oxyCODONE-acetaminophen (PERCOCET)  mg per tablet; Take 1 tablet by mouth every 8 (eight) hours as needed for Pain.  Dispense: 90 tablet; Refill: 0  -     oxyCODONE-acetaminophen (PERCOCET)  mg per tablet; Take 1 tablet by mouth every 8 (eight) hours as needed for Pain.  Dispense: 90 tablet; Refill: 0    Vitamin D deficiency, unspecified   -     oxyCODONE-acetaminophen (PERCOCET)  mg per tablet; Take 1 tablet by mouth every 8 (eight) hours as needed for Pain.  Dispense: 90 tablet; Refill: 0  -     oxyCODONE-acetaminophen (PERCOCET)  mg per tablet; Take 1 tablet by mouth every 8 (eight) hours as needed for Pain.  Dispense: 90 tablet; Refill: 0  -     oxyCODONE-acetaminophen (PERCOCET)  mg per tablet; Take 1 tablet by mouth every 8 (eight) hours as needed for Pain.  Dispense: 90 tablet; Refill: 0    Local skin infection  -     oxyCODONE-acetaminophen (PERCOCET)  mg per tablet; Take 1 tablet by mouth every 8 (eight) hours as needed for Pain.  Dispense: 90 tablet; Refill: 0  -     oxyCODONE-acetaminophen (PERCOCET)  mg per tablet; Take 1 tablet by mouth every 8 (eight) hours as needed for Pain.  Dispense: 90 tablet; Refill: 0  -     oxyCODONE-acetaminophen (PERCOCET)  mg per tablet; Take 1 tablet by  mouth every 8 (eight) hours as needed for Pain.  Dispense: 90 tablet; Refill: 0        Assessment:  40 year old male with  Ankylosing spondylitis, elevated ESR/CRP on simponi  --chronic pain syndrome on perocet 10/325  --alcoholic cardiomyopathy, EF 55% followed by Cardiology--COPD, tobacco use  --chronic anxiety on xanax PRN per PCP  --allergy to ASA, NSAIDs    1. Continue simponi pt is stable on tx  2. . I have  check [place] prescription monitoring program site and no unusual or abnormal behavior has occurred  3. Added amoxil  4. F/u 4 mths

## 2022-10-07 ENCOUNTER — OFFICE VISIT (OUTPATIENT)
Dept: CARDIOLOGY | Facility: CLINIC | Age: 42
End: 2022-10-07
Payer: MEDICARE

## 2022-10-07 VITALS
HEART RATE: 76 BPM | DIASTOLIC BLOOD PRESSURE: 84 MMHG | BODY MASS INDEX: 24.31 KG/M2 | WEIGHT: 183.44 LBS | HEIGHT: 73 IN | SYSTOLIC BLOOD PRESSURE: 123 MMHG

## 2022-10-07 DIAGNOSIS — F17.200 TOBACCO DEPENDENCE: ICD-10-CM

## 2022-10-07 DIAGNOSIS — E78.2 MIXED HYPERLIPIDEMIA: ICD-10-CM

## 2022-10-07 DIAGNOSIS — I42.6 CARDIOMYOPATHY, ALCOHOLIC: Primary | ICD-10-CM

## 2022-10-07 PROCEDURE — 99213 OFFICE O/P EST LOW 20 MIN: CPT | Mod: PBBFAC,PO | Performed by: INTERNAL MEDICINE

## 2022-10-07 PROCEDURE — 99214 PR OFFICE/OUTPT VISIT, EST, LEVL IV, 30-39 MIN: ICD-10-PCS | Mod: S$PBB,,, | Performed by: INTERNAL MEDICINE

## 2022-10-07 PROCEDURE — 99999 PR PBB SHADOW E&M-EST. PATIENT-LVL III: ICD-10-PCS | Mod: PBBFAC,,, | Performed by: INTERNAL MEDICINE

## 2022-10-07 PROCEDURE — 99214 OFFICE O/P EST MOD 30 MIN: CPT | Mod: S$PBB,,, | Performed by: INTERNAL MEDICINE

## 2022-10-07 PROCEDURE — 99999 PR PBB SHADOW E&M-EST. PATIENT-LVL III: CPT | Mod: PBBFAC,,, | Performed by: INTERNAL MEDICINE

## 2022-10-07 NOTE — PROGRESS NOTES
Subjective:    Patient ID:  Umang Greer is a 42 y.o. male who presents for follow-up of Cardiomyopathy (Annual f/u )      HPI  He comes for follow up with no major problems, no chest pain, no shortness of breath.  FC II  Still smoking    Review of Systems   Constitutional: Negative for decreased appetite, malaise/fatigue, weight gain and weight loss.   Cardiovascular:  Negative for chest pain, dyspnea on exertion, leg swelling, palpitations and syncope.   Respiratory:  Negative for cough and shortness of breath.    Gastrointestinal: Negative.    Neurological:  Negative for weakness.   All other systems reviewed and are negative.     Objective:      Physical Exam  Vitals and nursing note reviewed.   Constitutional:       Appearance: Normal appearance. He is well-developed.   HENT:      Head: Normocephalic.   Eyes:      Pupils: Pupils are equal, round, and reactive to light.   Neck:      Thyroid: No thyromegaly.      Vascular: No carotid bruit or JVD.   Cardiovascular:      Rate and Rhythm: Normal rate and regular rhythm.      Chest Wall: PMI is not displaced.      Pulses: Normal pulses and intact distal pulses.      Heart sounds: Normal heart sounds. No murmur heard.    No gallop.   Pulmonary:      Effort: Pulmonary effort is normal.      Breath sounds: Normal breath sounds.   Abdominal:      Palpations: Abdomen is soft. There is no mass.      Tenderness: There is no abdominal tenderness.   Musculoskeletal:         General: Normal range of motion.      Cervical back: Normal range of motion and neck supple.   Skin:     General: Skin is warm.   Neurological:      Mental Status: He is alert and oriented to person, place, and time.      Sensory: No sensory deficit.      Deep Tendon Reflexes: Reflexes are normal and symmetric.       Assessment:       1. Cardiomyopathy, alcoholic    2. Mixed hyperlipidemia    3. Tobacco dependence         Plan:     Continue all cardiac medications  Regular exercise program  One year  follow-up with echo

## 2022-10-18 ENCOUNTER — TELEPHONE (OUTPATIENT)
Dept: RHEUMATOLOGY | Facility: CLINIC | Age: 42
End: 2022-10-18
Payer: MEDICARE

## 2022-10-18 NOTE — TELEPHONE ENCOUNTER
Patient can take Isotretinoin with having diagnosis of Ankylosing Spondylitis per Dr. Locke. Dr. Cha has been notified of this information.         [phone] Dr. Ivette Veliz's office

## 2022-10-24 ENCOUNTER — SPECIALTY PHARMACY (OUTPATIENT)
Dept: PHARMACY | Facility: CLINIC | Age: 42
End: 2022-10-24
Payer: MEDICARE

## 2022-10-24 NOTE — TELEPHONE ENCOUNTER
Specialty Pharmacy - Refill Coordination    Specialty Medication Orders Linked to Encounter      Flowsheet Row Most Recent Value   Medication #1 golimumab (SIMPONI) 50 mg/0.5 mL PnIj (Order#[id number], Rx#[id number])            Refill Questions - Documented Responses      Flowsheet Row Most Recent Value   Patient Availability and HIPAA Verification    Does patient want to proceed with activity? Yes   HIPAA/medical authority confirmed? Yes   Relationship to patient of person spoken to? Self   Refill Screening Questions    Changes to allergies? No   Changes to medications? No   New conditions since last clinic visit? No   Unplanned office visit, urgent care, ED, or hospital admission in the last 4 weeks? No   How does patient/caregiver feel medication is working? Good   Financial problems or insurance changes? No   How many doses of your specialty medications were missed in the last 4 weeks? 0   Would patient like to speak to a pharmacist? No   When does the patient need to receive the medication? 10/31/22   Refill Delivery Questions    How will the patient receive the medication? MEDRx   When does the patient need to receive the medication? 10/31/22   Shipping Address Home   Address in [facility] confirmed and updated if neccessary? Yes   Expected Copay ($) 0   Is the patient able to afford the medication copay? Yes   Payment Method zero copay   Days supply of Refill 30   Supplies needed? No supplies needed   Refill activity completed? Yes   Refill activity plan Refill scheduled   Shipment/Pickup Date: 10/26/22            Current Outpatient Medications   Medication Sig    ALPRAZolam (XANAX) 2 MG Tab Take 1 tablet (2 mg total) by mouth 2 (two) times daily as needed (anxiety).    baclofen (LIORESAL) 20 MG tablet TAKE 1/2 TABLET(10 MG) BY MOUTH THREE TIMES DAILY    budesonide-formoterol 160-4.5 mcg (SYMBICORT) 160-4.5 mcg/actuation HFAA INHALE 2 PUFFS INTO THE LUNGS EVERY 12 HOURS    carvediloL (COREG) 3.125 MG  tablet TAKE 1 TABLET(3.125 MG) BY MOUTH TWICE DAILY    cetirizine (ZYRTEC) 10 MG tablet Take 10 mg by mouth once daily.    clindamycin (CLEOCIN T) 1 % external solution     clotrimazole-betamethasone 1-0.05% (LOTRISONE) cream Apply topically 2 (two) times daily.    cyanocobalamin (VITAMIN B-12) 1000 MCG tablet 1,000 mcg.    dapsone 25 MG Tab Take 1 tablet (25 mg total) by mouth once daily. for 10 days    doxycycline (VIBRAMYCIN) 100 MG Cap Take 100 mg by mouth 2 (two) times daily.    DULoxetine (CYMBALTA) 30 MG capsule Take 1 capsule (30 mg total) by mouth once daily.    epinephrine (EPIPEN JR) 0.15 mg/0.3 mL pen injection Inject 0.3 mg into the muscle as needed for Anaphylaxis.    fluconazole (DIFLUCAN) 150 MG Tab     golimumab (SIMPONI) 50 mg/0.5 mL PnIj Inject 0.5 mL (50 mg) into the skin every 30 days.    icosapent ethyL (VASCEPA) 1 gram Cap Take 1 capsule (1 g total) by mouth 2 (two) times daily.    mupirocin (BACTROBAN) 2 % ointment APPLY EXTERNALLY TO THE AFFECTED AREA THREE TIMES DAILY    NARCAN 4 mg/actuation Spry     oxyCODONE-acetaminophen (PERCOCET)  mg per tablet Take 1 tablet by mouth every 8 (eight) hours as needed for Pain.    [START ON 11/1/2022] oxyCODONE-acetaminophen (PERCOCET)  mg per tablet Take 1 tablet by mouth every 8 (eight) hours as needed for Pain.    [START ON 12/1/2022] oxyCODONE-acetaminophen (PERCOCET)  mg per tablet Take 1 tablet by mouth every 8 (eight) hours as needed for Pain.    predniSONE (DELTASONE) 2.5 MG tablet Take 2 tablets (5 mg total) by mouth daily as needed.    sildenafil (REVATIO) 20 mg Tab Take 1 tablet (20 mg total) by mouth 2 (two) times daily.   Last reviewed on 10/7/2022 10:59 AM by Remberto Bah MD    Review of patient's allergies indicates:   Allergen Reactions    Asa [aspirin] Anaphylaxis    Azulfidine [sulfasalazine] Anaphylaxis    Ibuprofen Anaphylaxis    Gabapentin      Lip swelling, tongue swelling    Last reviewed on  10/7/2022  10:59 AM by Remberto Bah      Tasks added this encounter   11/23/2022 - Refill Call (Auto Added)   Tasks due within next 3 months   No tasks due.     Irene Rogers, PharmD  [facility]  [address] [place], [place] [address]  [facility] [address]  Phone: [phone]  Fax: [phone]

## 2022-11-23 ENCOUNTER — SPECIALTY PHARMACY (OUTPATIENT)
Dept: PHARMACY | Facility: CLINIC | Age: 42
End: 2022-11-23
Payer: MEDICARE

## 2022-11-23 NOTE — TELEPHONE ENCOUNTER
Specialty Pharmacy - Refill Coordination    Specialty Medication Orders Linked to Encounter      Flowsheet Row Most Recent Value   Medication #1 golimumab (SIMPONI) 50 mg/0.5 mL PnIj (Order#[id number], Rx#[id number])            Refill Questions - Documented Responses      Flowsheet Row Most Recent Value   Patient Availability and HIPAA Verification    Does patient want to proceed with activity? Yes   HIPAA/medical authority confirmed? Yes   Relationship to patient of person spoken to? Self   Refill Screening Questions    Changes to allergies? No   Changes to medications? No   New conditions since last clinic visit? No   Unplanned office visit, urgent care, ED, or hospital admission in the last 4 weeks? No   How does patient/caregiver feel medication is working? Good   Financial problems or insurance changes? No   How many doses of your specialty medications were missed in the last 4 weeks? 0   Would patient like to speak to a pharmacist? No   When does the patient need to receive the medication? 11/30/22   Refill Delivery Questions    How will the patient receive the medication? MEDRx   When does the patient need to receive the medication? 11/30/22   Shipping Address Home   Address in [facility] confirmed and updated if neccessary? Yes   Expected Copay ($) 0   Payment Method zero copay   Days supply of Refill 28   Supplies needed? No supplies needed   Refill activity completed? Yes   Refill activity plan Refill scheduled   Shipment/Pickup Date: 11/29/22            Current Outpatient Medications   Medication Sig    ALPRAZolam (XANAX) 2 MG Tab Take 1 tablet (2 mg total) by mouth 2 (two) times daily as needed (anxiety).    baclofen (LIORESAL) 20 MG tablet TAKE 1/2 TABLET(10 MG) BY MOUTH THREE TIMES DAILY    budesonide-formoterol 160-4.5 mcg (SYMBICORT) 160-4.5 mcg/actuation HFAA INHALE 2 PUFFS INTO THE LUNGS EVERY 12 HOURS    carvediloL (COREG) 3.125 MG tablet TAKE 1 TABLET(3.125 MG) BY MOUTH TWICE DAILY     cetirizine (ZYRTEC) 10 MG tablet Take 10 mg by mouth once daily.    clindamycin (CLEOCIN T) 1 % external solution     clotrimazole-betamethasone 1-0.05% (LOTRISONE) cream Apply topically 2 (two) times daily.    cyanocobalamin (VITAMIN B-12) 1000 MCG tablet 1,000 mcg.    dapsone 25 MG Tab Take 1 tablet (25 mg total) by mouth once daily. for 10 days    doxycycline (VIBRAMYCIN) 100 MG Cap Take 100 mg by mouth 2 (two) times daily.    DULoxetine (CYMBALTA) 30 MG capsule Take 1 capsule (30 mg total) by mouth once daily.    epinephrine (EPIPEN JR) 0.15 mg/0.3 mL pen injection Inject 0.3 mg into the muscle as needed for Anaphylaxis.    fluconazole (DIFLUCAN) 150 MG Tab     golimumab (SIMPONI) 50 mg/0.5 mL PnIj Inject 0.5 mL (50 mg) into the skin every 30 days.    icosapent ethyL (VASCEPA) 1 gram Cap Take 1 capsule (1 g total) by mouth 2 (two) times daily.    mupirocin (BACTROBAN) 2 % ointment APPLY EXTERNALLY TO THE AFFECTED AREA THREE TIMES DAILY    NARCAN 4 mg/actuation Spry     oxyCODONE-acetaminophen (PERCOCET)  mg per tablet Take 1 tablet by mouth every 8 (eight) hours as needed for Pain.    [START ON 12/1/2022] oxyCODONE-acetaminophen (PERCOCET)  mg per tablet Take 1 tablet by mouth every 8 (eight) hours as needed for Pain.    predniSONE (DELTASONE) 2.5 MG tablet Take 2 tablets (5 mg total) by mouth daily as needed.    sildenafil (REVATIO) 20 mg Tab Take 1 tablet (20 mg total) by mouth 2 (two) times daily.   Last reviewed on 10/7/2022 10:59 AM by Remberto Bah MD    Review of patient's allergies indicates:   Allergen Reactions    Asa [aspirin] Anaphylaxis    Azulfidine [sulfasalazine] Anaphylaxis    Ibuprofen Anaphylaxis    Gabapentin      Lip swelling, tongue swelling    Last reviewed on  10/7/2022 10:59 AM by Remberto Bah      Tasks added this encounter   12/21/2022 - Refill Call (Auto Added)   Tasks due within next 3 months   No tasks due.     [place]  [address] [facility] [address]  Phone: [phone]  Fax: [phone]

## 2022-12-21 ENCOUNTER — SPECIALTY PHARMACY (OUTPATIENT)
Dept: PHARMACY | Facility: CLINIC | Age: 42
End: 2022-12-21
Payer: MEDICARE

## 2022-12-21 NOTE — TELEPHONE ENCOUNTER
Specialty Pharmacy - Refill Coordination    Specialty Medication Orders Linked to Encounter      Flowsheet Row Most Recent Value   Medication #1 golimumab (SIMPONI) 50 mg/0.5 mL PnIj (Order#[id number], Rx#[id number])            Refill Questions - Documented Responses      Flowsheet Row Most Recent Value   Patient Availability and HIPAA Verification    Does patient want to proceed with activity? Yes   HIPAA/medical authority confirmed? Yes   Relationship to patient of person spoken to? Self   Refill Screening Questions    Changes to allergies? No   Changes to medications? No   New conditions since last clinic visit? No   Unplanned office visit, urgent care, ED, or hospital admission in the last 4 weeks? No   How does patient/caregiver feel medication is working? Good   Financial problems or insurance changes? No   How many doses of your specialty medications were missed in the last 4 weeks? 0   Would patient like to speak to a pharmacist? No   When does the patient need to receive the medication? 12/31/22   Refill Delivery Questions    How will the patient receive the medication? MEDRx   When does the patient need to receive the medication? 12/31/22   Shipping Address Home   Address in [facility] confirmed and updated if neccessary? Yes   Expected Copay ($) 0   Is the patient able to afford the medication copay? Yes   Payment Method zero copay   Days supply of Refill 30   Supplies needed? No supplies needed   Refill activity completed? Yes   Refill activity plan Refill scheduled   Shipment/Pickup Date: 12/27/22            Current Outpatient Medications   Medication Sig    ALPRAZolam (XANAX) 2 MG Tab Take 1 tablet (2 mg total) by mouth 2 (two) times daily as needed (anxiety).    baclofen (LIORESAL) 20 MG tablet TAKE 1/2 TABLET(10 MG) BY MOUTH THREE TIMES DAILY    budesonide-formoterol 160-4.5 mcg (SYMBICORT) 160-4.5 mcg/actuation HFAA INHALE 2 PUFFS INTO THE LUNGS EVERY 12 HOURS    carvediloL (COREG) 3.125 MG  tablet TAKE 1 TABLET(3.125 MG) BY MOUTH TWICE DAILY    cetirizine (ZYRTEC) 10 MG tablet Take 10 mg by mouth once daily.    clindamycin (CLEOCIN T) 1 % external solution     clotrimazole-betamethasone 1-0.05% (LOTRISONE) cream Apply topically 2 (two) times daily.    cyanocobalamin (VITAMIN B-12) 1000 MCG tablet 1,000 mcg.    dapsone 25 MG Tab Take 1 tablet (25 mg total) by mouth once daily. for 10 days    doxycycline (VIBRAMYCIN) 100 MG Cap Take 100 mg by mouth 2 (two) times daily.    DULoxetine (CYMBALTA) 30 MG capsule Take 1 capsule (30 mg total) by mouth once daily.    epinephrine (EPIPEN JR) 0.15 mg/0.3 mL pen injection Inject 0.3 mg into the muscle as needed for Anaphylaxis.    fluconazole (DIFLUCAN) 150 MG Tab     golimumab (SIMPONI) 50 mg/0.5 mL PnIj Inject 0.5 mL (50 mg) into the skin every 30 days.    icosapent ethyL (VASCEPA) 1 gram Cap Take 1 capsule (1 g total) by mouth 2 (two) times daily.    mupirocin (BACTROBAN) 2 % ointment APPLY EXTERNALLY TO THE AFFECTED AREA THREE TIMES DAILY    NARCAN 4 mg/actuation Spry     oxyCODONE-acetaminophen (PERCOCET)  mg per tablet Take 1 tablet by mouth every 8 (eight) hours as needed for Pain.    predniSONE (DELTASONE) 2.5 MG tablet Take 2 tablets (5 mg total) by mouth daily as needed.    sildenafil (REVATIO) 20 mg Tab Take 1 tablet (20 mg total) by mouth 2 (two) times daily.   Last reviewed on 10/7/2022 10:59 AM by Remberto Bah MD    Review of patient's allergies indicates:   Allergen Reactions    Asa [aspirin] Anaphylaxis    Azulfidine [sulfasalazine] Anaphylaxis    Ibuprofen Anaphylaxis    Gabapentin      Lip swelling, tongue swelling    Last reviewed on  10/7/2022 10:59 AM by Remberto Bah      Tasks added this encounter   1/23/2023 - Refill Call (Auto Added)   Tasks due within next 3 months   3/21/2023 - Clinical - Follow Up Assesement (Annual)     [facility]  [address]  [address]  [facility]  [address]  Phone: [phone]  Fax: [phone]

## 2022-12-23 DIAGNOSIS — D84.821 IMMUNOCOMPROMISED STATE DUE TO DRUG THERAPY: ICD-10-CM

## 2022-12-23 DIAGNOSIS — M62.838 NECK MUSCLE SPASM: ICD-10-CM

## 2022-12-23 DIAGNOSIS — R79.82 ELEVATED C-REACTIVE PROTEIN (CRP): ICD-10-CM

## 2022-12-23 DIAGNOSIS — M45.9 ANKYLOSING SPONDYLITIS, UNSPECIFIED SITE OF SPINE: ICD-10-CM

## 2022-12-23 DIAGNOSIS — Z79.899 IMMUNOCOMPROMISED STATE DUE TO DRUG THERAPY: ICD-10-CM

## 2022-12-23 DIAGNOSIS — M47.816 LUMBAR SPONDYLOSIS: ICD-10-CM

## 2022-12-23 DIAGNOSIS — G89.4 CHRONIC PAIN SYNDROME: ICD-10-CM

## 2022-12-23 DIAGNOSIS — L08.9 LOCAL SKIN INFECTION: ICD-10-CM

## 2022-12-23 DIAGNOSIS — M47.817 LUMBAR AND SACRAL ARTHRITIS: ICD-10-CM

## 2022-12-23 DIAGNOSIS — E55.9 VITAMIN D DEFICIENCY, UNSPECIFIED: ICD-10-CM

## 2022-12-23 DIAGNOSIS — M46.46 LUMBAR DISCITIS: ICD-10-CM

## 2022-12-23 RX ORDER — OXYCODONE AND ACETAMINOPHEN 10; 325 MG/1; MG/1
1 TABLET ORAL EVERY 8 HOURS PRN
Qty: 90 TABLET | Refills: 0 | Status: SHIPPED | OUTPATIENT
Start: 2022-12-23 | End: 2023-01-27 | Stop reason: SDUPTHER

## 2023-01-09 ENCOUNTER — PATIENT MESSAGE (OUTPATIENT)
Dept: RHEUMATOLOGY | Facility: CLINIC | Age: 43
End: 2023-01-09
Payer: MEDICARE

## 2023-01-10 ENCOUNTER — PATIENT MESSAGE (OUTPATIENT)
Dept: FAMILY MEDICINE | Facility: CLINIC | Age: 43
End: 2023-01-10
Payer: MEDICARE

## 2023-01-20 ENCOUNTER — LAB VISIT (OUTPATIENT)
Dept: LAB | Facility: HOSPITAL | Age: 43
End: 2023-01-20
Attending: INTERNAL MEDICINE
Payer: MEDICARE

## 2023-01-20 DIAGNOSIS — K04.7 DENTAL INFECTION: ICD-10-CM

## 2023-01-20 LAB
ALBUMIN SERPL BCP-MCNC: 3.8 G/DL (ref 3.5–5.2)
ALP SERPL-CCNC: 105 U/L (ref 55–135)
ALT SERPL W/O P-5'-P-CCNC: 15 U/L (ref 10–44)
ANION GAP SERPL CALC-SCNC: 7 MMOL/L (ref 8–16)
AST SERPL-CCNC: 18 U/L (ref 10–40)
BILIRUB SERPL-MCNC: 0.5 MG/DL (ref 0.1–1)
BUN SERPL-MCNC: 10 MG/DL (ref 6–20)
CALCIUM SERPL-MCNC: 9.6 MG/DL (ref 8.7–10.5)
CHLORIDE SERPL-SCNC: 106 MMOL/L (ref 95–110)
CO2 SERPL-SCNC: 26 MMOL/L (ref 23–29)
CREAT SERPL-MCNC: 0.8 MG/DL (ref 0.5–1.4)
CRP SERPL-MCNC: 9.2 MG/L (ref 0–8.2)
EST. GFR  (NO RACE VARIABLE): >60 ML/MIN/1.73 M^2
GLUCOSE SERPL-MCNC: 78 MG/DL (ref 70–110)
POTASSIUM SERPL-SCNC: 3.6 MMOL/L (ref 3.5–5.1)
PROT SERPL-MCNC: 7.6 G/DL (ref 6–8.4)
SODIUM SERPL-SCNC: 139 MMOL/L (ref 136–145)

## 2023-01-20 PROCEDURE — 86140 C-REACTIVE PROTEIN: CPT | Performed by: INTERNAL MEDICINE

## 2023-01-20 PROCEDURE — 80053 COMPREHEN METABOLIC PANEL: CPT | Performed by: INTERNAL MEDICINE

## 2023-01-20 PROCEDURE — 85652 RBC SED RATE AUTOMATED: CPT | Performed by: INTERNAL MEDICINE

## 2023-01-20 PROCEDURE — 36415 COLL VENOUS BLD VENIPUNCTURE: CPT | Mod: PO | Performed by: INTERNAL MEDICINE

## 2023-01-20 PROCEDURE — 85025 COMPLETE CBC W/AUTO DIFF WBC: CPT | Performed by: INTERNAL MEDICINE

## 2023-01-21 LAB
BASOPHILS # BLD AUTO: 0.08 K/UL (ref 0–0.2)
BASOPHILS NFR BLD: 0.6 % (ref 0–1.9)
DIFFERENTIAL METHOD: ABNORMAL
EOSINOPHIL # BLD AUTO: 0.2 K/UL (ref 0–0.5)
EOSINOPHIL NFR BLD: 1.6 % (ref 0–8)
ERYTHROCYTE [DISTWIDTH] IN BLOOD BY AUTOMATED COUNT: 13.2 % (ref 11.5–14.5)
ERYTHROCYTE [SEDIMENTATION RATE] IN BLOOD BY PHOTOMETRIC METHOD: 14 MM/HR (ref 0–23)
HCT VFR BLD AUTO: 40 % (ref 40–54)
HGB BLD-MCNC: 13 G/DL (ref 14–18)
IMM GRANULOCYTES # BLD AUTO: 0.02 K/UL (ref 0–0.04)
IMM GRANULOCYTES NFR BLD AUTO: 0.2 % (ref 0–0.5)
LYMPHOCYTES # BLD AUTO: 4.5 K/UL (ref 1–4.8)
LYMPHOCYTES NFR BLD: 34.3 % (ref 18–48)
MCH RBC QN AUTO: 29.7 PG (ref 27–31)
MCHC RBC AUTO-ENTMCNC: 32.5 G/DL (ref 32–36)
MCV RBC AUTO: 91 FL (ref 82–98)
MONOCYTES # BLD AUTO: 0.8 K/UL (ref 0.3–1)
MONOCYTES NFR BLD: 6 % (ref 4–15)
NEUTROPHILS # BLD AUTO: 7.5 K/UL (ref 1.8–7.7)
NEUTROPHILS NFR BLD: 57.3 % (ref 38–73)
NRBC BLD-RTO: 0 /100 WBC
PLATELET # BLD AUTO: 375 K/UL (ref 150–450)
PMV BLD AUTO: 9.4 FL (ref 9.2–12.9)
RBC # BLD AUTO: 4.38 M/UL (ref 4.6–6.2)
WBC # BLD AUTO: 13.1 K/UL (ref 3.9–12.7)

## 2023-01-24 ENCOUNTER — SPECIALTY PHARMACY (OUTPATIENT)
Dept: PHARMACY | Facility: CLINIC | Age: 43
End: 2023-01-24
Payer: MEDICARE

## 2023-01-24 DIAGNOSIS — M45.9 ANKYLOSING SPONDYLITIS, UNSPECIFIED SITE OF SPINE: ICD-10-CM

## 2023-01-24 DIAGNOSIS — M45.9 ANKYLOSING SPONDYLITIS, UNSPECIFIED SITE OF SPINE: Primary | ICD-10-CM

## 2023-01-24 RX ORDER — GOLIMUMAB 50 MG/.5ML
50 INJECTION, SOLUTION SUBCUTANEOUS
Qty: 0.5 ML | Refills: 11 | Status: SHIPPED | OUTPATIENT
Start: 2023-01-24 | End: 2024-01-23 | Stop reason: SDUPTHER

## 2023-01-24 NOTE — TELEPHONE ENCOUNTER
Call attempt 1 Simponi for AS refill Pt reports he injects at the end of each month, due for injection 1/31/23.     Explained he is out of refills, request sent to rheum. Explained to pt will call back to set up delivery once refill authorized. Pt voiced understanding.

## 2023-01-25 NOTE — TELEPHONE ENCOUNTER
Specialty Pharmacy - Refill Coordination    Specialty Medication Orders Linked to Encounter      Flowsheet Row Most Recent Value   Medication #1 golimumab (SIMPONI) 50 mg/0.5 mL PnIj (Order#[id number], Rx#[id number])          Patient reported going to the ER for a boil 3 weeks ago. Patient had an I&D and was given abx. Patient completed abx therapy, and reported that there is no active infection. Confirmed with Melodie that we could proceed with the refill.     Refill Questions - Documented Responses      Flowsheet Row Most Recent Value   Patient Availability and HIPAA Verification    Does patient want to proceed with activity? Yes   HIPAA/medical authority confirmed? Yes   Relationship to patient of person spoken to? Self   Refill Screening Questions    Changes to allergies? No   Changes to medications? No   New conditions since last clinic visit? No   Unplanned office visit, urgent care, ED, or hospital admission in the last 4 weeks? Yes  [ER for a boil.]   How does patient/caregiver feel medication is working? Good   Financial problems or insurance changes? No   How many doses of your specialty medications were missed in the last 4 weeks? 0   Would patient like to speak to a pharmacist? No   When does the patient need to receive the medication? 01/31/23   Refill Delivery Questions    How will the patient receive the medication? MEDRx   When does the patient need to receive the medication? 01/31/23   Shipping Address Home   Address in [facility] confirmed and updated if neccessary? Yes   Expected Copay ($) 4.3   Is the patient able to afford the medication copay? Yes   Payment Method CC on file   Days supply of Refill 30   Supplies needed? No supplies needed   Refill activity completed? Yes   Refill activity plan Refill scheduled   Shipment/Pickup Date: 01/27/23            Current Outpatient Medications   Medication Sig    ALPRAZolam (XANAX) 2 MG Tab Take 1 tablet (2 mg total) by mouth 2 (two) times daily as  needed (anxiety).    baclofen (LIORESAL) 20 MG tablet TAKE 1/2 TABLET(10 MG) BY MOUTH THREE TIMES DAILY    budesonide-formoterol 160-4.5 mcg (SYMBICORT) 160-4.5 mcg/actuation HFAA INHALE 2 PUFFS INTO THE LUNGS EVERY 12 HOURS    carvediloL (COREG) 3.125 MG tablet TAKE 1 TABLET(3.125 MG) BY MOUTH TWICE DAILY    cetirizine (ZYRTEC) 10 MG tablet Take 10 mg by mouth once daily.    clindamycin (CLEOCIN T) 1 % external solution     clotrimazole-betamethasone 1-0.05% (LOTRISONE) cream Apply topically 2 (two) times daily.    cyanocobalamin (VITAMIN B-12) 1000 MCG tablet 1,000 mcg.    dapsone 25 MG Tab Take 1 tablet (25 mg total) by mouth once daily. for 10 days    doxycycline (VIBRAMYCIN) 100 MG Cap Take 100 mg by mouth 2 (two) times daily.    DULoxetine (CYMBALTA) 30 MG capsule Take 1 capsule (30 mg total) by mouth once daily.    epinephrine (EPIPEN JR) 0.15 mg/0.3 mL pen injection Inject 0.3 mg into the muscle as needed for Anaphylaxis.    fluconazole (DIFLUCAN) 150 MG Tab     golimumab (SIMPONI) 50 mg/0.5 mL PnIj Inject 0.5 mL (50 mg) into the skin every 30 days.    icosapent ethyL (VASCEPA) 1 gram Cap Take 1 capsule (1 g total) by mouth 2 (two) times daily.    mupirocin (BACTROBAN) 2 % ointment APPLY EXTERNALLY TO THE AFFECTED AREA THREE TIMES DAILY    NARCAN 4 mg/actuation Spry     predniSONE (DELTASONE) 2.5 MG tablet Take 2 tablets (5 mg total) by mouth daily as needed.    sildenafil (REVATIO) 20 mg Tab Take 1 tablet (20 mg total) by mouth 2 (two) times daily.   Last reviewed on 1/13/2023  8:53 PM by Kenna Mercado, RN    Review of patient's allergies indicates:   Allergen Reactions    Asa [aspirin] Anaphylaxis    Azulfidine [sulfasalazine] Anaphylaxis    Ibuprofen Anaphylaxis    Gabapentin      Lip swelling, tongue swelling    Last reviewed on  1/13/2023 8:49 PM by Kenna Mercado      Tasks added this encounter   2/23/2023 - Refill Call (Auto Added)   Tasks due within next 3 months   3/21/2023 - Clinical -  Follow Up Assesement (Annual)     [facility]  [address]  [facility] [address]  Phone: [phone]  Fax: [phone]

## 2023-01-27 ENCOUNTER — PATIENT MESSAGE (OUTPATIENT)
Dept: FAMILY MEDICINE | Facility: CLINIC | Age: 43
End: 2023-01-27
Payer: MEDICARE

## 2023-01-27 ENCOUNTER — OFFICE VISIT (OUTPATIENT)
Dept: RHEUMATOLOGY | Facility: CLINIC | Age: 43
End: 2023-01-27
Payer: MEDICARE

## 2023-01-27 VITALS
BODY MASS INDEX: 24.11 KG/M2 | HEART RATE: 87 BPM | DIASTOLIC BLOOD PRESSURE: 87 MMHG | WEIGHT: 181.88 LBS | HEIGHT: 73 IN | SYSTOLIC BLOOD PRESSURE: 138 MMHG

## 2023-01-27 DIAGNOSIS — R74.8 LOW SERUM HDL: ICD-10-CM

## 2023-01-27 DIAGNOSIS — B37.81 THRUSH OF MOUTH AND ESOPHAGUS: ICD-10-CM

## 2023-01-27 DIAGNOSIS — K21.9 GASTROESOPHAGEAL REFLUX DISEASE, UNSPECIFIED WHETHER ESOPHAGITIS PRESENT: ICD-10-CM

## 2023-01-27 DIAGNOSIS — R79.82 ELEVATED C-REACTIVE PROTEIN (CRP): ICD-10-CM

## 2023-01-27 DIAGNOSIS — M47.816 LUMBAR SPONDYLOSIS: ICD-10-CM

## 2023-01-27 DIAGNOSIS — Z79.899 IMMUNOCOMPROMISED STATE DUE TO DRUG THERAPY: ICD-10-CM

## 2023-01-27 DIAGNOSIS — M45.9 ANKYLOSING SPONDYLITIS, UNSPECIFIED SITE OF SPINE: ICD-10-CM

## 2023-01-27 DIAGNOSIS — I42.6 CARDIOMYOPATHY, ALCOHOLIC: ICD-10-CM

## 2023-01-27 DIAGNOSIS — N52.9 IMPOTENCE DUE TO ERECTILE DYSFUNCTION: ICD-10-CM

## 2023-01-27 DIAGNOSIS — M47.817 LUMBAR AND SACRAL ARTHRITIS: ICD-10-CM

## 2023-01-27 DIAGNOSIS — G89.4 CHRONIC PAIN SYNDROME: ICD-10-CM

## 2023-01-27 DIAGNOSIS — L08.9 LOCAL SKIN INFECTION: ICD-10-CM

## 2023-01-27 DIAGNOSIS — B37.0 THRUSH OF MOUTH AND ESOPHAGUS: ICD-10-CM

## 2023-01-27 DIAGNOSIS — D84.821 IMMUNOCOMPROMISED STATE DUE TO DRUG THERAPY: ICD-10-CM

## 2023-01-27 DIAGNOSIS — L70.0 ACNE VULGARIS: Primary | ICD-10-CM

## 2023-01-27 DIAGNOSIS — E55.9 VITAMIN D DEFICIENCY, UNSPECIFIED: ICD-10-CM

## 2023-01-27 DIAGNOSIS — M46.46 LUMBAR DISCITIS: ICD-10-CM

## 2023-01-27 DIAGNOSIS — E78.9 LIPID DISORDER: ICD-10-CM

## 2023-01-27 DIAGNOSIS — M62.838 NECK MUSCLE SPASM: ICD-10-CM

## 2023-01-27 PROCEDURE — 99999 PR PBB SHADOW E&M-EST. PATIENT-LVL V: ICD-10-PCS | Mod: PBBFAC,,, | Performed by: INTERNAL MEDICINE

## 2023-01-27 PROCEDURE — 99215 PR OFFICE/OUTPT VISIT, EST, LEVL V, 40-54 MIN: ICD-10-PCS | Mod: S$PBB,,, | Performed by: INTERNAL MEDICINE

## 2023-01-27 PROCEDURE — 99215 OFFICE O/P EST HI 40 MIN: CPT | Mod: PBBFAC,PN | Performed by: INTERNAL MEDICINE

## 2023-01-27 PROCEDURE — 99999 PR PBB SHADOW E&M-EST. PATIENT-LVL V: CPT | Mod: PBBFAC,,, | Performed by: INTERNAL MEDICINE

## 2023-01-27 PROCEDURE — 99215 OFFICE O/P EST HI 40 MIN: CPT | Mod: S$PBB,,, | Performed by: INTERNAL MEDICINE

## 2023-01-27 RX ORDER — OXYCODONE AND ACETAMINOPHEN 10; 325 MG/1; MG/1
1 TABLET ORAL EVERY 8 HOURS PRN
Qty: 90 TABLET | Refills: 0 | Status: SHIPPED | OUTPATIENT
Start: 2023-02-27 | End: 2023-03-29

## 2023-01-27 RX ORDER — CLINDAMYCIN PHOSPHATE 10 UG/ML
LOTION TOPICAL 2 TIMES DAILY
Qty: 180 ML | Refills: 6 | Status: SHIPPED | OUTPATIENT
Start: 2023-01-27 | End: 2023-09-27 | Stop reason: SDUPTHER

## 2023-01-27 RX ORDER — FAMOTIDINE 40 MG/1
40 TABLET, FILM COATED ORAL NIGHTLY PRN
Qty: 30 TABLET | Refills: 11 | Status: SHIPPED | OUTPATIENT
Start: 2023-01-27 | End: 2024-01-27

## 2023-01-27 RX ORDER — MUPIROCIN 20 MG/G
OINTMENT TOPICAL
Qty: 22 G | Refills: 3 | Status: SHIPPED | OUTPATIENT
Start: 2023-01-27 | End: 2023-09-27 | Stop reason: SDUPTHER

## 2023-01-27 RX ORDER — SILDENAFIL CITRATE 20 MG/1
20 TABLET ORAL 2 TIMES DAILY
Qty: 60 TABLET | Refills: 11 | Status: SHIPPED | OUTPATIENT
Start: 2023-01-27 | End: 2023-11-28

## 2023-01-27 RX ORDER — PREDNISONE 2.5 MG/1
5 TABLET ORAL DAILY PRN
Qty: 60 TABLET | Refills: 5 | Status: SHIPPED | OUTPATIENT
Start: 2023-01-27 | End: 2023-08-19

## 2023-01-27 RX ORDER — FLUCONAZOLE 150 MG/1
150 TABLET ORAL DAILY
Qty: 7 TABLET | Refills: 3 | Status: SHIPPED | OUTPATIENT
Start: 2023-01-27 | End: 2023-02-03

## 2023-01-27 RX ORDER — CLINDAMYCIN HYDROCHLORIDE 150 MG/1
300 CAPSULE ORAL EVERY 8 HOURS
Qty: 60 CAPSULE | Refills: 0 | Status: SHIPPED | OUTPATIENT
Start: 2023-01-27 | End: 2023-02-06

## 2023-01-27 RX ORDER — ICOSAPENT ETHYL 1000 MG/1
1 CAPSULE ORAL 2 TIMES DAILY
Qty: 180 CAPSULE | Refills: 3 | Status: SHIPPED | OUTPATIENT
Start: 2023-01-27 | End: 2023-05-26 | Stop reason: SDUPTHER

## 2023-01-27 RX ORDER — NYSTATIN 100000 [USP'U]/ML
6 SUSPENSION ORAL 4 TIMES DAILY
Qty: 240 ML | Refills: 1 | Status: SHIPPED | OUTPATIENT
Start: 2023-01-27 | End: 2023-02-06

## 2023-01-27 RX ORDER — OXYCODONE AND ACETAMINOPHEN 10; 325 MG/1; MG/1
1 TABLET ORAL EVERY 8 HOURS PRN
Qty: 90 TABLET | Refills: 0 | Status: SHIPPED | OUTPATIENT
Start: 2023-03-27 | End: 2023-04-24 | Stop reason: SDUPTHER

## 2023-01-27 RX ORDER — OXYCODONE AND ACETAMINOPHEN 10; 325 MG/1; MG/1
1 TABLET ORAL EVERY 8 HOURS PRN
Qty: 90 TABLET | Refills: 0 | Status: SHIPPED | OUTPATIENT
Start: 2023-01-27 | End: 2023-02-26

## 2023-01-27 NOTE — PROGRESS NOTES
Subjective:       Patient ID: Umang Greer is a 42 y.o. male.    Chief Complaint: Follow-up      Follow up: 42 year old male who presents to clinic for follow up on Ankylosing spondylitis, he went to the ER with a scrotum abscess he was I and D and tx with cleocin 450 mg tid x 7 days his back and skin cleared. He has had multiple skin eruptions on his back he started docycline and dapsone. He completed course of doxycycline fHe is using hibiclens prn and panoxyl soap slightly improved. He complain   He reports good improvement overall with his arthritis on simponi monthly.  He is using prednisone 2.5-5 mg daily PRN.     Current tx:  1. Simponi  2. Prednisone prn  3. norco 10/325 q6    Review of Systems   Constitutional:  Positive for activity change and fatigue. Negative for unexpected weight change.   HENT:  Negative for mouth sores and trouble swallowing.    Eyes:  Positive for redness. Negative for visual disturbance.   Respiratory:  Negative for shortness of breath and wheezing.    Cardiovascular:  Negative for palpitations and leg swelling.   Gastrointestinal:  Negative for constipation and diarrhea.   Genitourinary:  Negative for genital sores.   Musculoskeletal:  Positive for back pain, joint swelling and neck stiffness.   Allergic/Immunologic: Positive for immunocompromised state.   Neurological:  Negative for dizziness and syncope.   Hematological:  Negative for adenopathy. Bruises/bleeds easily.       Objective:     Vitals:    01/27/23 0828   BP: 138/87   Pulse: 87       Past Medical History:   Diagnosis Date    Anxiety     Asthma     Cardiomyopathy     CHF (congestive heart failure)     Emphysema of lung     Sickle cell anemia     only trait    Sickle cell trait      Past Surgical History:   Procedure Laterality Date    HERNIA REPAIR      NASAL SEPTOPLASTY  12/20/2021    Dr. Menjivar at [facility]          Physical Exam   Constitutional: He is oriented to person, place, and time.   HENT:   Head:  Normocephalic and atraumatic.   Mouth/Throat: Oropharynx is clear and moist.   Eyes: Pupils are equal, round, and reactive to light.   Neck: No thyromegaly present.   Cardiovascular: Normal rate, regular rhythm and normal heart sounds. Exam reveals no gallop and no friction rub.   No murmur heard.  Pulmonary/Chest: Breath sounds normal. He has no wheezes. He has no rales. He exhibits no tenderness.   Abdominal: There is no abdominal tenderness. There is no rebound and no guarding.   Musculoskeletal:         General: Tenderness and deformity present.      Right shoulder: Tenderness present.      Left shoulder: Tenderness present.      Right elbow: Swelling present. Tenderness present.      Left elbow: Tenderness present.      Right wrist: Swelling and tenderness present.      Left wrist: Swelling and tenderness present.      Cervical back: Neck supple.      Right knee: Swelling and effusion present. Tenderness present.      Left knee: Swelling and effusion present. Tenderness present.   Lymphadenopathy:     He has no cervical adenopathy.   Neurological: He is alert and oriented to person, place, and time. Gait normal.   Skin: No rash noted. No erythema. No pallor.   Psychiatric: Mood, affect and judgment normal.   Vitals reviewed.      Right Side Rheumatological Exam     The patient is tender to palpation of the shoulder, elbow, wrist, knee, 1st PIP, 1st MCP, 2nd PIP, 2nd MCP, 3rd PIP, 3rd MCP, 4th PIP, 4th MCP and 5th PIP    He has swelling of the elbow, wrist, knee, 1st PIP, 1st MCP, 2nd PIP, 2nd MCP, 3rd PIP, 3rd MCP, 4th PIP, 4th MCP, 5th PIP and 5th MCP    The patient has an enlarged wrist and knee    Shoulder Exam   Tenderness Location: no tenderness    Range of Motion   Active abduction:  abnormal   Adduction: abnormal  Sensation: normal    Knee Exam   Tenderness Location: medial joint line and LCL  Patellofemoral Crepitus: positive  Effusion: positive  Sensation: normal    Hip Exam   Tenderness Location:  posterior and anterior  Sensation: normal    Elbow/Wrist Exam   Tenderness Location: no tenderness  Sensation: normal    Left Side Rheumatological Exam     The patient is tender to palpation of the shoulder, elbow, wrist, knee, 1st PIP, 1st MCP, 2nd PIP, 2nd MCP, 3rd PIP, 3rd MCP, 4th PIP, 4th MCP, 5th PIP and 5th MCP.    He has swelling of the wrist, knee, 1st PIP, 1st MCP, 2nd PIP, 2nd MCP, 3rd PIP, 3rd MCP, 4th PIP, 4th MCP, 5th PIP and 5th MCP    The patient has an enlarged knee.    Shoulder Exam   Tenderness Location: no tenderness    Range of Motion   Active abduction:  abnormal   Sensation: normal    Knee Exam   Tenderness Location: lateral joint line and medial joint line    Patellofemoral Crepitus: positive  Effusion: positive  Sensation: normal    Hip Exam   Tenderness Location: posterior and anterior  Sensation: normal    Elbow/Wrist Exam   Sensation: normal      Back/Neck Exam   General Inspection   Gait: normal       Tenderness Right paramedian tenderness of the Upper C-Spine, Lower C-Spine, Lower L-Spine and SI Joint.Left paramedian tenderness of the Upper C-Spine, Lower L-Spine, Lower C-Spine and SI Joint.    Neck Range of Motion   Flexion:  Limited  Extension:  Limited    Results for orders placed or performed in visit on 01/20/23   CBC Auto Differential   Result Value Ref Range    WBC 13.10 (H) 3.90 - 12.70 K/uL    RBC 4.38 (L) 4.60 - 6.20 M/uL    Hemoglobin 13.0 (L) 14.0 - 18.0 g/dL    Hematocrit 40.0 40.0 - 54.0 %    MCV 91 82 - 98 fL    MCH 29.7 27.0 - 31.0 pg    MCHC 32.5 32.0 - 36.0 g/dL    RDW 13.2 11.5 - 14.5 %    Platelets 375 150 - 450 K/uL    MPV 9.4 9.2 - 12.9 fL    Immature Granulocytes 0.2 0.0 - 0.5 %    Gran # (ANC) 7.5 1.8 - 7.7 K/uL    Immature Grans (Abs) 0.02 0.00 - 0.04 K/uL    Lymph # 4.5 1.0 - 4.8 K/uL    Mono # 0.8 0.3 - 1.0 K/uL    Eos # 0.2 0.0 - 0.5 K/uL    Baso # 0.08 0.00 - 0.20 K/uL    nRBC 0 0 /100 WBC    Gran % 57.3 38.0 - 73.0 %    Lymph % 34.3 18.0 - 48.0 %    Mono %  6.0 4.0 - 15.0 %    Eosinophil % 1.6 0.0 - 8.0 %    Basophil % 0.6 0.0 - 1.9 %    Differential Method Automated    Comprehensive Metabolic Panel   Result Value Ref Range    Sodium 139 136 - 145 mmol/L    Potassium 3.6 3.5 - 5.1 mmol/L    Chloride 106 95 - 110 mmol/L    CO2 26 23 - 29 mmol/L    Glucose 78 70 - 110 mg/dL    BUN 10 6 - 20 mg/dL    Creatinine 0.8 0.5 - 1.4 mg/dL    Calcium 9.6 8.7 - 10.5 mg/dL    Total Protein 7.6 6.0 - 8.4 g/dL    Albumin 3.8 3.5 - 5.2 g/dL    Total Bilirubin 0.5 0.1 - 1.0 mg/dL    Alkaline Phosphatase 105 55 - 135 U/L    AST 18 10 - 40 U/L    ALT 15 10 - 44 U/L    Anion Gap 7 (L) 8 - 16 mmol/L    eGFR >60.0 >60 mL/min/1.73 m^2   Sedimentation rate   Result Value Ref Range    Sed Rate 14 0 - 23 mm/Hr   C-Reactive Protein   Result Value Ref Range    CRP 9.2 (H) 0.0 - 8.2 mg/L     reviewed labs with patient during this visit       Assessment:       1. Acne vulgaris    2. Immunocompromised state due to drug therapy    3. Ankylosing spondylitis, unspecified site of spine    4. Cardiomyopathy, alcoholic    5. Lumbar and sacral arthritis    6. Lumbar discitis    7. Thrush of mouth and esophagus    8. Gastroesophageal reflux disease, unspecified whether esophagitis present                Plan:       Acne vulgaris  -     clindamycin (CLEOCIN) 150 MG capsule; Take 2 capsules (300 mg total) by mouth every 8 (eight) hours. for 10 days  Dispense: 60 capsule; Refill: 0  -     sildenafil (REVATIO) 20 mg Tab; Take 1 tablet (20 mg total) by mouth 2 (two) times daily.  Dispense: 60 tablet; Refill: 11  -     clindamycin (CLEOCIN T) 1 % lotion; Apply topically 2 (two) times daily. for 15 days  Dispense: 180 mL; Refill: 6    Immunocompromised state due to drug therapy  -     sildenafil (REVATIO) 20 mg Tab; Take 1 tablet (20 mg total) by mouth 2 (two) times daily.  Dispense: 60 tablet; Refill: 11    Ankylosing spondylitis, unspecified site of spine  -     sildenafil (REVATIO) 20 mg Tab; Take 1 tablet (20  mg total) by mouth 2 (two) times daily.  Dispense: 60 tablet; Refill: 11    Cardiomyopathy, alcoholic    Lumbar and sacral arthritis  -     sildenafil (REVATIO) 20 mg Tab; Take 1 tablet (20 mg total) by mouth 2 (two) times daily.  Dispense: 60 tablet; Refill: 11    Lumbar discitis  -     sildenafil (REVATIO) 20 mg Tab; Take 1 tablet (20 mg total) by mouth 2 (two) times daily.  Dispense: 60 tablet; Refill: 11    Thrush of mouth and esophagus  -     fluconazole (DIFLUCAN) 150 MG Tab; Take 1 tablet (150 mg total) by mouth once daily. for 7 days  Dispense: 7 tablet; Refill: 3  -     nystatin (MYCOSTATIN) 100,000 unit/mL suspension; Take 6 mLs (600,000 Units total) by mouth 4 (four) times daily. for 10 days  Dispense: 240 mL; Refill: 1    Gastroesophageal reflux disease, unspecified whether esophagitis present  -     famotidine (PEPCID) 40 MG tablet; Take 1 tablet (40 mg total) by mouth nightly as needed for Heartburn.  Dispense: 30 tablet; Refill: 11            Assessment:  40 year old male with  Ankylosing spondylitis, elevated ESR/CRP on simponi  --chronic pain syndrome on perocet 10/325  --alcoholic cardiomyopathy, EF 55% followed by Cardiology--COPD, tobacco use  --chronic anxiety on xanax PRN per PCP  --allergy to ASA, NSAIDs    1. Continue simponi pt is stable on tx he is doing well on tx  2. . I have  check [place] prescription monitoring program site and no unusual or abnormal behavior has occurred  3. Pt is clear of infection okay to take simponi  on 1/31/23

## 2023-01-30 ENCOUNTER — OFFICE VISIT (OUTPATIENT)
Dept: FAMILY MEDICINE | Facility: CLINIC | Age: 43
End: 2023-01-30
Payer: MEDICARE

## 2023-01-30 DIAGNOSIS — J44.9 CHRONIC OBSTRUCTIVE PULMONARY DISEASE, UNSPECIFIED COPD TYPE: ICD-10-CM

## 2023-01-30 DIAGNOSIS — F41.9 ANXIETY: Primary | ICD-10-CM

## 2023-01-30 PROCEDURE — 99213 PR OFFICE/OUTPT VISIT, EST, LEVL III, 20-29 MIN: ICD-10-PCS | Mod: 95,,, | Performed by: FAMILY MEDICINE

## 2023-01-30 PROCEDURE — 99213 OFFICE O/P EST LOW 20 MIN: CPT | Mod: 95,,, | Performed by: FAMILY MEDICINE

## 2023-01-30 RX ORDER — BUDESONIDE AND FORMOTEROL FUMARATE DIHYDRATE 160; 4.5 UG/1; UG/1
AEROSOL RESPIRATORY (INHALATION)
Qty: 30.6 G | Refills: 3 | Status: SHIPPED | OUTPATIENT
Start: 2023-01-30 | End: 2023-07-24

## 2023-01-30 NOTE — PROGRESS NOTES
Subjective:       Patient ID: Umang Greer is a 42 y.o. male.    Chief Complaint: anxiety  Video visit for f/u chronic issues including anxiety. Mood stable  Saw rheum recently  The patient location is: home in [place]  The chief complaint leading to consultation is: anxiety    Visit type: audiovisual    Face to Face time with patient: 8 minutes  9 minutes of total time spent on the encounter, which includes face to face time and non-face to face time preparing to see the patient (eg, review of tests), Obtaining and/or reviewing separately obtained history, Documenting clinical information in the electronic or other health record, Independently interpreting results (not separately reported) and communicating results to the patient/family/caregiver, or Care coordination (not separately reported).         Each patient to whom he or she provides medical services by telemedicine is:  (1) informed of the relationship between the physician and patient and the respective role of any other health care provider with respect to management of the patient; and (2) notified that he or she may decline to receive medical services by telemedicine and may withdraw from such care at any time.    Notes:     Review of Systems   Constitutional:  Negative for activity change, chills, fever and unexpected weight change.   HENT:  Negative for hearing loss, rhinorrhea and trouble swallowing.    Eyes:  Negative for discharge and visual disturbance.   Respiratory:  Negative for cough, chest tightness, shortness of breath and wheezing.    Cardiovascular:  Negative for chest pain, palpitations and leg swelling.   Gastrointestinal:  Negative for blood in stool, constipation, diarrhea and vomiting.   Endocrine: Negative for cold intolerance and heat intolerance.   Genitourinary:  Negative for difficulty urinating, hematuria and urgency.   Musculoskeletal:  Positive for arthralgias, joint swelling and neck pain.   Neurological:  Negative for  weakness and headaches.   Psychiatric/Behavioral:  Negative for confusion, decreased concentration and dysphoric mood. The patient is not nervous/anxious.      Objective:      Physical Exam  Constitutional:       Appearance: Normal appearance.   Neurological:      Mental Status: He is alert.   Psychiatric:         Mood and Affect: Mood normal.         Behavior: Behavior normal.       Assessment:       1. Anxiety    2. Chronic obstructive pulmonary disease, unspecified COPD type        Plan:       Anxiety    Chronic obstructive pulmonary disease, unspecified COPD type  -     budesonide-formoterol 160-4.5 mcg (SYMBICORT) 160-4.5 mcg/actuation HFAA; INHALE 2 PUFFS INTO THE LUNGS EVERY 12 HOURS  Dispense: 30.6 g; Refill: 3        Stop smoking  Mood stable  Refill meds and monitor  Follow up in about 3 months (around 4/30/2023), or if symptoms worsen or fail to improve.

## 2023-02-04 NOTE — TELEPHONE ENCOUNTER
Contacted james and informed ok to fill 2 days early due to weather.   Monitor summary  .17/.08/.40  Hr 77-86 sinus rhythm  12 hour chart check complete

## 2023-02-23 ENCOUNTER — SPECIALTY PHARMACY (OUTPATIENT)
Dept: PHARMACY | Facility: CLINIC | Age: 43
End: 2023-02-23
Payer: MEDICARE

## 2023-02-23 NOTE — TELEPHONE ENCOUNTER
Specialty Pharmacy - Refill Coordination    Specialty Medication Orders Linked to Encounter      Flowsheet Row Most Recent Value   Medication #1 golimumab (SIMPONI) 50 mg/0.5 mL PnIj (Order#[id number], Rx#)            Refill Questions - Documented Responses      Flowsheet Row Most Recent Value   Patient Availability and HIPAA Verification    Does patient want to proceed with activity? Yes   HIPAA/medical authority confirmed? Yes   Relationship to patient of person spoken to? Self   Refill Screening Questions    Changes to allergies? No   Changes to medications? No   New conditions since last clinic visit? No   Unplanned office visit, urgent care, ED, or hospital admission in the last 4 weeks? No   How does patient/caregiver feel medication is working? Good   Financial problems or insurance changes? No   How many doses of your specialty medications were missed in the last 4 weeks? 0   Would patient like to speak to a pharmacist? No   When does the patient need to receive the medication? 03/01/23   Refill Delivery Questions    How will the patient receive the medication? MEDRx   When does the patient need to receive the medication? 03/01/23   Shipping Address Home   Address in [facility] confirmed and updated if neccessary? Yes   Expected Copay ($) 4.3   Is the patient able to afford the medication copay? Yes   Payment Method CC on file   Days supply of Refill 30   Supplies needed? No supplies needed   Refill activity completed? Yes   Refill activity plan Refill scheduled   Shipment/Pickup Date: 02/27/23            Current Outpatient Medications   Medication Sig    ALPRAZolam (XANAX) 2 MG Tab Take 1 tablet (2 mg total) by mouth 2 (two) times daily as needed (anxiety).    baclofen (LIORESAL) 20 MG tablet TAKE 1/2 TABLET(10 MG) BY MOUTH THREE TIMES DAILY    budesonide-formoterol 160-4.5 mcg (SYMBICORT) 160-4.5 mcg/actuation HFAA INHALE 2 PUFFS INTO THE LUNGS EVERY 12 HOURS    carvediloL (COREG) 3.125 MG tablet  TAKE 1 TABLET(3.125 MG) BY MOUTH TWICE DAILY    cetirizine (ZYRTEC) 10 MG tablet Take 10 mg by mouth once daily.    clindamycin (CLEOCIN T) 1 % lotion Apply topically 2 (two) times daily. for 15 days    clotrimazole-betamethasone 1-0.05% (LOTRISONE) cream Apply topically 2 (two) times daily.    cyanocobalamin (VITAMIN B-12) 1000 MCG tablet 1,000 mcg.    dapsone 25 MG Tab Take 1 tablet (25 mg total) by mouth once daily. for 10 days    doxycycline (VIBRAMYCIN) 100 MG Cap Take 100 mg by mouth 2 (two) times daily.    DULoxetine (CYMBALTA) 30 MG capsule Take 1 capsule (30 mg total) by mouth once daily.    epinephrine (EPIPEN JR) 0.15 mg/0.3 mL pen injection Inject 0.3 mg into the muscle as needed for Anaphylaxis.    famotidine (PEPCID) 40 MG tablet Take 1 tablet (40 mg total) by mouth nightly as needed for Heartburn.    fluconazole (DIFLUCAN) 150 MG Tab     golimumab (SIMPONI) 50 mg/0.5 mL PnIj Inject 0.5 mL (50 mg) into the skin every 30 days.    icosapent ethyL (VASCEPA) 1 gram Cap Take 1 capsule (1 g total) by mouth 2 (two) times daily.    mupirocin (BACTROBAN) 2 % ointment APPLY EXTERNALLY TO THE AFFECTED AREA THREE TIMES DAILY    NARCAN 4 mg/actuation Spry     oxyCODONE-acetaminophen (PERCOCET)  mg per tablet Take 1 tablet by mouth every 8 (eight) hours as needed for Pain.    [START ON 2/27/2023] oxyCODONE-acetaminophen (PERCOCET)  mg per tablet Take 1 tablet by mouth every 8 (eight) hours as needed for Pain.    [START ON 3/27/2023] oxyCODONE-acetaminophen (PERCOCET)  mg per tablet Take 1 tablet by mouth every 8 (eight) hours as needed for Pain.    predniSONE (DELTASONE) 2.5 MG tablet Take 2 tablets (5 mg total) by mouth daily as needed (flare).    sildenafil (REVATIO) 20 mg Tab Take 1 tablet (20 mg total) by mouth 2 (two) times daily.   Last reviewed on 1/27/2023  8:29 AM by Pati Wallis MA    Review of patient's allergies indicates:   Allergen Reactions    Asa [aspirin] Anaphylaxis     Azulfidine [sulfasalazine] Anaphylaxis    Ibuprofen Anaphylaxis    Gabapentin      Lip swelling, tongue swelling    Last reviewed on  1/30/2023 2:19 PM by Andrea Khan      Tasks added this encounter   3/24/2023 - Refill Call (Auto Added)   Tasks due within next 3 months   3/21/2023 - Clinical - Follow Up Assesement (Annual)     [facility]  [facility] [address]  [facility] [address]  Phone: [phone]  Fax: [phone]

## 2023-02-27 ENCOUNTER — PATIENT MESSAGE (OUTPATIENT)
Dept: RHEUMATOLOGY | Facility: CLINIC | Age: 43
End: 2023-02-27
Payer: MEDICARE

## 2023-02-27 DIAGNOSIS — B37.0 THRUSH: Primary | ICD-10-CM

## 2023-02-27 RX ORDER — NYSTATIN 100000 [USP'U]/ML
6 SUSPENSION ORAL 4 TIMES DAILY
Qty: 240 ML | Refills: 0 | Status: SHIPPED | OUTPATIENT
Start: 2023-02-27 | End: 2023-03-30

## 2023-03-16 ENCOUNTER — PES CALL (OUTPATIENT)
Dept: ADMINISTRATIVE | Facility: CLINIC | Age: 43
End: 2023-03-16
Payer: MEDICARE

## 2023-03-27 ENCOUNTER — SPECIALTY PHARMACY (OUTPATIENT)
Dept: PHARMACY | Facility: CLINIC | Age: 43
End: 2023-03-27
Payer: MEDICARE

## 2023-03-27 NOTE — TELEPHONE ENCOUNTER
Specialty Pharmacy - Refill Coordination    Specialty Medication Orders Linked to Encounter      Flowsheet Row Most Recent Value   Medication #1 golimumab (SIMPONI) 50 mg/0.5 mL PnIj (Order#[id number], Rx#[id number])            Refill Questions - Documented Responses      Flowsheet Row Most Recent Value   Patient Availability and HIPAA Verification    Does patient want to proceed with activity? Yes   HIPAA/medical authority confirmed? Yes   Relationship to patient of person spoken to? Self   Refill Screening Questions    Changes to allergies? No   Changes to medications? No   New conditions since last clinic visit? No   Unplanned office visit, urgent care, ED, or hospital admission in the last 4 weeks? No   How does patient/caregiver feel medication is working? Good   Financial problems or insurance changes? No   How many doses of your specialty medications were missed in the last 4 weeks? 0   Would patient like to speak to a pharmacist? No   When does the patient need to receive the medication? 03/28/23   Refill Delivery Questions    How will the patient receive the medication? MEDRx   When does the patient need to receive the medication? 03/28/23   Shipping Address Home   Address in [facility] confirmed and updated if neccessary? Yes   Expected Copay ($) 0   Is the patient able to afford the medication copay? Yes   Payment Method zero copay   Days supply of Refill 30   Supplies needed? No supplies needed   Refill activity completed? Yes   Refill activity plan Refill scheduled   Shipment/Pickup Date: 03/28/23            Current Outpatient Medications   Medication Sig    ALPRAZolam (XANAX) 2 MG Tab Take 1 tablet (2 mg total) by mouth 2 (two) times daily as needed (anxiety).    baclofen (LIORESAL) 20 MG tablet TAKE 1/2 TABLET(10 MG) BY MOUTH THREE TIMES DAILY    budesonide-formoterol 160-4.5 mcg (SYMBICORT) 160-4.5 mcg/actuation HFAA INHALE 2 PUFFS INTO THE LUNGS EVERY 12 HOURS    carvediloL (COREG) 3.125 MG  tablet TAKE 1 TABLET(3.125 MG) BY MOUTH TWICE DAILY    cetirizine (ZYRTEC) 10 MG tablet Take 10 mg by mouth once daily.    clindamycin (CLEOCIN T) 1 % lotion Apply topically 2 (two) times daily. for 15 days    clotrimazole-betamethasone 1-0.05% (LOTRISONE) cream Apply topically 2 (two) times daily.    cyanocobalamin (VITAMIN B-12) 1000 MCG tablet 1,000 mcg.    dapsone 25 MG Tab Take 1 tablet (25 mg total) by mouth once daily. for 10 days    doxycycline (VIBRAMYCIN) 100 MG Cap Take 100 mg by mouth 2 (two) times daily.    DULoxetine (CYMBALTA) 30 MG capsule Take 1 capsule (30 mg total) by mouth once daily.    epinephrine (EPIPEN JR) 0.15 mg/0.3 mL pen injection Inject 0.3 mg into the muscle as needed for Anaphylaxis.    famotidine (PEPCID) 40 MG tablet Take 1 tablet (40 mg total) by mouth nightly as needed for Heartburn.    fluconazole (DIFLUCAN) 150 MG Tab     golimumab (SIMPONI) 50 mg/0.5 mL PnIj Inject 0.5 mL (50 mg) into the skin every 30 days.    icosapent ethyL (VASCEPA) 1 gram Cap Take 1 capsule (1 g total) by mouth 2 (two) times daily.    mupirocin (BACTROBAN) 2 % ointment APPLY EXTERNALLY TO THE AFFECTED AREA THREE TIMES DAILY    NARCAN 4 mg/actuation Spry     oxyCODONE-acetaminophen (PERCOCET)  mg per tablet Take 1 tablet by mouth every 8 (eight) hours as needed for Pain.    oxyCODONE-acetaminophen (PERCOCET)  mg per tablet Take 1 tablet by mouth every 8 (eight) hours as needed for Pain.    predniSONE (DELTASONE) 2.5 MG tablet Take 2 tablets (5 mg total) by mouth daily as needed (flare).    sildenafil (REVATIO) 20 mg Tab Take 1 tablet (20 mg total) by mouth 2 (two) times daily.   Last reviewed on 1/27/2023  8:29 AM by Pati Wallis MA    Review of patient's allergies indicates:   Allergen Reactions    Asa [aspirin] Anaphylaxis    Azulfidine [sulfasalazine] Anaphylaxis    Ibuprofen Anaphylaxis    Gabapentin      Lip swelling, tongue swelling    Last reviewed on  2/27/2023 9:37 AM by Shaye  Matt      Tasks added this encounter   4/20/2023 - Refill Call (Auto Added)   Tasks due within next 3 months   3/21/2023 - Clinical - Follow Up Assesement (Annual)     Daniella Cummings, PharmD  [facility]  [address]  [facility] [address]  Phone: [phone]  Fax: [phone]

## 2023-04-05 ENCOUNTER — PES CALL (OUTPATIENT)
Dept: ADMINISTRATIVE | Facility: CLINIC | Age: 43
End: 2023-04-05
Payer: MEDICARE

## 2023-04-14 DIAGNOSIS — B37.0 THRUSH: ICD-10-CM

## 2023-04-18 RX ORDER — NYSTATIN 100000 [USP'U]/ML
4 SUSPENSION ORAL
Qty: 240 ML | Refills: 2 | Status: SHIPPED | OUTPATIENT
Start: 2023-04-18 | End: 2023-05-26 | Stop reason: SDUPTHER

## 2023-04-19 ENCOUNTER — SPECIALTY PHARMACY (OUTPATIENT)
Dept: PHARMACY | Facility: CLINIC | Age: 43
End: 2023-04-19
Payer: MEDICARE

## 2023-04-19 DIAGNOSIS — M45.9 ANKYLOSING SPONDYLITIS, UNSPECIFIED SITE OF SPINE: Primary | ICD-10-CM

## 2023-04-19 NOTE — TELEPHONE ENCOUNTER
Specialty Pharmacy - Clinical Reassessment  Specialty Pharmacy - Refill Coordination    Specialty Medication Orders Linked to Encounter      Flowsheet Row Most Recent Value   Medication #1 golimumab (SIMPONI) 50 mg/0.5 mL PnIj (Order#[id number], Rx#)          Patient Diagnosis   M45.9 - Ankylosing spondylitis    Umang Greer is a 42 y.o. male, who is followed by the specialty pharmacy service for management and education of his AS.  He has been on therapy with Simponi since 3/2015.  I have reviewed his electronic medical record and current medication list and determined that specialty medication adjustment Is not needed at this time.    Patient has not experienced adverse events.  He Is adherent reporting 0 missed doses since last review.  Adherence has been encouraged with the following mechanism(s): proactive refill calls.  He is meeting goals of therapy and will continue treatment.        3/27/2023 2/23/2023 1/25/2023 12/21/2022 11/23/2022 10/24/2022 9/23/2022   Follow Up Review   # of missed doses 0 0 0 0 0 0 0   New Medications? No No No No No No No   New Conditions? No No No No No No No   New Allergies? No No No No No No    Med Effective? Good Good Good Good Good Good Very good   Urgent Care? No No Yes    Yes No No No No   Requested Pharmacist? No No No No No No No       Multiple values from one day are sorted in reverse-chronological order         Therapy is appropriate to continue.    Therapy is effective: Yes  On scale of 1 to 10, how does patient rank quality of life? (10 - Best): Unable to Assess  Recommendations: none at this time.  Review Method: Chart Review    Tasks added this encounter   1/19/2024 - Clinical - Follow Up Assesement (Annual)   Tasks due within next 3 months   4/20/2023 - Refill Call (Auto Added)     Kirstin Hebert, PharmD  [facility]  [address] [place], [place] [address]  [facility] [address]  Phone: [phone]  Fax: [phone]

## 2023-04-19 NOTE — TELEPHONE ENCOUNTER
Outgoing call to pt for Simponi refill and f/u reassessment.  No answer, LVM.  ZURDO open for f/u reassessment.

## 2023-04-20 NOTE — TELEPHONE ENCOUNTER
Specialty Pharmacy - Clinical Reassessment  Specialty Pharmacy - Refill Coordination    Specialty Medication Orders Linked to Encounter      Flowsheet Row Most Recent Value   Medication #1 golimumab (SIMPONI) 50 mg/0.5 mL PnIj (Order#[id number], Rx#)            Refill Questions - Documented Responses      Flowsheet Row Most Recent Value   Patient Availability and HIPAA Verification    Does patient want to proceed with activity? Yes   HIPAA/medical authority confirmed? Yes   Relationship to patient of person spoken to? Self   Refill Screening Questions    Changes to allergies? No   Changes to medications? No   New conditions since last clinic visit? No   Unplanned office visit, urgent care, ED, or hospital admission in the last 4 weeks? No   How does patient/caregiver feel medication is working? Good   Financial problems or insurance changes? No   How many doses of your specialty medications were missed in the last 4 weeks? 0   Would patient like to speak to a pharmacist? No   When does the patient need to receive the medication? 04/30/23   Refill Delivery Questions    How will the patient receive the medication? MEDRx   When does the patient need to receive the medication? 04/30/23   Shipping Address Home   Address in [facility] confirmed and updated if neccessary? Yes   Expected Copay ($) 0   Is the patient able to afford the medication copay? Yes   Payment Method zero copay   Days supply of Refill 30   Supplies needed? No supplies needed   Refill activity completed? Yes   Refill activity plan Refill scheduled   Shipment/Pickup Date: 04/25/23            Current Outpatient Medications   Medication Sig    ALPRAZolam (XANAX) 2 MG Tab Take 1 tablet (2 mg total) by mouth 2 (two) times daily as needed (anxiety).    baclofen (LIORESAL) 20 MG tablet TAKE 1/2 TABLET(10 MG) BY MOUTH THREE TIMES DAILY    budesonide-formoterol 160-4.5 mcg (SYMBICORT) 160-4.5 mcg/actuation HFAA INHALE 2 PUFFS INTO THE LUNGS EVERY 12 HOURS     carvediloL (COREG) 3.125 MG tablet TAKE 1 TABLET(3.125 MG) BY MOUTH TWICE DAILY    cetirizine (ZYRTEC) 10 MG tablet Take 10 mg by mouth once daily.    clindamycin (CLEOCIN T) 1 % lotion Apply topically 2 (two) times daily. for 15 days    clotrimazole-betamethasone 1-0.05% (LOTRISONE) cream Apply topically 2 (two) times daily.    cyanocobalamin (VITAMIN B-12) 1000 MCG tablet 1,000 mcg.    dapsone 25 MG Tab Take 1 tablet (25 mg total) by mouth once daily. for 10 days    doxycycline (VIBRAMYCIN) 100 MG Cap Take 100 mg by mouth 2 (two) times daily.    DULoxetine (CYMBALTA) 30 MG capsule Take 1 capsule (30 mg total) by mouth once daily.    epinephrine (EPIPEN JR) 0.15 mg/0.3 mL pen injection Inject 0.3 mg into the muscle as needed for Anaphylaxis.    famotidine (PEPCID) 40 MG tablet Take 1 tablet (40 mg total) by mouth nightly as needed for Heartburn.    fluconazole (DIFLUCAN) 150 MG Tab     golimumab (SIMPONI) 50 mg/0.5 mL PnIj Inject 0.5 mL (50 mg) into the skin every 30 days.    icosapent ethyL (VASCEPA) 1 gram Cap Take 1 capsule (1 g total) by mouth 2 (two) times daily.    mupirocin (BACTROBAN) 2 % ointment APPLY EXTERNALLY TO THE AFFECTED AREA THREE TIMES DAILY    NARCAN 4 mg/actuation Spry     nystatin (MYCOSTATIN) 100,000 unit/mL suspension Take 4 mLs (400,000 Units total) by mouth after meals as needed.    oxyCODONE-acetaminophen (PERCOCET)  mg per tablet Take 1 tablet by mouth every 8 (eight) hours as needed for Pain.    predniSONE (DELTASONE) 2.5 MG tablet Take 2 tablets (5 mg total) by mouth daily as needed (flare).    sildenafil (REVATIO) 20 mg Tab Take 1 tablet (20 mg total) by mouth 2 (two) times daily.   Last reviewed on 4/19/2023  1:27 PM by Kirstin Hebert, PharmD    Review of patient's allergies indicates:   Allergen Reactions    Asa [aspirin] Anaphylaxis    Azulfidine [sulfasalazine] Anaphylaxis    Ibuprofen Anaphylaxis    Gabapentin      Lip swelling, tongue swelling    Last reviewed on  4/19/2023  1:27 PM by Kirstin Hebert    Interventions added this encounter   Closed: [facility] Patient Intervention - Patient educational resources: golimumab (SIMPONI) 50 mg/0.5 mL PnIj     Tasks added this encounter   1/19/2024 - Clinical - Follow Up Assesement (Annual)  5/23/2023 - Refill Call (Auto Added)   Tasks due within next 3 months   No tasks due.     Kirstin Hebert, PharmD  [facility]  [address] [place] [address]  Phone: [phone]  Fax: [phone]

## 2023-04-24 ENCOUNTER — LAB VISIT (OUTPATIENT)
Dept: LAB | Facility: HOSPITAL | Age: 43
End: 2023-04-24
Attending: INTERNAL MEDICINE
Payer: MEDICARE

## 2023-04-24 DIAGNOSIS — E55.9 VITAMIN D DEFICIENCY, UNSPECIFIED: ICD-10-CM

## 2023-04-24 DIAGNOSIS — G89.4 CHRONIC PAIN SYNDROME: ICD-10-CM

## 2023-04-24 DIAGNOSIS — D84.821 IMMUNOCOMPROMISED STATE DUE TO DRUG THERAPY: ICD-10-CM

## 2023-04-24 DIAGNOSIS — L08.9 LOCAL SKIN INFECTION: ICD-10-CM

## 2023-04-24 DIAGNOSIS — N52.9 IMPOTENCE DUE TO ERECTILE DYSFUNCTION: ICD-10-CM

## 2023-04-24 DIAGNOSIS — M45.9 ANKYLOSING SPONDYLITIS, UNSPECIFIED SITE OF SPINE: ICD-10-CM

## 2023-04-24 DIAGNOSIS — M46.46 LUMBAR DISCITIS: ICD-10-CM

## 2023-04-24 DIAGNOSIS — L70.0 ACNE VULGARIS: ICD-10-CM

## 2023-04-24 DIAGNOSIS — M62.838 NECK MUSCLE SPASM: ICD-10-CM

## 2023-04-24 DIAGNOSIS — M47.817 LUMBAR AND SACRAL ARTHRITIS: ICD-10-CM

## 2023-04-24 DIAGNOSIS — M47.816 LUMBAR SPONDYLOSIS: ICD-10-CM

## 2023-04-24 DIAGNOSIS — Z79.899 IMMUNOCOMPROMISED STATE DUE TO DRUG THERAPY: ICD-10-CM

## 2023-04-24 DIAGNOSIS — R79.82 ELEVATED C-REACTIVE PROTEIN (CRP): ICD-10-CM

## 2023-04-24 PROCEDURE — 82672 ASSAY OF ESTROGEN: CPT | Performed by: INTERNAL MEDICINE

## 2023-04-24 PROCEDURE — 84403 ASSAY OF TOTAL TESTOSTERONE: CPT | Performed by: INTERNAL MEDICINE

## 2023-04-24 PROCEDURE — 36415 COLL VENOUS BLD VENIPUNCTURE: CPT | Mod: PO | Performed by: INTERNAL MEDICINE

## 2023-04-24 PROCEDURE — 84270 ASSAY OF SEX HORMONE GLOBUL: CPT | Performed by: INTERNAL MEDICINE

## 2023-04-25 RX ORDER — OXYCODONE AND ACETAMINOPHEN 10; 325 MG/1; MG/1
1 TABLET ORAL EVERY 8 HOURS PRN
Qty: 90 TABLET | Refills: 0 | Status: SHIPPED | OUTPATIENT
Start: 2023-04-25 | End: 2023-05-24 | Stop reason: SDUPTHER

## 2023-04-26 ENCOUNTER — TELEPHONE (OUTPATIENT)
Dept: UROLOGY | Facility: CLINIC | Age: 43
End: 2023-04-26
Payer: MEDICARE

## 2023-04-27 LAB — ESTROGEN SERPL-MCNC: 71 PG/ML

## 2023-05-02 LAB
ALBUMIN SERPL-MCNC: 4 G/DL (ref 3.6–5.1)
SHBG SERPL-SCNC: 54 NMOL/L (ref 10–50)
TESTOST FREE SERPL-MCNC: 43.7 PG/ML (ref 46–224)
TESTOST SERPL-MCNC: 490 NG/DL (ref 250–1100)
TESTOSTERONE.FREE+WB SERPL-MCNC: 80.3 NG/DL (ref 110–575)

## 2023-05-10 ENCOUNTER — PATIENT OUTREACH (OUTPATIENT)
Dept: ADMINISTRATIVE | Facility: HOSPITAL | Age: 43
End: 2023-05-10
Payer: MEDICARE

## 2023-05-10 NOTE — PROGRESS NOTES
Tobacco Screening review.  Is patient still a current smoker?   Last review was 10/7/2022    states still an everyday smoker, but completing form for smoking cessation

## 2023-05-11 ENCOUNTER — OFFICE VISIT (OUTPATIENT)
Dept: FAMILY MEDICINE | Facility: CLINIC | Age: 43
End: 2023-05-11
Payer: MEDICARE

## 2023-05-11 VITALS — OXYGEN SATURATION: 99 % | DIASTOLIC BLOOD PRESSURE: 76 MMHG | HEART RATE: 85 BPM | SYSTOLIC BLOOD PRESSURE: 110 MMHG

## 2023-05-11 DIAGNOSIS — J44.9 CHRONIC OBSTRUCTIVE PULMONARY DISEASE, UNSPECIFIED COPD TYPE: ICD-10-CM

## 2023-05-11 DIAGNOSIS — E78.2 MIXED HYPERLIPIDEMIA: ICD-10-CM

## 2023-05-11 DIAGNOSIS — D57.3 SICKLE CELL TRAIT: ICD-10-CM

## 2023-05-11 DIAGNOSIS — F41.9 ANXIETY: Primary | ICD-10-CM

## 2023-05-11 PROCEDURE — 99214 PR OFFICE/OUTPT VISIT, EST, LEVL IV, 30-39 MIN: ICD-10-PCS | Mod: S$PBB,,, | Performed by: FAMILY MEDICINE

## 2023-05-11 PROCEDURE — 99214 OFFICE O/P EST MOD 30 MIN: CPT | Mod: S$PBB,,, | Performed by: FAMILY MEDICINE

## 2023-05-11 PROCEDURE — 99212 OFFICE O/P EST SF 10 MIN: CPT | Mod: PBBFAC,PO | Performed by: FAMILY MEDICINE

## 2023-05-11 PROCEDURE — 99999 PR PBB SHADOW E&M-EST. PATIENT-LVL II: ICD-10-PCS | Mod: PBBFAC,,, | Performed by: FAMILY MEDICINE

## 2023-05-11 PROCEDURE — 99999 PR PBB SHADOW E&M-EST. PATIENT-LVL II: CPT | Mod: PBBFAC,,, | Performed by: FAMILY MEDICINE

## 2023-05-11 RX ORDER — OMEPRAZOLE 20 MG/1
20 CAPSULE, DELAYED RELEASE ORAL DAILY
Qty: 90 CAPSULE | Refills: 1 | Status: SHIPPED | OUTPATIENT
Start: 2023-05-11 | End: 2023-11-10

## 2023-05-11 RX ORDER — CHLORHEXIDINE GLUCONATE ORAL RINSE 1.2 MG/ML
SOLUTION DENTAL
COMMUNITY
Start: 2023-03-27

## 2023-05-11 NOTE — PROGRESS NOTES
Subjective:       Patient ID: Umang Greer is a 42 y.o. male.    Chief Complaint: Medication Refill (Follow up )    Here for follow up multiple chronic medical issues. Doing well overall and in normal state of health.      Medication Refill  Associated symptoms include arthralgias, joint swelling and neck pain. Pertinent negatives include no chest pain, chills, coughing, fever, headaches, vomiting or weakness.   Review of Systems   Constitutional:  Positive for activity change. Negative for chills, fever and unexpected weight change.   HENT:  Negative for hearing loss, rhinorrhea and trouble swallowing.    Eyes:  Negative for discharge and visual disturbance.   Respiratory:  Positive for wheezing. Negative for cough, chest tightness and shortness of breath.    Cardiovascular:  Negative for chest pain, palpitations and leg swelling.   Gastrointestinal:  Positive for constipation. Negative for blood in stool, diarrhea and vomiting.   Endocrine: Positive for polydipsia and polyuria. Negative for cold intolerance and heat intolerance.   Genitourinary:  Negative for difficulty urinating, hematuria and urgency.   Musculoskeletal:  Positive for arthralgias, joint swelling and neck pain.   Neurological:  Negative for weakness and headaches.   Psychiatric/Behavioral:  Positive for dysphoric mood. Negative for confusion and decreased concentration. The patient is not nervous/anxious.      Objective:      Physical Exam  Vitals and nursing note reviewed.   Constitutional:       Appearance: He is well-developed.   HENT:      Head: Normocephalic and atraumatic.   Cardiovascular:      Rate and Rhythm: Normal rate and regular rhythm.      Heart sounds: Normal heart sounds.   Pulmonary:      Effort: Pulmonary effort is normal.      Breath sounds: Normal breath sounds.       Assessment:       1. Anxiety    2. Chronic obstructive pulmonary disease, unspecified COPD type    3. Mixed hyperlipidemia    4. Sickle cell trait         Plan:       Anxiety    Chronic obstructive pulmonary disease, unspecified COPD type    Mixed hyperlipidemia    Sickle cell trait    Other orders  -     omeprazole (PRILOSEC) 20 MG capsule; Take 1 capsule (20 mg total) by mouth once daily.  Dispense: 90 capsule; Refill: 1        Told by dentist has acid reflux and requests treatment; to gi as needed  Treat and monitor  F/u with rheum as planned  Reviewed labs  Will monitor chronic medical issues and continue current plan of care.    Follow up in about 6 months (around 11/11/2023), or if symptoms worsen or fail to improve.

## 2023-05-18 ENCOUNTER — SPECIALTY PHARMACY (OUTPATIENT)
Dept: PHARMACY | Facility: CLINIC | Age: 43
End: 2023-05-18
Payer: MEDICARE

## 2023-05-18 NOTE — TELEPHONE ENCOUNTER
Specialty Pharmacy - Refill Coordination    Specialty Medication Orders Linked to Encounter      Flowsheet Row Most Recent Value   Medication #1 golimumab (SIMPONI) 50 mg/0.5 mL PnIj (Order#[id number], Rx#[id number])            Refill Questions - Documented Responses      Flowsheet Row Most Recent Value   Patient Availability and HIPAA Verification    Does patient want to proceed with activity? Yes   HIPAA/medical authority confirmed? Yes   Relationship to patient of person spoken to? Self   Refill Screening Questions    Changes to allergies? No   Changes to medications? No   New conditions since last clinic visit? No   Unplanned office visit, urgent care, ED, or hospital admission in the last 4 weeks? No   How does patient/caregiver feel medication is working? Good   Financial problems or insurance changes? No   How many doses of your specialty medications were missed in the last 4 weeks? 0   Would patient like to speak to a pharmacist? No   When does the patient need to receive the medication? 05/28/23   Refill Delivery Questions    How will the patient receive the medication? MEDRx   When does the patient need to receive the medication? 05/28/23   Shipping Address Home   Address in [facility] confirmed and updated if neccessary? No   Expected Copay ($) 0   Is the patient able to afford the medication copay? No   Payment Method zero copay   Days supply of Refill 30   Supplies needed? No supplies needed   Refill activity completed? No   Refill activity plan Refill scheduled   Shipment/Pickup Date: 05/23/23            Current Outpatient Medications   Medication Sig    ALPRAZolam (XANAX) 2 MG Tab Take 1 tablet (2 mg total) by mouth 2 (two) times daily as needed (anxiety).    baclofen (LIORESAL) 20 MG tablet TAKE 1/2 TABLET(10 MG) BY MOUTH THREE TIMES DAILY    budesonide-formoterol 160-4.5 mcg (SYMBICORT) 160-4.5 mcg/actuation HFAA INHALE 2 PUFFS INTO THE LUNGS EVERY 12 HOURS    carvediloL (COREG) 3.125 MG  tablet TAKE 1 TABLET(3.125 MG) BY MOUTH TWICE DAILY    cetirizine (ZYRTEC) 10 MG tablet Take 10 mg by mouth once daily.    chlorhexidine (PERIDEX) 0.12 % solution SMARTSIG:By Mouth    clindamycin (CLEOCIN T) 1 % lotion Apply topically 2 (two) times daily. for 15 days    clotrimazole-betamethasone 1-0.05% (LOTRISONE) cream Apply topically 2 (two) times daily.    cyanocobalamin (VITAMIN B-12) 1000 MCG tablet 1,000 mcg.    dapsone 25 MG Tab Take 1 tablet (25 mg total) by mouth once daily. for 10 days    doxycycline (VIBRAMYCIN) 100 MG Cap Take 100 mg by mouth 2 (two) times daily.    DULoxetine (CYMBALTA) 30 MG capsule Take 1 capsule (30 mg total) by mouth once daily.    epinephrine (EPIPEN JR) 0.15 mg/0.3 mL pen injection Inject 0.3 mg into the muscle as needed for Anaphylaxis.    famotidine (PEPCID) 40 MG tablet Take 1 tablet (40 mg total) by mouth nightly as needed for Heartburn.    fluconazole (DIFLUCAN) 150 MG Tab     golimumab (SIMPONI) 50 mg/0.5 mL PnIj Inject 0.5 mL (50 mg) into the skin every 30 days.    icosapent ethyL (VASCEPA) 1 gram Cap Take 1 capsule (1 g total) by mouth 2 (two) times daily.    mupirocin (BACTROBAN) 2 % ointment APPLY EXTERNALLY TO THE AFFECTED AREA THREE TIMES DAILY    NARCAN 4 mg/actuation Spry     nystatin (MYCOSTATIN) 100,000 unit/mL suspension Take 4 mLs (400,000 Units total) by mouth after meals as needed.    omeprazole (PRILOSEC) 20 MG capsule Take 1 capsule (20 mg total) by mouth once daily.    oxyCODONE-acetaminophen (PERCOCET)  mg per tablet Take 1 tablet by mouth every 8 (eight) hours as needed for Pain.    predniSONE (DELTASONE) 2.5 MG tablet Take 2 tablets (5 mg total) by mouth daily as needed (flare).    sildenafil (REVATIO) 20 mg Tab Take 1 tablet (20 mg total) by mouth 2 (two) times daily.   Last reviewed on 4/19/2023  1:27 PM by Kirstin Hebert, PriscillaD    Review of patient's allergies indicates:   Allergen Reactions    Asa [aspirin] Anaphylaxis    Azulfidine  [sulfasalazine] Anaphylaxis    Ibuprofen Anaphylaxis    Gabapentin      Lip swelling, tongue swelling    Last reviewed on  5/11/2023 11:11 AM by Andrea Khan      Tasks added this encounter   No tasks added.   Tasks due within next 3 months   5/23/2023 - Refill Coordination Outreach (1 time occurrence)  5/18/2023 - Refill Coordination Outreach (1 time occurrence)     Luke O'Jose, PharmD  [facility]  [address] [place], [place] [address]  [facility] [address]  Phone: [phone]  Fax: [phone]

## 2023-05-24 DIAGNOSIS — M62.838 NECK MUSCLE SPASM: ICD-10-CM

## 2023-05-24 DIAGNOSIS — R79.82 ELEVATED C-REACTIVE PROTEIN (CRP): ICD-10-CM

## 2023-05-24 DIAGNOSIS — E55.9 VITAMIN D DEFICIENCY, UNSPECIFIED: ICD-10-CM

## 2023-05-24 DIAGNOSIS — M47.816 LUMBAR SPONDYLOSIS: ICD-10-CM

## 2023-05-24 DIAGNOSIS — M45.9 ANKYLOSING SPONDYLITIS, UNSPECIFIED SITE OF SPINE: ICD-10-CM

## 2023-05-24 DIAGNOSIS — M46.46 LUMBAR DISCITIS: ICD-10-CM

## 2023-05-24 DIAGNOSIS — M47.817 LUMBAR AND SACRAL ARTHRITIS: ICD-10-CM

## 2023-05-24 DIAGNOSIS — L08.9 LOCAL SKIN INFECTION: ICD-10-CM

## 2023-05-24 DIAGNOSIS — Z79.899 IMMUNOCOMPROMISED STATE DUE TO DRUG THERAPY: ICD-10-CM

## 2023-05-24 DIAGNOSIS — G89.4 CHRONIC PAIN SYNDROME: ICD-10-CM

## 2023-05-24 DIAGNOSIS — D84.821 IMMUNOCOMPROMISED STATE DUE TO DRUG THERAPY: ICD-10-CM

## 2023-05-25 RX ORDER — OXYCODONE AND ACETAMINOPHEN 10; 325 MG/1; MG/1
1 TABLET ORAL EVERY 8 HOURS PRN
Qty: 90 TABLET | Refills: 0 | Status: SHIPPED | OUTPATIENT
Start: 2023-05-25 | End: 2023-05-26 | Stop reason: SDUPTHER

## 2023-05-26 ENCOUNTER — OFFICE VISIT (OUTPATIENT)
Dept: RHEUMATOLOGY | Facility: CLINIC | Age: 43
End: 2023-05-26
Payer: MEDICARE

## 2023-05-26 VITALS
DIASTOLIC BLOOD PRESSURE: 79 MMHG | SYSTOLIC BLOOD PRESSURE: 112 MMHG | HEART RATE: 80 BPM | WEIGHT: 176.81 LBS | HEIGHT: 73 IN | BODY MASS INDEX: 23.43 KG/M2

## 2023-05-26 DIAGNOSIS — Z79.899 IMMUNOCOMPROMISED STATE DUE TO DRUG THERAPY: ICD-10-CM

## 2023-05-26 DIAGNOSIS — M62.838 NECK MUSCLE SPASM: ICD-10-CM

## 2023-05-26 DIAGNOSIS — G89.4 CHRONIC PAIN SYNDROME: ICD-10-CM

## 2023-05-26 DIAGNOSIS — E78.9 LIPID DISORDER: ICD-10-CM

## 2023-05-26 DIAGNOSIS — E55.9 VITAMIN D DEFICIENCY, UNSPECIFIED: ICD-10-CM

## 2023-05-26 DIAGNOSIS — M47.817 LUMBAR AND SACRAL ARTHRITIS: ICD-10-CM

## 2023-05-26 DIAGNOSIS — L70.0 ACNE VULGARIS: ICD-10-CM

## 2023-05-26 DIAGNOSIS — R79.82 ELEVATED C-REACTIVE PROTEIN (CRP): ICD-10-CM

## 2023-05-26 DIAGNOSIS — M46.46 LUMBAR DISCITIS: ICD-10-CM

## 2023-05-26 DIAGNOSIS — M47.816 LUMBAR SPONDYLOSIS: ICD-10-CM

## 2023-05-26 DIAGNOSIS — L08.9 LOCAL SKIN INFECTION: ICD-10-CM

## 2023-05-26 DIAGNOSIS — M45.9 ANKYLOSING SPONDYLITIS, UNSPECIFIED SITE OF SPINE: ICD-10-CM

## 2023-05-26 DIAGNOSIS — B37.0 THRUSH: ICD-10-CM

## 2023-05-26 DIAGNOSIS — R74.8 LOW SERUM HDL: ICD-10-CM

## 2023-05-26 DIAGNOSIS — N52.9 IMPOTENCE DUE TO ERECTILE DYSFUNCTION: ICD-10-CM

## 2023-05-26 DIAGNOSIS — D84.821 IMMUNOCOMPROMISED STATE DUE TO DRUG THERAPY: ICD-10-CM

## 2023-05-26 PROCEDURE — 99999 PR PBB SHADOW E&M-EST. PATIENT-LVL IV: ICD-10-PCS | Mod: PBBFAC,,, | Performed by: INTERNAL MEDICINE

## 2023-05-26 PROCEDURE — 99215 OFFICE O/P EST HI 40 MIN: CPT | Mod: S$PBB,,, | Performed by: INTERNAL MEDICINE

## 2023-05-26 PROCEDURE — 99999 PR PBB SHADOW E&M-EST. PATIENT-LVL IV: CPT | Mod: PBBFAC,,, | Performed by: INTERNAL MEDICINE

## 2023-05-26 PROCEDURE — 99214 OFFICE O/P EST MOD 30 MIN: CPT | Mod: PBBFAC,PN | Performed by: INTERNAL MEDICINE

## 2023-05-26 PROCEDURE — 99215 PR OFFICE/OUTPT VISIT, EST, LEVL V, 40-54 MIN: ICD-10-PCS | Mod: S$PBB,,, | Performed by: INTERNAL MEDICINE

## 2023-05-26 RX ORDER — FLUCONAZOLE 150 MG/1
150 TABLET ORAL DAILY
Qty: 7 TABLET | Refills: 0 | Status: SHIPPED | OUTPATIENT
Start: 2023-05-26 | End: 2023-06-02

## 2023-05-26 RX ORDER — OXYCODONE AND ACETAMINOPHEN 10; 325 MG/1; MG/1
1 TABLET ORAL EVERY 8 HOURS PRN
Qty: 90 TABLET | Refills: 0 | Status: SHIPPED | OUTPATIENT
Start: 2023-08-20 | End: 2023-09-18 | Stop reason: SDUPTHER

## 2023-05-26 RX ORDER — ICOSAPENT ETHYL 1000 MG/1
2 CAPSULE ORAL 2 TIMES DAILY
Qty: 360 CAPSULE | Refills: 3 | Status: SHIPPED | OUTPATIENT
Start: 2023-05-26 | End: 2023-10-04

## 2023-05-26 RX ORDER — OXYCODONE AND ACETAMINOPHEN 10; 325 MG/1; MG/1
1 TABLET ORAL EVERY 8 HOURS PRN
Qty: 90 TABLET | Refills: 0 | Status: SHIPPED | OUTPATIENT
Start: 2023-07-22 | End: 2023-08-25

## 2023-05-26 RX ORDER — DAPSONE 25 MG/1
25 TABLET ORAL DAILY
Qty: 30 TABLET | Refills: 3 | Status: SHIPPED | OUTPATIENT
Start: 2023-05-26 | End: 2023-09-26

## 2023-05-26 RX ORDER — NYSTATIN 100000 [USP'U]/ML
4 SUSPENSION ORAL
Qty: 240 ML | Refills: 2 | Status: SHIPPED | OUTPATIENT
Start: 2023-05-26 | End: 2023-06-27 | Stop reason: SDUPTHER

## 2023-05-26 RX ORDER — OXYCODONE AND ACETAMINOPHEN 10; 325 MG/1; MG/1
1 TABLET ORAL EVERY 8 HOURS PRN
Qty: 90 TABLET | Refills: 0 | Status: SHIPPED | OUTPATIENT
Start: 2023-06-23 | End: 2023-07-23

## 2023-05-26 NOTE — PROGRESS NOTES
Subjective:       Patient ID: Umang Greer is a 42 y.o. male.    Chief Complaint: Disease Management      Follow up: 42 year old male who presents to clinic for follow up on Ankylosing spondylitis, He has had multiple skin eruptions on his back he started docycline and dapsone. He has thrush that he is treating. He is using hibiclens prn and panoxyl soap slightly improved. He complain   He reports good improvement overall with his arthritis on simponi monthly.  He is using prednisone 2.5-5 mg daily PRN.     Current tx:  1. Simponi  2. Prednisone prn  3. norco 10/325 q6    Review of Systems   Constitutional:  Positive for activity change. Negative for unexpected weight change.   HENT:  Negative for mouth sores.    Eyes:  Positive for redness. Negative for visual disturbance.   Respiratory:  Negative for cough, shortness of breath and wheezing.    Cardiovascular:  Negative for chest pain, palpitations and leg swelling.   Gastrointestinal:  Negative for constipation and diarrhea.   Genitourinary:  Negative for genital sores.   Musculoskeletal:  Positive for back pain, joint swelling and neck stiffness.   Skin:  Negative for rash.   Allergic/Immunologic: Positive for immunocompromised state.   Neurological:  Negative for dizziness and syncope.   Hematological:  Negative for adenopathy. Bruises/bleeds easily.       Objective:     Vitals:    05/26/23 1056   BP: 112/79   Pulse: 80       Past Medical History:   Diagnosis Date    Anxiety     Asthma     Cardiomyopathy     CHF (congestive heart failure)     Emphysema of lung     Sickle cell anemia     only trait    Sickle cell trait      Past Surgical History:   Procedure Laterality Date    HERNIA REPAIR      NASAL SEPTOPLASTY  12/20/2021    Dr. Menjivar at [facility]          Physical Exam   Constitutional: He is oriented to person, place, and time.   HENT:   Head: Normocephalic and atraumatic.   Mouth/Throat: Oropharynx is clear and moist.   Eyes: Pupils are equal, round, and  reactive to light.   Neck: No thyromegaly present.   Cardiovascular: Normal rate, regular rhythm and normal heart sounds. Exam reveals no gallop and no friction rub.   No murmur heard.  Pulmonary/Chest: Breath sounds normal. He has no wheezes. He has no rales. He exhibits no tenderness.   Abdominal: There is no abdominal tenderness. There is no rebound and no guarding.   Musculoskeletal:         General: Tenderness and deformity present.      Right shoulder: Tenderness present.      Left shoulder: Tenderness present.      Right elbow: Swelling present. Tenderness present.      Left elbow: Tenderness present.      Right wrist: Swelling and tenderness present.      Left wrist: Swelling and tenderness present.      Cervical back: Neck supple.      Right knee: Swelling and effusion present. Tenderness present.      Left knee: Swelling and effusion present. Tenderness present.   Lymphadenopathy:     He has no cervical adenopathy.   Neurological: He is alert and oriented to person, place, and time. Gait normal.   Skin: No rash noted. No erythema. No pallor.   Psychiatric: Mood, affect and judgment normal.   Vitals reviewed.      Right Side Rheumatological Exam     The patient is tender to palpation of the shoulder, elbow, wrist, knee, 1st PIP, 1st MCP, 2nd PIP, 2nd MCP, 3rd PIP, 3rd MCP, 4th PIP, 4th MCP and 5th PIP    He has swelling of the elbow, wrist, knee, 1st PIP, 1st MCP, 2nd PIP, 2nd MCP, 3rd PIP, 3rd MCP, 4th PIP, 4th MCP, 5th PIP and 5th MCP    The patient has an enlarged wrist and knee    Shoulder Exam   Tenderness Location: no tenderness    Range of Motion   Active abduction:  abnormal   Adduction: abnormal  Sensation: normal    Knee Exam   Tenderness Location: medial joint line and LCL  Patellofemoral Crepitus: positive  Effusion: positive  Sensation: normal    Hip Exam   Tenderness Location: posterior and anterior  Sensation: normal    Elbow/Wrist Exam   Tenderness Location: no tenderness  Sensation:  normal    Left Side Rheumatological Exam     The patient is tender to palpation of the shoulder, elbow, wrist, knee, 1st PIP, 1st MCP, 2nd PIP, 2nd MCP, 3rd PIP, 3rd MCP, 4th PIP, 4th MCP, 5th PIP and 5th MCP.    He has swelling of the wrist, knee, 1st PIP, 1st MCP, 2nd PIP, 2nd MCP, 3rd PIP, 3rd MCP, 4th PIP, 4th MCP, 5th PIP and 5th MCP    The patient has an enlarged knee.    Shoulder Exam   Tenderness Location: no tenderness    Range of Motion   Active abduction:  abnormal   Sensation: normal    Knee Exam   Tenderness Location: lateral joint line and medial joint line    Patellofemoral Crepitus: positive  Effusion: positive  Sensation: normal    Hip Exam   Tenderness Location: posterior and anterior  Sensation: normal    Elbow/Wrist Exam   Sensation: normal      Back/Neck Exam   General Inspection   Gait: normal       Tenderness Right paramedian tenderness of the Upper C-Spine, Lower C-Spine, Lower L-Spine and SI Joint.Left paramedian tenderness of the Upper C-Spine, Lower L-Spine, Lower C-Spine and SI Joint.    Neck Range of Motion   Flexion:  Limited  Extension:  Limited    Results for orders placed or performed in visit on 04/24/23   Estrogens, Total   Result Value Ref Range    Estrogen 71 pg/mL   TESTOSTERONE PANEL   Result Value Ref Range    Testosterone 490 250 - 1100 ng/dL    Testosterone, Free 43.7 (L) 46.0 - 224.0 pg/mL    Testosterone, Bioavailable 80.3 (L) 110.0 - 575.0 ng/dL    Sex Hormone Binding Globulin 54 (H) 10 - 50 nmol/L    Albumin 4.0 3.6 - 5.1 g/dL     reviewed labs with patient during this visit       Assessment:       1. Acne vulgaris    2. Ankylosing spondylitis, unspecified site of spine    3. Lumbar and sacral arthritis    4. Lumbar discitis    5. Chronic pain syndrome    6. Lumbar spondylosis    7. Neck muscle spasm    8. Impotence due to erectile dysfunction    9. Lipid disorder    10. Low serum HDL    11. Immunocompromised state due to drug therapy    12. Elevated C-reactive protein  (CRP)    13. Vitamin D deficiency, unspecified     14. Local skin infection                  Plan:       Acne vulgaris  -     icosapent ethyL (VASCEPA) 1 gram Cap; Take 2 capsules (2 g total) by mouth 2 (two) times a day.  Dispense: 360 capsule; Refill: 3  -     fluconazole (DIFLUCAN) 150 MG Tab; Take 1 tablet (150 mg total) by mouth once daily. for 7 days  Dispense: 7 tablet; Refill: 0  -     dapsone 25 MG Tab; Take 1 tablet (25 mg total) by mouth once daily.  Dispense: 30 tablet; Refill: 3    Ankylosing spondylitis, unspecified site of spine  -     icosapent ethyL (VASCEPA) 1 gram Cap; Take 2 capsules (2 g total) by mouth 2 (two) times a day.  Dispense: 360 capsule; Refill: 3  -     oxyCODONE-acetaminophen (PERCOCET)  mg per tablet; Take 1 tablet by mouth every 8 (eight) hours as needed for Pain.  Dispense: 90 tablet; Refill: 0  -     oxyCODONE-acetaminophen (PERCOCET)  mg per tablet; Take 1 tablet by mouth every 8 (eight) hours as needed for Pain.  Dispense: 90 tablet; Refill: 0  -     oxyCODONE-acetaminophen (PERCOCET)  mg per tablet; Take 1 tablet by mouth every 8 (eight) hours as needed for Pain.  Dispense: 90 tablet; Refill: 0  -     dapsone 25 MG Tab; Take 1 tablet (25 mg total) by mouth once daily.  Dispense: 30 tablet; Refill: 3    Lumbar and sacral arthritis  -     icosapent ethyL (VASCEPA) 1 gram Cap; Take 2 capsules (2 g total) by mouth 2 (two) times a day.  Dispense: 360 capsule; Refill: 3  -     oxyCODONE-acetaminophen (PERCOCET)  mg per tablet; Take 1 tablet by mouth every 8 (eight) hours as needed for Pain.  Dispense: 90 tablet; Refill: 0  -     oxyCODONE-acetaminophen (PERCOCET)  mg per tablet; Take 1 tablet by mouth every 8 (eight) hours as needed for Pain.  Dispense: 90 tablet; Refill: 0  -     oxyCODONE-acetaminophen (PERCOCET)  mg per tablet; Take 1 tablet by mouth every 8 (eight) hours as needed for Pain.  Dispense: 90 tablet; Refill: 0  -     dapsone 25 MG  Tab; Take 1 tablet (25 mg total) by mouth once daily.  Dispense: 30 tablet; Refill: 3    Lumbar discitis  -     icosapent ethyL (VASCEPA) 1 gram Cap; Take 2 capsules (2 g total) by mouth 2 (two) times a day.  Dispense: 360 capsule; Refill: 3  -     oxyCODONE-acetaminophen (PERCOCET)  mg per tablet; Take 1 tablet by mouth every 8 (eight) hours as needed for Pain.  Dispense: 90 tablet; Refill: 0  -     oxyCODONE-acetaminophen (PERCOCET)  mg per tablet; Take 1 tablet by mouth every 8 (eight) hours as needed for Pain.  Dispense: 90 tablet; Refill: 0  -     oxyCODONE-acetaminophen (PERCOCET)  mg per tablet; Take 1 tablet by mouth every 8 (eight) hours as needed for Pain.  Dispense: 90 tablet; Refill: 0  -     dapsone 25 MG Tab; Take 1 tablet (25 mg total) by mouth once daily.  Dispense: 30 tablet; Refill: 3    Chronic pain syndrome  -     icosapent ethyL (VASCEPA) 1 gram Cap; Take 2 capsules (2 g total) by mouth 2 (two) times a day.  Dispense: 360 capsule; Refill: 3  -     oxyCODONE-acetaminophen (PERCOCET)  mg per tablet; Take 1 tablet by mouth every 8 (eight) hours as needed for Pain.  Dispense: 90 tablet; Refill: 0  -     oxyCODONE-acetaminophen (PERCOCET)  mg per tablet; Take 1 tablet by mouth every 8 (eight) hours as needed for Pain.  Dispense: 90 tablet; Refill: 0  -     oxyCODONE-acetaminophen (PERCOCET)  mg per tablet; Take 1 tablet by mouth every 8 (eight) hours as needed for Pain.  Dispense: 90 tablet; Refill: 0    Lumbar spondylosis  -     icosapent ethyL (VASCEPA) 1 gram Cap; Take 2 capsules (2 g total) by mouth 2 (two) times a day.  Dispense: 360 capsule; Refill: 3  -     oxyCODONE-acetaminophen (PERCOCET)  mg per tablet; Take 1 tablet by mouth every 8 (eight) hours as needed for Pain.  Dispense: 90 tablet; Refill: 0  -     oxyCODONE-acetaminophen (PERCOCET)  mg per tablet; Take 1 tablet by mouth every 8 (eight) hours as needed for Pain.  Dispense: 90 tablet;  Refill: 0  -     oxyCODONE-acetaminophen (PERCOCET)  mg per tablet; Take 1 tablet by mouth every 8 (eight) hours as needed for Pain.  Dispense: 90 tablet; Refill: 0    Neck muscle spasm  -     icosapent ethyL (VASCEPA) 1 gram Cap; Take 2 capsules (2 g total) by mouth 2 (two) times a day.  Dispense: 360 capsule; Refill: 3  -     oxyCODONE-acetaminophen (PERCOCET)  mg per tablet; Take 1 tablet by mouth every 8 (eight) hours as needed for Pain.  Dispense: 90 tablet; Refill: 0  -     oxyCODONE-acetaminophen (PERCOCET)  mg per tablet; Take 1 tablet by mouth every 8 (eight) hours as needed for Pain.  Dispense: 90 tablet; Refill: 0  -     oxyCODONE-acetaminophen (PERCOCET)  mg per tablet; Take 1 tablet by mouth every 8 (eight) hours as needed for Pain.  Dispense: 90 tablet; Refill: 0    Impotence due to erectile dysfunction  -     icosapent ethyL (VASCEPA) 1 gram Cap; Take 2 capsules (2 g total) by mouth 2 (two) times a day.  Dispense: 360 capsule; Refill: 3    Lipid disorder  -     icosapent ethyL (VASCEPA) 1 gram Cap; Take 2 capsules (2 g total) by mouth 2 (two) times a day.  Dispense: 360 capsule; Refill: 3    Low serum HDL  -     icosapent ethyL (VASCEPA) 1 gram Cap; Take 2 capsules (2 g total) by mouth 2 (two) times a day.  Dispense: 360 capsule; Refill: 3    Immunocompromised state due to drug therapy  -     oxyCODONE-acetaminophen (PERCOCET)  mg per tablet; Take 1 tablet by mouth every 8 (eight) hours as needed for Pain.  Dispense: 90 tablet; Refill: 0  -     oxyCODONE-acetaminophen (PERCOCET)  mg per tablet; Take 1 tablet by mouth every 8 (eight) hours as needed for Pain.  Dispense: 90 tablet; Refill: 0  -     oxyCODONE-acetaminophen (PERCOCET)  mg per tablet; Take 1 tablet by mouth every 8 (eight) hours as needed for Pain.  Dispense: 90 tablet; Refill: 0  -     dapsone 25 MG Tab; Take 1 tablet (25 mg total) by mouth once daily.  Dispense: 30 tablet; Refill: 3    Elevated  C-reactive protein (CRP)  -     oxyCODONE-acetaminophen (PERCOCET)  mg per tablet; Take 1 tablet by mouth every 8 (eight) hours as needed for Pain.  Dispense: 90 tablet; Refill: 0  -     oxyCODONE-acetaminophen (PERCOCET)  mg per tablet; Take 1 tablet by mouth every 8 (eight) hours as needed for Pain.  Dispense: 90 tablet; Refill: 0  -     oxyCODONE-acetaminophen (PERCOCET)  mg per tablet; Take 1 tablet by mouth every 8 (eight) hours as needed for Pain.  Dispense: 90 tablet; Refill: 0    Vitamin D deficiency, unspecified   -     oxyCODONE-acetaminophen (PERCOCET)  mg per tablet; Take 1 tablet by mouth every 8 (eight) hours as needed for Pain.  Dispense: 90 tablet; Refill: 0  -     oxyCODONE-acetaminophen (PERCOCET)  mg per tablet; Take 1 tablet by mouth every 8 (eight) hours as needed for Pain.  Dispense: 90 tablet; Refill: 0  -     oxyCODONE-acetaminophen (PERCOCET)  mg per tablet; Take 1 tablet by mouth every 8 (eight) hours as needed for Pain.  Dispense: 90 tablet; Refill: 0    Local skin infection  -     oxyCODONE-acetaminophen (PERCOCET)  mg per tablet; Take 1 tablet by mouth every 8 (eight) hours as needed for Pain.  Dispense: 90 tablet; Refill: 0  -     oxyCODONE-acetaminophen (PERCOCET)  mg per tablet; Take 1 tablet by mouth every 8 (eight) hours as needed for Pain.  Dispense: 90 tablet; Refill: 0  -     oxyCODONE-acetaminophen (PERCOCET)  mg per tablet; Take 1 tablet by mouth every 8 (eight) hours as needed for Pain.  Dispense: 90 tablet; Refill: 0              Assessment:  42 year old male with  Ankylosing spondylitis, elevated ESR/CRP on simponi  --chronic pain syndrome on perocet 10/325  --alcoholic cardiomyopathy, EF 55% followed by Cardiology--COPD, tobacco use  --chronic anxiety on xanax PRN per PCP  --allergy to ASA, NSAIDs    1. Continue simponi pt is stable on tx he is doing well on tx  2. I have  check [place] prescription monitoring program  site and no unusual or abnormal behavior has occurred  3. Pt is clear of infection okay to take simponi  on 1/31/23    More than 50% of the  40 minute encounter was spent face to face counseling the patient regarding current status and future plan of care as well as side effects  of the medications. All questions were answered to patient's satisfaction also includes  non-face to face time preparing to see the patient (eg, review of tests), Obtaining and/or reviewing separately obtained history, Documenting clinical information in the electronic or other health record, Independently interpreting results

## 2023-06-15 ENCOUNTER — SPECIALTY PHARMACY (OUTPATIENT)
Dept: PHARMACY | Facility: CLINIC | Age: 43
End: 2023-06-15
Payer: MEDICARE

## 2023-06-15 NOTE — TELEPHONE ENCOUNTER
Outgoing call regarding simponi refill; per pt, he's due to inject on 6/30; informed him that [facility] will follow up on 6/20 to schedule delivery

## 2023-06-20 NOTE — TELEPHONE ENCOUNTER
Specialty Pharmacy - Refill Coordination    Specialty Medication Orders Linked to Encounter      Flowsheet Row Most Recent Value   Medication #1 golimumab (SIMPONI) 50 mg/0.5 mL PnIj (Order#[id number], Rx#[id number])            Refill Questions - Documented Responses      Flowsheet Row Most Recent Value   Patient Availability and HIPAA Verification    Does patient want to proceed with activity? Yes   HIPAA/medical authority confirmed? Yes   Relationship to patient of person spoken to? Self   Refill Screening Questions    Changes to allergies? No   Changes to medications? No   New conditions since last clinic visit? No   Unplanned office visit, urgent care, ED, or hospital admission in the last 4 weeks? No   How does patient/caregiver feel medication is working? Good   Financial problems or insurance changes? No   How many doses of your specialty medications were missed in the last 4 weeks? 0   Would patient like to speak to a pharmacist? No   When does the patient need to receive the medication? 06/30/23   Refill Delivery Questions    How will the patient receive the medication? [facility]   When does the patient need to receive the medication? 06/30/23   Shipping Address Home   Address in [facility] confirmed and updated if neccessary? Yes   Expected Copay ($) 0   Is the patient able to afford the medication copay? Yes   Payment Method zero copay   Days supply of Refill 30   Supplies needed? No supplies needed   Refill activity completed? Yes   Refill activity plan Refill scheduled   Shipment/Pickup Date: 06/27/23            Current Outpatient Medications   Medication Sig    ALPRAZolam (XANAX) 2 MG Tab Take 1 tablet (2 mg total) by mouth 2 (two) times daily as needed (anxiety).    baclofen (LIORESAL) 20 MG tablet TAKE 1/2 TABLET(10 MG) BY MOUTH THREE TIMES DAILY    budesonide-formoterol 160-4.5 mcg (SYMBICORT) 160-4.5 mcg/actuation HFAA INHALE 2 PUFFS INTO THE LUNGS EVERY 12 HOURS    carvediloL (COREG) 3.125 MG  tablet TAKE 1 TABLET(3.125 MG) BY MOUTH TWICE DAILY    cetirizine (ZYRTEC) 10 MG tablet Take 10 mg by mouth once daily.    chlorhexidine (PERIDEX) 0.12 % solution SMARTSIG:By Mouth    clindamycin (CLEOCIN T) 1 % lotion Apply topically 2 (two) times daily. for 15 days    clotrimazole-betamethasone 1-0.05% (LOTRISONE) cream Apply topically 2 (two) times daily.    cyanocobalamin (VITAMIN B-12) 1000 MCG tablet 1,000 mcg.    dapsone 25 MG Tab Take 1 tablet (25 mg total) by mouth once daily.    doxycycline (VIBRAMYCIN) 100 MG Cap Take 100 mg by mouth 2 (two) times daily.    DULoxetine (CYMBALTA) 30 MG capsule Take 1 capsule (30 mg total) by mouth once daily.    epinephrine (EPIPEN JR) 0.15 mg/0.3 mL pen injection Inject 0.3 mg into the muscle as needed for Anaphylaxis.    famotidine (PEPCID) 40 MG tablet Take 1 tablet (40 mg total) by mouth nightly as needed for Heartburn.    golimumab (SIMPONI) 50 mg/0.5 mL PnIj Inject 0.5 mL (50 mg) into the skin every 30 days.    icosapent ethyL (VASCEPA) 1 gram Cap Take 2 capsules (2 g total) by mouth 2 (two) times a day.    mupirocin (BACTROBAN) 2 % ointment APPLY EXTERNALLY TO THE AFFECTED AREA THREE TIMES DAILY    NARCAN 4 mg/actuation Spry     nystatin (MYCOSTATIN) 100,000 unit/mL suspension Take 4 mLs (400,000 Units total) by mouth after meals as needed (thrush).    omeprazole (PRILOSEC) 20 MG capsule Take 1 capsule (20 mg total) by mouth once daily.    [START ON 6/23/2023] oxyCODONE-acetaminophen (PERCOCET)  mg per tablet Take 1 tablet by mouth every 8 (eight) hours as needed for Pain.    [START ON 7/22/2023] oxyCODONE-acetaminophen (PERCOCET)  mg per tablet Take 1 tablet by mouth every 8 (eight) hours as needed for Pain.    [START ON 8/20/2023] oxyCODONE-acetaminophen (PERCOCET)  mg per tablet Take 1 tablet by mouth every 8 (eight) hours as needed for Pain.    predniSONE (DELTASONE) 2.5 MG tablet Take 2 tablets (5 mg total) by mouth daily as needed (flare).     sildenafil (REVATIO) 20 mg Tab Take 1 tablet (20 mg total) by mouth 2 (two) times daily.   Last reviewed on 5/26/2023 11:53 AM by Luis Locke MD    Review of patient's allergies indicates:   Allergen Reactions    Asa [aspirin] Anaphylaxis    Azulfidine [sulfasalazine] Anaphylaxis    Ibuprofen Anaphylaxis    Gabapentin      Lip swelling, tongue swelling    Last reviewed on  5/26/2023 11:53 AM by Luis Locke      Tasks added this encounter   No tasks added.   Tasks due within next 3 months   6/20/2023 - Refill Coordination Outreach (1 time occurrence)     [facility]  [address] [facility] [address]  Phone: [phone]  Fax: [phone]

## 2023-06-24 DIAGNOSIS — B37.0 THRUSH: ICD-10-CM

## 2023-06-27 DIAGNOSIS — B37.0 THRUSH: ICD-10-CM

## 2023-06-28 RX ORDER — NYSTATIN 100000 [USP'U]/ML
SUSPENSION ORAL
Qty: 240 ML | Refills: 2 | OUTPATIENT
Start: 2023-06-28

## 2023-06-29 RX ORDER — NYSTATIN 100000 [USP'U]/ML
4 SUSPENSION ORAL
Qty: 240 ML | Refills: 2 | Status: SHIPPED | OUTPATIENT
Start: 2023-06-29 | End: 2023-12-07

## 2023-07-17 ENCOUNTER — TELEPHONE (OUTPATIENT)
Dept: RHEUMATOLOGY | Facility: CLINIC | Age: 43
End: 2023-07-17
Payer: MEDICARE

## 2023-07-17 DIAGNOSIS — L03.90 CELLULITIS, UNSPECIFIED CELLULITIS SITE: ICD-10-CM

## 2023-07-17 DIAGNOSIS — G89.4 CHRONIC PAIN SYNDROME: ICD-10-CM

## 2023-07-17 DIAGNOSIS — M45.9 ANKYLOSING SPONDYLITIS, UNSPECIFIED SITE OF SPINE: Primary | ICD-10-CM

## 2023-07-17 RX ORDER — CLINDAMYCIN HYDROCHLORIDE 300 MG/1
300 CAPSULE ORAL EVERY 8 HOURS
Qty: 30 CAPSULE | Refills: 0 | Status: SHIPPED | OUTPATIENT
Start: 2023-07-17 | End: 2023-08-25

## 2023-07-23 DIAGNOSIS — J44.9 CHRONIC OBSTRUCTIVE PULMONARY DISEASE, UNSPECIFIED COPD TYPE: ICD-10-CM

## 2023-07-23 NOTE — TELEPHONE ENCOUNTER
No care due was identified.  [facility] Embedded Care Due Messages. Reference number: [id number].   7/23/2023 3:34:46 PM CDT

## 2023-07-23 NOTE — TELEPHONE ENCOUNTER
Refill Routing Note   Medication(s) are not appropriate for processing by [facility] for the following reason(s):      Drug-disease interaction    [facility] action(s):  Defer Care Due:  None identified     Medication Therapy Plan: THRUSH      Appointments  past 12m or future 3m with PCP    Date Provider   Last Visit   5/11/2023 LUPILLO Khan MD   Next Visit   8/25/2023 LUPILLO Khan MD   ED visits in past 90 days: 0        Note composed:3:50 PM 07/23/2023

## 2023-07-24 ENCOUNTER — PATIENT MESSAGE (OUTPATIENT)
Dept: FAMILY MEDICINE | Facility: CLINIC | Age: 43
End: 2023-07-24
Payer: MEDICARE

## 2023-07-24 RX ORDER — BUDESONIDE AND FORMOTEROL FUMARATE DIHYDRATE 160; 4.5 UG/1; UG/1
AEROSOL RESPIRATORY (INHALATION)
Qty: 30.6 G | Refills: 3 | Status: SHIPPED | OUTPATIENT
Start: 2023-07-24 | End: 2024-03-18 | Stop reason: SDUPTHER

## 2023-07-26 ENCOUNTER — SPECIALTY PHARMACY (OUTPATIENT)
Dept: PHARMACY | Facility: CLINIC | Age: 43
End: 2023-07-26
Payer: MEDICARE

## 2023-07-26 NOTE — TELEPHONE ENCOUNTER
Specialty Pharmacy - Refill Coordination    Specialty Medication Orders Linked to Encounter      Flowsheet Row Most Recent Value   Medication #1 golimumab (SIMPONI) 50 mg/0.5 mL PnIj (Order#[id number], Rx#)            Refill Questions - Documented Responses      Flowsheet Row Most Recent Value   Patient Availability and HIPAA Verification    Does patient want to proceed with activity? Yes   HIPAA/medical authority confirmed? Yes   Relationship to patient of person spoken to? Self   Refill Screening Questions    Changes to allergies? No   Changes to medications? No   New conditions since last clinic visit? No   Unplanned office visit, urgent care, ED, or hospital admission in the last 4 weeks? No   How does patient/caregiver feel medication is working? Good   Financial problems or insurance changes? No   How many doses of your specialty medications were missed in the last 4 weeks? 0   Would patient like to speak to a pharmacist? No   When does the patient need to receive the medication? 07/31/23   Refill Delivery Questions    How will the patient receive the medication? [facility]   When does the patient need to receive the medication? 07/31/23   Shipping Address Home   Address in [facility] confirmed and updated if neccessary? Yes   Expected Copay ($) 0   Is the patient able to afford the medication copay? Yes   Payment Method zero copay   Days supply of Refill 30   Supplies needed? No supplies needed   Refill activity completed? Yes   Refill activity plan Refill scheduled   Shipment/Pickup Date: 07/27/23            Current Outpatient Medications   Medication Sig    ALPRAZolam (XANAX) 2 MG Tab Take 1 tablet (2 mg total) by mouth 2 (two) times daily as needed (anxiety).    baclofen (LIORESAL) 20 MG tablet TAKE 1/2 TABLET(10 MG) BY MOUTH THREE TIMES DAILY    budesonide-formoterol 160-4.5 mcg (SYMBICORT) 160-4.5 mcg/actuation HFAA INHALE 2 PUFFS INTO THE LUNGS EVERY 12 HOURS    carvediloL (COREG) 3.125 MG tablet TAKE  1 TABLET(3.125 MG) BY MOUTH TWICE DAILY    cetirizine (ZYRTEC) 10 MG tablet Take 10 mg by mouth once daily.    chlorhexidine (PERIDEX) 0.12 % solution SMARTSIG:By Mouth    clindamycin (CLEOCIN T) 1 % lotion Apply topically 2 (two) times daily. for 15 days    clindamycin (CLEOCIN) 300 MG capsule Take 1 capsule (300 mg total) by mouth every 8 (eight) hours. for 10 days    clotrimazole-betamethasone 1-0.05% (LOTRISONE) cream Apply topically 2 (two) times daily.    cyanocobalamin (VITAMIN B-12) 1000 MCG tablet 1,000 mcg.    dapsone 25 MG Tab Take 1 tablet (25 mg total) by mouth once daily.    doxycycline (VIBRAMYCIN) 100 MG Cap Take 100 mg by mouth 2 (two) times daily.    DULoxetine (CYMBALTA) 30 MG capsule Take 1 capsule (30 mg total) by mouth once daily.    epinephrine (EPIPEN JR) 0.15 mg/0.3 mL pen injection Inject 0.3 mg into the muscle as needed for Anaphylaxis.    famotidine (PEPCID) 40 MG tablet Take 1 tablet (40 mg total) by mouth nightly as needed for Heartburn.    golimumab (SIMPONI) 50 mg/0.5 mL PnIj Inject 0.5 mL (50 mg) into the skin every 30 days.    icosapent ethyL (VASCEPA) 1 gram Cap Take 2 capsules (2 g total) by mouth 2 (two) times a day.    mupirocin (BACTROBAN) 2 % ointment APPLY EXTERNALLY TO THE AFFECTED AREA THREE TIMES DAILY    NARCAN 4 mg/actuation Spry     nystatin (MYCOSTATIN) 100,000 unit/mL suspension Take 4 mLs (400,000 Units total) by mouth after meals as needed (thrush).    omeprazole (PRILOSEC) 20 MG capsule Take 1 capsule (20 mg total) by mouth once daily.    oxyCODONE-acetaminophen (PERCOCET)  mg per tablet Take 1 tablet by mouth every 8 (eight) hours as needed for Pain.    [START ON 8/20/2023] oxyCODONE-acetaminophen (PERCOCET)  mg per tablet Take 1 tablet by mouth every 8 (eight) hours as needed for Pain.    predniSONE (DELTASONE) 2.5 MG tablet Take 2 tablets (5 mg total) by mouth daily as needed (flare).    sildenafil (REVATIO) 20 mg Tab Take 1 tablet (20 mg total) by  mouth 2 (two) times daily.   Last reviewed on 5/26/2023 11:53 AM by Luis Locke MD    Review of patient's allergies indicates:   Allergen Reactions    Asa [aspirin] Anaphylaxis    Azulfidine [sulfasalazine] Anaphylaxis    Ibuprofen Anaphylaxis    Gabapentin      Lip swelling, tongue swelling    Last reviewed on  7/17/2023 2:54 PM by Luis Locke      Tasks added this encounter   No tasks added.   Tasks due within next 3 months   7/27/2023 - Refill Coordination Outreach (1 time occurrence)     Judi Goode, PharmD  [facility]  [address] [place] [address]  Phone: [phone]  Fax: [phone]

## 2023-08-03 NOTE — TELEPHONE ENCOUNTER
No care due was identified.  [facility] Embedded Care Due Messages. Reference number: [id number].   8/03/2023 2:09:54 PM CDT

## 2023-08-04 RX ORDER — ALPRAZOLAM 2 MG/1
2 TABLET ORAL 2 TIMES DAILY PRN
Qty: 60 TABLET | Refills: 5 | Status: SHIPPED | OUTPATIENT
Start: 2023-08-04 | End: 2024-02-03 | Stop reason: SDUPTHER

## 2023-08-18 DIAGNOSIS — E55.9 VITAMIN D DEFICIENCY, UNSPECIFIED: ICD-10-CM

## 2023-08-18 DIAGNOSIS — D84.821 IMMUNOCOMPROMISED STATE DUE TO DRUG THERAPY: ICD-10-CM

## 2023-08-18 DIAGNOSIS — M46.46 LUMBAR DISCITIS: ICD-10-CM

## 2023-08-18 DIAGNOSIS — M62.838 NECK MUSCLE SPASM: ICD-10-CM

## 2023-08-18 DIAGNOSIS — M47.816 LUMBAR SPONDYLOSIS: ICD-10-CM

## 2023-08-18 DIAGNOSIS — R79.82 ELEVATED C-REACTIVE PROTEIN (CRP): ICD-10-CM

## 2023-08-18 DIAGNOSIS — Z79.899 IMMUNOCOMPROMISED STATE DUE TO DRUG THERAPY: ICD-10-CM

## 2023-08-18 DIAGNOSIS — M45.9 ANKYLOSING SPONDYLITIS, UNSPECIFIED SITE OF SPINE: ICD-10-CM

## 2023-08-18 DIAGNOSIS — G89.4 CHRONIC PAIN SYNDROME: ICD-10-CM

## 2023-08-18 DIAGNOSIS — N52.9 IMPOTENCE DUE TO ERECTILE DYSFUNCTION: ICD-10-CM

## 2023-08-18 DIAGNOSIS — L08.9 LOCAL SKIN INFECTION: ICD-10-CM

## 2023-08-18 DIAGNOSIS — L70.0 ACNE VULGARIS: ICD-10-CM

## 2023-08-18 DIAGNOSIS — M47.817 LUMBAR AND SACRAL ARTHRITIS: ICD-10-CM

## 2023-08-19 RX ORDER — PREDNISONE 2.5 MG/1
TABLET ORAL
Qty: 60 TABLET | Refills: 5 | Status: SHIPPED | OUTPATIENT
Start: 2023-08-19 | End: 2024-01-16

## 2023-08-21 ENCOUNTER — SPECIALTY PHARMACY (OUTPATIENT)
Dept: PHARMACY | Facility: CLINIC | Age: 43
End: 2023-08-21
Payer: MEDICARE

## 2023-08-21 NOTE — TELEPHONE ENCOUNTER
Specialty Pharmacy - Refill Coordination    Specialty Medication Orders Linked to Encounter      Flowsheet Row Most Recent Value   Medication #1 golimumab (SIMPONI) 50 mg/0.5 mL PnIj (Order#[id number], Rx#[id number])            Refill Questions - Documented Responses      Flowsheet Row Most Recent Value   Patient Availability and HIPAA Verification    Does patient want to proceed with activity? Yes   HIPAA/medical authority confirmed? Yes   Relationship to patient of person spoken to? Self   Refill Screening Questions    Changes to allergies? No   Changes to medications? No   New conditions since last clinic visit? No   Unplanned office visit, urgent care, ED, or hospital admission in the last 4 weeks? No   How does patient/caregiver feel medication is working? Good   Financial problems or insurance changes? No   How many doses of your specialty medications were missed in the last 4 weeks? 0   Would patient like to speak to a pharmacist? No   When does the patient need to receive the medication? 08/28/23   Refill Delivery Questions    How will the patient receive the medication? [facility]Rx   When does the patient need to receive the medication? 08/28/23   Shipping Address Home   Address in [facility] confirmed and updated if neccessary? Yes   Expected Copay ($) 0   Is the patient able to afford the medication copay? Yes   Payment Method zero copay   Days supply of Refill 30   Supplies needed? No supplies needed   Refill activity completed? Yes   Refill activity plan Refill scheduled   Shipment/Pickup Date: 08/24/23            Current Outpatient Medications   Medication Sig    ALPRAZolam (XANAX) 2 MG Tab Take 1 tablet (2 mg total) by mouth 2 (two) times daily as needed (anxiety).    baclofen (LIORESAL) 20 MG tablet TAKE 1/2 TABLET(10 MG) BY MOUTH THREE TIMES DAILY    budesonide-formoterol 160-4.5 mcg (SYMBICORT) 160-4.5 mcg/actuation HFAA INHALE 2 PUFFS INTO THE LUNGS EVERY 12 HOURS    carvediloL (COREG) 3.125 MG  tablet TAKE 1 TABLET(3.125 MG) BY MOUTH TWICE DAILY    cetirizine (ZYRTEC) 10 MG tablet Take 10 mg by mouth once daily.    chlorhexidine (PERIDEX) 0.12 % solution SMARTSIG:By Mouth    clindamycin (CLEOCIN T) 1 % lotion Apply topically 2 (two) times daily. for 15 days    clotrimazole-betamethasone 1-0.05% (LOTRISONE) cream Apply topically 2 (two) times daily.    cyanocobalamin (VITAMIN B-12) 1000 MCG tablet 1,000 mcg.    dapsone 25 MG Tab Take 1 tablet (25 mg total) by mouth once daily.    doxycycline (VIBRAMYCIN) 100 MG Cap Take 100 mg by mouth 2 (two) times daily.    DULoxetine (CYMBALTA) 30 MG capsule Take 1 capsule (30 mg total) by mouth once daily.    epinephrine (EPIPEN JR) 0.15 mg/0.3 mL pen injection Inject 0.3 mg into the muscle as needed for Anaphylaxis.    famotidine (PEPCID) 40 MG tablet Take 1 tablet (40 mg total) by mouth nightly as needed for Heartburn.    golimumab (SIMPONI) 50 mg/0.5 mL PnIj Inject 0.5 mL (50 mg) into the skin every 30 days.    icosapent ethyL (VASCEPA) 1 gram Cap Take 2 capsules (2 g total) by mouth 2 (two) times a day.    mupirocin (BACTROBAN) 2 % ointment APPLY EXTERNALLY TO THE AFFECTED AREA THREE TIMES DAILY    NARCAN 4 mg/actuation Spry     nystatin (MYCOSTATIN) 100,000 unit/mL suspension Take 4 mLs (400,000 Units total) by mouth after meals as needed (thrush).    omeprazole (PRILOSEC) 20 MG capsule Take 1 capsule (20 mg total) by mouth once daily.    oxyCODONE-acetaminophen (PERCOCET)  mg per tablet Take 1 tablet by mouth every 8 (eight) hours as needed for Pain.    oxyCODONE-acetaminophen (PERCOCET)  mg per tablet Take 1 tablet by mouth every 8 (eight) hours as needed for Pain.    predniSONE (DELTASONE) 2.5 MG tablet TAKE 2 TABLETS(5 MG) BY MOUTH DAILY AS NEEDED FOR FLARE    sildenafil (REVATIO) 20 mg Tab Take 1 tablet (20 mg total) by mouth 2 (two) times daily.   Last reviewed on 5/26/2023 11:53 AM by Luis Locke MD    Review of patient's allergies  indicates:   Allergen Reactions    Asa [aspirin] Anaphylaxis    Azulfidine [sulfasalazine] Anaphylaxis    Ibuprofen Anaphylaxis    Gabapentin      Lip swelling, tongue swelling    Last reviewed on  8/18/2023 9:51 AM by Radha Quiroz      Tasks added this encounter   No tasks added.   Tasks due within next 3 months   8/19/2023 - Refill Coordination Outreach (1 time occurrence)     [facility]  [address] [facility] [address]  Phone: [phone]  Fax: [phone]

## 2023-08-25 ENCOUNTER — OFFICE VISIT (OUTPATIENT)
Dept: FAMILY MEDICINE | Facility: CLINIC | Age: 43
End: 2023-08-25
Payer: MEDICARE

## 2023-08-25 DIAGNOSIS — F41.9 ANXIETY: Primary | ICD-10-CM

## 2023-08-25 DIAGNOSIS — J44.9 CHRONIC OBSTRUCTIVE PULMONARY DISEASE, UNSPECIFIED COPD TYPE: ICD-10-CM

## 2023-08-25 PROCEDURE — 99213 PR OFFICE/OUTPT VISIT, EST, LEVL III, 20-29 MIN: ICD-10-PCS | Mod: 95,,, | Performed by: FAMILY MEDICINE

## 2023-08-25 PROCEDURE — 99213 OFFICE O/P EST LOW 20 MIN: CPT | Mod: 95,,, | Performed by: FAMILY MEDICINE

## 2023-08-25 NOTE — PROGRESS NOTES
Subjective:       Patient ID: Umang Greer is a 42 y.o. male.    Chief Complaint: No chief complaint on file.    Virtual for f/u anxiety. Mood stable.        The patient location is: home in [place]  The chief complaint leading to consultation is: anxiety    Visit type: audiovisual    Face to Face time with patient: 7 minutes  10 minutes of total time spent on the encounter, which includes face to face time and non-face to face time preparing to see the patient (eg, review of tests), Obtaining and/or reviewing separately obtained history, Documenting clinical information in the electronic or other health record, Independently interpreting results (not separately reported) and communicating results to the patient/family/caregiver, or Care coordination (not separately reported).         Each patient to whom he or she provides medical services by telemedicine is:  (1) informed of the relationship between the physician and patient and the respective role of any other health care provider with respect to management of the patient; and (2) notified that he or she may decline to receive medical services by telemedicine and may withdraw from such care at any time.    Notes:     Review of Systems   Constitutional:  Positive for activity change. Negative for chills, fever and unexpected weight change.   HENT:  Negative for hearing loss, rhinorrhea and trouble swallowing.    Eyes:  Negative for discharge and visual disturbance.   Respiratory:  Negative for cough, chest tightness, shortness of breath and wheezing.    Cardiovascular:  Negative for chest pain, palpitations and leg swelling.   Gastrointestinal:  Negative for blood in stool, constipation, diarrhea and vomiting.   Endocrine: Positive for polydipsia and polyuria. Negative for cold intolerance and heat intolerance.   Genitourinary:  Negative for difficulty urinating, hematuria and urgency.   Musculoskeletal:  Positive for arthralgias. Negative for joint swelling and  neck pain.   Neurological:  Negative for weakness and headaches.   Psychiatric/Behavioral:  Negative for confusion, decreased concentration and dysphoric mood. The patient is not nervous/anxious.        Objective:      Physical Exam  Constitutional:       Appearance: Normal appearance.   Neurological:      Mental Status: He is alert.   Psychiatric:         Mood and Affect: Mood normal.         Behavior: Behavior normal.         Assessment:       1. Anxiety    2. Chronic obstructive pulmonary disease, unspecified COPD type        Plan:       Anxiety    Chronic obstructive pulmonary disease, unspecified COPD type      Will monitor chronic medical issues and continue current plan of care.      Follow up in about 3 months (around 11/25/2023), or if symptoms worsen or fail to improve.

## 2023-09-10 DIAGNOSIS — G89.4 CHRONIC PAIN SYNDROME: ICD-10-CM

## 2023-09-10 DIAGNOSIS — M62.838 NECK MUSCLE SPASM: ICD-10-CM

## 2023-09-10 DIAGNOSIS — M45.9 ANKYLOSING SPONDYLITIS, UNSPECIFIED SITE OF SPINE: ICD-10-CM

## 2023-09-12 RX ORDER — BACLOFEN 20 MG/1
TABLET ORAL
Qty: 120 TABLET | Refills: 3 | Status: SHIPPED | OUTPATIENT
Start: 2023-09-12

## 2023-09-13 ENCOUNTER — PATIENT MESSAGE (OUTPATIENT)
Dept: RHEUMATOLOGY | Facility: CLINIC | Age: 43
End: 2023-09-13
Payer: MEDICARE

## 2023-09-18 ENCOUNTER — PATIENT MESSAGE (OUTPATIENT)
Dept: PHARMACY | Facility: CLINIC | Age: 43
End: 2023-09-18
Payer: MEDICARE

## 2023-09-18 DIAGNOSIS — G89.4 CHRONIC PAIN SYNDROME: ICD-10-CM

## 2023-09-18 DIAGNOSIS — Z79.899 IMMUNOCOMPROMISED STATE DUE TO DRUG THERAPY: ICD-10-CM

## 2023-09-18 DIAGNOSIS — E55.9 VITAMIN D DEFICIENCY, UNSPECIFIED: ICD-10-CM

## 2023-09-18 DIAGNOSIS — D84.821 IMMUNOCOMPROMISED STATE DUE TO DRUG THERAPY: ICD-10-CM

## 2023-09-18 DIAGNOSIS — M46.46 LUMBAR DISCITIS: ICD-10-CM

## 2023-09-18 DIAGNOSIS — M47.817 LUMBAR AND SACRAL ARTHRITIS: ICD-10-CM

## 2023-09-18 DIAGNOSIS — R79.82 ELEVATED C-REACTIVE PROTEIN (CRP): ICD-10-CM

## 2023-09-18 DIAGNOSIS — M47.816 LUMBAR SPONDYLOSIS: ICD-10-CM

## 2023-09-18 DIAGNOSIS — L08.9 LOCAL SKIN INFECTION: ICD-10-CM

## 2023-09-18 DIAGNOSIS — M45.9 ANKYLOSING SPONDYLITIS, UNSPECIFIED SITE OF SPINE: ICD-10-CM

## 2023-09-18 DIAGNOSIS — M62.838 NECK MUSCLE SPASM: ICD-10-CM

## 2023-09-19 ENCOUNTER — LAB VISIT (OUTPATIENT)
Dept: LAB | Facility: HOSPITAL | Age: 43
End: 2023-09-19
Attending: INTERNAL MEDICINE
Payer: MEDICARE

## 2023-09-19 DIAGNOSIS — M62.838 NECK MUSCLE SPASM: ICD-10-CM

## 2023-09-19 DIAGNOSIS — M46.46 LUMBAR DISCITIS: ICD-10-CM

## 2023-09-19 DIAGNOSIS — M47.816 LUMBAR SPONDYLOSIS: ICD-10-CM

## 2023-09-19 DIAGNOSIS — G89.4 CHRONIC PAIN SYNDROME: ICD-10-CM

## 2023-09-19 DIAGNOSIS — M47.817 LUMBAR AND SACRAL ARTHRITIS: ICD-10-CM

## 2023-09-19 LAB
ALBUMIN SERPL BCP-MCNC: 3.9 G/DL (ref 3.5–5.2)
ALP SERPL-CCNC: 112 U/L (ref 55–135)
ALT SERPL W/O P-5'-P-CCNC: 14 U/L (ref 10–44)
ANION GAP SERPL CALC-SCNC: 10 MMOL/L (ref 8–16)
AST SERPL-CCNC: 18 U/L (ref 10–40)
BASOPHILS # BLD AUTO: 0.08 K/UL (ref 0–0.2)
BASOPHILS NFR BLD: 0.4 % (ref 0–1.9)
BILIRUB SERPL-MCNC: 0.6 MG/DL (ref 0.1–1)
BUN SERPL-MCNC: 12 MG/DL (ref 6–20)
CALCIUM SERPL-MCNC: 9.8 MG/DL (ref 8.7–10.5)
CHLORIDE SERPL-SCNC: 103 MMOL/L (ref 95–110)
CO2 SERPL-SCNC: 27 MMOL/L (ref 23–29)
CREAT SERPL-MCNC: 0.9 MG/DL (ref 0.5–1.4)
CRP SERPL-MCNC: 12.1 MG/L (ref 0–8.2)
DIFFERENTIAL METHOD: ABNORMAL
EOSINOPHIL # BLD AUTO: 0.2 K/UL (ref 0–0.5)
EOSINOPHIL NFR BLD: 0.8 % (ref 0–8)
ERYTHROCYTE [DISTWIDTH] IN BLOOD BY AUTOMATED COUNT: 12.2 % (ref 11.5–14.5)
ERYTHROCYTE [SEDIMENTATION RATE] IN BLOOD BY PHOTOMETRIC METHOD: 43 MM/HR (ref 0–23)
EST. GFR  (NO RACE VARIABLE): >60 ML/MIN/1.73 M^2
GLUCOSE SERPL-MCNC: 97 MG/DL (ref 70–110)
HCT VFR BLD AUTO: 40.6 % (ref 40–54)
HGB BLD-MCNC: 14.3 G/DL (ref 14–18)
IMM GRANULOCYTES # BLD AUTO: 0.05 K/UL (ref 0–0.04)
IMM GRANULOCYTES NFR BLD AUTO: 0.3 % (ref 0–0.5)
LYMPHOCYTES # BLD AUTO: 3.1 K/UL (ref 1–4.8)
LYMPHOCYTES NFR BLD: 15.6 % (ref 18–48)
MCH RBC QN AUTO: 30.2 PG (ref 27–31)
MCHC RBC AUTO-ENTMCNC: 35.2 G/DL (ref 32–36)
MCV RBC AUTO: 86 FL (ref 82–98)
MONOCYTES # BLD AUTO: 0.9 K/UL (ref 0.3–1)
MONOCYTES NFR BLD: 4.4 % (ref 4–15)
NEUTROPHILS # BLD AUTO: 15.7 K/UL (ref 1.8–7.7)
NEUTROPHILS NFR BLD: 78.5 % (ref 38–73)
NRBC BLD-RTO: 0 /100 WBC
PLATELET # BLD AUTO: 330 K/UL (ref 150–450)
PMV BLD AUTO: 9.5 FL (ref 9.2–12.9)
POTASSIUM SERPL-SCNC: 4.4 MMOL/L (ref 3.5–5.1)
PROT SERPL-MCNC: 7.9 G/DL (ref 6–8.4)
RBC # BLD AUTO: 4.73 M/UL (ref 4.6–6.2)
SODIUM SERPL-SCNC: 140 MMOL/L (ref 136–145)
WBC # BLD AUTO: 19.94 K/UL (ref 3.9–12.7)

## 2023-09-19 PROCEDURE — 36415 COLL VENOUS BLD VENIPUNCTURE: CPT | Mod: PO | Performed by: INTERNAL MEDICINE

## 2023-09-19 PROCEDURE — 80053 COMPREHEN METABOLIC PANEL: CPT | Performed by: INTERNAL MEDICINE

## 2023-09-19 PROCEDURE — 85652 RBC SED RATE AUTOMATED: CPT | Performed by: INTERNAL MEDICINE

## 2023-09-19 PROCEDURE — 85025 COMPLETE CBC W/AUTO DIFF WBC: CPT | Performed by: INTERNAL MEDICINE

## 2023-09-19 PROCEDURE — 86140 C-REACTIVE PROTEIN: CPT | Performed by: INTERNAL MEDICINE

## 2023-09-19 RX ORDER — OXYCODONE AND ACETAMINOPHEN 10; 325 MG/1; MG/1
1 TABLET ORAL EVERY 8 HOURS PRN
Qty: 90 TABLET | Refills: 0 | Status: SHIPPED | OUTPATIENT
Start: 2023-09-19 | End: 2023-09-27 | Stop reason: SDUPTHER

## 2023-09-25 DIAGNOSIS — M46.46 LUMBAR DISCITIS: ICD-10-CM

## 2023-09-25 DIAGNOSIS — M47.817 LUMBAR AND SACRAL ARTHRITIS: ICD-10-CM

## 2023-09-25 DIAGNOSIS — M45.9 ANKYLOSING SPONDYLITIS, UNSPECIFIED SITE OF SPINE: ICD-10-CM

## 2023-09-25 DIAGNOSIS — L70.0 ACNE VULGARIS: ICD-10-CM

## 2023-09-25 DIAGNOSIS — Z79.899 IMMUNOCOMPROMISED STATE DUE TO DRUG THERAPY: ICD-10-CM

## 2023-09-25 DIAGNOSIS — D84.821 IMMUNOCOMPROMISED STATE DUE TO DRUG THERAPY: ICD-10-CM

## 2023-09-26 RX ORDER — DAPSONE 25 MG/1
25 TABLET ORAL
Qty: 90 TABLET | Refills: 1 | Status: SHIPPED | OUTPATIENT
Start: 2023-09-26 | End: 2023-10-16

## 2023-09-27 ENCOUNTER — OFFICE VISIT (OUTPATIENT)
Dept: RHEUMATOLOGY | Facility: CLINIC | Age: 43
End: 2023-09-27
Payer: MEDICARE

## 2023-09-27 VITALS
SYSTOLIC BLOOD PRESSURE: 147 MMHG | DIASTOLIC BLOOD PRESSURE: 90 MMHG | HEART RATE: 94 BPM | WEIGHT: 186.06 LBS | BODY MASS INDEX: 24.66 KG/M2 | HEIGHT: 73 IN

## 2023-09-27 DIAGNOSIS — D84.821 IMMUNOCOMPROMISED STATE DUE TO DRUG THERAPY: ICD-10-CM

## 2023-09-27 DIAGNOSIS — G89.4 CHRONIC PAIN SYNDROME: ICD-10-CM

## 2023-09-27 DIAGNOSIS — M47.816 LUMBAR SPONDYLOSIS: ICD-10-CM

## 2023-09-27 DIAGNOSIS — L08.9 LOCAL SKIN INFECTION: ICD-10-CM

## 2023-09-27 DIAGNOSIS — R79.82 ELEVATED C-REACTIVE PROTEIN (CRP): ICD-10-CM

## 2023-09-27 DIAGNOSIS — M46.46 LUMBAR DISCITIS: ICD-10-CM

## 2023-09-27 DIAGNOSIS — L03.90 CELLULITIS, UNSPECIFIED CELLULITIS SITE: ICD-10-CM

## 2023-09-27 DIAGNOSIS — N52.9 IMPOTENCE DUE TO ERECTILE DYSFUNCTION: ICD-10-CM

## 2023-09-27 DIAGNOSIS — M62.838 NECK MUSCLE SPASM: ICD-10-CM

## 2023-09-27 DIAGNOSIS — Z79.899 IMMUNOCOMPROMISED STATE DUE TO DRUG THERAPY: ICD-10-CM

## 2023-09-27 DIAGNOSIS — L70.0 ACNE VULGARIS: ICD-10-CM

## 2023-09-27 DIAGNOSIS — M45.9 ANKYLOSING SPONDYLITIS, UNSPECIFIED SITE OF SPINE: Primary | ICD-10-CM

## 2023-09-27 DIAGNOSIS — M47.817 LUMBAR AND SACRAL ARTHRITIS: ICD-10-CM

## 2023-09-27 DIAGNOSIS — E55.9 VITAMIN D DEFICIENCY, UNSPECIFIED: ICD-10-CM

## 2023-09-27 PROCEDURE — 99999 PR PBB SHADOW E&M-EST. PATIENT-LVL V: CPT | Mod: PBBFAC,,, | Performed by: INTERNAL MEDICINE

## 2023-09-27 PROCEDURE — 99999 PR PBB SHADOW E&M-EST. PATIENT-LVL V: ICD-10-PCS | Mod: PBBFAC,,, | Performed by: INTERNAL MEDICINE

## 2023-09-27 PROCEDURE — 99999PBSHW PR PBB SHADOW TECHNICAL ONLY FILED TO HB: Mod: PBBFAC,,,

## 2023-09-27 PROCEDURE — 99215 OFFICE O/P EST HI 40 MIN: CPT | Mod: PBBFAC,PN | Performed by: INTERNAL MEDICINE

## 2023-09-27 PROCEDURE — 99999PBSHW PR PBB SHADOW TECHNICAL ONLY FILED TO HB: ICD-10-PCS | Mod: PBBFAC,,,

## 2023-09-27 PROCEDURE — 99215 PR OFFICE/OUTPT VISIT, EST, LEVL V, 40-54 MIN: ICD-10-PCS | Mod: S$PBB,,, | Performed by: INTERNAL MEDICINE

## 2023-09-27 PROCEDURE — 96372 THER/PROPH/DIAG INJ SC/IM: CPT | Mod: PBBFAC,59,PN

## 2023-09-27 PROCEDURE — 99215 OFFICE O/P EST HI 40 MIN: CPT | Mod: S$PBB,,, | Performed by: INTERNAL MEDICINE

## 2023-09-27 RX ORDER — FLUCONAZOLE 150 MG/1
150 TABLET ORAL DAILY
Qty: 7 TABLET | Refills: 0 | Status: SHIPPED | OUTPATIENT
Start: 2023-09-27 | End: 2023-10-04

## 2023-09-27 RX ORDER — DOXYCYCLINE 100 MG/1
100 CAPSULE ORAL 2 TIMES DAILY
Qty: 30 CAPSULE | Refills: 3 | Status: SHIPPED | OUTPATIENT
Start: 2023-09-27 | End: 2023-12-07

## 2023-09-27 RX ORDER — CLINDAMYCIN HYDROCHLORIDE 300 MG/1
300 CAPSULE ORAL EVERY 8 HOURS
Qty: 30 CAPSULE | Refills: 0 | Status: SHIPPED | OUTPATIENT
Start: 2023-09-27 | End: 2023-10-07

## 2023-09-27 RX ORDER — CYANOCOBALAMIN 1000 UG/ML
1000 INJECTION, SOLUTION INTRAMUSCULAR; SUBCUTANEOUS
Status: COMPLETED | OUTPATIENT
Start: 2023-09-27 | End: 2023-09-27

## 2023-09-27 RX ORDER — CLINDAMYCIN PHOSPHATE 10 UG/ML
LOTION TOPICAL 2 TIMES DAILY
Qty: 180 ML | Refills: 6 | Status: SHIPPED | OUTPATIENT
Start: 2023-09-27 | End: 2023-12-14

## 2023-09-27 RX ORDER — KETOCONAZOLE 20 MG/ML
SHAMPOO, SUSPENSION TOPICAL
Qty: 120 ML | Refills: 3 | Status: SHIPPED | OUTPATIENT
Start: 2023-09-28 | End: 2024-02-05 | Stop reason: SDUPTHER

## 2023-09-27 RX ORDER — CEFTRIAXONE 1 G/1
1 INJECTION, POWDER, FOR SOLUTION INTRAMUSCULAR; INTRAVENOUS
Status: COMPLETED | OUTPATIENT
Start: 2023-09-27 | End: 2023-09-27

## 2023-09-27 RX ORDER — OXYCODONE AND ACETAMINOPHEN 10; 325 MG/1; MG/1
1 TABLET ORAL EVERY 8 HOURS PRN
Qty: 90 TABLET | Refills: 0 | Status: SHIPPED | OUTPATIENT
Start: 2023-11-14 | End: 2023-12-14

## 2023-09-27 RX ORDER — MUPIROCIN 20 MG/G
OINTMENT TOPICAL
Qty: 22 G | Refills: 3 | Status: SHIPPED | OUTPATIENT
Start: 2023-09-27 | End: 2024-01-16 | Stop reason: SDUPTHER

## 2023-09-27 RX ORDER — OXYCODONE AND ACETAMINOPHEN 10; 325 MG/1; MG/1
1 TABLET ORAL EVERY 8 HOURS PRN
Qty: 90 TABLET | Refills: 0 | Status: SHIPPED | OUTPATIENT
Start: 2023-12-13 | End: 2024-01-10 | Stop reason: SDUPTHER

## 2023-09-27 RX ORDER — OXYCODONE AND ACETAMINOPHEN 10; 325 MG/1; MG/1
1 TABLET ORAL EVERY 8 HOURS PRN
Qty: 90 TABLET | Refills: 0 | Status: SHIPPED | OUTPATIENT
Start: 2023-10-16 | End: 2023-11-15

## 2023-09-27 RX ADMIN — CEFTRIAXONE SODIUM 1 G: 1 INJECTION, POWDER, FOR SOLUTION INTRAMUSCULAR; INTRAVENOUS at 09:09

## 2023-09-27 RX ADMIN — CYANOCOBALAMIN 1000 MCG: 1000 INJECTION INTRAMUSCULAR; SUBCUTANEOUS at 09:09

## 2023-09-27 NOTE — PATIENT INSTRUCTIONS
Hold dapsone while on abx, he washing with benzo peroxide  we will add weekly Hibiclens and weekly, and weekly nizoral( ketaconizole shampoo, labs 15 days from today

## 2023-09-27 NOTE — PROGRESS NOTES
Subjective:       Patient ID: Umang Greer is a 43 y.o. male.    Chief Complaint: Disease Management      Follow up: 43 year old male who presents to clinic for follow up on Ankylosing spondylitis,he has dark urine fatigue, he has a abscess returning. He has had multiple skin eruptions on his back he started docycline and dapsone. He has thrush that he is treating. He is using hibiclens prn and panoxyl soap slightly improved. He complain   He reports good improvement overall with his arthritis on simponi monthly.  He is using prednisone 2.5-5 mg daily PRN.     Current tx:  1. Simponi  2. Prednisone prn  3. norco 10/325 q6      Review of Systems   Constitutional:  Positive for activity change and fatigue. Negative for unexpected weight change.   HENT:  Negative for mouth sores.    Eyes:  Positive for redness. Negative for visual disturbance.   Respiratory:  Negative for cough, shortness of breath and wheezing.    Cardiovascular:  Negative for chest pain, palpitations and leg swelling.   Gastrointestinal:  Negative for constipation and diarrhea.   Genitourinary:  Negative for genital sores.   Musculoskeletal:  Positive for back pain and neck stiffness.   Skin:  Negative for rash.   Allergic/Immunologic: Positive for immunocompromised state.   Neurological:  Negative for dizziness and syncope.   Hematological:  Negative for adenopathy. Bruises/bleeds easily.         Objective:     Vitals:    09/27/23 0820   BP: (!) 147/90   Pulse: 94       Past Medical History:   Diagnosis Date    Anxiety     Asthma     Cardiomyopathy     CHF (congestive heart failure)     Emphysema of lung     Sickle cell anemia     only trait    Sickle cell trait      Past Surgical History:   Procedure Laterality Date    HERNIA REPAIR      NASAL SEPTOPLASTY  12/20/2021    Dr. Menjivar at [facility]          Physical Exam   Constitutional: He is oriented to person, place, and time.   HENT:   Head: Normocephalic and atraumatic.   Mouth/Throat:  Oropharynx is clear and moist.   Eyes: Pupils are equal, round, and reactive to light.   Neck: No thyromegaly present.   Cardiovascular: Normal rate, regular rhythm and normal heart sounds. Exam reveals no gallop and no friction rub.   No murmur heard.  Pulmonary/Chest: Breath sounds normal. He has no wheezes. He has no rales. He exhibits no tenderness.   Abdominal: There is no abdominal tenderness. There is no rebound and no guarding.   Musculoskeletal:         General: Tenderness and deformity present.      Right shoulder: Tenderness present.      Left shoulder: Tenderness present.      Right elbow: Swelling present. Tenderness present.      Left elbow: Tenderness present.      Right wrist: Swelling and tenderness present.      Left wrist: Swelling and tenderness present.      Cervical back: Neck supple.      Right knee: Swelling and effusion present. Tenderness present.      Left knee: Swelling and effusion present. Tenderness present.   Lymphadenopathy:     He has no cervical adenopathy.   Neurological: He is alert and oriented to person, place, and time. Gait normal.   Skin: No rash noted. No erythema. No pallor.   Psychiatric: Mood, affect and judgment normal.   Vitals reviewed.      Right Side Rheumatological Exam     The patient is tender to palpation of the shoulder, elbow, wrist, knee, 1st PIP, 1st MCP, 2nd PIP, 2nd MCP, 3rd PIP, 3rd MCP, 4th PIP, 4th MCP and 5th PIP    He has swelling of the elbow, wrist, knee, 1st PIP, 1st MCP, 2nd PIP, 2nd MCP, 3rd PIP, 3rd MCP, 4th PIP, 4th MCP, 5th PIP and 5th MCP    The patient has an enlarged wrist and knee    Shoulder Exam   Tenderness Location: no tenderness    Range of Motion   Active abduction:  abnormal   Adduction: abnormal  Sensation: normal    Knee Exam   Tenderness Location: medial joint line and LCL  Patellofemoral Crepitus: positive  Effusion: positive  Sensation: normal    Hip Exam   Tenderness Location: posterior and anterior  Sensation:  normal    Elbow/Wrist Exam   Tenderness Location: no tenderness  Sensation: normal    Left Side Rheumatological Exam     The patient is tender to palpation of the shoulder, elbow, wrist, knee, 1st PIP, 1st MCP, 2nd PIP, 2nd MCP, 3rd PIP, 3rd MCP, 4th PIP, 4th MCP, 5th PIP and 5th MCP.    He has swelling of the wrist, knee, 1st PIP, 1st MCP, 2nd PIP, 2nd MCP, 3rd PIP, 3rd MCP, 4th PIP, 4th MCP, 5th PIP and 5th MCP    The patient has an enlarged knee.    Shoulder Exam   Tenderness Location: no tenderness    Range of Motion   Active abduction:  abnormal   Sensation: normal    Knee Exam   Tenderness Location: lateral joint line and medial joint line    Patellofemoral Crepitus: positive  Effusion: positive  Sensation: normal    Hip Exam   Tenderness Location: posterior and anterior  Sensation: normal    Elbow/Wrist Exam   Sensation: normal      Back/Neck Exam   General Inspection   Gait: normal       Tenderness Right paramedian tenderness of the Upper C-Spine, Lower C-Spine, Lower L-Spine and SI Joint.Left paramedian tenderness of the Upper C-Spine, Lower L-Spine, Lower C-Spine and SI Joint.    Neck Range of Motion   Flexion:  Limited  Extension:  Limited      Results for orders placed or performed in visit on 09/19/23   Sedimentation rate   Result Value Ref Range    Sed Rate 43 (H) 0 - 23 mm/Hr   C-Reactive Protein   Result Value Ref Range    CRP 12.1 (H) 0.0 - 8.2 mg/L   Comprehensive Metabolic Panel   Result Value Ref Range    Sodium 140 136 - 145 mmol/L    Potassium 4.4 3.5 - 5.1 mmol/L    Chloride 103 95 - 110 mmol/L    CO2 27 23 - 29 mmol/L    Glucose 97 70 - 110 mg/dL    BUN 12 6 - 20 mg/dL    Creatinine 0.9 0.5 - 1.4 mg/dL    Calcium 9.8 8.7 - 10.5 mg/dL    Total Protein 7.9 6.0 - 8.4 g/dL    Albumin 3.9 3.5 - 5.2 g/dL    Total Bilirubin 0.6 0.1 - 1.0 mg/dL    Alkaline Phosphatase 112 55 - 135 U/L    AST 18 10 - 40 U/L    ALT 14 10 - 44 U/L    eGFR >60.0 >60 mL/min/1.73 m^2    Anion Gap 10 8 - 16 mmol/L   CBC  Auto Differential   Result Value Ref Range    WBC 19.94 (H) 3.90 - 12.70 K/uL    RBC 4.73 4.60 - 6.20 M/uL    Hemoglobin 14.3 14.0 - 18.0 g/dL    Hematocrit 40.6 40.0 - 54.0 %    MCV 86 82 - 98 fL    MCH 30.2 27.0 - 31.0 pg    MCHC 35.2 32.0 - 36.0 g/dL    RDW 12.2 11.5 - 14.5 %    Platelets 330 150 - 450 K/uL    MPV 9.5 9.2 - 12.9 fL    Immature Granulocytes 0.3 0.0 - 0.5 %    Gran # (ANC) 15.7 (H) 1.8 - 7.7 K/uL    Immature Grans (Abs) 0.05 (H) 0.00 - 0.04 K/uL    Lymph # 3.1 1.0 - 4.8 K/uL    Mono # 0.9 0.3 - 1.0 K/uL    Eos # 0.2 0.0 - 0.5 K/uL    Baso # 0.08 0.00 - 0.20 K/uL    nRBC 0 0 /100 WBC    Gran % 78.5 (H) 38.0 - 73.0 %    Lymph % 15.6 (L) 18.0 - 48.0 %    Mono % 4.4 4.0 - 15.0 %    Eosinophil % 0.8 0.0 - 8.0 %    Basophil % 0.4 0.0 - 1.9 %    Differential Method Automated      reviewed labs with patient during this visit       Assessment:       1. Ankylosing spondylitis, unspecified site of spine    2. Chronic pain syndrome    3. Cellulitis, unspecified cellulitis site    4. Acne vulgaris    5. Lumbar and sacral arthritis    6. Lumbar spondylosis    7. Local skin infection    8. Lumbar discitis    9. Neck muscle spasm    10. Immunocompromised state due to drug therapy    11. Elevated C-reactive protein (CRP)    12. Vitamin D deficiency, unspecified     13. Impotence due to erectile dysfunction                    Plan:       Ankylosing spondylitis, unspecified site of spine  -     CBC Auto Differential; Future; Expected date: 09/27/2023  -     Protein Electrophoresis, Serum; Future; Expected date: 09/27/2023  -     Sedimentation rate; Future; Expected date: 09/27/2023  -     C-Reactive Protein; Future; Expected date: 09/27/2023  -     cefTRIAXone injection 1 g  -     cyanocobalamin injection 1,000 mcg  -     Urinalysis; Future; Expected date: 09/27/2023  -     clindamycin (CLEOCIN) 300 MG capsule; Take 1 capsule (300 mg total) by mouth every 8 (eight) hours. for 10 days  Dispense: 30 capsule; Refill:  0  -     fluconazole (DIFLUCAN) 150 MG Tab; Take 1 tablet (150 mg total) by mouth once daily. for 7 days  Dispense: 7 tablet; Refill: 0  -     oxyCODONE-acetaminophen (PERCOCET)  mg per tablet; Take 1 tablet by mouth every 8 (eight) hours as needed for Pain.  Dispense: 90 tablet; Refill: 0  -     oxyCODONE-acetaminophen (PERCOCET)  mg per tablet; Take 1 tablet by mouth every 8 (eight) hours as needed for Pain.  Dispense: 90 tablet; Refill: 0  -     oxyCODONE-acetaminophen (PERCOCET)  mg per tablet; Take 1 tablet by mouth every 8 (eight) hours as needed for Pain.  Dispense: 90 tablet; Refill: 0  -     mupirocin (BACTROBAN) 2 % ointment; APPLY EXTERNALLY TO THE AFFECTED AREA THREE TIMES DAILY  Dispense: 22 g; Refill: 3  -     Comprehensive Metabolic Panel; Standing  -     CBC Auto Differential; Standing  -     Sedimentation rate; Standing  -     C-Reactive Protein; Standing    Chronic pain syndrome  -     CBC Auto Differential; Future; Expected date: 09/27/2023  -     Protein Electrophoresis, Serum; Future; Expected date: 09/27/2023  -     Sedimentation rate; Future; Expected date: 09/27/2023  -     C-Reactive Protein; Future; Expected date: 09/27/2023  -     cefTRIAXone injection 1 g  -     cyanocobalamin injection 1,000 mcg  -     Urinalysis; Future; Expected date: 09/27/2023  -     clindamycin (CLEOCIN) 300 MG capsule; Take 1 capsule (300 mg total) by mouth every 8 (eight) hours. for 10 days  Dispense: 30 capsule; Refill: 0  -     fluconazole (DIFLUCAN) 150 MG Tab; Take 1 tablet (150 mg total) by mouth once daily. for 7 days  Dispense: 7 tablet; Refill: 0  -     oxyCODONE-acetaminophen (PERCOCET)  mg per tablet; Take 1 tablet by mouth every 8 (eight) hours as needed for Pain.  Dispense: 90 tablet; Refill: 0  -     oxyCODONE-acetaminophen (PERCOCET)  mg per tablet; Take 1 tablet by mouth every 8 (eight) hours as needed for Pain.  Dispense: 90 tablet; Refill: 0  -      oxyCODONE-acetaminophen (PERCOCET)  mg per tablet; Take 1 tablet by mouth every 8 (eight) hours as needed for Pain.  Dispense: 90 tablet; Refill: 0  -     mupirocin (BACTROBAN) 2 % ointment; APPLY EXTERNALLY TO THE AFFECTED AREA THREE TIMES DAILY  Dispense: 22 g; Refill: 3    Cellulitis, unspecified cellulitis site  -     CBC Auto Differential; Future; Expected date: 09/27/2023  -     Protein Electrophoresis, Serum; Future; Expected date: 09/27/2023  -     Sedimentation rate; Future; Expected date: 09/27/2023  -     C-Reactive Protein; Future; Expected date: 09/27/2023  -     cefTRIAXone injection 1 g  -     cyanocobalamin injection 1,000 mcg  -     Urinalysis; Future; Expected date: 09/27/2023  -     clindamycin (CLEOCIN) 300 MG capsule; Take 1 capsule (300 mg total) by mouth every 8 (eight) hours. for 10 days  Dispense: 30 capsule; Refill: 0  -     fluconazole (DIFLUCAN) 150 MG Tab; Take 1 tablet (150 mg total) by mouth once daily. for 7 days  Dispense: 7 tablet; Refill: 0  -     mupirocin (BACTROBAN) 2 % ointment; APPLY EXTERNALLY TO THE AFFECTED AREA THREE TIMES DAILY  Dispense: 22 g; Refill: 3  -     doxycycline (VIBRAMYCIN) 100 MG Cap; Take 1 capsule (100 mg total) by mouth 2 (two) times daily.  Dispense: 30 capsule; Refill: 3  -     clindamycin (CLEOCIN T) 1 % lotion; Apply topically 2 (two) times daily. for 15 days  Dispense: 180 mL; Refill: 6  -     ketoconazole (NIZORAL) 2 % shampoo; Apply topically twice a week.  Dispense: 120 mL; Refill: 3  -     Ambulatory referral/consult to Dermatology; Future; Expected date: 10/04/2023    Acne vulgaris  -     CBC Auto Differential; Future; Expected date: 09/27/2023  -     Protein Electrophoresis, Serum; Future; Expected date: 09/27/2023  -     Sedimentation rate; Future; Expected date: 09/27/2023  -     C-Reactive Protein; Future; Expected date: 09/27/2023  -     cefTRIAXone injection 1 g  -     cyanocobalamin injection 1,000 mcg  -     Urinalysis; Future;  Expected date: 09/27/2023  -     clindamycin (CLEOCIN) 300 MG capsule; Take 1 capsule (300 mg total) by mouth every 8 (eight) hours. for 10 days  Dispense: 30 capsule; Refill: 0  -     fluconazole (DIFLUCAN) 150 MG Tab; Take 1 tablet (150 mg total) by mouth once daily. for 7 days  Dispense: 7 tablet; Refill: 0  -     mupirocin (BACTROBAN) 2 % ointment; APPLY EXTERNALLY TO THE AFFECTED AREA THREE TIMES DAILY  Dispense: 22 g; Refill: 3  -     doxycycline (VIBRAMYCIN) 100 MG Cap; Take 1 capsule (100 mg total) by mouth 2 (two) times daily.  Dispense: 30 capsule; Refill: 3  -     clindamycin (CLEOCIN T) 1 % lotion; Apply topically 2 (two) times daily. for 15 days  Dispense: 180 mL; Refill: 6  -     ketoconazole (NIZORAL) 2 % shampoo; Apply topically twice a week.  Dispense: 120 mL; Refill: 3  -     Ambulatory referral/consult to Dermatology; Future; Expected date: 10/04/2023  -     Comprehensive Metabolic Panel; Standing  -     CBC Auto Differential; Standing  -     Sedimentation rate; Standing  -     C-Reactive Protein; Standing    Lumbar and sacral arthritis  -     CBC Auto Differential; Future; Expected date: 09/27/2023  -     Protein Electrophoresis, Serum; Future; Expected date: 09/27/2023  -     Sedimentation rate; Future; Expected date: 09/27/2023  -     C-Reactive Protein; Future; Expected date: 09/27/2023  -     cefTRIAXone injection 1 g  -     cyanocobalamin injection 1,000 mcg  -     Urinalysis; Future; Expected date: 09/27/2023  -     clindamycin (CLEOCIN) 300 MG capsule; Take 1 capsule (300 mg total) by mouth every 8 (eight) hours. for 10 days  Dispense: 30 capsule; Refill: 0  -     fluconazole (DIFLUCAN) 150 MG Tab; Take 1 tablet (150 mg total) by mouth once daily. for 7 days  Dispense: 7 tablet; Refill: 0  -     oxyCODONE-acetaminophen (PERCOCET)  mg per tablet; Take 1 tablet by mouth every 8 (eight) hours as needed for Pain.  Dispense: 90 tablet; Refill: 0  -     oxyCODONE-acetaminophen (PERCOCET)   mg per tablet; Take 1 tablet by mouth every 8 (eight) hours as needed for Pain.  Dispense: 90 tablet; Refill: 0  -     oxyCODONE-acetaminophen (PERCOCET)  mg per tablet; Take 1 tablet by mouth every 8 (eight) hours as needed for Pain.  Dispense: 90 tablet; Refill: 0  -     mupirocin (BACTROBAN) 2 % ointment; APPLY EXTERNALLY TO THE AFFECTED AREA THREE TIMES DAILY  Dispense: 22 g; Refill: 3  -     doxycycline (VIBRAMYCIN) 100 MG Cap; Take 1 capsule (100 mg total) by mouth 2 (two) times daily.  Dispense: 30 capsule; Refill: 3  -     clindamycin (CLEOCIN T) 1 % lotion; Apply topically 2 (two) times daily. for 15 days  Dispense: 180 mL; Refill: 6  -     ketoconazole (NIZORAL) 2 % shampoo; Apply topically twice a week.  Dispense: 120 mL; Refill: 3  -     Ambulatory referral/consult to Dermatology; Future; Expected date: 10/04/2023  -     Comprehensive Metabolic Panel; Standing  -     CBC Auto Differential; Standing  -     Sedimentation rate; Standing  -     C-Reactive Protein; Standing    Lumbar spondylosis  -     CBC Auto Differential; Future; Expected date: 09/27/2023  -     Protein Electrophoresis, Serum; Future; Expected date: 09/27/2023  -     Sedimentation rate; Future; Expected date: 09/27/2023  -     C-Reactive Protein; Future; Expected date: 09/27/2023  -     cefTRIAXone injection 1 g  -     cyanocobalamin injection 1,000 mcg  -     Urinalysis; Future; Expected date: 09/27/2023  -     clindamycin (CLEOCIN) 300 MG capsule; Take 1 capsule (300 mg total) by mouth every 8 (eight) hours. for 10 days  Dispense: 30 capsule; Refill: 0  -     fluconazole (DIFLUCAN) 150 MG Tab; Take 1 tablet (150 mg total) by mouth once daily. for 7 days  Dispense: 7 tablet; Refill: 0  -     oxyCODONE-acetaminophen (PERCOCET)  mg per tablet; Take 1 tablet by mouth every 8 (eight) hours as needed for Pain.  Dispense: 90 tablet; Refill: 0  -     oxyCODONE-acetaminophen (PERCOCET)  mg per tablet; Take 1 tablet by  mouth every 8 (eight) hours as needed for Pain.  Dispense: 90 tablet; Refill: 0  -     oxyCODONE-acetaminophen (PERCOCET)  mg per tablet; Take 1 tablet by mouth every 8 (eight) hours as needed for Pain.  Dispense: 90 tablet; Refill: 0  -     mupirocin (BACTROBAN) 2 % ointment; APPLY EXTERNALLY TO THE AFFECTED AREA THREE TIMES DAILY  Dispense: 22 g; Refill: 3  -     doxycycline (VIBRAMYCIN) 100 MG Cap; Take 1 capsule (100 mg total) by mouth 2 (two) times daily.  Dispense: 30 capsule; Refill: 3  -     clindamycin (CLEOCIN T) 1 % lotion; Apply topically 2 (two) times daily. for 15 days  Dispense: 180 mL; Refill: 6  -     ketoconazole (NIZORAL) 2 % shampoo; Apply topically twice a week.  Dispense: 120 mL; Refill: 3  -     Ambulatory referral/consult to Dermatology; Future; Expected date: 10/04/2023  -     Comprehensive Metabolic Panel; Standing  -     CBC Auto Differential; Standing  -     Sedimentation rate; Standing  -     C-Reactive Protein; Standing    Local skin infection  -     CBC Auto Differential; Future; Expected date: 09/27/2023  -     Protein Electrophoresis, Serum; Future; Expected date: 09/27/2023  -     Sedimentation rate; Future; Expected date: 09/27/2023  -     C-Reactive Protein; Future; Expected date: 09/27/2023  -     cefTRIAXone injection 1 g  -     cyanocobalamin injection 1,000 mcg  -     Urinalysis; Future; Expected date: 09/27/2023  -     clindamycin (CLEOCIN) 300 MG capsule; Take 1 capsule (300 mg total) by mouth every 8 (eight) hours. for 10 days  Dispense: 30 capsule; Refill: 0  -     fluconazole (DIFLUCAN) 150 MG Tab; Take 1 tablet (150 mg total) by mouth once daily. for 7 days  Dispense: 7 tablet; Refill: 0  -     oxyCODONE-acetaminophen (PERCOCET)  mg per tablet; Take 1 tablet by mouth every 8 (eight) hours as needed for Pain.  Dispense: 90 tablet; Refill: 0  -     oxyCODONE-acetaminophen (PERCOCET)  mg per tablet; Take 1 tablet by mouth every 8 (eight) hours as needed  for Pain.  Dispense: 90 tablet; Refill: 0  -     oxyCODONE-acetaminophen (PERCOCET)  mg per tablet; Take 1 tablet by mouth every 8 (eight) hours as needed for Pain.  Dispense: 90 tablet; Refill: 0  -     mupirocin (BACTROBAN) 2 % ointment; APPLY EXTERNALLY TO THE AFFECTED AREA THREE TIMES DAILY  Dispense: 22 g; Refill: 3  -     doxycycline (VIBRAMYCIN) 100 MG Cap; Take 1 capsule (100 mg total) by mouth 2 (two) times daily.  Dispense: 30 capsule; Refill: 3  -     clindamycin (CLEOCIN T) 1 % lotion; Apply topically 2 (two) times daily. for 15 days  Dispense: 180 mL; Refill: 6  -     ketoconazole (NIZORAL) 2 % shampoo; Apply topically twice a week.  Dispense: 120 mL; Refill: 3  -     Ambulatory referral/consult to Dermatology; Future; Expected date: 10/04/2023    Lumbar discitis  -     oxyCODONE-acetaminophen (PERCOCET)  mg per tablet; Take 1 tablet by mouth every 8 (eight) hours as needed for Pain.  Dispense: 90 tablet; Refill: 0  -     oxyCODONE-acetaminophen (PERCOCET)  mg per tablet; Take 1 tablet by mouth every 8 (eight) hours as needed for Pain.  Dispense: 90 tablet; Refill: 0  -     oxyCODONE-acetaminophen (PERCOCET)  mg per tablet; Take 1 tablet by mouth every 8 (eight) hours as needed for Pain.  Dispense: 90 tablet; Refill: 0  -     mupirocin (BACTROBAN) 2 % ointment; APPLY EXTERNALLY TO THE AFFECTED AREA THREE TIMES DAILY  Dispense: 22 g; Refill: 3  -     doxycycline (VIBRAMYCIN) 100 MG Cap; Take 1 capsule (100 mg total) by mouth 2 (two) times daily.  Dispense: 30 capsule; Refill: 3  -     clindamycin (CLEOCIN T) 1 % lotion; Apply topically 2 (two) times daily. for 15 days  Dispense: 180 mL; Refill: 6  -     ketoconazole (NIZORAL) 2 % shampoo; Apply topically twice a week.  Dispense: 120 mL; Refill: 3  -     Ambulatory referral/consult to Dermatology; Future; Expected date: 10/04/2023    Neck muscle spasm  -     oxyCODONE-acetaminophen (PERCOCET)  mg per tablet; Take 1 tablet by  mouth every 8 (eight) hours as needed for Pain.  Dispense: 90 tablet; Refill: 0  -     oxyCODONE-acetaminophen (PERCOCET)  mg per tablet; Take 1 tablet by mouth every 8 (eight) hours as needed for Pain.  Dispense: 90 tablet; Refill: 0  -     oxyCODONE-acetaminophen (PERCOCET)  mg per tablet; Take 1 tablet by mouth every 8 (eight) hours as needed for Pain.  Dispense: 90 tablet; Refill: 0  -     mupirocin (BACTROBAN) 2 % ointment; APPLY EXTERNALLY TO THE AFFECTED AREA THREE TIMES DAILY  Dispense: 22 g; Refill: 3    Immunocompromised state due to drug therapy  -     oxyCODONE-acetaminophen (PERCOCET)  mg per tablet; Take 1 tablet by mouth every 8 (eight) hours as needed for Pain.  Dispense: 90 tablet; Refill: 0  -     oxyCODONE-acetaminophen (PERCOCET)  mg per tablet; Take 1 tablet by mouth every 8 (eight) hours as needed for Pain.  Dispense: 90 tablet; Refill: 0  -     oxyCODONE-acetaminophen (PERCOCET)  mg per tablet; Take 1 tablet by mouth every 8 (eight) hours as needed for Pain.  Dispense: 90 tablet; Refill: 0  -     mupirocin (BACTROBAN) 2 % ointment; APPLY EXTERNALLY TO THE AFFECTED AREA THREE TIMES DAILY  Dispense: 22 g; Refill: 3  -     doxycycline (VIBRAMYCIN) 100 MG Cap; Take 1 capsule (100 mg total) by mouth 2 (two) times daily.  Dispense: 30 capsule; Refill: 3  -     clindamycin (CLEOCIN T) 1 % lotion; Apply topically 2 (two) times daily. for 15 days  Dispense: 180 mL; Refill: 6  -     ketoconazole (NIZORAL) 2 % shampoo; Apply topically twice a week.  Dispense: 120 mL; Refill: 3  -     Ambulatory referral/consult to Dermatology; Future; Expected date: 10/04/2023    Elevated C-reactive protein (CRP)  -     oxyCODONE-acetaminophen (PERCOCET)  mg per tablet; Take 1 tablet by mouth every 8 (eight) hours as needed for Pain.  Dispense: 90 tablet; Refill: 0  -     oxyCODONE-acetaminophen (PERCOCET)  mg per tablet; Take 1 tablet by mouth every 8 (eight) hours as needed for  Pain.  Dispense: 90 tablet; Refill: 0  -     oxyCODONE-acetaminophen (PERCOCET)  mg per tablet; Take 1 tablet by mouth every 8 (eight) hours as needed for Pain.  Dispense: 90 tablet; Refill: 0  -     mupirocin (BACTROBAN) 2 % ointment; APPLY EXTERNALLY TO THE AFFECTED AREA THREE TIMES DAILY  Dispense: 22 g; Refill: 3    Vitamin D deficiency, unspecified   -     oxyCODONE-acetaminophen (PERCOCET)  mg per tablet; Take 1 tablet by mouth every 8 (eight) hours as needed for Pain.  Dispense: 90 tablet; Refill: 0  -     oxyCODONE-acetaminophen (PERCOCET)  mg per tablet; Take 1 tablet by mouth every 8 (eight) hours as needed for Pain.  Dispense: 90 tablet; Refill: 0  -     oxyCODONE-acetaminophen (PERCOCET)  mg per tablet; Take 1 tablet by mouth every 8 (eight) hours as needed for Pain.  Dispense: 90 tablet; Refill: 0  -     mupirocin (BACTROBAN) 2 % ointment; APPLY EXTERNALLY TO THE AFFECTED AREA THREE TIMES DAILY  Dispense: 22 g; Refill: 3    Impotence due to erectile dysfunction  -     mupirocin (BACTROBAN) 2 % ointment; APPLY EXTERNALLY TO THE AFFECTED AREA THREE TIMES DAILY  Dispense: 22 g; Refill: 3                Assessment:  42 year old male with  Ankylosing spondylitis, elevated ESR/CRP on simponi  --chronic pain syndrome on perocet 10/325  --alcoholic cardiomyopathy, EF 55% followed by Cardiology--COPD, tobacco use  --chronic anxiety on xanax PRN per PCP  --allergy to ASA, NSAIDs    1.Continue simponi pt is stable on tx he is doing well on tx- he knows to hold tx if ill.  2. I have  check [place] prescription monitoring program site and no unusual or abnormal behavior has occurred  3 refer to Dr Vivian pena. Hold dapsone while on abx, he washing with benzo peroxide  we will add weekly Hibiclens and weekly, and weekly nizoral labs 15 days from today    More than 50% of the  40 minute encounter was spent face to face counseling the patient regarding current status and future plan of care as  well as side effects  of the medications. All questions were answered to patient's satisfaction also includes  non-face to face time preparing to see the patient (eg, review of tests), Obtaining and/or reviewing separately obtained history, Documenting clinical information in the electronic or other health record, Independently interpreting results

## 2023-10-02 DIAGNOSIS — M47.817 LUMBAR AND SACRAL ARTHRITIS: ICD-10-CM

## 2023-10-02 DIAGNOSIS — R74.8 LOW SERUM HDL: ICD-10-CM

## 2023-10-02 DIAGNOSIS — M62.838 NECK MUSCLE SPASM: ICD-10-CM

## 2023-10-02 DIAGNOSIS — L70.0 ACNE VULGARIS: ICD-10-CM

## 2023-10-02 DIAGNOSIS — M45.9 ANKYLOSING SPONDYLITIS, UNSPECIFIED SITE OF SPINE: ICD-10-CM

## 2023-10-02 DIAGNOSIS — M47.816 LUMBAR SPONDYLOSIS: ICD-10-CM

## 2023-10-02 DIAGNOSIS — E78.9 LIPID DISORDER: ICD-10-CM

## 2023-10-02 DIAGNOSIS — N52.9 IMPOTENCE DUE TO ERECTILE DYSFUNCTION: ICD-10-CM

## 2023-10-02 DIAGNOSIS — G89.4 CHRONIC PAIN SYNDROME: ICD-10-CM

## 2023-10-02 DIAGNOSIS — M46.46 LUMBAR DISCITIS: ICD-10-CM

## 2023-10-03 DIAGNOSIS — M45.9 ANKYLOSING SPONDYLITIS, UNSPECIFIED SITE OF SPINE: ICD-10-CM

## 2023-10-03 DIAGNOSIS — G89.4 CHRONIC PAIN SYNDROME: ICD-10-CM

## 2023-10-03 DIAGNOSIS — N52.9 IMPOTENCE DUE TO ERECTILE DYSFUNCTION: ICD-10-CM

## 2023-10-03 DIAGNOSIS — M47.817 LUMBAR AND SACRAL ARTHRITIS: ICD-10-CM

## 2023-10-03 DIAGNOSIS — M47.816 LUMBAR SPONDYLOSIS: ICD-10-CM

## 2023-10-03 DIAGNOSIS — R74.8 LOW SERUM HDL: ICD-10-CM

## 2023-10-03 DIAGNOSIS — M62.838 NECK MUSCLE SPASM: ICD-10-CM

## 2023-10-03 DIAGNOSIS — L70.0 ACNE VULGARIS: ICD-10-CM

## 2023-10-03 DIAGNOSIS — M46.46 LUMBAR DISCITIS: ICD-10-CM

## 2023-10-03 DIAGNOSIS — E78.9 LIPID DISORDER: ICD-10-CM

## 2023-10-04 RX ORDER — ICOSAPENT ETHYL 1000 MG/1
CAPSULE ORAL
Qty: 120 CAPSULE | OUTPATIENT
Start: 2023-10-04

## 2023-10-04 RX ORDER — ICOSAPENT ETHYL 1000 MG/1
CAPSULE ORAL
Qty: 120 CAPSULE | Refills: 6 | Status: SHIPPED | OUTPATIENT
Start: 2023-10-04

## 2023-10-13 ENCOUNTER — LAB VISIT (OUTPATIENT)
Dept: LAB | Facility: HOSPITAL | Age: 43
End: 2023-10-13
Attending: INTERNAL MEDICINE
Payer: MEDICARE

## 2023-10-13 DIAGNOSIS — G89.4 CHRONIC PAIN SYNDROME: ICD-10-CM

## 2023-10-13 DIAGNOSIS — L03.90 CELLULITIS, UNSPECIFIED CELLULITIS SITE: ICD-10-CM

## 2023-10-13 DIAGNOSIS — L70.0 ACNE VULGARIS: ICD-10-CM

## 2023-10-13 DIAGNOSIS — M47.817 LUMBAR AND SACRAL ARTHRITIS: ICD-10-CM

## 2023-10-13 DIAGNOSIS — M47.816 LUMBAR SPONDYLOSIS: ICD-10-CM

## 2023-10-13 DIAGNOSIS — L08.9 LOCAL SKIN INFECTION: ICD-10-CM

## 2023-10-13 DIAGNOSIS — M45.9 ANKYLOSING SPONDYLITIS, UNSPECIFIED SITE OF SPINE: ICD-10-CM

## 2023-10-13 LAB
BASOPHILS # BLD AUTO: 0.07 K/UL (ref 0–0.2)
BASOPHILS NFR BLD: 0.5 % (ref 0–1.9)
CRP SERPL-MCNC: 7.4 MG/L (ref 0–8.2)
DIFFERENTIAL METHOD: ABNORMAL
EOSINOPHIL # BLD AUTO: 0.1 K/UL (ref 0–0.5)
EOSINOPHIL NFR BLD: 0.5 % (ref 0–8)
ERYTHROCYTE [DISTWIDTH] IN BLOOD BY AUTOMATED COUNT: 12.4 % (ref 11.5–14.5)
ERYTHROCYTE [SEDIMENTATION RATE] IN BLOOD BY PHOTOMETRIC METHOD: 13 MM/HR (ref 0–23)
HCT VFR BLD AUTO: 41 % (ref 40–54)
HGB BLD-MCNC: 13.6 G/DL (ref 14–18)
IMM GRANULOCYTES # BLD AUTO: 0.07 K/UL (ref 0–0.04)
IMM GRANULOCYTES NFR BLD AUTO: 0.5 % (ref 0–0.5)
LYMPHOCYTES # BLD AUTO: 2.3 K/UL (ref 1–4.8)
LYMPHOCYTES NFR BLD: 15.1 % (ref 18–48)
MCH RBC QN AUTO: 30 PG (ref 27–31)
MCHC RBC AUTO-ENTMCNC: 33.2 G/DL (ref 32–36)
MCV RBC AUTO: 90 FL (ref 82–98)
MONOCYTES # BLD AUTO: 0.5 K/UL (ref 0.3–1)
MONOCYTES NFR BLD: 3.5 % (ref 4–15)
NEUTROPHILS # BLD AUTO: 12.4 K/UL (ref 1.8–7.7)
NEUTROPHILS NFR BLD: 79.9 % (ref 38–73)
NRBC BLD-RTO: 0 /100 WBC
PLATELET # BLD AUTO: 295 K/UL (ref 150–450)
PMV BLD AUTO: 10.1 FL (ref 9.2–12.9)
RBC # BLD AUTO: 4.54 M/UL (ref 4.6–6.2)
WBC # BLD AUTO: 15.48 K/UL (ref 3.9–12.7)

## 2023-10-13 PROCEDURE — 84165 PROTEIN E-PHORESIS SERUM: CPT | Performed by: INTERNAL MEDICINE

## 2023-10-13 PROCEDURE — 85652 RBC SED RATE AUTOMATED: CPT | Performed by: INTERNAL MEDICINE

## 2023-10-13 PROCEDURE — 84165 PROTEIN E-PHORESIS SERUM: CPT | Mod: 26,,, | Performed by: PATHOLOGY

## 2023-10-13 PROCEDURE — 86140 C-REACTIVE PROTEIN: CPT | Performed by: INTERNAL MEDICINE

## 2023-10-13 PROCEDURE — 84165 PATHOLOGIST INTERPRETATION SPE: ICD-10-PCS | Mod: 26,,, | Performed by: PATHOLOGY

## 2023-10-13 PROCEDURE — 36415 COLL VENOUS BLD VENIPUNCTURE: CPT | Mod: PO | Performed by: INTERNAL MEDICINE

## 2023-10-13 PROCEDURE — 85025 COMPLETE CBC W/AUTO DIFF WBC: CPT | Performed by: INTERNAL MEDICINE

## 2023-10-16 ENCOUNTER — TELEPHONE (OUTPATIENT)
Dept: RHEUMATOLOGY | Facility: CLINIC | Age: 43
End: 2023-10-16
Payer: MEDICARE

## 2023-10-16 DIAGNOSIS — M45.9 ANKYLOSING SPONDYLITIS, UNSPECIFIED SITE OF SPINE: ICD-10-CM

## 2023-10-16 DIAGNOSIS — L73.2 HIDRADENITIS SUPPURATIVA: ICD-10-CM

## 2023-10-16 DIAGNOSIS — L70.0 ACNE VULGARIS: Primary | ICD-10-CM

## 2023-10-16 LAB
ALBUMIN SERPL ELPH-MCNC: 4.14 G/DL (ref 3.35–5.55)
ALPHA1 GLOB SERPL ELPH-MCNC: 0.28 G/DL (ref 0.17–0.41)
ALPHA2 GLOB SERPL ELPH-MCNC: 0.76 G/DL (ref 0.43–0.99)
B-GLOBULIN SERPL ELPH-MCNC: 1.02 G/DL (ref 0.5–1.1)
GAMMA GLOB SERPL ELPH-MCNC: 0.99 G/DL (ref 0.67–1.58)
PROT SERPL-MCNC: 7.2 G/DL (ref 6–8.4)

## 2023-10-16 RX ORDER — RIFAMPIN 300 MG/1
300 CAPSULE ORAL EVERY 12 HOURS
Qty: 60 CAPSULE | Refills: 1 | Status: SHIPPED | OUTPATIENT
Start: 2023-10-16 | End: 2023-12-07

## 2023-10-16 RX ORDER — PREDNISONE 1 MG/1
4 TABLET ORAL DAILY
Qty: 120 TABLET | Refills: 5 | Status: SHIPPED | OUTPATIENT
Start: 2023-10-16 | End: 2024-04-13

## 2023-10-16 NOTE — TELEPHONE ENCOUNTER
Pt is  about labs  Hold dapsone start rifampin x 10 days then prn may restart doxy post. Try to wean pred 2.5 mg daily.

## 2023-10-17 ENCOUNTER — PATIENT MESSAGE (OUTPATIENT)
Dept: RHEUMATOLOGY | Facility: CLINIC | Age: 43
End: 2023-10-17
Payer: MEDICARE

## 2023-10-17 LAB — PATHOLOGIST INTERPRETATION SPE: NORMAL

## 2023-10-22 ENCOUNTER — PATIENT MESSAGE (OUTPATIENT)
Dept: RHEUMATOLOGY | Facility: CLINIC | Age: 43
End: 2023-10-22
Payer: MEDICARE

## 2023-10-23 NOTE — TELEPHONE ENCOUNTER
Patient went to [facility] ER today for abdominal pain. In ER note, upper abdominal pain, indigestion, and intermittent diarrhea since Thursday. Patient discharged with rx for bentyl 20 mg every 6 hours and advised to f/u with PCP on 2-3 days    Rifampin prescribed on 10/16/23 for acne vulgaris and hidradenitis suppurative. Routing providers to advise

## 2023-10-25 ENCOUNTER — PATIENT MESSAGE (OUTPATIENT)
Dept: FAMILY MEDICINE | Facility: CLINIC | Age: 43
End: 2023-10-25
Payer: MEDICARE

## 2023-10-26 ENCOUNTER — TELEPHONE (OUTPATIENT)
Dept: CARDIOLOGY | Facility: CLINIC | Age: 43
End: 2023-10-26
Payer: MEDICARE

## 2023-10-26 ENCOUNTER — PATIENT MESSAGE (OUTPATIENT)
Dept: CARDIOLOGY | Facility: CLINIC | Age: 43
End: 2023-10-26
Payer: MEDICARE

## 2023-10-26 NOTE — TELEPHONE ENCOUNTER
Pt scheduled for upper endoscopy. Please advise.    [facility]  P- [phone]  F- [phone]

## 2023-11-10 RX ORDER — OMEPRAZOLE 20 MG/1
20 CAPSULE, DELAYED RELEASE ORAL
Qty: 90 CAPSULE | Refills: 1 | Status: SHIPPED | OUTPATIENT
Start: 2023-11-10 | End: 2024-03-14 | Stop reason: SDUPTHER

## 2023-11-10 NOTE — TELEPHONE ENCOUNTER
No care due was identified.  [facility] Embedded Care Due Messages. Reference number: [id number].   11/10/2023 3:25:04 AM CST

## 2023-11-18 DIAGNOSIS — I42.9 CARDIOMYOPATHY, UNSPECIFIED TYPE: ICD-10-CM

## 2023-11-20 ENCOUNTER — PATIENT MESSAGE (OUTPATIENT)
Dept: CARDIOLOGY | Facility: CLINIC | Age: 43
End: 2023-11-20
Payer: MEDICARE

## 2023-11-20 RX ORDER — CARVEDILOL 3.12 MG/1
TABLET ORAL
Qty: 60 TABLET | Refills: 0 | Status: SHIPPED | OUTPATIENT
Start: 2023-11-20 | End: 2023-12-14 | Stop reason: SDUPTHER

## 2023-11-28 ENCOUNTER — OFFICE VISIT (OUTPATIENT)
Dept: UROLOGY | Facility: CLINIC | Age: 43
End: 2023-11-28
Payer: MEDICARE

## 2023-11-28 VITALS — HEIGHT: 72 IN | WEIGHT: 191.56 LBS | BODY MASS INDEX: 25.95 KG/M2

## 2023-11-28 DIAGNOSIS — R35.1 NOCTURIA: ICD-10-CM

## 2023-11-28 DIAGNOSIS — N52.01 ERECTILE DYSFUNCTION DUE TO ARTERIAL INSUFFICIENCY: Primary | ICD-10-CM

## 2023-11-28 PROCEDURE — 99999 PR PBB SHADOW E&M-EST. PATIENT-LVL IV: ICD-10-PCS | Mod: PBBFAC,,, | Performed by: STUDENT IN AN ORGANIZED HEALTH CARE EDUCATION/TRAINING PROGRAM

## 2023-11-28 PROCEDURE — 99204 OFFICE O/P NEW MOD 45 MIN: CPT | Mod: S$PBB,,, | Performed by: STUDENT IN AN ORGANIZED HEALTH CARE EDUCATION/TRAINING PROGRAM

## 2023-11-28 PROCEDURE — 99999 PR PBB SHADOW E&M-EST. PATIENT-LVL IV: CPT | Mod: PBBFAC,,, | Performed by: STUDENT IN AN ORGANIZED HEALTH CARE EDUCATION/TRAINING PROGRAM

## 2023-11-28 PROCEDURE — 99204 PR OFFICE/OUTPT VISIT, NEW, LEVL IV, 45-59 MIN: ICD-10-PCS | Mod: S$PBB,,, | Performed by: STUDENT IN AN ORGANIZED HEALTH CARE EDUCATION/TRAINING PROGRAM

## 2023-11-28 PROCEDURE — 99214 OFFICE O/P EST MOD 30 MIN: CPT | Mod: PBBFAC,PO | Performed by: STUDENT IN AN ORGANIZED HEALTH CARE EDUCATION/TRAINING PROGRAM

## 2023-11-28 RX ORDER — TADALAFIL 20 MG/1
20 TABLET ORAL DAILY PRN
Qty: 10 TABLET | Refills: 11 | Status: SHIPPED | OUTPATIENT
Start: 2023-11-28

## 2023-11-28 NOTE — PROGRESS NOTES
[facility]   Clinic Note    Subjective:     Chief Complaint: Urinary Frequency and Erectile Dysfunction      History of Present Illness:  Umang Greer is a 43 y.o. male who presents to clinic for evaluation and management of ED and nocturia. He is new to our clinic referred by Dr. LUPILLO Khna    He reports ED onset about 2 years ago. He has been on on sildenafil with moderate results. He mostly has trouble maintaining the erection.     Testosterone 4/2023 was 490.     He reports nocturia 2-3x. He has no daytime symptoms. He has a good stream. He drinks plenty of water late into the night and during the night.     IPSS Questionnaire (AUA-SS):  1) Incomplete Emptying 1 - Less than 1 time in 5   2) Frequency 3 - About half the time   3) Intermittency 0 - Not at all   4) Urgency 0 - Not at all   5) Weak Stream  0 - Not at all   6) Straining 0 - Not at all   7) Nocturia 3 - 3 times   Total score:   7   Quality of Life:  3 - Mixed      Past medical, family, surgical and social history reviewed as documented in chart with pertinent positive medical, family, surgical and social history detailed in HPI.    A review systems was conducted with pertinent positive and negative findings documented in HPI.    Objective:     Estimated body mass index is 25.98 kg/m² as calculated from the following:    Height as of this encounter: 6' (1.829 m).    Weight as of this encounter: 86.9 kg (191 lb 9.3 oz).    Vital Signs (Most Recent)       Physical Exam  Constitutional:       General: He is not in acute distress.     Appearance: He is not ill-appearing or toxic-appearing.   Pulmonary:      Effort: Pulmonary effort is normal. No accessory muscle usage or respiratory distress.   Neurological:      Mental Status: He is alert.       Labs reviewed below:  Lab Results   Component Value Date    BUN 12 09/19/2023    CREATININE 0.9 09/19/2023    WBC 15.48 (H) 10/13/2023    HGB 13.6 (L) 10/13/2023    HCT 41.0 10/13/2023    PLT  295 10/13/2023    AST 18 09/19/2023    ALT 14 09/19/2023    ALKPHOS 112 09/19/2023    ALBUMIN 3.9 09/19/2023    HGBA1C 5.0 10/08/2021      Assessment:     1. Erectile dysfunction due to arterial insufficiency    2. Nocturia      Plan:     1. Erectile dysfunction due to arterial insufficiency  Recent T was normal. Discussed etiologies of ED likely related to smoking and medications. Recommend cialis. Discussed intracavernosal injections if needed.   - tadalafiL (CIALIS) 20 MG Tab; Take 1 tablet (20 mg total) by mouth daily as needed (1/2 or 1 full tablet as needed for erectile dysfunction).  Dispense: 10 tablet; Refill: 11    2. Nocturia  Discussed urinary symptoms. Limit fluids at night.       Albaro Escoto MD

## 2023-12-07 ENCOUNTER — OFFICE VISIT (OUTPATIENT)
Dept: FAMILY MEDICINE | Facility: CLINIC | Age: 43
End: 2023-12-07
Payer: MEDICARE

## 2023-12-07 VITALS
BODY MASS INDEX: 25.14 KG/M2 | HEART RATE: 90 BPM | DIASTOLIC BLOOD PRESSURE: 82 MMHG | WEIGHT: 185.63 LBS | HEIGHT: 72 IN | SYSTOLIC BLOOD PRESSURE: 128 MMHG | OXYGEN SATURATION: 98 %

## 2023-12-07 DIAGNOSIS — F41.9 ANXIETY: ICD-10-CM

## 2023-12-07 DIAGNOSIS — J44.9 CHRONIC OBSTRUCTIVE PULMONARY DISEASE, UNSPECIFIED COPD TYPE: ICD-10-CM

## 2023-12-07 DIAGNOSIS — M45.9 ANKYLOSING SPONDYLITIS, UNSPECIFIED SITE OF SPINE: ICD-10-CM

## 2023-12-07 DIAGNOSIS — E78.2 MIXED HYPERLIPIDEMIA: Primary | ICD-10-CM

## 2023-12-07 PROCEDURE — 99214 PR OFFICE/OUTPT VISIT, EST, LEVL IV, 30-39 MIN: ICD-10-PCS | Mod: S$PBB,,, | Performed by: FAMILY MEDICINE

## 2023-12-07 PROCEDURE — 99999 PR PBB SHADOW E&M-EST. PATIENT-LVL IV: ICD-10-PCS | Mod: PBBFAC,,, | Performed by: FAMILY MEDICINE

## 2023-12-07 PROCEDURE — 99214 OFFICE O/P EST MOD 30 MIN: CPT | Mod: PBBFAC,PO | Performed by: FAMILY MEDICINE

## 2023-12-07 PROCEDURE — 99999 PR PBB SHADOW E&M-EST. PATIENT-LVL IV: CPT | Mod: PBBFAC,,, | Performed by: FAMILY MEDICINE

## 2023-12-07 PROCEDURE — 99214 OFFICE O/P EST MOD 30 MIN: CPT | Mod: S$PBB,,, | Performed by: FAMILY MEDICINE

## 2023-12-07 NOTE — PROGRESS NOTES
Subjective:       Patient ID: Umang Greer is a 43 y.o. male.    Chief Complaint: Follow-up (3 month f/u)    Here for follow up multiple chronic medical issues. Doing well overall and in normal state of health.      Follow-up  Associated symptoms include arthralgias, joint swelling and neck pain. Pertinent negatives include no chest pain, chills, coughing, fever, headaches, vomiting or weakness.     Review of Systems   Constitutional:  Positive for activity change. Negative for chills, fever and unexpected weight change.   HENT:  Negative for hearing loss, rhinorrhea and trouble swallowing.    Eyes:  Negative for discharge and visual disturbance.   Respiratory:  Negative for cough, chest tightness, shortness of breath and wheezing.    Cardiovascular:  Negative for chest pain, palpitations and leg swelling.   Gastrointestinal:  Negative for blood in stool, constipation, diarrhea and vomiting.   Endocrine: Positive for polydipsia and polyuria. Negative for cold intolerance and heat intolerance.   Genitourinary:  Negative for difficulty urinating, hematuria and urgency.   Musculoskeletal:  Positive for arthralgias, joint swelling and neck pain.   Neurological:  Negative for weakness and headaches.   Psychiatric/Behavioral:  Negative for confusion, decreased concentration and dysphoric mood. The patient is not nervous/anxious.        Objective:      Physical Exam  Vitals and nursing note reviewed.   Constitutional:       Appearance: He is well-developed.   HENT:      Head: Normocephalic and atraumatic.   Cardiovascular:      Rate and Rhythm: Normal rate and regular rhythm.      Heart sounds: Normal heart sounds.   Pulmonary:      Effort: Pulmonary effort is normal.      Breath sounds: Normal breath sounds.         Assessment:       1. Mixed hyperlipidemia    2. Ankylosing spondylitis, unspecified site of spine    3. Anxiety    4. Chronic obstructive pulmonary disease, unspecified COPD type        Plan:       Mixed  hyperlipidemia    Ankylosing spondylitis, unspecified site of spine    Anxiety    Chronic obstructive pulmonary disease, unspecified COPD type      Copd stable  Anxiety stable  Lipid stable  Stop smoking  AS stable  Will monitor chronic medical issues and continue current plan of care.    Follow up in about 3 months (around 3/7/2024), or if symptoms worsen or fail to improve, for Virtual Visit.

## 2023-12-14 ENCOUNTER — OFFICE VISIT (OUTPATIENT)
Dept: CARDIOLOGY | Facility: CLINIC | Age: 43
End: 2023-12-14
Payer: MEDICARE

## 2023-12-14 VITALS
DIASTOLIC BLOOD PRESSURE: 79 MMHG | SYSTOLIC BLOOD PRESSURE: 116 MMHG | HEIGHT: 72 IN | WEIGHT: 187.19 LBS | RESPIRATION RATE: 18 BRPM | BODY MASS INDEX: 25.35 KG/M2 | HEART RATE: 92 BPM

## 2023-12-14 DIAGNOSIS — E78.2 MIXED HYPERLIPIDEMIA: ICD-10-CM

## 2023-12-14 DIAGNOSIS — I42.9 CARDIOMYOPATHY, UNSPECIFIED TYPE: ICD-10-CM

## 2023-12-14 DIAGNOSIS — R06.02 SOB (SHORTNESS OF BREATH) ON EXERTION: ICD-10-CM

## 2023-12-14 DIAGNOSIS — F17.200 TOBACCO DEPENDENCE: ICD-10-CM

## 2023-12-14 DIAGNOSIS — I42.6 CARDIOMYOPATHY, ALCOHOLIC: Primary | ICD-10-CM

## 2023-12-14 PROCEDURE — 99214 OFFICE O/P EST MOD 30 MIN: CPT | Mod: PBBFAC,PO | Performed by: INTERNAL MEDICINE

## 2023-12-14 PROCEDURE — 99214 PR OFFICE/OUTPT VISIT, EST, LEVL IV, 30-39 MIN: ICD-10-PCS | Mod: S$PBB,,, | Performed by: INTERNAL MEDICINE

## 2023-12-14 PROCEDURE — 99214 OFFICE O/P EST MOD 30 MIN: CPT | Mod: S$PBB,,, | Performed by: INTERNAL MEDICINE

## 2023-12-14 PROCEDURE — 99999 PR PBB SHADOW E&M-EST. PATIENT-LVL IV: ICD-10-PCS | Mod: PBBFAC,,, | Performed by: INTERNAL MEDICINE

## 2023-12-14 PROCEDURE — 99999 PR PBB SHADOW E&M-EST. PATIENT-LVL IV: CPT | Mod: PBBFAC,,, | Performed by: INTERNAL MEDICINE

## 2023-12-14 RX ORDER — CARVEDILOL 3.12 MG/1
3.12 TABLET ORAL 2 TIMES DAILY
Qty: 61 TABLET | Refills: 11 | Status: SHIPPED | OUTPATIENT
Start: 2023-12-14

## 2023-12-14 NOTE — PROGRESS NOTES
Subjective:    Patient ID:  Umang Greer is a 43 y.o. male who presents for follow-up of dilated cardiomyopathy    HPI  He comes for follow up with no major problems, no chest pain, no shortness of breath.  Currently functional class 2.    No cardiac issues.    Still smoking   Having upper GI scope within the next week or 2 for what appears to be gastroesophageal reflux disease     Review of Systems   Constitutional: Negative for decreased appetite, malaise/fatigue, weight gain and weight loss.   Cardiovascular:  Negative for chest pain, dyspnea on exertion, leg swelling, palpitations and syncope.   Respiratory:  Negative for cough and shortness of breath.    Gastrointestinal: Negative.    Neurological:  Negative for weakness.   All other systems reviewed and are negative.       Objective:      Physical Exam  Vitals and nursing note reviewed.   Constitutional:       Appearance: Normal appearance. He is well-developed.   HENT:      Head: Normocephalic.   Eyes:      Pupils: Pupils are equal, round, and reactive to light.   Neck:      Thyroid: No thyromegaly.      Vascular: No carotid bruit or JVD.   Cardiovascular:      Rate and Rhythm: Normal rate and regular rhythm.      Chest Wall: PMI is not displaced.      Pulses: Normal pulses and intact distal pulses.      Heart sounds: Normal heart sounds. No murmur heard.     No gallop.   Pulmonary:      Effort: Pulmonary effort is normal.      Breath sounds: Normal breath sounds.   Abdominal:      Palpations: Abdomen is soft. There is no mass.      Tenderness: There is no abdominal tenderness.   Musculoskeletal:         General: Normal range of motion.      Cervical back: Normal range of motion and neck supple.   Skin:     General: Skin is warm.   Neurological:      Mental Status: He is alert and oriented to person, place, and time.      Sensory: No sensory deficit.      Deep Tendon Reflexes: Reflexes are normal and symmetric.             Most Recent EKG  Results      Most Recent Echocardiogram Results  Results for orders placed in visit on 11/11/19    Echocardiogram stress test (Cupid Only)    Narrative  · The stress echo portion of this study is negative for myocardial ischemia.  · Normal left ventricular systolic function. The estimated ejection fraction is 55%  · Normal LV diastolic function.  · Normal right ventricular systolic function.      Most Recent Nuclear Stress Test Results  No results found for this or any previous visit.      Most Recent Cardiac PET Stress Test Results  No results found for this or any previous visit.      Most Recent Cardiovascular Angiogram results  No results found for this or any previous visit.      Other Most Recent Cardiology Results  No results found for this or any previous visit.      Labs reviewed    Assessment:       1. Cardiomyopathy, alcoholic    2. Mixed hyperlipidemia    3. Tobacco dependence    4. Cardiomyopathy, unspecified type         Plan:   No contraindication for surgery/anesthesia from cardiac standpoint    Continue:  Beta blocker  Regular exercise program  Low cholesterol diet    Follow-up in 1 year with Shelbie Doyle with a stress echocardiogram

## 2024-01-08 ENCOUNTER — LAB VISIT (OUTPATIENT)
Dept: LAB | Facility: HOSPITAL | Age: 44
End: 2024-01-08
Attending: INTERNAL MEDICINE
Payer: MEDICARE

## 2024-01-08 DIAGNOSIS — M47.817 LUMBAR AND SACRAL ARTHRITIS: ICD-10-CM

## 2024-01-08 DIAGNOSIS — L70.0 ACNE VULGARIS: ICD-10-CM

## 2024-01-08 DIAGNOSIS — M45.9 ANKYLOSING SPONDYLITIS, UNSPECIFIED SITE OF SPINE: ICD-10-CM

## 2024-01-08 DIAGNOSIS — M47.816 LUMBAR SPONDYLOSIS: ICD-10-CM

## 2024-01-08 LAB
ALBUMIN SERPL BCP-MCNC: 3.9 G/DL (ref 3.5–5.2)
ALP SERPL-CCNC: 102 U/L (ref 55–135)
ALT SERPL W/O P-5'-P-CCNC: 15 U/L (ref 10–44)
ANION GAP SERPL CALC-SCNC: 9 MMOL/L (ref 8–16)
AST SERPL-CCNC: 17 U/L (ref 10–40)
BASOPHILS # BLD AUTO: 0.06 K/UL (ref 0–0.2)
BASOPHILS NFR BLD: 0.6 % (ref 0–1.9)
BILIRUB SERPL-MCNC: 0.5 MG/DL (ref 0.1–1)
BUN SERPL-MCNC: 11 MG/DL (ref 6–20)
CALCIUM SERPL-MCNC: 9.3 MG/DL (ref 8.7–10.5)
CHLORIDE SERPL-SCNC: 104 MMOL/L (ref 95–110)
CO2 SERPL-SCNC: 27 MMOL/L (ref 23–29)
CREAT SERPL-MCNC: 0.9 MG/DL (ref 0.5–1.4)
CRP SERPL-MCNC: 11.6 MG/L (ref 0–8.2)
DIFFERENTIAL METHOD BLD: ABNORMAL
EOSINOPHIL # BLD AUTO: 0.3 K/UL (ref 0–0.5)
EOSINOPHIL NFR BLD: 3.6 % (ref 0–8)
ERYTHROCYTE [DISTWIDTH] IN BLOOD BY AUTOMATED COUNT: 12.6 % (ref 11.5–14.5)
ERYTHROCYTE [SEDIMENTATION RATE] IN BLOOD BY PHOTOMETRIC METHOD: 17 MM/HR (ref 0–23)
EST. GFR  (NO RACE VARIABLE): >60 ML/MIN/1.73 M^2
GLUCOSE SERPL-MCNC: 98 MG/DL (ref 70–110)
HCT VFR BLD AUTO: 39.6 % (ref 40–54)
HGB BLD-MCNC: 13.3 G/DL (ref 14–18)
IMM GRANULOCYTES # BLD AUTO: 0.02 K/UL (ref 0–0.04)
IMM GRANULOCYTES NFR BLD AUTO: 0.2 % (ref 0–0.5)
LYMPHOCYTES # BLD AUTO: 3.3 K/UL (ref 1–4.8)
LYMPHOCYTES NFR BLD: 35.5 % (ref 18–48)
MCH RBC QN AUTO: 29.4 PG (ref 27–31)
MCHC RBC AUTO-ENTMCNC: 33.6 G/DL (ref 32–36)
MCV RBC AUTO: 88 FL (ref 82–98)
MONOCYTES # BLD AUTO: 0.6 K/UL (ref 0.3–1)
MONOCYTES NFR BLD: 5.9 % (ref 4–15)
NEUTROPHILS # BLD AUTO: 5.1 K/UL (ref 1.8–7.7)
NEUTROPHILS NFR BLD: 54.2 % (ref 38–73)
NRBC BLD-RTO: 0 /100 WBC
PLATELET # BLD AUTO: 333 K/UL (ref 150–450)
PMV BLD AUTO: 9.5 FL (ref 9.2–12.9)
POTASSIUM SERPL-SCNC: 3.9 MMOL/L (ref 3.5–5.1)
PROT SERPL-MCNC: 7.5 G/DL (ref 6–8.4)
RBC # BLD AUTO: 4.52 M/UL (ref 4.6–6.2)
SODIUM SERPL-SCNC: 140 MMOL/L (ref 136–145)
WBC # BLD AUTO: 9.39 K/UL (ref 3.9–12.7)

## 2024-01-08 PROCEDURE — 36415 COLL VENOUS BLD VENIPUNCTURE: CPT | Mod: PO | Performed by: INTERNAL MEDICINE

## 2024-01-08 PROCEDURE — 86140 C-REACTIVE PROTEIN: CPT | Performed by: INTERNAL MEDICINE

## 2024-01-08 PROCEDURE — 85651 RBC SED RATE NONAUTOMATED: CPT | Mod: PO | Performed by: INTERNAL MEDICINE

## 2024-01-08 PROCEDURE — 85652 RBC SED RATE AUTOMATED: CPT | Performed by: INTERNAL MEDICINE

## 2024-01-08 PROCEDURE — 85025 COMPLETE CBC W/AUTO DIFF WBC: CPT | Performed by: INTERNAL MEDICINE

## 2024-01-08 PROCEDURE — 80053 COMPREHEN METABOLIC PANEL: CPT | Performed by: INTERNAL MEDICINE

## 2024-01-10 DIAGNOSIS — M46.46 LUMBAR DISCITIS: ICD-10-CM

## 2024-01-10 DIAGNOSIS — L08.9 LOCAL SKIN INFECTION: ICD-10-CM

## 2024-01-10 DIAGNOSIS — Z79.899 IMMUNOCOMPROMISED STATE DUE TO DRUG THERAPY: ICD-10-CM

## 2024-01-10 DIAGNOSIS — G89.4 CHRONIC PAIN SYNDROME: ICD-10-CM

## 2024-01-10 DIAGNOSIS — M47.816 LUMBAR SPONDYLOSIS: ICD-10-CM

## 2024-01-10 DIAGNOSIS — M45.9 ANKYLOSING SPONDYLITIS, UNSPECIFIED SITE OF SPINE: ICD-10-CM

## 2024-01-10 DIAGNOSIS — R79.82 ELEVATED C-REACTIVE PROTEIN (CRP): ICD-10-CM

## 2024-01-10 DIAGNOSIS — M47.817 LUMBAR AND SACRAL ARTHRITIS: ICD-10-CM

## 2024-01-10 DIAGNOSIS — E55.9 VITAMIN D DEFICIENCY, UNSPECIFIED: ICD-10-CM

## 2024-01-10 DIAGNOSIS — D84.821 IMMUNOCOMPROMISED STATE DUE TO DRUG THERAPY: ICD-10-CM

## 2024-01-10 DIAGNOSIS — M62.838 NECK MUSCLE SPASM: ICD-10-CM

## 2024-01-10 RX ORDER — OXYCODONE AND ACETAMINOPHEN 10; 325 MG/1; MG/1
1 TABLET ORAL EVERY 8 HOURS PRN
Qty: 90 TABLET | Refills: 0 | Status: SHIPPED | OUTPATIENT
Start: 2024-01-10 | End: 2024-01-16 | Stop reason: SDUPTHER

## 2024-01-16 ENCOUNTER — OFFICE VISIT (OUTPATIENT)
Dept: RHEUMATOLOGY | Facility: CLINIC | Age: 44
End: 2024-01-16
Payer: MEDICARE

## 2024-01-16 DIAGNOSIS — E55.9 VITAMIN D DEFICIENCY: ICD-10-CM

## 2024-01-16 DIAGNOSIS — D84.821 IMMUNOCOMPROMISED STATE DUE TO DRUG THERAPY: ICD-10-CM

## 2024-01-16 DIAGNOSIS — M62.838 NECK MUSCLE SPASM: ICD-10-CM

## 2024-01-16 DIAGNOSIS — L70.0 ACNE VULGARIS: ICD-10-CM

## 2024-01-16 DIAGNOSIS — M47.816 LUMBAR SPONDYLOSIS: ICD-10-CM

## 2024-01-16 DIAGNOSIS — M16.0 PRIMARY OSTEOARTHRITIS OF BOTH HIPS: ICD-10-CM

## 2024-01-16 DIAGNOSIS — I42.6 CARDIOMYOPATHY, ALCOHOLIC: ICD-10-CM

## 2024-01-16 DIAGNOSIS — D57.3 SICKLE CELL TRAIT: ICD-10-CM

## 2024-01-16 DIAGNOSIS — M47.817 LUMBAR AND SACRAL ARTHRITIS: Primary | ICD-10-CM

## 2024-01-16 DIAGNOSIS — L73.2 HIDRADENITIS SUPPURATIVA: ICD-10-CM

## 2024-01-16 DIAGNOSIS — Z79.899 IMMUNOCOMPROMISED STATE DUE TO DRUG THERAPY: ICD-10-CM

## 2024-01-16 DIAGNOSIS — M45.9 ANKYLOSING SPONDYLITIS, UNSPECIFIED SITE OF SPINE: ICD-10-CM

## 2024-01-16 DIAGNOSIS — E55.9 VITAMIN D DEFICIENCY, UNSPECIFIED: ICD-10-CM

## 2024-01-16 DIAGNOSIS — G89.4 CHRONIC PAIN SYNDROME: ICD-10-CM

## 2024-01-16 PROCEDURE — 99215 OFFICE O/P EST HI 40 MIN: CPT | Mod: NDTC,,, | Performed by: INTERNAL MEDICINE

## 2024-01-16 RX ORDER — OXYCODONE AND ACETAMINOPHEN 10; 325 MG/1; MG/1
1 TABLET ORAL EVERY 8 HOURS PRN
Qty: 90 TABLET | Refills: 0 | Status: SHIPPED | OUTPATIENT
Start: 2024-02-07 | End: 2024-03-08

## 2024-01-16 RX ORDER — MUPIROCIN 20 MG/G
OINTMENT TOPICAL
Qty: 22 G | Refills: 3 | Status: SHIPPED | OUTPATIENT
Start: 2024-01-16

## 2024-01-16 RX ORDER — OXYCODONE AND ACETAMINOPHEN 10; 325 MG/1; MG/1
1 TABLET ORAL EVERY 8 HOURS PRN
Qty: 90 TABLET | Refills: 0 | Status: SHIPPED | OUTPATIENT
Start: 2024-03-07 | End: 2024-04-06

## 2024-01-16 RX ORDER — OXYCODONE AND ACETAMINOPHEN 10; 325 MG/1; MG/1
1 TABLET ORAL EVERY 8 HOURS PRN
Qty: 90 TABLET | Refills: 0 | Status: SHIPPED | OUTPATIENT
Start: 2024-04-05 | End: 2024-05-01 | Stop reason: SDUPTHER

## 2024-01-16 RX ORDER — DAPSONE 25 MG/1
25 TABLET ORAL DAILY
Qty: 90 TABLET | Refills: 2 | Status: SHIPPED | OUTPATIENT
Start: 2024-01-16 | End: 2024-07-14

## 2024-01-16 NOTE — PROGRESS NOTES
Subjective:       Patient ID: Umang Greer is a 43 y.o. male.    Chief Complaint: No chief complaint on file.      Follow up: 43 year old male who presents to clinic for follow up on Ankylosing spondylitis,he has dark urine fatigue, He has had multiple skin eruptions on his back he started docycline and dapsone. He has thrush that he is treating. He is using hibiclens prn and panoxyl soap slightly improved. He complain   He reports good improvement overall with his arthritis on simponi monthly.  He is using prednisone 2.5-5 mg daily PRN.     Current tx:  1. Simponi  2. Prednisone prn  3. norco 10/325 q6      Review of Systems   Constitutional:  Positive for activity change and fatigue. Negative for fever and unexpected weight change.   HENT:  Negative for mouth sores and trouble swallowing.    Eyes:  Positive for redness. Negative for visual disturbance.   Respiratory:  Negative for cough, shortness of breath and wheezing.    Cardiovascular:  Negative for chest pain, palpitations and leg swelling.   Gastrointestinal:  Negative for constipation and diarrhea.   Genitourinary:  Negative for genital sores.   Musculoskeletal:  Positive for back pain and neck stiffness.   Skin:  Negative for rash.   Allergic/Immunologic: Positive for immunocompromised state.   Neurological:  Negative for dizziness, syncope and headaches.   Hematological:  Negative for adenopathy. Bruises/bleeds easily.         Objective:     There were no vitals filed for this visit.      Past Medical History:   Diagnosis Date    Anxiety     Asthma     Cardiomyopathy     CHF (congestive heart failure)     Emphysema of lung     Sickle cell anemia     only trait    Sickle cell trait      Past Surgical History:   Procedure Laterality Date    HERNIA REPAIR      NASAL SEPTOPLASTY  12/20/2021    Dr[facility]ar at [facility]          Physical Exam   Constitutional: He is oriented to person, place, and time.   Neurological: He is alert and oriented to person, place,  and time.   Psychiatric: Mood, affect and judgment normal.       Results for orders placed or performed in visit on 01/08/24   Comprehensive Metabolic Panel   Result Value Ref Range    Sodium 140 136 - 145 mmol/L    Potassium 3.9 3.5 - 5.1 mmol/L    Chloride 104 95 - 110 mmol/L    CO2 27 23 - 29 mmol/L    Glucose 98 70 - 110 mg/dL    BUN 11 6 - 20 mg/dL    Creatinine 0.9 0.5 - 1.4 mg/dL    Calcium 9.3 8.7 - 10.5 mg/dL    Total Protein 7.5 6.0 - 8.4 g/dL    Albumin 3.9 3.5 - 5.2 g/dL    Total Bilirubin 0.5 0.1 - 1.0 mg/dL    Alkaline Phosphatase 102 55 - 135 U/L    AST 17 10 - 40 U/L    ALT 15 10 - 44 U/L    eGFR >60.0 >60 mL/min/1.73 m^2    Anion Gap 9 8 - 16 mmol/L   CBC Auto Differential   Result Value Ref Range    WBC 9.39 3.90 - 12.70 K/uL    RBC 4.52 (L) 4.60 - 6.20 M/uL    Hemoglobin 13.3 (L) 14.0 - 18.0 g/dL    Hematocrit 39.6 (L) 40.0 - 54.0 %    MCV 88 82 - 98 fL    MCH 29.4 27.0 - 31.0 pg    MCHC 33.6 32.0 - 36.0 g/dL    RDW 12.6 11.5 - 14.5 %    Platelets 333 150 - 450 K/uL    MPV 9.5 9.2 - 12.9 fL    Immature Granulocytes 0.2 0.0 - 0.5 %    Gran # (ANC) 5.1 1.8 - 7.7 K/uL    Immature Grans (Abs) 0.02 0.00 - 0.04 K/uL    Lymph # 3.3 1.0 - 4.8 K/uL    Mono # 0.6 0.3 - 1.0 K/uL    Eos # 0.3 0.0 - 0.5 K/uL    Baso # 0.06 0.00 - 0.20 K/uL    nRBC 0 0 /100 WBC    Gran % 54.2 38.0 - 73.0 %    Lymph % 35.5 18.0 - 48.0 %    Mono % 5.9 4.0 - 15.0 %    Eosinophil % 3.6 0.0 - 8.0 %    Basophil % 0.6 0.0 - 1.9 %    Differential Method Automated    Sedimentation rate   Result Value Ref Range    Sed Rate 17 0 - 23 mm/Hr   C-Reactive Protein   Result Value Ref Range    CRP 11.6 (H) 0.0 - 8.2 mg/L     reviewed labs with patient during this visit       Assessment:       1. Lumbar and sacral arthritis    2. Ankylosing spondylitis, unspecified site of spine    3. Sickle cell trait    4. Vitamin D deficiency    5. Primary osteoarthritis of both hips    6. Chronic pain syndrome    7. Lumbar spondylosis    8. Neck muscle spasm     9. Immunocompromised state due to drug therapy    10. Vitamin D deficiency, unspecified     11. Acne vulgaris    12. Hidradenitis suppurativa                      Plan:       Lumbar and sacral arthritis  -     oxyCODONE-acetaminophen (PERCOCET)  mg per tablet; Take 1 tablet by mouth every 8 (eight) hours as needed for Pain.  Dispense: 90 tablet; Refill: 0  -     oxyCODONE-acetaminophen (PERCOCET)  mg per tablet; Take 1 tablet by mouth every 8 (eight) hours as needed for Pain.  Dispense: 90 tablet; Refill: 0  -     oxyCODONE-acetaminophen (PERCOCET)  mg per tablet; Take 1 tablet by mouth every 8 (eight) hours as needed for Pain.  Dispense: 90 tablet; Refill: 0  -     mupirocin (BACTROBAN) 2 % ointment; APPLY EXTERNALLY TO THE AFFECTED AREA THREE TIMES DAILY  Dispense: 22 g; Refill: 3    Ankylosing spondylitis, unspecified site of spine  -     oxyCODONE-acetaminophen (PERCOCET)  mg per tablet; Take 1 tablet by mouth every 8 (eight) hours as needed for Pain.  Dispense: 90 tablet; Refill: 0  -     oxyCODONE-acetaminophen (PERCOCET)  mg per tablet; Take 1 tablet by mouth every 8 (eight) hours as needed for Pain.  Dispense: 90 tablet; Refill: 0  -     oxyCODONE-acetaminophen (PERCOCET)  mg per tablet; Take 1 tablet by mouth every 8 (eight) hours as needed for Pain.  Dispense: 90 tablet; Refill: 0  -     mupirocin (BACTROBAN) 2 % ointment; APPLY EXTERNALLY TO THE AFFECTED AREA THREE TIMES DAILY  Dispense: 22 g; Refill: 3  -     Sedimentation rate; Future; Expected date: 01/28/2024  -     C-Reactive Protein; Future; Expected date: 01/28/2024  -     Comprehensive Metabolic Panel; Future; Expected date: 01/28/2024  -     CBC Auto Differential; Future; Expected date: 01/28/2024    Sickle cell trait  -     Sedimentation rate; Future; Expected date: 01/28/2024  -     C-Reactive Protein; Future; Expected date: 01/28/2024  -     Comprehensive Metabolic Panel; Future; Expected date:  01/28/2024  -     CBC Auto Differential; Future; Expected date: 01/28/2024    Vitamin D deficiency  -     Sedimentation rate; Future; Expected date: 01/28/2024  -     C-Reactive Protein; Future; Expected date: 01/28/2024  -     Comprehensive Metabolic Panel; Future; Expected date: 01/28/2024  -     CBC Auto Differential; Future; Expected date: 01/28/2024    Primary osteoarthritis of both hips  -     Sedimentation rate; Future; Expected date: 01/28/2024  -     C-Reactive Protein; Future; Expected date: 01/28/2024  -     Comprehensive Metabolic Panel; Future; Expected date: 01/28/2024  -     CBC Auto Differential; Future; Expected date: 01/28/2024    Chronic pain syndrome  -     oxyCODONE-acetaminophen (PERCOCET)  mg per tablet; Take 1 tablet by mouth every 8 (eight) hours as needed for Pain.  Dispense: 90 tablet; Refill: 0  -     oxyCODONE-acetaminophen (PERCOCET)  mg per tablet; Take 1 tablet by mouth every 8 (eight) hours as needed for Pain.  Dispense: 90 tablet; Refill: 0  -     oxyCODONE-acetaminophen (PERCOCET)  mg per tablet; Take 1 tablet by mouth every 8 (eight) hours as needed for Pain.  Dispense: 90 tablet; Refill: 0  -     mupirocin (BACTROBAN) 2 % ointment; APPLY EXTERNALLY TO THE AFFECTED AREA THREE TIMES DAILY  Dispense: 22 g; Refill: 3    Lumbar spondylosis  -     oxyCODONE-acetaminophen (PERCOCET)  mg per tablet; Take 1 tablet by mouth every 8 (eight) hours as needed for Pain.  Dispense: 90 tablet; Refill: 0  -     oxyCODONE-acetaminophen (PERCOCET)  mg per tablet; Take 1 tablet by mouth every 8 (eight) hours as needed for Pain.  Dispense: 90 tablet; Refill: 0  -     oxyCODONE-acetaminophen (PERCOCET)  mg per tablet; Take 1 tablet by mouth every 8 (eight) hours as needed for Pain.  Dispense: 90 tablet; Refill: 0  -     mupirocin (BACTROBAN) 2 % ointment; APPLY EXTERNALLY TO THE AFFECTED AREA THREE TIMES DAILY  Dispense: 22 g; Refill: 3  -     Sedimentation rate;  Future; Expected date: 01/28/2024  -     C-Reactive Protein; Future; Expected date: 01/28/2024  -     Comprehensive Metabolic Panel; Future; Expected date: 01/28/2024  -     CBC Auto Differential; Future; Expected date: 01/28/2024    Neck muscle spasm  -     oxyCODONE-acetaminophen (PERCOCET)  mg per tablet; Take 1 tablet by mouth every 8 (eight) hours as needed for Pain.  Dispense: 90 tablet; Refill: 0  -     oxyCODONE-acetaminophen (PERCOCET)  mg per tablet; Take 1 tablet by mouth every 8 (eight) hours as needed for Pain.  Dispense: 90 tablet; Refill: 0  -     oxyCODONE-acetaminophen (PERCOCET)  mg per tablet; Take 1 tablet by mouth every 8 (eight) hours as needed for Pain.  Dispense: 90 tablet; Refill: 0  -     mupirocin (BACTROBAN) 2 % ointment; APPLY EXTERNALLY TO THE AFFECTED AREA THREE TIMES DAILY  Dispense: 22 g; Refill: 3    Immunocompromised state due to drug therapy  -     oxyCODONE-acetaminophen (PERCOCET)  mg per tablet; Take 1 tablet by mouth every 8 (eight) hours as needed for Pain.  Dispense: 90 tablet; Refill: 0  -     oxyCODONE-acetaminophen (PERCOCET)  mg per tablet; Take 1 tablet by mouth every 8 (eight) hours as needed for Pain.  Dispense: 90 tablet; Refill: 0  -     oxyCODONE-acetaminophen (PERCOCET)  mg per tablet; Take 1 tablet by mouth every 8 (eight) hours as needed for Pain.  Dispense: 90 tablet; Refill: 0  -     mupirocin (BACTROBAN) 2 % ointment; APPLY EXTERNALLY TO THE AFFECTED AREA THREE TIMES DAILY  Dispense: 22 g; Refill: 3  -     Sedimentation rate; Future; Expected date: 01/28/2024  -     C-Reactive Protein; Future; Expected date: 01/28/2024  -     Comprehensive Metabolic Panel; Future; Expected date: 01/28/2024  -     CBC Auto Differential; Future; Expected date: 01/28/2024    Vitamin D deficiency, unspecified   -     oxyCODONE-acetaminophen (PERCOCET)  mg per tablet; Take 1 tablet by mouth every 8 (eight) hours as needed for Pain.   Dispense: 90 tablet; Refill: 0  -     oxyCODONE-acetaminophen (PERCOCET)  mg per tablet; Take 1 tablet by mouth every 8 (eight) hours as needed for Pain.  Dispense: 90 tablet; Refill: 0  -     oxyCODONE-acetaminophen (PERCOCET)  mg per tablet; Take 1 tablet by mouth every 8 (eight) hours as needed for Pain.  Dispense: 90 tablet; Refill: 0  -     mupirocin (BACTROBAN) 2 % ointment; APPLY EXTERNALLY TO THE AFFECTED AREA THREE TIMES DAILY  Dispense: 22 g; Refill: 3    Acne vulgaris  -     dapsone 25 MG Tab; Take 1 tablet (25 mg total) by mouth once daily.  Dispense: 90 tablet; Refill: 2  -     mupirocin (BACTROBAN) 2 % ointment; APPLY EXTERNALLY TO THE AFFECTED AREA THREE TIMES DAILY  Dispense: 22 g; Refill: 3    Hidradenitis suppurativa                  Assessment:  42 year old male with  Ankylosing spondylitis, elevated ESR/CRP on simponi  --chronic pain syndrome on perocet 10/325  --alcoholic cardiomyopathy, EF 55% followed by Cardiology--COPD, tobacco use  --chronic anxiety on xanax PRN per PCP  --allergy to ASA, NSAIDs    1.Continue simponi pt is stable on tx he is doing well on tx-  2. I have  check [place] prescription monitoring program site and no unusual or abnormal behavior has occurred  3  refill pain meds    More than 50% of the  54 minute encounter was spent face to face counseling the patient regarding current status and future plan of care as well as side effects  of the medications. All questions were answered to patient's satisfaction also includes  non-face to face time preparing to see the patient (eg, review of tests), Obtaining and/or reviewing separately obtained history, Documenting clinical information in the electronic or other

## 2024-01-23 DIAGNOSIS — M45.9 ANKYLOSING SPONDYLITIS, UNSPECIFIED SITE OF SPINE: ICD-10-CM

## 2024-01-23 RX ORDER — GOLIMUMAB 50 MG/.5ML
50 INJECTION, SOLUTION SUBCUTANEOUS
Qty: 0.5 ML | Refills: 11 | Status: ACTIVE | OUTPATIENT
Start: 2024-01-23 | End: 2025-01-22

## 2024-02-03 DIAGNOSIS — F41.9 ANXIETY: Primary | ICD-10-CM

## 2024-02-03 NOTE — TELEPHONE ENCOUNTER
No care due was identified.  [facility] Embedded Care Due Messages. Reference number: [id number].   2/03/2024 1:32:22 PM CST

## 2024-02-05 DIAGNOSIS — D84.821 IMMUNOCOMPROMISED STATE DUE TO DRUG THERAPY: ICD-10-CM

## 2024-02-05 DIAGNOSIS — L03.90 CELLULITIS, UNSPECIFIED CELLULITIS SITE: ICD-10-CM

## 2024-02-05 DIAGNOSIS — M47.816 LUMBAR SPONDYLOSIS: ICD-10-CM

## 2024-02-05 DIAGNOSIS — L08.9 LOCAL SKIN INFECTION: ICD-10-CM

## 2024-02-05 DIAGNOSIS — M46.46 LUMBAR DISCITIS: ICD-10-CM

## 2024-02-05 DIAGNOSIS — L70.0 ACNE VULGARIS: ICD-10-CM

## 2024-02-05 DIAGNOSIS — Z79.899 IMMUNOCOMPROMISED STATE DUE TO DRUG THERAPY: ICD-10-CM

## 2024-02-05 DIAGNOSIS — M47.817 LUMBAR AND SACRAL ARTHRITIS: ICD-10-CM

## 2024-02-05 RX ORDER — ALPRAZOLAM 2 MG/1
2 TABLET ORAL 2 TIMES DAILY PRN
Qty: 60 TABLET | Refills: 5 | Status: SHIPPED | OUTPATIENT
Start: 2024-02-05

## 2024-02-08 RX ORDER — KETOCONAZOLE 20 MG/ML
SHAMPOO, SUSPENSION TOPICAL
Qty: 120 ML | Refills: 3 | Status: SHIPPED | OUTPATIENT
Start: 2024-02-08 | End: 2024-05-31 | Stop reason: SDUPTHER

## 2024-03-14 ENCOUNTER — OFFICE VISIT (OUTPATIENT)
Dept: FAMILY MEDICINE | Facility: CLINIC | Age: 44
End: 2024-03-14
Payer: MEDICARE

## 2024-03-14 DIAGNOSIS — J44.9 CHRONIC OBSTRUCTIVE PULMONARY DISEASE, UNSPECIFIED COPD TYPE: ICD-10-CM

## 2024-03-14 DIAGNOSIS — K21.9 GASTROESOPHAGEAL REFLUX DISEASE WITHOUT ESOPHAGITIS: ICD-10-CM

## 2024-03-14 DIAGNOSIS — F41.9 ANXIETY: Primary | ICD-10-CM

## 2024-03-14 PROCEDURE — 99213 OFFICE O/P EST LOW 20 MIN: CPT | Mod: 95,,, | Performed by: FAMILY MEDICINE

## 2024-03-14 RX ORDER — OMEPRAZOLE 20 MG/1
20 CAPSULE, DELAYED RELEASE ORAL DAILY
Qty: 90 CAPSULE | Refills: 1 | Status: SHIPPED | OUTPATIENT
Start: 2024-03-14

## 2024-03-14 NOTE — PROGRESS NOTES
Subjective:       Patient ID: Umang Greer is a 43 y.o. male.    Chief Complaint: No chief complaint on file.    Here for follow up multiple chronic medical issues. Doing well overall and in normal state of health.        The patient location is: home in [place]  The chief complaint leading to consultation is: anxiety    Visit type: audiovisual    Face to Face time with patient: 9 minutes  10 minutes of total time spent on the encounter, which includes face to face time and non-face to face time preparing to see the patient (eg, review of tests), Obtaining and/or reviewing separately obtained history, Documenting clinical information in the electronic or other health record, Independently interpreting results (not separately reported) and communicating results to the patient/family/caregiver, or Care coordination (not separately reported).         Each patient to whom he or she provides medical services by telemedicine is:  (1) informed of the relationship between the physician and patient and the respective role of any other health care provider with respect to management of the patient; and (2) notified that he or she may decline to receive medical services by telemedicine and may withdraw from such care at any time.    Notes:     Review of Systems   Constitutional:  Positive for activity change. Negative for unexpected weight change.   HENT:  Negative for hearing loss, rhinorrhea and trouble swallowing.    Eyes:  Negative for discharge and visual disturbance.   Respiratory:  Negative for chest tightness and wheezing.    Cardiovascular:  Negative for chest pain and palpitations.   Gastrointestinal:  Negative for blood in stool, constipation, diarrhea and vomiting.   Endocrine: Positive for polydipsia and polyuria.   Genitourinary:  Negative for difficulty urinating, hematuria and urgency.   Musculoskeletal:  Positive for arthralgias and neck pain. Negative for joint swelling.   Neurological:  Negative for weakness  and headaches.   Psychiatric/Behavioral:  Positive for dysphoric mood. Negative for confusion.        Objective:      Physical Exam  Constitutional:       Appearance: Normal appearance.   Neurological:      Mental Status: He is alert.   Psychiatric:         Mood and Affect: Mood normal.         Behavior: Behavior normal.         Assessment:       1. Anxiety    2. Gastroesophageal reflux disease without esophagitis    3. Chronic obstructive pulmonary disease, unspecified COPD type        Plan:       Anxiety    Gastroesophageal reflux disease without esophagitis    Chronic obstructive pulmonary disease, unspecified COPD type    Other orders  -     omeprazole (PRILOSEC) 20 MG capsule; Take 1 capsule (20 mg total) by mouth once daily.  Dispense: 90 capsule; Refill: 1      Copd stable  Mood stable  Gerd stable on med  Will monitor chronic medical issues and continue current plan of care.      Follow up in about 3 months (around 6/14/2024), or if symptoms worsen or fail to improve.

## 2024-03-17 ENCOUNTER — PATIENT MESSAGE (OUTPATIENT)
Dept: FAMILY MEDICINE | Facility: CLINIC | Age: 44
End: 2024-03-17
Payer: MEDICARE

## 2024-03-17 DIAGNOSIS — J44.9 CHRONIC OBSTRUCTIVE PULMONARY DISEASE, UNSPECIFIED COPD TYPE: ICD-10-CM

## 2024-03-18 RX ORDER — BUDESONIDE AND FORMOTEROL FUMARATE DIHYDRATE 160; 4.5 UG/1; UG/1
AEROSOL RESPIRATORY (INHALATION)
Qty: 30.6 G | Refills: 3 | Status: SHIPPED | OUTPATIENT
Start: 2024-03-18

## 2024-03-18 NOTE — TELEPHONE ENCOUNTER
No care due was identified.  [facility] Embedded Care Due Messages. Reference number: [id number].   3/18/2024 10:45:10 AM CDT

## 2024-05-01 DIAGNOSIS — M47.816 LUMBAR SPONDYLOSIS: ICD-10-CM

## 2024-05-01 DIAGNOSIS — E55.9 VITAMIN D DEFICIENCY, UNSPECIFIED: ICD-10-CM

## 2024-05-01 DIAGNOSIS — M62.838 NECK MUSCLE SPASM: ICD-10-CM

## 2024-05-01 DIAGNOSIS — M47.817 LUMBAR AND SACRAL ARTHRITIS: ICD-10-CM

## 2024-05-01 DIAGNOSIS — D84.821 IMMUNOCOMPROMISED STATE DUE TO DRUG THERAPY: ICD-10-CM

## 2024-05-01 DIAGNOSIS — M45.9 ANKYLOSING SPONDYLITIS, UNSPECIFIED SITE OF SPINE: ICD-10-CM

## 2024-05-01 DIAGNOSIS — G89.4 CHRONIC PAIN SYNDROME: ICD-10-CM

## 2024-05-01 DIAGNOSIS — Z79.899 IMMUNOCOMPROMISED STATE DUE TO DRUG THERAPY: ICD-10-CM

## 2024-05-01 DIAGNOSIS — L03.90 CELLULITIS, UNSPECIFIED CELLULITIS SITE: Primary | ICD-10-CM

## 2024-05-02 ENCOUNTER — PATIENT MESSAGE (OUTPATIENT)
Dept: RHEUMATOLOGY | Facility: CLINIC | Age: 44
End: 2024-05-02
Payer: MEDICARE

## 2024-05-03 RX ORDER — DOXYCYCLINE 100 MG/1
100 CAPSULE ORAL 2 TIMES DAILY
COMMUNITY
Start: 2024-03-06 | End: 2024-05-03 | Stop reason: SDUPTHER

## 2024-05-04 RX ORDER — OXYCODONE AND ACETAMINOPHEN 10; 325 MG/1; MG/1
1 TABLET ORAL EVERY 8 HOURS PRN
Qty: 90 TABLET | Refills: 0 | Status: SHIPPED | OUTPATIENT
Start: 2024-05-04 | End: 2024-05-31 | Stop reason: SDUPTHER

## 2024-05-04 RX ORDER — DOXYCYCLINE 100 MG/1
100 CAPSULE ORAL 2 TIMES DAILY
Qty: 20 CAPSULE | Refills: 1 | Status: SHIPPED | OUTPATIENT
Start: 2024-05-04 | End: 2024-05-31 | Stop reason: SDUPTHER

## 2024-05-16 ENCOUNTER — PATIENT MESSAGE (OUTPATIENT)
Dept: RHEUMATOLOGY | Facility: CLINIC | Age: 44
End: 2024-05-16
Payer: MEDICARE

## 2024-05-24 ENCOUNTER — LAB VISIT (OUTPATIENT)
Dept: LAB | Facility: HOSPITAL | Age: 44
End: 2024-05-24
Attending: INTERNAL MEDICINE
Payer: MEDICARE

## 2024-05-24 DIAGNOSIS — D84.821 IMMUNOCOMPROMISED STATE DUE TO DRUG THERAPY: ICD-10-CM

## 2024-05-24 DIAGNOSIS — M47.816 LUMBAR SPONDYLOSIS: ICD-10-CM

## 2024-05-24 DIAGNOSIS — M45.9 ANKYLOSING SPONDYLITIS, UNSPECIFIED SITE OF SPINE: ICD-10-CM

## 2024-05-24 DIAGNOSIS — Z79.899 IMMUNOCOMPROMISED STATE DUE TO DRUG THERAPY: ICD-10-CM

## 2024-05-24 DIAGNOSIS — E55.9 VITAMIN D DEFICIENCY: ICD-10-CM

## 2024-05-24 DIAGNOSIS — D57.3 SICKLE CELL TRAIT: ICD-10-CM

## 2024-05-24 DIAGNOSIS — M16.0 PRIMARY OSTEOARTHRITIS OF BOTH HIPS: ICD-10-CM

## 2024-05-24 LAB
BASOPHILS # BLD AUTO: 0.05 K/UL (ref 0–0.2)
BASOPHILS NFR BLD: 0.5 % (ref 0–1.9)
DIFFERENTIAL METHOD BLD: ABNORMAL
EOSINOPHIL # BLD AUTO: 0.1 K/UL (ref 0–0.5)
EOSINOPHIL NFR BLD: 1 % (ref 0–8)
ERYTHROCYTE [DISTWIDTH] IN BLOOD BY AUTOMATED COUNT: 12.9 % (ref 11.5–14.5)
ERYTHROCYTE [SEDIMENTATION RATE] IN BLOOD BY PHOTOMETRIC METHOD: 7 MM/HR (ref 0–23)
HCT VFR BLD AUTO: 41.2 % (ref 40–54)
HGB BLD-MCNC: 13.9 G/DL (ref 14–18)
IMM GRANULOCYTES # BLD AUTO: 0.02 K/UL (ref 0–0.04)
IMM GRANULOCYTES NFR BLD AUTO: 0.2 % (ref 0–0.5)
LYMPHOCYTES # BLD AUTO: 2.9 K/UL (ref 1–4.8)
LYMPHOCYTES NFR BLD: 27.9 % (ref 18–48)
MCH RBC QN AUTO: 29.7 PG (ref 27–31)
MCHC RBC AUTO-ENTMCNC: 33.7 G/DL (ref 32–36)
MCV RBC AUTO: 88 FL (ref 82–98)
MONOCYTES # BLD AUTO: 0.5 K/UL (ref 0.3–1)
MONOCYTES NFR BLD: 4.7 % (ref 4–15)
NEUTROPHILS # BLD AUTO: 6.9 K/UL (ref 1.8–7.7)
NEUTROPHILS NFR BLD: 65.7 % (ref 38–73)
NRBC BLD-RTO: 0 /100 WBC
PLATELET # BLD AUTO: 299 K/UL (ref 150–450)
PMV BLD AUTO: 9.8 FL (ref 9.2–12.9)
RBC # BLD AUTO: 4.68 M/UL (ref 4.6–6.2)
WBC # BLD AUTO: 10.52 K/UL (ref 3.9–12.7)

## 2024-05-24 PROCEDURE — 85025 COMPLETE CBC W/AUTO DIFF WBC: CPT | Performed by: INTERNAL MEDICINE

## 2024-05-24 PROCEDURE — 80053 COMPREHEN METABOLIC PANEL: CPT | Performed by: INTERNAL MEDICINE

## 2024-05-24 PROCEDURE — 86140 C-REACTIVE PROTEIN: CPT | Performed by: INTERNAL MEDICINE

## 2024-05-24 PROCEDURE — 85652 RBC SED RATE AUTOMATED: CPT | Performed by: INTERNAL MEDICINE

## 2024-05-24 PROCEDURE — 36415 COLL VENOUS BLD VENIPUNCTURE: CPT | Mod: PO | Performed by: INTERNAL MEDICINE

## 2024-05-25 LAB
ALBUMIN SERPL BCP-MCNC: 3.9 G/DL (ref 3.5–5.2)
ALP SERPL-CCNC: 109 U/L (ref 55–135)
ALT SERPL W/O P-5'-P-CCNC: 13 U/L (ref 10–44)
ANION GAP SERPL CALC-SCNC: 11 MMOL/L (ref 8–16)
AST SERPL-CCNC: 15 U/L (ref 10–40)
BILIRUB SERPL-MCNC: 0.4 MG/DL (ref 0.1–1)
BUN SERPL-MCNC: 16 MG/DL (ref 6–20)
CALCIUM SERPL-MCNC: 10 MG/DL (ref 8.7–10.5)
CHLORIDE SERPL-SCNC: 106 MMOL/L (ref 95–110)
CO2 SERPL-SCNC: 23 MMOL/L (ref 23–29)
CREAT SERPL-MCNC: 1 MG/DL (ref 0.5–1.4)
CRP SERPL-MCNC: 6.8 MG/L (ref 0–8.2)
EST. GFR  (NO RACE VARIABLE): >60 ML/MIN/1.73 M^2
GLUCOSE SERPL-MCNC: 111 MG/DL (ref 70–110)
POTASSIUM SERPL-SCNC: 4.3 MMOL/L (ref 3.5–5.1)
PROT SERPL-MCNC: 7.6 G/DL (ref 6–8.4)
SODIUM SERPL-SCNC: 140 MMOL/L (ref 136–145)

## 2024-05-31 ENCOUNTER — OFFICE VISIT (OUTPATIENT)
Dept: RHEUMATOLOGY | Facility: CLINIC | Age: 44
End: 2024-05-31
Payer: MEDICARE

## 2024-05-31 VITALS
BODY MASS INDEX: 24.58 KG/M2 | WEIGHT: 181.44 LBS | HEIGHT: 72 IN | SYSTOLIC BLOOD PRESSURE: 125 MMHG | HEART RATE: 79 BPM | DIASTOLIC BLOOD PRESSURE: 82 MMHG

## 2024-05-31 DIAGNOSIS — E55.9 VITAMIN D DEFICIENCY, UNSPECIFIED: ICD-10-CM

## 2024-05-31 DIAGNOSIS — M47.816 LUMBAR SPONDYLOSIS: ICD-10-CM

## 2024-05-31 DIAGNOSIS — M45.9 ANKYLOSING SPONDYLITIS, UNSPECIFIED SITE OF SPINE: ICD-10-CM

## 2024-05-31 DIAGNOSIS — M62.838 NECK MUSCLE SPASM: ICD-10-CM

## 2024-05-31 DIAGNOSIS — D84.821 IMMUNOCOMPROMISED STATE DUE TO DRUG THERAPY: ICD-10-CM

## 2024-05-31 DIAGNOSIS — L08.9 LOCAL SKIN INFECTION: ICD-10-CM

## 2024-05-31 DIAGNOSIS — G89.4 CHRONIC PAIN SYNDROME: ICD-10-CM

## 2024-05-31 DIAGNOSIS — L03.90 CELLULITIS, UNSPECIFIED CELLULITIS SITE: ICD-10-CM

## 2024-05-31 DIAGNOSIS — M46.46 LUMBAR DISCITIS: ICD-10-CM

## 2024-05-31 DIAGNOSIS — Z79.899 IMMUNOCOMPROMISED STATE DUE TO DRUG THERAPY: ICD-10-CM

## 2024-05-31 DIAGNOSIS — L70.0 ACNE VULGARIS: ICD-10-CM

## 2024-05-31 DIAGNOSIS — M47.817 LUMBAR AND SACRAL ARTHRITIS: ICD-10-CM

## 2024-05-31 PROCEDURE — 99213 OFFICE O/P EST LOW 20 MIN: CPT | Mod: PBBFAC,PN | Performed by: INTERNAL MEDICINE

## 2024-05-31 PROCEDURE — 99999 PR PBB SHADOW E&M-EST. PATIENT-LVL III: CPT | Mod: PBBFAC,,, | Performed by: INTERNAL MEDICINE

## 2024-05-31 PROCEDURE — 99215 OFFICE O/P EST HI 40 MIN: CPT | Mod: S$PBB,,, | Performed by: INTERNAL MEDICINE

## 2024-05-31 RX ORDER — OXYCODONE AND ACETAMINOPHEN 10; 325 MG/1; MG/1
1 TABLET ORAL EVERY 8 HOURS PRN
Qty: 90 TABLET | Refills: 0 | Status: SHIPPED | OUTPATIENT
Start: 2024-06-01 | End: 2024-07-01

## 2024-05-31 RX ORDER — OXYCODONE AND ACETAMINOPHEN 10; 325 MG/1; MG/1
1 TABLET ORAL EVERY 8 HOURS PRN
Qty: 90 TABLET | Refills: 0 | Status: SHIPPED | OUTPATIENT
Start: 2024-07-01 | End: 2024-07-31

## 2024-05-31 RX ORDER — OXYCODONE AND ACETAMINOPHEN 10; 325 MG/1; MG/1
1 TABLET ORAL EVERY 8 HOURS PRN
Qty: 90 TABLET | Refills: 0 | Status: SHIPPED | OUTPATIENT
Start: 2024-08-01 | End: 2024-08-31

## 2024-05-31 RX ORDER — DOXYCYCLINE 100 MG/1
100 CAPSULE ORAL 2 TIMES DAILY
Qty: 20 CAPSULE | Refills: 1 | Status: SHIPPED | OUTPATIENT
Start: 2024-05-31 | End: 2024-06-20

## 2024-05-31 RX ORDER — KETOCONAZOLE 20 MG/ML
SHAMPOO, SUSPENSION TOPICAL
Qty: 120 ML | Refills: 12 | Status: SHIPPED | OUTPATIENT
Start: 2024-06-03 | End: 2025-06-03

## 2024-05-31 RX ORDER — BACLOFEN 20 MG/1
20 TABLET ORAL 4 TIMES DAILY
Qty: 120 TABLET | Refills: 6 | Status: SHIPPED | OUTPATIENT
Start: 2024-05-31 | End: 2025-05-31

## 2024-05-31 NOTE — PROGRESS NOTES
Subjective:     Patient ID:  Umang Greer    Chief Complaint:  Disease Management     History of Present Illness:  Pt is a 43 y.o. male Follow up: 43 year old male who presents to clinic for follow up on Ankylosing spondylitis,, He has had multiple skin eruptions on his back he started docycline and dapsone. He has thrush that he is treating. He is using hibiclens prn and panoxyl soap slightly improved. He complain   He reports good improvement overall with his arthritis on simponi monthly.  He is using prednisone 2.5-5 mg daily PRN.      Current tx:  1. Simponi  2. Prednisone prn  3. Perocet 10/325 q8          Rheumatologic History:   - Diagnosis/es:  - Positive serologies:  - Infectious screening labs:  - Previous Treatments:  - Current Treatments:     Interval History:   Hospitalization since last office visit: No    Patient Active Problem List    Diagnosis Date Noted    Gastroesophageal reflux disease without esophagitis 03/14/2024    Immunocompromised state due to drug therapy 01/27/2023    Hyperlipidemia 01/27/2020    Chronic obstructive pulmonary disease 02/02/2018    Vitamin D deficiency 02/02/2018    Anxiety 12/15/2015    Ankylosing spondylitis 12/04/2014    Lumbar and sacral arthritis 12/04/2014    Osteoarthritis of both hips 12/04/2014    Sickle cell trait 12/14/2012    Cardiomyopathy, alcoholic 08/03/2012    Tobacco dependence 08/03/2012     Past Surgical History:   Procedure Laterality Date    HERNIA REPAIR      NASAL SEPTOPLASTY  12/20/2021    Dr. [facility]ar at [facility]     Social History     Tobacco Use    Smoking status: Every Day     Current packs/day: 0.50     Average packs/day: 0.5 packs/day for 0.5 years (0.3 ttl pk-yrs)     Types: Cigarettes    Smokeless tobacco: Current   Substance Use Topics    Alcohol use: No    Drug use: Yes     Types: Hydrocodone     Family History   Problem Relation Name Age of Onset    Thyroid disease Mother      Heart disease Mother      Hypertension Mother      Anemia  Mother      Heart disease Father      Cancer Paternal Grandmother      Amblyopia Neg Hx      Blindness Neg Hx      Cataracts Neg Hx      Macular degeneration Neg Hx      Retinal detachment Neg Hx      Strabismus Neg Hx      Stroke Neg Hx      Glaucoma Neg Hx       Review of patient's allergies indicates:   Allergen Reactions    Asa [aspirin] Anaphylaxis    Azulfidine [sulfasalazine] Anaphylaxis    Ibuprofen Anaphylaxis    Gabapentin      Lip swelling, tongue swelling       Review of Systems   Review of Systems   Constitutional:  Negative for fever and unexpected weight change.   HENT:  Negative for mouth sores and trouble swallowing.    Eyes:  Negative for redness.   Respiratory:  Negative for cough and shortness of breath.    Cardiovascular:  Negative for chest pain.   Gastrointestinal:  Negative for constipation and diarrhea.   Genitourinary:  Negative for genital sores.   Skin:  Negative for rash.   Neurological:  Negative for headaches.   Hematological:  Bruises/bleeds easily.        Current Medications:  Current Outpatient Medications   Medication Instructions    ALPRAZolam (XANAX) 2 mg, Oral, 2 times daily PRN    baclofen (LIORESAL) 20 mg, Oral, 4 times daily    budesonide-formoterol 160-4.5 mcg (SYMBICORT) 160-4.5 mcg/actuation HFAA INHALE 2 PUFFS INTO THE LUNGS EVERY 12 HOURS    carvediloL (COREG) 3.125 mg, Oral, 2 times daily    cetirizine (ZYRTEC) 10 mg, Oral, Daily    chlorhexidine (PERIDEX) 0.12 % solution SMARTSIG:By Mouth    clotrimazole-betamethasone 1-0.05% (LOTRISONE) cream Topical (Top), 2 times daily    cyanocobalamin (VITAMIN B-12) 1,000 mcg    dapsone 25 mg, Oral, Daily    doxycycline (VIBRAMYCIN) 100 mg, Oral, 2 times daily    EPINEPHrine (EPIPEN JR) 0.3 mg, Intramuscular, As needed (PRN)    famotidine (PEPCID) 40 mg, Oral, Nightly PRN    golimumab (SIMPONI) 50 mg/0.5 mL PnIj Inject 0.5 mL (50 mg) into the skin every 30 days.    icosapent ethyL (VASCEPA) 1 gram Cap TAKE 2 CAPSULES(2 GRAMS) BY  "MOUTH TWICE DAILY    [START ON 6/3/2024] ketoconazole (NIZORAL) 2 % shampoo Topical (Top), Twice weekly    mupirocin (BACTROBAN) 2 % ointment APPLY EXTERNALLY TO THE AFFECTED AREA THREE TIMES DAILY    NARCAN 4 mg/actuation Spry No dose, route, or frequency recorded.    omeprazole (PRILOSEC) 20 mg, Oral, Daily    [START ON 6/1/2024] oxyCODONE-acetaminophen (PERCOCET)  mg per tablet 1 tablet, Oral, Every 8 hours PRN    [START ON 7/1/2024] oxyCODONE-acetaminophen (PERCOCET)  mg per tablet 1 tablet, Oral, Every 8 hours PRN    [START ON 8/1/2024] oxyCODONE-acetaminophen (PERCOCET)  mg per tablet 1 tablet, Oral, Every 8 hours PRN    tadalafiL (CIALIS) 20 mg, Oral, Daily PRN         Objective:     Vitals:    05/31/24 0854   BP: 125/82   Pulse: 79   Weight: 82.3 kg (181 lb 7 oz)   Height: 6' 0.01" (1.829 m)   PainSc:   6   PainLoc: Back      Body mass index is 24.6 kg/m².     Physical Examinations:  Physical Exam   Constitutional: He is oriented to person, place, and time. No distress.   HENT:   Head: Normocephalic and atraumatic.   Mouth/Throat: Oropharynx is clear and moist.   Eyes: Pupils are equal, round, and reactive to light.   Neck: No thyromegaly present.   Cardiovascular: Normal rate, regular rhythm and normal heart sounds. Exam reveals no gallop and no friction rub.   No murmur heard.  Pulmonary/Chest: Breath sounds normal. He has no wheezes. He has no rales. He exhibits no tenderness.   Abdominal: There is no abdominal tenderness. There is no rebound and no guarding.   Musculoskeletal:         General: Tenderness and deformity present.      Right shoulder: Tenderness present.      Left shoulder: Tenderness present.      Right elbow: Tenderness present.      Left elbow: Normal.      Left wrist: Normal.      Cervical back: Neck supple.      Right knee: Swelling and effusion present.      Left knee: Swelling and effusion present.   Lymphadenopathy:     He has no cervical adenopathy.   Neurological: " He is alert and oriented to person, place, and time. He displays weakness. He exhibits abnormal muscle tone.   Reflex Scores:       Patellar reflexes are 2+ on the right side and 2+ on the left side.  Skin: No rash noted. No erythema. No pallor.   Psychiatric: Mood and affect normal.   Vitals reviewed.      Right Side Rheumatological Exam     Examination finds the 2nd MCP, 3rd MCP, 4th MCP and 5th MCP normal.    The patient is tender to palpation of the shoulder and elbow    He has swelling of the knee    The patient has an enlarged wrist, 1st PIP, 2nd PIP, 3rd PIP, 4th PIP and 5th PIP    Shoulder Exam   Tenderness Location: acromioclavicular joint and posterior shoulder    Range of Motion   Active abduction:  abnormal   Adduction: abnormal  Sensation: normal    Knee Exam     Range of Motion   Extension:  normal   Flexion:  normal   Patellofemoral Crepitus: positive  Effusion: positive  Sensation: normal    Hip Exam   Tenderness Location: anterior and posterior    Range of Motion   Extension:  abnormal   Flexion:  abnormal   Sensation: normal    Elbow/Wrist Exam   Tenderness Location: no tenderness    Range of Motion   Elbow   Flexion:  normal   Sensation: normal    Muscle Strength (0-5 scale):  Neck Flexion:  3  Neck Extension: 3  Deltoid:  3  Biceps: 3/5   Triceps:  3  Quadriceps:  3   Distal Lower Extremity: 3    Left Side Rheumatological Exam     Examination finds the elbow, wrist, 1st MCP, 2nd MCP, 3rd MCP, 4th MCP and 5th MCP normal.    The patient is tender to palpation of the shoulder.    He has swelling of the knee    The patient has an enlarged 1st PIP, 2nd PIP, 3rd PIP, 4th PIP and 5th PIP.    Shoulder Exam   Tenderness Location: acromioclavicular joint and posterior shoulder    Range of Motion   Active abduction:  abnormal   Extension:  abnormal   Sensation: normal    Knee Exam     Range of Motion   Extension:  normal   Flexion:  normal     Patellofemoral Crepitus: positive  Effusion:  positive  Sensation: normal    Hip Exam   Tenderness Location: anterior and posterior    Range of Motion   Extension:  abnormal   Flexion:  abnormal   Sensation: normal    Elbow/Wrist Exam     Range of Motion   Elbow   Flexion:  normal   Sensation: normal    Muscle Strength (0-5 scale):  Neck Flexion:  3  Neck Extension: 3  Deltoid:  3  Biceps: 3/5   Triceps:  3  Quadriceps:  3   Distal Lower Extremity: 3      Back/Neck Exam   General Inspection   Gait: normal       Tenderness Right paramedian tenderness of the Lower C-Spine, Lower L-Spine, Upper C-Spine, Upper L-Spine and SI Joint.Left paramedian tenderness of the Lower C-Spine, Lower L-Spine, Upper C-Spine, Upper L-Spine and SI Joint.    Back Range of Motion   Extension:  abnormal  Flexion:  abnormal  Lateral Bend Right:  abnormal  Lateral Bend Left:  abnormal  Rotation Right:  abnormal  Rotation Left:  abnormal    Neck Range of Motion   Flexion:  Limited and moderate  Extension:  Limited and moderate  Right Lateral Bend: abnormal  Left Lateral Bend: abnormal  Right Rotation: abnormal  Left Rotation: abnormal       Disease Assessment Scores:  Patient's Global Assessment of arthritis (0-10): 3  Physician's Global Assessment of arthritis (0-10): 3  Number of Tender Joints (0-28): 3  Number of Swollen Joints (0-28): 3        5/24/2024    12:39 PM   Rapid3 Question Responses and Scores   MDHAQ Score 1.1   Psychologic Score 3.3   Pain Score 7   When you awakened in the morning OVER THE LAST WEEK, did you feel stiff? Yes   If Yes, please indicate the number of hours until you are as limber as you will be for the day 1   Fatigue Score 7   Global Health Score 6.5   RAPID3 Score 5.72       Monitoring Lab Results:  Lab Results   Component Value Date    WBC 10.52 05/24/2024    RBC 4.68 05/24/2024    HGB 13.9 (L) 05/24/2024    HCT 41.2 05/24/2024    MCV 88 05/24/2024    MCH 29.7 05/24/2024    MCHC 33.7 05/24/2024    RDW 12.9 05/24/2024     05/24/2024        Lab Results  "  Component Value Date     05/24/2024    K 4.3 05/24/2024     05/24/2024    CO2 23 05/24/2024     (H) 05/24/2024    BUN 16 05/24/2024    CREATININE 1.0 05/24/2024    CALCIUM 10.0 05/24/2024    PROT 7.6 05/24/2024    ALBUMIN 3.9 05/24/2024    BILITOT 0.4 05/24/2024    ALKPHOS 109 05/24/2024    AST 15 05/24/2024    ALT 13 05/24/2024    ANIONGAP 11 05/24/2024    EGFRNORACEVR >60.0 05/24/2024       Lab Results   Component Value Date    SEDRATE 7 05/24/2024    CRP 6.8 05/24/2024        Lab Results   Component Value Date    NASCEVZD49CU 67 10/08/2021    ZIRDAAVD92 964 (H) 10/08/2021        Lab Results   Component Value Date    CHOL 200 (H) 05/20/2022    HDL 33 (L) 05/20/2022    LDLCALC 133.6 05/20/2022    TRIG 167 (H) 05/20/2022       No results found for: "RF", "CCPANTIBODIE"  No results found for: "ANASCREEN", "ANATITER", "ANAPATTE", "DSDNA", "SMRNPAB", "SSAANTIBODY", "SSBANTIBODY", "YDJ35SK", "JO1AB"  No results found for: "HLABB27"    Infectious Disease Screening:  Lab Results   Component Value Date    HEPBSAG Non-reactive 09/21/2022    HEPBSAB <3.00 09/21/2022    HEPBSAB Non-reactive 09/21/2022    HEPBIGM Non-reactive 09/21/2022     Lab Results   Component Value Date    HEPCAB Non-reactive 09/21/2022     Lab Results   Component Value Date    TBGOLDPLUS Negative 09/21/2022     No results found for: "QUANTIFERON", "SVCMT", "QUANTAGVALUE", "QUANTNILVALU", "QUANTMITOGEN", "QFTTBAG", "QINT"     Imaging: DEXA, Xrays, MRIs, CTs, etc    Old & Outside Medical Records:  Reviewed old and all outside medical records available in Care Everywhere     Assessment:     Encounter Diagnoses   Name Primary?    Lumbar and sacral arthritis     Ankylosing spondylitis, unspecified site of spine     Chronic pain syndrome     Lumbar spondylosis     Neck muscle spasm     Immunocompromised state due to drug therapy     Vitamin D deficiency, unspecified      Cellulitis, unspecified cellulitis site     Acne vulgaris     Local " skin infection     Lumbar discitis         Plan:      Encounter Diagnoses   Name Primary?    Lumbar and sacral arthritis     Ankylosing spondylitis, unspecified site of spine     Chronic pain syndrome     Lumbar spondylosis     Neck muscle spasm     Immunocompromised state due to drug therapy     Vitamin D deficiency, unspecified      Cellulitis, unspecified cellulitis site     Acne vulgaris     Local skin infection     Lumbar discitis      Umang was seen today for disease management.    Diagnoses and all orders for this visit:    Lumbar and sacral arthritis  -     oxyCODONE-acetaminophen (PERCOCET)  mg per tablet; Take 1 tablet by mouth every 8 (eight) hours as needed for Pain.  -     oxyCODONE-acetaminophen (PERCOCET)  mg per tablet; Take 1 tablet by mouth every 8 (eight) hours as needed for Pain.  -     oxyCODONE-acetaminophen (PERCOCET)  mg per tablet; Take 1 tablet by mouth every 8 (eight) hours as needed for Pain.  -     ketoconazole (NIZORAL) 2 % shampoo; Apply topically twice a week.    Ankylosing spondylitis, unspecified site of spine  -     oxyCODONE-acetaminophen (PERCOCET)  mg per tablet; Take 1 tablet by mouth every 8 (eight) hours as needed for Pain.  -     oxyCODONE-acetaminophen (PERCOCET)  mg per tablet; Take 1 tablet by mouth every 8 (eight) hours as needed for Pain.  -     oxyCODONE-acetaminophen (PERCOCET)  mg per tablet; Take 1 tablet by mouth every 8 (eight) hours as needed for Pain.  -     baclofen (LIORESAL) 20 MG tablet; Take 1 tablet (20 mg total) by mouth 4 (four) times daily.  -     T4, Free; Future  -     TSH; Future  -     Vitamin D; Future  -     Cyclic Citrullinated Peptide Antibody, IgG; Future  -     Rheumatoid Factor; Future  -     Sedimentation rate; Future  -     C-Reactive Protein; Future  -     Comprehensive Metabolic Panel; Future  -     CBC Auto Differential; Future    Chronic pain syndrome  -     oxyCODONE-acetaminophen (PERCOCET)   mg per tablet; Take 1 tablet by mouth every 8 (eight) hours as needed for Pain.  -     oxyCODONE-acetaminophen (PERCOCET)  mg per tablet; Take 1 tablet by mouth every 8 (eight) hours as needed for Pain.  -     oxyCODONE-acetaminophen (PERCOCET)  mg per tablet; Take 1 tablet by mouth every 8 (eight) hours as needed for Pain.  -     baclofen (LIORESAL) 20 MG tablet; Take 1 tablet (20 mg total) by mouth 4 (four) times daily.  -     T4, Free; Future  -     TSH; Future  -     Vitamin D; Future  -     Cyclic Citrullinated Peptide Antibody, IgG; Future  -     Rheumatoid Factor; Future  -     Sedimentation rate; Future  -     C-Reactive Protein; Future  -     Comprehensive Metabolic Panel; Future  -     CBC Auto Differential; Future    Lumbar spondylosis  -     oxyCODONE-acetaminophen (PERCOCET)  mg per tablet; Take 1 tablet by mouth every 8 (eight) hours as needed for Pain.  -     oxyCODONE-acetaminophen (PERCOCET)  mg per tablet; Take 1 tablet by mouth every 8 (eight) hours as needed for Pain.  -     oxyCODONE-acetaminophen (PERCOCET)  mg per tablet; Take 1 tablet by mouth every 8 (eight) hours as needed for Pain.  -     ketoconazole (NIZORAL) 2 % shampoo; Apply topically twice a week.  -     T4, Free; Future  -     TSH; Future  -     Vitamin D; Future  -     Cyclic Citrullinated Peptide Antibody, IgG; Future  -     Rheumatoid Factor; Future  -     Sedimentation rate; Future  -     C-Reactive Protein; Future  -     Comprehensive Metabolic Panel; Future  -     CBC Auto Differential; Future    Neck muscle spasm  -     oxyCODONE-acetaminophen (PERCOCET)  mg per tablet; Take 1 tablet by mouth every 8 (eight) hours as needed for Pain.  -     oxyCODONE-acetaminophen (PERCOCET)  mg per tablet; Take 1 tablet by mouth every 8 (eight) hours as needed for Pain.  -     oxyCODONE-acetaminophen (PERCOCET)  mg per tablet; Take 1 tablet by mouth every 8 (eight) hours as needed for Pain.  -      baclofen (LIORESAL) 20 MG tablet; Take 1 tablet (20 mg total) by mouth 4 (four) times daily.  -     T4, Free; Future  -     TSH; Future  -     Vitamin D; Future  -     Cyclic Citrullinated Peptide Antibody, IgG; Future  -     Rheumatoid Factor; Future  -     Sedimentation rate; Future  -     C-Reactive Protein; Future  -     Comprehensive Metabolic Panel; Future  -     CBC Auto Differential; Future    Immunocompromised state due to drug therapy  -     oxyCODONE-acetaminophen (PERCOCET)  mg per tablet; Take 1 tablet by mouth every 8 (eight) hours as needed for Pain.  -     oxyCODONE-acetaminophen (PERCOCET)  mg per tablet; Take 1 tablet by mouth every 8 (eight) hours as needed for Pain.  -     oxyCODONE-acetaminophen (PERCOCET)  mg per tablet; Take 1 tablet by mouth every 8 (eight) hours as needed for Pain.  -     ketoconazole (NIZORAL) 2 % shampoo; Apply topically twice a week.  -     T4, Free; Future  -     TSH; Future  -     Vitamin D; Future  -     Cyclic Citrullinated Peptide Antibody, IgG; Future  -     Rheumatoid Factor; Future  -     Sedimentation rate; Future  -     C-Reactive Protein; Future  -     Comprehensive Metabolic Panel; Future  -     CBC Auto Differential; Future    Vitamin D deficiency, unspecified   -     oxyCODONE-acetaminophen (PERCOCET)  mg per tablet; Take 1 tablet by mouth every 8 (eight) hours as needed for Pain.  -     oxyCODONE-acetaminophen (PERCOCET)  mg per tablet; Take 1 tablet by mouth every 8 (eight) hours as needed for Pain.  -     oxyCODONE-acetaminophen (PERCOCET)  mg per tablet; Take 1 tablet by mouth every 8 (eight) hours as needed for Pain.  -     T4, Free; Future  -     TSH; Future  -     Vitamin D; Future  -     Cyclic Citrullinated Peptide Antibody, IgG; Future  -     Rheumatoid Factor; Future  -     Sedimentation rate; Future  -     C-Reactive Protein; Future  -     Comprehensive Metabolic Panel; Future  -     CBC Auto Differential;  Future    Cellulitis, unspecified cellulitis site  -     doxycycline (VIBRAMYCIN) 100 MG Cap; Take 1 capsule (100 mg total) by mouth 2 (two) times daily. for 20 days  -     ketoconazole (NIZORAL) 2 % shampoo; Apply topically twice a week.    Acne vulgaris  -     ketoconazole (NIZORAL) 2 % shampoo; Apply topically twice a week.    Local skin infection  -     ketoconazole (NIZORAL) 2 % shampoo; Apply topically twice a week.    Lumbar discitis  -     ketoconazole (NIZORAL) 2 % shampoo; Apply topically twice a week.          1. Refill perocet  2. Continue simponi  3. F/u 4 month     Follow-up 4 months    More than 50% of the  40 minute encounter was spent face to face counseling the patient regarding current status and future plan of care as well as side effects  of the medications. All questions were answered to patient's satisfaction also includes  non-face to face time preparing to see the patient (eg, review of tests), Obtaining and/or reviewing separately obtained history, Documenting clinical information in the electronic or other health record, Independently interpreting results

## 2024-06-06 ENCOUNTER — TELEPHONE (OUTPATIENT)
Dept: RHEUMATOLOGY | Facility: CLINIC | Age: 44
End: 2024-06-06
Payer: MEDICARE

## 2024-06-06 NOTE — TELEPHONE ENCOUNTER
Spoke to Brok at the pharmacy and clarified what the prescription states in the chart. He verbalized understanding.

## 2024-06-06 NOTE — TELEPHONE ENCOUNTER
----- Message from Anuel Pierson sent at 6/6/2024  8:38 AM CDT -----  Regarding: pharmacy  Contact: [facility]  Type:  Pharmacy Calling to Clarify an RX    Name of Caller:  [id number] - [place] [address] AT [address] [facility] [address] [address]  Phone: [phone] Fax: [phone]  Pharmacy Name:  Prescription Name:doxycycline (VIBRAMYCIN) 100 MG Cap  What do they need to clarify?:directions  Best Call Back Number:  Additional Information:

## 2024-06-14 ENCOUNTER — OFFICE VISIT (OUTPATIENT)
Dept: FAMILY MEDICINE | Facility: CLINIC | Age: 44
End: 2024-06-14
Payer: MEDICARE

## 2024-06-14 DIAGNOSIS — K21.9 GASTROESOPHAGEAL REFLUX DISEASE WITHOUT ESOPHAGITIS: ICD-10-CM

## 2024-06-14 DIAGNOSIS — F41.9 ANXIETY: Primary | ICD-10-CM

## 2024-06-14 PROCEDURE — 99213 OFFICE O/P EST LOW 20 MIN: CPT | Mod: 95,,, | Performed by: FAMILY MEDICINE

## 2024-06-14 PROCEDURE — G2211 COMPLEX E/M VISIT ADD ON: HCPCS | Mod: 95,,, | Performed by: FAMILY MEDICINE

## 2024-06-14 NOTE — PROGRESS NOTES
Subjective:       Patient ID: Umang Greer is a 43 y.o. male.    Chief Complaint: Gastroesophageal Reflux and Anxiety    virtual for follow up multiple chronic medical issues. Doing well overall and in normal state of health.        Review of Systems   Constitutional:  Positive for activity change. Negative for chills, fever and unexpected weight change.   HENT:  Negative for hearing loss, rhinorrhea and trouble swallowing.    Eyes:  Negative for discharge and visual disturbance.   Respiratory:  Negative for cough, chest tightness, shortness of breath and wheezing.    Cardiovascular:  Negative for chest pain, palpitations and leg swelling.   Gastrointestinal:  Negative for blood in stool, constipation, diarrhea and vomiting.   Endocrine: Positive for polydipsia and polyuria. Negative for cold intolerance and heat intolerance.   Genitourinary:  Negative for difficulty urinating, hematuria and urgency.   Musculoskeletal:  Positive for arthralgias. Negative for joint swelling and neck pain.   Neurological:  Positive for weakness. Negative for headaches.   Psychiatric/Behavioral:  Positive for dysphoric mood. Negative for confusion and decreased concentration. The patient is not nervous/anxious.        Objective:      Physical Exam  Constitutional:       Appearance: Normal appearance.   Neurological:      Mental Status: He is alert.   Psychiatric:         Mood and Affect: Mood normal.         Behavior: Behavior normal.       The patient location is: [place]  The chief complaint leading to consultation is: anxiety and gerd    Visit type: audiovisual    Face to Face time with patient: 8 minutes  10 minutes of total time spent on the encounter, which includes face to face time and non-face to face time preparing to see the patient (eg, review of tests), Obtaining and/or reviewing separately obtained history, Documenting clinical information in the electronic or other health record, Independently interpreting results (not  separately reported) and communicating results to the patient/family/caregiver, or Care coordination (not separately reported).         Each patient to whom he or she provides medical services by telemedicine is:  (1) informed of the relationship between the physician and patient and the respective role of any other health care provider with respect to management of the patient; and (2) notified that he or she may decline to receive medical services by telemedicine and may withdraw from such care at any time.    Notes:     Assessment:       1. Anxiety    2. Gastroesophageal reflux disease without esophagitis        Plan:       Anxiety    Gastroesophageal reflux disease without esophagitis        Visit today included increased complexity associated with the care of the episodic problem gerd addressed and managing the longitudinal care of the patient due to the serious and/or complex managed problem(s) as above.  Will monitor chronic medical issues and continue current plan of care.  Gerd stable on med  Mood stable with prn med  F/u with rheum  Follow up in about 3 months (around 9/14/2024), or if symptoms worsen or fail to improve.

## 2024-07-02 ENCOUNTER — PATIENT MESSAGE (OUTPATIENT)
Dept: RHEUMATOLOGY | Facility: CLINIC | Age: 44
End: 2024-07-02
Payer: MEDICARE

## 2024-07-02 ENCOUNTER — PATIENT MESSAGE (OUTPATIENT)
Dept: FAMILY MEDICINE | Facility: CLINIC | Age: 44
End: 2024-07-02
Payer: MEDICARE

## 2024-07-02 DIAGNOSIS — G89.4 CHRONIC PAIN SYNDROME: ICD-10-CM

## 2024-07-02 DIAGNOSIS — M47.817 LUMBAR AND SACRAL ARTHRITIS: ICD-10-CM

## 2024-07-02 DIAGNOSIS — Z79.899 IMMUNOCOMPROMISED STATE DUE TO DRUG THERAPY: ICD-10-CM

## 2024-07-02 DIAGNOSIS — M45.9 ANKYLOSING SPONDYLITIS, UNSPECIFIED SITE OF SPINE: ICD-10-CM

## 2024-07-02 DIAGNOSIS — D84.821 IMMUNOCOMPROMISED STATE DUE TO DRUG THERAPY: ICD-10-CM

## 2024-07-02 DIAGNOSIS — E55.9 VITAMIN D DEFICIENCY, UNSPECIFIED: ICD-10-CM

## 2024-07-02 DIAGNOSIS — M62.838 NECK MUSCLE SPASM: ICD-10-CM

## 2024-07-02 DIAGNOSIS — M47.816 LUMBAR SPONDYLOSIS: ICD-10-CM

## 2024-07-02 RX ORDER — OXYCODONE AND ACETAMINOPHEN 10; 325 MG/1; MG/1
1 TABLET ORAL EVERY 8 HOURS PRN
Qty: 90 TABLET | Refills: 0 | Status: SHIPPED | OUTPATIENT
Start: 2024-08-01 | End: 2024-08-31

## 2024-07-03 NOTE — TELEPHONE ENCOUNTER
Patient was started on Percocet  by specialty.   Pharmacy is refusing to release alprazolam 2mg script without direction from PCP.

## 2024-07-05 DIAGNOSIS — M62.838 NECK MUSCLE SPASM: ICD-10-CM

## 2024-07-05 DIAGNOSIS — M46.46 LUMBAR DISCITIS: ICD-10-CM

## 2024-07-05 DIAGNOSIS — M45.9 ANKYLOSING SPONDYLITIS, UNSPECIFIED SITE OF SPINE: ICD-10-CM

## 2024-07-05 DIAGNOSIS — G89.4 CHRONIC PAIN SYNDROME: ICD-10-CM

## 2024-07-05 DIAGNOSIS — E78.9 LIPID DISORDER: ICD-10-CM

## 2024-07-05 DIAGNOSIS — M47.816 LUMBAR SPONDYLOSIS: ICD-10-CM

## 2024-07-05 DIAGNOSIS — M47.817 LUMBAR AND SACRAL ARTHRITIS: ICD-10-CM

## 2024-07-05 DIAGNOSIS — N52.9 IMPOTENCE DUE TO ERECTILE DYSFUNCTION: ICD-10-CM

## 2024-07-05 DIAGNOSIS — L70.0 ACNE VULGARIS: ICD-10-CM

## 2024-07-05 DIAGNOSIS — R74.8 LOW SERUM HDL: ICD-10-CM

## 2024-07-09 DIAGNOSIS — M47.817 LUMBAR AND SACRAL ARTHRITIS: ICD-10-CM

## 2024-07-09 DIAGNOSIS — G89.4 CHRONIC PAIN SYNDROME: ICD-10-CM

## 2024-07-09 DIAGNOSIS — E78.9 LIPID DISORDER: ICD-10-CM

## 2024-07-09 DIAGNOSIS — N52.9 IMPOTENCE DUE TO ERECTILE DYSFUNCTION: ICD-10-CM

## 2024-07-09 DIAGNOSIS — R74.8 LOW SERUM HDL: ICD-10-CM

## 2024-07-09 DIAGNOSIS — M46.46 LUMBAR DISCITIS: ICD-10-CM

## 2024-07-09 DIAGNOSIS — M62.838 NECK MUSCLE SPASM: ICD-10-CM

## 2024-07-09 DIAGNOSIS — M45.9 ANKYLOSING SPONDYLITIS, UNSPECIFIED SITE OF SPINE: ICD-10-CM

## 2024-07-09 DIAGNOSIS — M47.816 LUMBAR SPONDYLOSIS: ICD-10-CM

## 2024-07-09 DIAGNOSIS — L70.0 ACNE VULGARIS: ICD-10-CM

## 2024-07-09 RX ORDER — ICOSAPENT ETHYL 1 G/1
CAPSULE ORAL
Qty: 120 CAPSULE | Refills: 6 | Status: SHIPPED | OUTPATIENT
Start: 2024-07-09

## 2024-07-14 RX ORDER — ICOSAPENT ETHYL 1 G/1
1 CAPSULE ORAL 2 TIMES DAILY
Qty: 180 CAPSULE | Refills: 3 | Status: SHIPPED | OUTPATIENT
Start: 2024-07-14 | End: 2024-07-15 | Stop reason: SDUPTHER

## 2024-07-15 DIAGNOSIS — N52.9 IMPOTENCE DUE TO ERECTILE DYSFUNCTION: ICD-10-CM

## 2024-07-15 DIAGNOSIS — M46.46 LUMBAR DISCITIS: ICD-10-CM

## 2024-07-15 DIAGNOSIS — R74.8 LOW SERUM HDL: ICD-10-CM

## 2024-07-15 DIAGNOSIS — G89.4 CHRONIC PAIN SYNDROME: ICD-10-CM

## 2024-07-15 DIAGNOSIS — L70.0 ACNE VULGARIS: ICD-10-CM

## 2024-07-15 DIAGNOSIS — E78.9 LIPID DISORDER: ICD-10-CM

## 2024-07-15 DIAGNOSIS — M45.9 ANKYLOSING SPONDYLITIS, UNSPECIFIED SITE OF SPINE: ICD-10-CM

## 2024-07-15 DIAGNOSIS — M62.838 NECK MUSCLE SPASM: ICD-10-CM

## 2024-07-15 DIAGNOSIS — M47.817 LUMBAR AND SACRAL ARTHRITIS: ICD-10-CM

## 2024-07-15 DIAGNOSIS — M47.816 LUMBAR SPONDYLOSIS: ICD-10-CM

## 2024-07-16 RX ORDER — ICOSAPENT ETHYL 1 G/1
1 CAPSULE ORAL 2 TIMES DAILY
Qty: 180 CAPSULE | Refills: 3 | Status: SHIPPED | OUTPATIENT
Start: 2024-07-16 | End: 2025-07-16

## 2024-07-29 DIAGNOSIS — F41.9 ANXIETY: ICD-10-CM

## 2024-07-29 NOTE — TELEPHONE ENCOUNTER
No care due was identified.  [facility] Embedded Care Due Messages. Reference number: [id number].   7/29/2024 1:12:31 PM CDT

## 2024-07-30 RX ORDER — ALPRAZOLAM 2 MG/1
2 TABLET ORAL 2 TIMES DAILY PRN
Qty: 60 TABLET | Refills: 5 | Status: SHIPPED | OUTPATIENT
Start: 2024-07-30

## 2024-08-21 ENCOUNTER — PATIENT MESSAGE (OUTPATIENT)
Dept: ADMINISTRATIVE | Facility: OTHER | Age: 44
End: 2024-08-21
Payer: MEDICARE

## 2024-08-28 DIAGNOSIS — Z79.899 IMMUNOCOMPROMISED STATE DUE TO DRUG THERAPY: ICD-10-CM

## 2024-08-28 DIAGNOSIS — M62.838 NECK MUSCLE SPASM: ICD-10-CM

## 2024-08-28 DIAGNOSIS — M47.817 LUMBAR AND SACRAL ARTHRITIS: ICD-10-CM

## 2024-08-28 DIAGNOSIS — G89.4 CHRONIC PAIN SYNDROME: ICD-10-CM

## 2024-08-28 DIAGNOSIS — M47.816 LUMBAR SPONDYLOSIS: ICD-10-CM

## 2024-08-28 DIAGNOSIS — M45.9 ANKYLOSING SPONDYLITIS, UNSPECIFIED SITE OF SPINE: ICD-10-CM

## 2024-08-28 DIAGNOSIS — D84.821 IMMUNOCOMPROMISED STATE DUE TO DRUG THERAPY: ICD-10-CM

## 2024-08-28 DIAGNOSIS — E55.9 VITAMIN D DEFICIENCY, UNSPECIFIED: ICD-10-CM

## 2024-08-28 RX ORDER — OXYCODONE AND ACETAMINOPHEN 10; 325 MG/1; MG/1
1 TABLET ORAL EVERY 8 HOURS PRN
Qty: 90 TABLET | Refills: 0 | Status: SHIPPED | OUTPATIENT
Start: 2024-08-28 | End: 2024-09-27

## 2024-09-04 ENCOUNTER — OFFICE VISIT (OUTPATIENT)
Dept: RHEUMATOLOGY | Facility: CLINIC | Age: 44
End: 2024-09-04
Payer: MEDICARE

## 2024-09-04 VITALS
SYSTOLIC BLOOD PRESSURE: 124 MMHG | WEIGHT: 183.88 LBS | DIASTOLIC BLOOD PRESSURE: 83 MMHG | BODY MASS INDEX: 24.91 KG/M2 | HEART RATE: 76 BPM | HEIGHT: 72 IN

## 2024-09-04 DIAGNOSIS — M46.46 LUMBAR DISCITIS: ICD-10-CM

## 2024-09-04 DIAGNOSIS — R74.8 LOW SERUM HDL: ICD-10-CM

## 2024-09-04 DIAGNOSIS — M47.817 LUMBAR AND SACRAL ARTHRITIS: ICD-10-CM

## 2024-09-04 DIAGNOSIS — L03.90 CELLULITIS, UNSPECIFIED CELLULITIS SITE: ICD-10-CM

## 2024-09-04 DIAGNOSIS — E55.9 VITAMIN D DEFICIENCY, UNSPECIFIED: ICD-10-CM

## 2024-09-04 DIAGNOSIS — D84.821 IMMUNOCOMPROMISED STATE DUE TO DRUG THERAPY: ICD-10-CM

## 2024-09-04 DIAGNOSIS — M47.816 LUMBAR SPONDYLOSIS: ICD-10-CM

## 2024-09-04 DIAGNOSIS — L08.9 LOCAL SKIN INFECTION: ICD-10-CM

## 2024-09-04 DIAGNOSIS — E78.9 LIPID DISORDER: ICD-10-CM

## 2024-09-04 DIAGNOSIS — Z79.899 IMMUNOCOMPROMISED STATE DUE TO DRUG THERAPY: ICD-10-CM

## 2024-09-04 DIAGNOSIS — M62.838 NECK MUSCLE SPASM: ICD-10-CM

## 2024-09-04 DIAGNOSIS — L70.0 ACNE VULGARIS: ICD-10-CM

## 2024-09-04 DIAGNOSIS — M45.9 ANKYLOSING SPONDYLITIS, UNSPECIFIED SITE OF SPINE: ICD-10-CM

## 2024-09-04 DIAGNOSIS — N52.9 IMPOTENCE DUE TO ERECTILE DYSFUNCTION: ICD-10-CM

## 2024-09-04 DIAGNOSIS — G89.4 CHRONIC PAIN SYNDROME: ICD-10-CM

## 2024-09-04 PROCEDURE — 99999 PR PBB SHADOW E&M-EST. PATIENT-LVL III: CPT | Mod: PBBFAC,,, | Performed by: INTERNAL MEDICINE

## 2024-09-04 PROCEDURE — 99215 OFFICE O/P EST HI 40 MIN: CPT | Mod: S$PBB,,, | Performed by: INTERNAL MEDICINE

## 2024-09-04 PROCEDURE — 99213 OFFICE O/P EST LOW 20 MIN: CPT | Mod: PBBFAC,PO | Performed by: INTERNAL MEDICINE

## 2024-09-04 RX ORDER — KETOCONAZOLE 20 MG/ML
SHAMPOO, SUSPENSION TOPICAL
Qty: 120 ML | Refills: 12 | Status: SHIPPED | OUTPATIENT
Start: 2024-09-05 | End: 2025-09-05

## 2024-09-04 RX ORDER — ICOSAPENT ETHYL 1 G/1
1 CAPSULE ORAL 2 TIMES DAILY
Qty: 180 CAPSULE | Refills: 3 | Status: SHIPPED | OUTPATIENT
Start: 2024-09-04 | End: 2025-09-04

## 2024-09-04 RX ORDER — OXYCODONE AND ACETAMINOPHEN 10; 325 MG/1; MG/1
1 TABLET ORAL EVERY 8 HOURS PRN
Qty: 90 TABLET | Refills: 0 | Status: SHIPPED | OUTPATIENT
Start: 2024-11-24 | End: 2024-12-24

## 2024-09-04 RX ORDER — BACLOFEN 20 MG/1
20 TABLET ORAL 4 TIMES DAILY
Qty: 120 TABLET | Refills: 6 | Status: SHIPPED | OUTPATIENT
Start: 2024-09-04 | End: 2025-09-04

## 2024-09-04 RX ORDER — OXYCODONE AND ACETAMINOPHEN 10; 325 MG/1; MG/1
1 TABLET ORAL EVERY 6 HOURS PRN
Qty: 120 TABLET | Refills: 0 | Status: SHIPPED | OUTPATIENT
Start: 2024-09-24 | End: 2024-10-24

## 2024-09-04 RX ORDER — PREDNISONE 1 MG/1
3 TABLET ORAL DAILY
Qty: 90 TABLET | Refills: 5 | Status: SHIPPED | OUTPATIENT
Start: 2024-09-04 | End: 2025-03-03

## 2024-09-04 RX ORDER — OXYCODONE AND ACETAMINOPHEN 10; 325 MG/1; MG/1
1 TABLET ORAL EVERY 8 HOURS PRN
Qty: 90 TABLET | Refills: 0 | Status: SHIPPED | OUTPATIENT
Start: 2024-10-24 | End: 2024-11-23

## 2024-09-04 RX ORDER — TRETINOIN 0.5 MG/G
CREAM TOPICAL NIGHTLY
Qty: 45 G | Refills: 3 | Status: SHIPPED | OUTPATIENT
Start: 2024-09-04

## 2024-09-04 RX ORDER — DOXYCYCLINE 100 MG/1
100 CAPSULE ORAL 2 TIMES DAILY
Qty: 20 CAPSULE | Refills: 3 | Status: SHIPPED | OUTPATIENT
Start: 2024-09-04 | End: 2024-10-04

## 2024-09-04 RX ORDER — MUPIROCIN 20 MG/G
OINTMENT TOPICAL
Qty: 30 G | Refills: 3 | Status: SHIPPED | OUTPATIENT
Start: 2024-09-04

## 2024-09-04 ASSESSMENT — ROUTINE ASSESSMENT OF PATIENT INDEX DATA (RAPID3)
MDHAQ FUNCTION SCORE: 1
PSYCHOLOGICAL DISTRESS SCORE: 4.4
PAIN SCORE: 8
TOTAL RAPID3 SCORE: 6.44
PATIENT GLOBAL ASSESSMENT SCORE: 8

## 2024-09-04 NOTE — PROGRESS NOTES
Subjective:     Patient ID:  Umang Greer    Chief Complaint:  Disease Management     History of Present Illness: Follow up: 43 year old male who presents to clinic for follow up on Ankylosing spondylitis,He has had multiple skin eruptions on his back he started docycline and dapsone. He has thrush that he is treating. He is using hibiclens prn and panoxyl soap slightly improved. He complain   He reports good improvement overall with his arthritis on simponi monthly.  He is using prednisone 2.5-5 mg daily PRN.      Current tx:  1. Simponi  2. Prednisone prn  3. Perocet 10/325 q8  Rheumatologic History:   - Diagnosis/es:  - Positive serologies:  - Infectious screening labs:  - Previous Treatments:  - Current Treatments:     Interval History:   Hospitalization since last office visit: No    Patient Active Problem List    Diagnosis Date Noted    Gastroesophageal reflux disease without esophagitis 03/14/2024    Immunocompromised state due to drug therapy 01/27/2023    Hyperlipidemia 01/27/2020    Chronic obstructive pulmonary disease 02/02/2018    Vitamin D deficiency 02/02/2018    Anxiety 12/15/2015    Ankylosing spondylitis 12/04/2014    Lumbar and sacral arthritis 12/04/2014    Osteoarthritis of both hips 12/04/2014    Sickle cell trait 12/14/2012    Cardiomyopathy, alcoholic 08/03/2012    Tobacco dependence 08/03/2012     Past Surgical History:   Procedure Laterality Date    HERNIA REPAIR      NASAL SEPTOPLASTY  12/20/2021    Dr. Menjivar at [facility]     Social History     Tobacco Use    Smoking status: Every Day     Current packs/day: 0.50     Average packs/day: 0.5 packs/day for 0.5 years (0.3 ttl pk-yrs)     Types: Cigarettes    Smokeless tobacco: Current   Substance Use Topics    Alcohol use: No    Drug use: Yes     Types: Hydrocodone     Family History   Problem Relation Name Age of Onset    Thyroid disease Mother      Heart disease Mother      Hypertension Mother      Anemia Mother      Heart disease Father       Cancer Paternal Grandmother      Amblyopia Neg Hx      Blindness Neg Hx      Cataracts Neg Hx      Macular degeneration Neg Hx      Retinal detachment Neg Hx      Strabismus Neg Hx      Stroke Neg Hx      Glaucoma Neg Hx       Review of patient's allergies indicates:   Allergen Reactions    Asa [aspirin] Anaphylaxis    Azulfidine [sulfasalazine] Anaphylaxis    Ibuprofen Anaphylaxis    Gabapentin      Lip swelling, tongue swelling       Review of Systems   Review of Systems   Constitutional:  Positive for activity change and fatigue. Negative for appetite change, chills, diaphoresis, fever and unexpected weight change.   HENT:  Negative for congestion, ear pain, facial swelling, mouth sores, nosebleeds, postnasal drip, rhinorrhea, sinus pressure, sneezing, sore throat, tinnitus, trouble swallowing and voice change.    Eyes:  Negative for pain, discharge, redness, itching and visual disturbance.   Respiratory:  Negative for apnea, cough, chest tightness, shortness of breath and wheezing.    Cardiovascular:  Negative for chest pain, palpitations and leg swelling.   Gastrointestinal:  Negative for abdominal pain, constipation, diarrhea, nausea and vomiting.   Endocrine: Negative for cold intolerance, heat intolerance, polydipsia and polyuria.   Genitourinary:  Negative for decreased urine volume, difficulty urinating, dysuria, flank pain, frequency, hematuria and urgency.   Musculoskeletal:  Positive for back pain, joint swelling and neck stiffness. Negative for arthralgias, gait problem, myalgias and neck pain.   Skin:  Negative for pallor, rash and wound.   Allergic/Immunologic: Negative for immunocompromised state.   Neurological:  Negative for dizziness, tremors, seizures, syncope, weakness, numbness and headaches.   Hematological:  Negative for adenopathy. Does not bruise/bleed easily.   Psychiatric/Behavioral:  Negative for sleep disturbance and suicidal ideas. The patient is not nervous/anxious.      "    Current Medications:  Current Outpatient Medications   Medication Instructions    ALPRAZolam (XANAX) 2 mg, Oral, 2 times daily PRN    baclofen (LIORESAL) 20 mg, Oral, 4 times daily    budesonide-formoterol 160-4.5 mcg (SYMBICORT) 160-4.5 mcg/actuation HFAA INHALE 2 PUFFS INTO THE LUNGS EVERY 12 HOURS    carvediloL (COREG) 3.125 mg, Oral, 2 times daily    cetirizine (ZYRTEC) 10 mg, Oral, Daily    chlorhexidine (PERIDEX) 0.12 % solution SMARTSIG:By Mouth    clotrimazole-betamethasone 1-0.05% (LOTRISONE) cream Topical (Top), 2 times daily    cyanocobalamin (VITAMIN B-12) 1,000 mcg    doxycycline (VIBRAMYCIN) 100 mg, Oral, 2 times daily    EPINEPHrine (EPIPEN JR) 0.3 mg, Intramuscular, As needed (PRN)    famotidine (PEPCID) 40 mg, Oral, Nightly PRN    golimumab (SIMPONI) 50 mg/0.5 mL PnIj Inject 0.5 mL (50 mg) into the skin every 30 days.    icosapent ethyL (VASCEPA) 1,000 mg, Oral, 2 times daily, 5    [START ON 9/5/2024] ketoconazole (NIZORAL) 2 % shampoo Topical (Top), Twice weekly    mupirocin (BACTROBAN) 2 % ointment APPLY EXTERNALLY TO THE AFFECTED AREA THREE TIMES DAILY    NARCAN 4 mg/actuation Spry No dose, route, or frequency recorded.    omeprazole (PRILOSEC) 20 mg, Oral, Daily    [START ON 10/24/2024] oxyCODONE-acetaminophen (PERCOCET)  mg per tablet 1 tablet, Oral, Every 8 hours PRN    [START ON 9/24/2024] oxyCODONE-acetaminophen (PERCOCET)  mg per tablet 1 tablet, Oral, Every 6 hours PRN    [START ON 11/24/2024] oxyCODONE-acetaminophen (PERCOCET)  mg per tablet 1 tablet, Oral, Every 8 hours PRN    predniSONE (DELTASONE) 3 mg, Oral, Daily    tadalafiL (CIALIS) 20 mg, Oral, Daily PRN    tretinoin (RETIN-A) 0.05 % cream Topical (Top), Nightly         Objective:     Vitals:    09/04/24 1118   BP: 124/83   Pulse: 76   Weight: 83.4 kg (183 lb 13.8 oz)   Height: 6' 0.01" (1.829 m)   PainSc:   7   PainLoc: Back      Body mass index is 24.93 kg/m².     Physical Examinations:  Physical Exam "   Constitutional: He is oriented to person, place, and time. He appears distressed.   HENT:   Head: Normocephalic and atraumatic.   Mouth/Throat: Oropharynx is clear and moist.   Eyes: Pupils are equal, round, and reactive to light.   Neck: No thyromegaly present.   Cardiovascular: Normal rate, regular rhythm and normal heart sounds. Exam reveals no gallop and no friction rub.   No murmur heard.  Pulmonary/Chest: Breath sounds normal. He has no wheezes. He has no rales. He exhibits no tenderness.   Abdominal: There is no abdominal tenderness. There is no rebound and no guarding.   Musculoskeletal:         General: Tenderness and deformity present.      Right shoulder: Tenderness present.      Left shoulder: Tenderness present.      Right elbow: Tenderness present.      Left elbow: Normal.      Left wrist: Normal.      Cervical back: Neck supple.      Right knee: Swelling and effusion present.      Left knee: Swelling and effusion present.   Lymphadenopathy:     He has no cervical adenopathy.   Neurological: He is alert and oriented to person, place, and time. He displays weakness. He exhibits abnormal muscle tone.   Reflex Scores:       Patellar reflexes are 2+ on the right side and 2+ on the left side.  Skin: No rash noted. No erythema. No pallor.   Psychiatric: Mood, affect and judgment normal.   Vitals reviewed.      Right Side Rheumatological Exam     Examination finds the 2nd MCP, 3rd MCP, 4th MCP and 5th MCP normal.    The patient is tender to palpation of the shoulder and elbow    He has swelling of the knee    The patient has an enlarged wrist, 1st PIP, 2nd PIP, 3rd PIP, 4th PIP and 5th PIP    Shoulder Exam   Tenderness Location: acromioclavicular joint and posterior shoulder    Range of Motion   Active abduction:  abnormal   Adduction: abnormal  Sensation: normal    Knee Exam     Range of Motion   Extension:  normal   Flexion:  normal   Patellofemoral Crepitus: positive  Effusion: positive  Sensation:  normal    Hip Exam   Tenderness Location: anterior and posterior    Range of Motion   Extension:  abnormal   Flexion:  abnormal   Sensation: normal    Elbow/Wrist Exam   Tenderness Location: no tenderness    Range of Motion   Elbow   Flexion:  normal   Sensation: normal    Muscle Strength (0-5 scale):  Neck Flexion:  3  Neck Extension: 3  Deltoid:  3  Biceps: 3/5   Triceps:  3  Quadriceps:  3   Distal Lower Extremity: 3    Left Side Rheumatological Exam     Examination finds the elbow, wrist, 1st MCP, 2nd MCP, 3rd MCP, 4th MCP and 5th MCP normal.    The patient is tender to palpation of the shoulder.    He has swelling of the knee    The patient has an enlarged 1st PIP, 2nd PIP, 3rd PIP, 4th PIP and 5th PIP.    Shoulder Exam   Tenderness Location: acromioclavicular joint and posterior shoulder    Range of Motion   Active abduction:  abnormal   Extension:  abnormal   Sensation: normal    Knee Exam     Range of Motion   Extension:  normal   Flexion:  normal     Patellofemoral Crepitus: positive  Effusion: positive  Sensation: normal    Hip Exam   Tenderness Location: anterior and posterior    Range of Motion   Extension:  abnormal   Flexion:  abnormal   Sensation: normal    Elbow/Wrist Exam     Range of Motion   Elbow   Flexion:  normal   Sensation: normal    Muscle Strength (0-5 scale):  Neck Flexion:  3  Neck Extension: 3  Deltoid:  3  Biceps: 3/5   Triceps:  3  Quadriceps:  3   Distal Lower Extremity: 3      Back/Neck Exam   General Inspection   Gait: normal       Tenderness Right paramedian tenderness of the Lower C-Spine, Lower L-Spine, Upper C-Spine, Upper L-Spine and SI Joint.Left paramedian tenderness of the Lower C-Spine, Lower L-Spine, Upper C-Spine, Upper L-Spine and SI Joint.    Back Range of Motion   Extension:  abnormal  Flexion:  abnormal  Lateral Bend Right:  abnormal  Lateral Bend Left:  abnormal  Rotation Right:  abnormal  Rotation Left:  abnormal    Neck Range of Motion   Flexion:  Limited and  "moderate  Extension:  Limited and moderate  Right Lateral Bend: abnormal  Left Lateral Bend: abnormal  Right Rotation: abnormal  Left Rotation: abnormal       Disease Assessment Scores:  Patient's Global Assessment of arthritis (0-10): 3  Physician's Global Assessment of arthritis (0-10): 2  Number of Tender Joints (0-28): 2  Number of Swollen Joints (0-28): 2        5/24/2024    12:39 PM   Rapid3 Question Responses and Scores   MDHAQ Score 1.1   Psychologic Score 3.3   Pain Score 7   When you awakened in the morning OVER THE LAST WEEK, did you feel stiff? Yes   If Yes, please indicate the number of hours until you are as limber as you will be for the day 1   Fatigue Score 7   Global Health Score 6.5   RAPID3 Score 5.72       Monitoring Lab Results:  Lab Results   Component Value Date    WBC 10.52 05/24/2024    RBC 4.68 05/24/2024    HGB 13.9 (L) 05/24/2024    HCT 41.2 05/24/2024    MCV 88 05/24/2024    MCH 29.7 05/24/2024    MCHC 33.7 05/24/2024    RDW 12.9 05/24/2024     05/24/2024        Lab Results   Component Value Date     05/24/2024    K 4.3 05/24/2024     05/24/2024    CO2 23 05/24/2024     (H) 05/24/2024    BUN 16 05/24/2024    CREATININE 1.0 05/24/2024    CALCIUM 10.0 05/24/2024    PROT 7.6 05/24/2024    ALBUMIN 3.9 05/24/2024    BILITOT 0.4 05/24/2024    ALKPHOS 109 05/24/2024    AST 15 05/24/2024    ALT 13 05/24/2024    ANIONGAP 11 05/24/2024    EGFRNORACEVR >60.0 05/24/2024       Lab Results   Component Value Date    SEDRATE 7 05/24/2024    CRP 6.8 05/24/2024        Lab Results   Component Value Date    YJJYLSCT88AM 67 10/08/2021    VSQJWEWE68 964 (H) 10/08/2021        Lab Results   Component Value Date    CHOL 200 (H) 05/20/2022    HDL 33 (L) 05/20/2022    LDLCALC 133.6 05/20/2022    TRIG 167 (H) 05/20/2022       No results found for: "RF", "CCPANTIBODIE"  No results found for: "ANASCREEN", "ANATITER", "ANAPATTE", "DSDNA", "SMRNPAB", "SSAANTIBODY", "SSBANTIBODY", "KIT68OW", " ""JO1AB"  No results found for: "HLABB27"    Infectious Disease Screening:  Lab Results   Component Value Date    HEPBSAG Non-reactive 09/21/2022    HEPBSAB <3.00 09/21/2022    HEPBSAB Non-reactive 09/21/2022    HEPBIGM Non-reactive 09/21/2022     Lab Results   Component Value Date    HEPCAB Non-reactive 09/21/2022     Lab Results   Component Value Date    TBGOLDPLUS Negative 09/21/2022     No results found for: "QUANTIFERON", "SVCMT", "QUANTAGVALUE", "QUANTNILVALU", "QUANTMITOGEN", "QFTTBAG", "QINT"     Imaging: DEXA, Xrays, MRIs, CTs, etc    Old & Outside Medical Records:  Reviewed old and all outside medical records available in Care Everywhere     Assessment:     Encounter Diagnoses   Name Primary?    Ankylosing spondylitis, unspecified site of spine     Chronic pain syndrome     Neck muscle spasm     Acne vulgaris     Lumbar and sacral arthritis     Lumbar discitis     Lumbar spondylosis     Impotence due to erectile dysfunction     Lipid disorder     Low serum HDL     Immunocompromised state due to drug therapy     Vitamin D deficiency, unspecified      Cellulitis, unspecified cellulitis site     Local skin infection         Plan:      Encounter Diagnoses   Name Primary?    Ankylosing spondylitis, unspecified site of spine     Chronic pain syndrome     Neck muscle spasm     Acne vulgaris     Lumbar and sacral arthritis     Lumbar discitis     Lumbar spondylosis     Impotence due to erectile dysfunction     Lipid disorder     Low serum HDL     Immunocompromised state due to drug therapy     Vitamin D deficiency, unspecified      Cellulitis, unspecified cellulitis site     Local skin infection      Umang was seen today for disease management.    Diagnoses and all orders for this visit:    Ankylosing spondylitis, unspecified site of spine  -     baclofen (LIORESAL) 20 MG tablet; Take 1 tablet (20 mg total) by mouth 4 (four) times daily.  -     icosapent ethyL (VASCEPA) 1 gram Cap; Take 1 capsule (1,000 mg total) " by mouth 2 (two) times a day. 5  -     mupirocin (BACTROBAN) 2 % ointment; APPLY EXTERNALLY TO THE AFFECTED AREA THREE TIMES DAILY  -     oxyCODONE-acetaminophen (PERCOCET)  mg per tablet; Take 1 tablet by mouth every 8 (eight) hours as needed for Pain.  -     oxyCODONE-acetaminophen (PERCOCET)  mg per tablet; Take 1 tablet by mouth every 6 (six) hours as needed for Pain.  -     oxyCODONE-acetaminophen (PERCOCET)  mg per tablet; Take 1 tablet by mouth every 8 (eight) hours as needed for Pain.  -     Sedimentation rate; Future  -     C-Reactive Protein; Future  -     Comprehensive Metabolic Panel; Future  -     CBC Auto Differential; Future  -     Hepatitis C Antibody; Future  -     Quantiferon Gold TB; Future  -     Hepatitis B Surface Antigen; Future  -     Hepatitis B Surface Antibody, Qual/Quant; Future  -     Hepatitis B Core Antibody, Total; Future  -     Hepatitis B Surface Ab, Qualitative; Future  -     T4, Free; Future  -     TSH; Future  -     T3, Free; Future  -     LIPID PANEL; Future    Chronic pain syndrome  -     baclofen (LIORESAL) 20 MG tablet; Take 1 tablet (20 mg total) by mouth 4 (four) times daily.  -     icosapent ethyL (VASCEPA) 1 gram Cap; Take 1 capsule (1,000 mg total) by mouth 2 (two) times a day. 5  -     mupirocin (BACTROBAN) 2 % ointment; APPLY EXTERNALLY TO THE AFFECTED AREA THREE TIMES DAILY  -     oxyCODONE-acetaminophen (PERCOCET)  mg per tablet; Take 1 tablet by mouth every 8 (eight) hours as needed for Pain.  -     oxyCODONE-acetaminophen (PERCOCET)  mg per tablet; Take 1 tablet by mouth every 6 (six) hours as needed for Pain.  -     oxyCODONE-acetaminophen (PERCOCET)  mg per tablet; Take 1 tablet by mouth every 8 (eight) hours as needed for Pain.  -     Sedimentation rate; Future  -     C-Reactive Protein; Future  -     Comprehensive Metabolic Panel; Future  -     CBC Auto Differential; Future  -     Hepatitis C Antibody; Future  -      Quantiferon Gold TB; Future  -     Hepatitis B Surface Antigen; Future  -     Hepatitis B Surface Antibody, Qual/Quant; Future  -     Hepatitis B Core Antibody, Total; Future  -     Hepatitis B Surface Ab, Qualitative; Future  -     T4, Free; Future  -     TSH; Future  -     T3, Free; Future  -     LIPID PANEL; Future    Neck muscle spasm  -     baclofen (LIORESAL) 20 MG tablet; Take 1 tablet (20 mg total) by mouth 4 (four) times daily.  -     icosapent ethyL (VASCEPA) 1 gram Cap; Take 1 capsule (1,000 mg total) by mouth 2 (two) times a day. 5  -     mupirocin (BACTROBAN) 2 % ointment; APPLY EXTERNALLY TO THE AFFECTED AREA THREE TIMES DAILY  -     predniSONE (DELTASONE) 1 MG tablet; Take 3 tablets (3 mg total) by mouth once daily.  -     oxyCODONE-acetaminophen (PERCOCET)  mg per tablet; Take 1 tablet by mouth every 8 (eight) hours as needed for Pain.  -     oxyCODONE-acetaminophen (PERCOCET)  mg per tablet; Take 1 tablet by mouth every 6 (six) hours as needed for Pain.  -     oxyCODONE-acetaminophen (PERCOCET)  mg per tablet; Take 1 tablet by mouth every 8 (eight) hours as needed for Pain.  -     Sedimentation rate; Future  -     C-Reactive Protein; Future  -     Comprehensive Metabolic Panel; Future  -     CBC Auto Differential; Future  -     Hepatitis C Antibody; Future  -     Quantiferon Gold TB; Future  -     Hepatitis B Surface Antigen; Future  -     Hepatitis B Surface Antibody, Qual/Quant; Future  -     Hepatitis B Core Antibody, Total; Future  -     Hepatitis B Surface Ab, Qualitative; Future  -     T4, Free; Future  -     TSH; Future  -     T3, Free; Future  -     LIPID PANEL; Future    Acne vulgaris  -     icosapent ethyL (VASCEPA) 1 gram Cap; Take 1 capsule (1,000 mg total) by mouth 2 (two) times a day. 5  -     mupirocin (BACTROBAN) 2 % ointment; APPLY EXTERNALLY TO THE AFFECTED AREA THREE TIMES DAILY  -     ketoconazole (NIZORAL) 2 % shampoo; Apply topically twice a week.  -      tretinoin (RETIN-A) 0.05 % cream; Apply topically every evening.    Lumbar and sacral arthritis  -     icosapent ethyL (VASCEPA) 1 gram Cap; Take 1 capsule (1,000 mg total) by mouth 2 (two) times a day. 5  -     mupirocin (BACTROBAN) 2 % ointment; APPLY EXTERNALLY TO THE AFFECTED AREA THREE TIMES DAILY  -     ketoconazole (NIZORAL) 2 % shampoo; Apply topically twice a week.  -     predniSONE (DELTASONE) 1 MG tablet; Take 3 tablets (3 mg total) by mouth once daily.  -     oxyCODONE-acetaminophen (PERCOCET)  mg per tablet; Take 1 tablet by mouth every 8 (eight) hours as needed for Pain.  -     oxyCODONE-acetaminophen (PERCOCET)  mg per tablet; Take 1 tablet by mouth every 6 (six) hours as needed for Pain.  -     oxyCODONE-acetaminophen (PERCOCET)  mg per tablet; Take 1 tablet by mouth every 8 (eight) hours as needed for Pain.  -     Sedimentation rate; Future  -     C-Reactive Protein; Future  -     Comprehensive Metabolic Panel; Future  -     CBC Auto Differential; Future  -     Hepatitis C Antibody; Future  -     Quantiferon Gold TB; Future  -     Hepatitis B Surface Antigen; Future  -     Hepatitis B Surface Antibody, Qual/Quant; Future  -     Hepatitis B Core Antibody, Total; Future  -     Hepatitis B Surface Ab, Qualitative; Future  -     T4, Free; Future  -     TSH; Future  -     T3, Free; Future  -     LIPID PANEL; Future    Lumbar discitis  -     icosapent ethyL (VASCEPA) 1 gram Cap; Take 1 capsule (1,000 mg total) by mouth 2 (two) times a day. 5  -     ketoconazole (NIZORAL) 2 % shampoo; Apply topically twice a week.  -     predniSONE (DELTASONE) 1 MG tablet; Take 3 tablets (3 mg total) by mouth once daily.  -     Sedimentation rate; Future  -     C-Reactive Protein; Future  -     Comprehensive Metabolic Panel; Future  -     CBC Auto Differential; Future  -     Hepatitis C Antibody; Future  -     Quantiferon Gold TB; Future  -     Hepatitis B Surface Antigen; Future  -     Hepatitis B  Surface Antibody, Qual/Quant; Future  -     Hepatitis B Core Antibody, Total; Future  -     Hepatitis B Surface Ab, Qualitative; Future  -     T4, Free; Future  -     TSH; Future  -     T3, Free; Future  -     LIPID PANEL; Future    Lumbar spondylosis  -     icosapent ethyL (VASCEPA) 1 gram Cap; Take 1 capsule (1,000 mg total) by mouth 2 (two) times a day. 5  -     mupirocin (BACTROBAN) 2 % ointment; APPLY EXTERNALLY TO THE AFFECTED AREA THREE TIMES DAILY  -     ketoconazole (NIZORAL) 2 % shampoo; Apply topically twice a week.  -     predniSONE (DELTASONE) 1 MG tablet; Take 3 tablets (3 mg total) by mouth once daily.  -     oxyCODONE-acetaminophen (PERCOCET)  mg per tablet; Take 1 tablet by mouth every 8 (eight) hours as needed for Pain.  -     oxyCODONE-acetaminophen (PERCOCET)  mg per tablet; Take 1 tablet by mouth every 6 (six) hours as needed for Pain.  -     oxyCODONE-acetaminophen (PERCOCET)  mg per tablet; Take 1 tablet by mouth every 8 (eight) hours as needed for Pain.  -     Sedimentation rate; Future  -     C-Reactive Protein; Future  -     Comprehensive Metabolic Panel; Future  -     CBC Auto Differential; Future  -     Hepatitis C Antibody; Future  -     Quantiferon Gold TB; Future  -     Hepatitis B Surface Antigen; Future  -     Hepatitis B Surface Antibody, Qual/Quant; Future  -     Hepatitis B Core Antibody, Total; Future  -     Hepatitis B Surface Ab, Qualitative; Future  -     T4, Free; Future  -     TSH; Future  -     T3, Free; Future  -     LIPID PANEL; Future    Impotence due to erectile dysfunction  -     icosapent ethyL (VASCEPA) 1 gram Cap; Take 1 capsule (1,000 mg total) by mouth 2 (two) times a day. 5    Lipid disorder  -     icosapent ethyL (VASCEPA) 1 gram Cap; Take 1 capsule (1,000 mg total) by mouth 2 (two) times a day. 5    Low serum HDL  -     icosapent ethyL (VASCEPA) 1 gram Cap; Take 1 capsule (1,000 mg total) by mouth 2 (two) times a day. 5    Immunocompromised  state due to drug therapy  -     mupirocin (BACTROBAN) 2 % ointment; APPLY EXTERNALLY TO THE AFFECTED AREA THREE TIMES DAILY  -     ketoconazole (NIZORAL) 2 % shampoo; Apply topically twice a week.  -     oxyCODONE-acetaminophen (PERCOCET)  mg per tablet; Take 1 tablet by mouth every 8 (eight) hours as needed for Pain.  -     oxyCODONE-acetaminophen (PERCOCET)  mg per tablet; Take 1 tablet by mouth every 6 (six) hours as needed for Pain.  -     oxyCODONE-acetaminophen (PERCOCET)  mg per tablet; Take 1 tablet by mouth every 8 (eight) hours as needed for Pain.  -     Sedimentation rate; Future  -     C-Reactive Protein; Future  -     Comprehensive Metabolic Panel; Future  -     CBC Auto Differential; Future  -     Hepatitis C Antibody; Future  -     Quantiferon Gold TB; Future  -     Hepatitis B Surface Antigen; Future  -     Hepatitis B Surface Antibody, Qual/Quant; Future  -     Hepatitis B Core Antibody, Total; Future  -     Hepatitis B Surface Ab, Qualitative; Future  -     T4, Free; Future  -     TSH; Future  -     T3, Free; Future  -     LIPID PANEL; Future    Vitamin D deficiency, unspecified   -     mupirocin (BACTROBAN) 2 % ointment; APPLY EXTERNALLY TO THE AFFECTED AREA THREE TIMES DAILY  -     oxyCODONE-acetaminophen (PERCOCET)  mg per tablet; Take 1 tablet by mouth every 8 (eight) hours as needed for Pain.  -     oxyCODONE-acetaminophen (PERCOCET)  mg per tablet; Take 1 tablet by mouth every 6 (six) hours as needed for Pain.  -     oxyCODONE-acetaminophen (PERCOCET)  mg per tablet; Take 1 tablet by mouth every 8 (eight) hours as needed for Pain.  -     Sedimentation rate; Future  -     C-Reactive Protein; Future  -     Comprehensive Metabolic Panel; Future  -     CBC Auto Differential; Future  -     Hepatitis C Antibody; Future  -     Quantiferon Gold TB; Future  -     Hepatitis B Surface Antigen; Future  -     Hepatitis B Surface Antibody, Qual/Quant; Future  -      Hepatitis B Core Antibody, Total; Future  -     Hepatitis B Surface Ab, Qualitative; Future  -     T4, Free; Future  -     TSH; Future  -     T3, Free; Future  -     LIPID PANEL; Future    Cellulitis, unspecified cellulitis site  -     ketoconazole (NIZORAL) 2 % shampoo; Apply topically twice a week.  -     doxycycline (VIBRAMYCIN) 100 MG Cap; Take 1 capsule (100 mg total) by mouth 2 (two) times daily.    Local skin infection  -     ketoconazole (NIZORAL) 2 % shampoo; Apply topically twice a week.        1. Continue simponi  2. Add retin a to tx for Acne vulgar  3. Refill percocet      Follow-up 4 months    More than 50% of the  40 minute encounter was spent face to face counseling the patient regarding current status and future plan of care as well as side effects  of the medications. All questions were answered to patient's satisfaction also includes  non-face to face time preparing to see the patient (eg, review of tests), Obtaining and/or reviewing separately obtained history, Documenting clinical information in the electronic or other health record, Independently interpreting results

## 2024-09-28 ENCOUNTER — PATIENT MESSAGE (OUTPATIENT)
Dept: ADMINISTRATIVE | Facility: OTHER | Age: 44
End: 2024-09-28
Payer: MEDICARE

## 2024-10-07 ENCOUNTER — OFFICE VISIT (OUTPATIENT)
Dept: FAMILY MEDICINE | Facility: CLINIC | Age: 44
End: 2024-10-07
Payer: MEDICARE

## 2024-10-07 VITALS
HEART RATE: 93 BPM | WEIGHT: 188.5 LBS | SYSTOLIC BLOOD PRESSURE: 122 MMHG | BODY MASS INDEX: 25.53 KG/M2 | HEIGHT: 72 IN | OXYGEN SATURATION: 95 % | DIASTOLIC BLOOD PRESSURE: 80 MMHG

## 2024-10-07 DIAGNOSIS — M45.9 ANKYLOSING SPONDYLITIS, UNSPECIFIED SITE OF SPINE: ICD-10-CM

## 2024-10-07 DIAGNOSIS — D84.821 IMMUNOCOMPROMISED STATE DUE TO DRUG THERAPY: ICD-10-CM

## 2024-10-07 DIAGNOSIS — Z79.899 IMMUNOCOMPROMISED STATE DUE TO DRUG THERAPY: ICD-10-CM

## 2024-10-07 DIAGNOSIS — F41.9 ANXIETY: Primary | ICD-10-CM

## 2024-10-07 DIAGNOSIS — J44.9 CHRONIC OBSTRUCTIVE PULMONARY DISEASE, UNSPECIFIED COPD TYPE: ICD-10-CM

## 2024-10-07 DIAGNOSIS — F17.200 TOBACCO DEPENDENCE: ICD-10-CM

## 2024-10-07 PROCEDURE — G2211 COMPLEX E/M VISIT ADD ON: HCPCS | Mod: S$PBB,,, | Performed by: FAMILY MEDICINE

## 2024-10-07 PROCEDURE — 99214 OFFICE O/P EST MOD 30 MIN: CPT | Mod: S$PBB,,, | Performed by: FAMILY MEDICINE

## 2024-10-07 PROCEDURE — 99214 OFFICE O/P EST MOD 30 MIN: CPT | Mod: PBBFAC,PO | Performed by: FAMILY MEDICINE

## 2024-10-07 PROCEDURE — 99999 PR PBB SHADOW E&M-EST. PATIENT-LVL IV: CPT | Mod: PBBFAC,,, | Performed by: FAMILY MEDICINE

## 2024-10-07 RX ORDER — OMEPRAZOLE 20 MG/1
20 CAPSULE, DELAYED RELEASE ORAL DAILY
Qty: 90 CAPSULE | Refills: 3 | Status: SHIPPED | OUTPATIENT
Start: 2024-10-07

## 2024-10-07 RX ORDER — DAPSONE 25 MG/1
25 TABLET ORAL DAILY
COMMUNITY
Start: 2024-09-30

## 2024-10-07 NOTE — PROGRESS NOTES
Subjective:       Patient ID: Umang Greer is a 44 y.o. male.    Chief Complaint: Follow-up (3 month follow up)    Here for follow up multiple chronic medical issues. Doing well overall and in normal state of health.      Follow-up  Pertinent negatives include no chest pain, chills, coughing or fever.     Review of Systems   Constitutional:  Negative for chills and fever.   Respiratory:  Negative for cough, chest tightness and shortness of breath.    Cardiovascular:  Negative for chest pain, palpitations and leg swelling.   Endocrine: Negative for cold intolerance and heat intolerance.   Psychiatric/Behavioral:  Negative for decreased concentration. The patient is not nervous/anxious.        Objective:      Physical Exam  Vitals and nursing note reviewed.   Constitutional:       Appearance: He is well-developed.   HENT:      Head: Normocephalic and atraumatic.   Cardiovascular:      Rate and Rhythm: Normal rate and regular rhythm.      Heart sounds: Normal heart sounds.   Pulmonary:      Effort: Pulmonary effort is normal.      Breath sounds: Normal breath sounds.   Psychiatric:         Mood and Affect: Mood normal.         Behavior: Behavior normal.         Assessment:       1. Anxiety    2. Immunocompromised state due to drug therapy    3. Chronic obstructive pulmonary disease, unspecified COPD type    4. Ankylosing spondylitis, unspecified site of spine    5. Tobacco dependence        Plan:       Anxiety    Immunocompromised state due to drug therapy    Chronic obstructive pulmonary disease, unspecified COPD type    Ankylosing spondylitis, unspecified site of spine    Tobacco dependence    Other orders  -     omeprazole (PRILOSEC) 20 MG capsule; Take 1 capsule (20 mg total) by mouth once daily.  Dispense: 90 capsule; Refill: 3    Mood stable  Goal to wean as tolerated for benzo  Copd stable  Stop smoking and to consider  AS stable; labs with rheum as planned  Will monitor chronic medical issues and continue  current plan of care.  Visit today included increased complexity associated with the care of the episodic problem copd addressed and managing the longitudinal care of the patient due to the serious and/or complex managed problem(s) as above.        Follow up in about 3 months (around 1/7/2025), or if symptoms worsen or fail to improve, for Virtual Visit.

## 2024-10-22 ENCOUNTER — PATIENT MESSAGE (OUTPATIENT)
Dept: ADMINISTRATIVE | Facility: OTHER | Age: 44
End: 2024-10-22
Payer: MEDICARE

## 2024-12-12 DIAGNOSIS — I42.9 CARDIOMYOPATHY, UNSPECIFIED TYPE: ICD-10-CM

## 2024-12-12 RX ORDER — CARVEDILOL 3.12 MG/1
3.12 TABLET ORAL 2 TIMES DAILY
Qty: 61 TABLET | Refills: 11 | Status: SHIPPED | OUTPATIENT
Start: 2024-12-12

## 2024-12-19 DIAGNOSIS — M45.9 ANKYLOSING SPONDYLITIS, UNSPECIFIED SITE OF SPINE: ICD-10-CM

## 2024-12-19 DIAGNOSIS — E55.9 VITAMIN D DEFICIENCY, UNSPECIFIED: ICD-10-CM

## 2024-12-19 DIAGNOSIS — G89.4 CHRONIC PAIN SYNDROME: ICD-10-CM

## 2024-12-19 DIAGNOSIS — M47.817 LUMBAR AND SACRAL ARTHRITIS: ICD-10-CM

## 2024-12-19 DIAGNOSIS — D84.821 IMMUNOCOMPROMISED STATE DUE TO DRUG THERAPY: ICD-10-CM

## 2024-12-19 DIAGNOSIS — M62.838 NECK MUSCLE SPASM: ICD-10-CM

## 2024-12-19 DIAGNOSIS — Z79.899 IMMUNOCOMPROMISED STATE DUE TO DRUG THERAPY: ICD-10-CM

## 2024-12-19 DIAGNOSIS — M47.816 LUMBAR SPONDYLOSIS: ICD-10-CM

## 2024-12-20 ENCOUNTER — PATIENT MESSAGE (OUTPATIENT)
Dept: ADMINISTRATIVE | Facility: OTHER | Age: 44
End: 2024-12-20
Payer: MEDICARE

## 2024-12-23 ENCOUNTER — PATIENT MESSAGE (OUTPATIENT)
Dept: RHEUMATOLOGY | Facility: CLINIC | Age: 44
End: 2024-12-23
Payer: MEDICARE

## 2024-12-23 RX ORDER — OXYCODONE AND ACETAMINOPHEN 10; 325 MG/1; MG/1
1 TABLET ORAL EVERY 8 HOURS PRN
Qty: 90 TABLET | Refills: 0 | Status: SHIPPED | OUTPATIENT
Start: 2024-12-23 | End: 2025-01-22

## 2024-12-26 ENCOUNTER — PATIENT MESSAGE (OUTPATIENT)
Dept: RHEUMATOLOGY | Facility: CLINIC | Age: 44
End: 2024-12-26
Payer: MEDICARE

## 2024-12-30 ENCOUNTER — LAB VISIT (OUTPATIENT)
Dept: LAB | Facility: HOSPITAL | Age: 44
End: 2024-12-30
Attending: INTERNAL MEDICINE
Payer: MEDICARE

## 2024-12-30 DIAGNOSIS — M47.816 LUMBAR SPONDYLOSIS: ICD-10-CM

## 2024-12-30 DIAGNOSIS — M45.9 ANKYLOSING SPONDYLITIS, UNSPECIFIED SITE OF SPINE: ICD-10-CM

## 2024-12-30 DIAGNOSIS — M62.838 NECK MUSCLE SPASM: ICD-10-CM

## 2024-12-30 DIAGNOSIS — D84.821 IMMUNOCOMPROMISED STATE DUE TO DRUG THERAPY: ICD-10-CM

## 2024-12-30 DIAGNOSIS — Z79.899 IMMUNOCOMPROMISED STATE DUE TO DRUG THERAPY: ICD-10-CM

## 2024-12-30 DIAGNOSIS — M46.46 LUMBAR DISCITIS: ICD-10-CM

## 2024-12-30 DIAGNOSIS — M47.817 LUMBAR AND SACRAL ARTHRITIS: ICD-10-CM

## 2024-12-30 DIAGNOSIS — E55.9 VITAMIN D DEFICIENCY, UNSPECIFIED: ICD-10-CM

## 2024-12-30 DIAGNOSIS — G89.4 CHRONIC PAIN SYNDROME: ICD-10-CM

## 2024-12-30 LAB
25(OH)D3+25(OH)D2 SERPL-MCNC: 69 NG/ML (ref 30–96)
ALBUMIN SERPL BCP-MCNC: 4.3 G/DL (ref 3.5–5.2)
ALP SERPL-CCNC: 121 U/L (ref 40–150)
ALT SERPL W/O P-5'-P-CCNC: 12 U/L (ref 10–44)
ANION GAP SERPL CALC-SCNC: 13 MMOL/L (ref 8–16)
AST SERPL-CCNC: 18 U/L (ref 10–40)
BASOPHILS # BLD AUTO: 0.06 K/UL (ref 0–0.2)
BASOPHILS NFR BLD: 0.6 % (ref 0–1.9)
BILIRUB SERPL-MCNC: 0.4 MG/DL (ref 0.1–1)
BUN SERPL-MCNC: 11 MG/DL (ref 6–20)
CALCIUM SERPL-MCNC: 9.7 MG/DL (ref 8.7–10.5)
CCP AB SER IA-ACNC: <0.5 U/ML
CHLORIDE SERPL-SCNC: 102 MMOL/L (ref 95–110)
CHOLEST SERPL-MCNC: 197 MG/DL (ref 120–199)
CHOLEST/HDLC SERPL: 6.2 {RATIO} (ref 2–5)
CO2 SERPL-SCNC: 25 MMOL/L (ref 23–29)
CREAT SERPL-MCNC: 0.9 MG/DL (ref 0.5–1.4)
CRP SERPL-MCNC: 8.3 MG/L (ref 0–8.2)
DIFFERENTIAL METHOD BLD: NORMAL
EOSINOPHIL # BLD AUTO: 0.1 K/UL (ref 0–0.5)
EOSINOPHIL NFR BLD: 1.1 % (ref 0–8)
ERYTHROCYTE [DISTWIDTH] IN BLOOD BY AUTOMATED COUNT: 12.4 % (ref 11.5–14.5)
ERYTHROCYTE [SEDIMENTATION RATE] IN BLOOD BY PHOTOMETRIC METHOD: <2 MM/HR (ref 0–23)
EST. GFR  (NO RACE VARIABLE): >60 ML/MIN/1.73 M^2
GLUCOSE SERPL-MCNC: 101 MG/DL (ref 70–110)
HBV CORE AB SERPL QL IA: NORMAL
HBV SURFACE AB SER-ACNC: 5.27 MIU/ML
HBV SURFACE AB SER-ACNC: NORMAL M[IU]/ML
HBV SURFACE AG SERPL QL IA: NORMAL
HCT VFR BLD AUTO: 44.1 % (ref 40–54)
HCV AB SERPL QL IA: NORMAL
HDLC SERPL-MCNC: 32 MG/DL (ref 40–75)
HDLC SERPL: 16.2 % (ref 20–50)
HGB BLD-MCNC: 14.4 G/DL (ref 14–18)
IMM GRANULOCYTES # BLD AUTO: 0.03 K/UL (ref 0–0.04)
IMM GRANULOCYTES NFR BLD AUTO: 0.3 % (ref 0–0.5)
LDLC SERPL CALC-MCNC: 100.8 MG/DL (ref 63–159)
LYMPHOCYTES # BLD AUTO: 3.2 K/UL (ref 1–4.8)
LYMPHOCYTES NFR BLD: 30.3 % (ref 18–48)
MCH RBC QN AUTO: 30.1 PG (ref 27–31)
MCHC RBC AUTO-ENTMCNC: 32.7 G/DL (ref 32–36)
MCV RBC AUTO: 92 FL (ref 82–98)
MONOCYTES # BLD AUTO: 0.4 K/UL (ref 0.3–1)
MONOCYTES NFR BLD: 4.1 % (ref 4–15)
NEUTROPHILS # BLD AUTO: 6.7 K/UL (ref 1.8–7.7)
NEUTROPHILS NFR BLD: 63.6 % (ref 38–73)
NONHDLC SERPL-MCNC: 165 MG/DL
NRBC BLD-RTO: 0 /100 WBC
PLATELET # BLD AUTO: 337 K/UL (ref 150–450)
PMV BLD AUTO: 9.7 FL (ref 9.2–12.9)
POTASSIUM SERPL-SCNC: 3.8 MMOL/L (ref 3.5–5.1)
PROT SERPL-MCNC: 8.7 G/DL (ref 6–8.4)
RBC # BLD AUTO: 4.79 M/UL (ref 4.6–6.2)
RHEUMATOID FACT SERPL-ACNC: <13 IU/ML (ref 0–15)
SODIUM SERPL-SCNC: 140 MMOL/L (ref 136–145)
T3FREE SERPL-MCNC: 3.5 PG/ML (ref 2.3–4.2)
T4 FREE SERPL-MCNC: 1.09 NG/DL (ref 0.71–1.51)
TRIGL SERPL-MCNC: 321 MG/DL (ref 30–150)
TSH SERPL DL<=0.005 MIU/L-ACNC: 1.51 UIU/ML (ref 0.4–4)
WBC # BLD AUTO: 10.46 K/UL (ref 3.9–12.7)

## 2024-12-30 PROCEDURE — 86200 CCP ANTIBODY: CPT | Performed by: INTERNAL MEDICINE

## 2024-12-30 PROCEDURE — 80061 LIPID PANEL: CPT | Performed by: INTERNAL MEDICINE

## 2024-12-30 PROCEDURE — 85025 COMPLETE CBC W/AUTO DIFF WBC: CPT | Performed by: INTERNAL MEDICINE

## 2024-12-30 PROCEDURE — 86706 HEP B SURFACE ANTIBODY: CPT | Mod: 91 | Performed by: INTERNAL MEDICINE

## 2024-12-30 PROCEDURE — 82306 VITAMIN D 25 HYDROXY: CPT | Performed by: INTERNAL MEDICINE

## 2024-12-30 PROCEDURE — 86704 HEP B CORE ANTIBODY TOTAL: CPT | Performed by: INTERNAL MEDICINE

## 2024-12-30 PROCEDURE — 86706 HEP B SURFACE ANTIBODY: CPT | Performed by: INTERNAL MEDICINE

## 2024-12-30 PROCEDURE — 84481 FREE ASSAY (FT-3): CPT | Performed by: INTERNAL MEDICINE

## 2024-12-30 PROCEDURE — 85652 RBC SED RATE AUTOMATED: CPT | Performed by: INTERNAL MEDICINE

## 2024-12-30 PROCEDURE — 86140 C-REACTIVE PROTEIN: CPT | Performed by: INTERNAL MEDICINE

## 2024-12-30 PROCEDURE — 86803 HEPATITIS C AB TEST: CPT | Performed by: INTERNAL MEDICINE

## 2024-12-30 PROCEDURE — 80053 COMPREHEN METABOLIC PANEL: CPT | Performed by: INTERNAL MEDICINE

## 2024-12-30 PROCEDURE — 84443 ASSAY THYROID STIM HORMONE: CPT | Performed by: INTERNAL MEDICINE

## 2024-12-30 PROCEDURE — 87340 HEPATITIS B SURFACE AG IA: CPT | Performed by: INTERNAL MEDICINE

## 2024-12-30 PROCEDURE — 86431 RHEUMATOID FACTOR QUANT: CPT | Performed by: INTERNAL MEDICINE

## 2024-12-30 PROCEDURE — 86480 TB TEST CELL IMMUN MEASURE: CPT | Performed by: INTERNAL MEDICINE

## 2024-12-30 PROCEDURE — 84439 ASSAY OF FREE THYROXINE: CPT | Performed by: INTERNAL MEDICINE

## 2024-12-31 LAB
GAMMA INTERFERON BACKGROUND BLD IA-ACNC: 0 IU/ML
M TB IFN-G CD4+ BCKGRND COR BLD-ACNC: 0 IU/ML
M TB IFN-G CD4+ BCKGRND COR BLD-ACNC: 0 IU/ML
MITOGEN IGNF BCKGRD COR BLD-ACNC: 7.48 IU/ML
TB GOLD PLUS: NEGATIVE

## 2025-01-08 ENCOUNTER — OFFICE VISIT (OUTPATIENT)
Dept: RHEUMATOLOGY | Facility: CLINIC | Age: 45
End: 2025-01-08
Payer: MEDICARE

## 2025-01-08 VITALS
HEART RATE: 102 BPM | SYSTOLIC BLOOD PRESSURE: 132 MMHG | HEIGHT: 72 IN | WEIGHT: 188.94 LBS | DIASTOLIC BLOOD PRESSURE: 81 MMHG | BODY MASS INDEX: 25.59 KG/M2

## 2025-01-08 DIAGNOSIS — M45.9 ANKYLOSING SPONDYLITIS, UNSPECIFIED SITE OF SPINE: ICD-10-CM

## 2025-01-08 DIAGNOSIS — L70.0 ACNE VULGARIS: ICD-10-CM

## 2025-01-08 DIAGNOSIS — Z79.899 IMMUNOCOMPROMISED STATE DUE TO DRUG THERAPY: ICD-10-CM

## 2025-01-08 DIAGNOSIS — I73.00 RAYNAUD'S DISEASE WITHOUT GANGRENE: Primary | ICD-10-CM

## 2025-01-08 DIAGNOSIS — G89.4 CHRONIC PAIN SYNDROME: ICD-10-CM

## 2025-01-08 DIAGNOSIS — M46.46 LUMBAR DISCITIS: ICD-10-CM

## 2025-01-08 DIAGNOSIS — E55.9 VITAMIN D DEFICIENCY, UNSPECIFIED: ICD-10-CM

## 2025-01-08 DIAGNOSIS — L08.9 LOCAL SKIN INFECTION: ICD-10-CM

## 2025-01-08 DIAGNOSIS — M47.817 LUMBAR AND SACRAL ARTHRITIS: ICD-10-CM

## 2025-01-08 DIAGNOSIS — M47.816 LUMBAR SPONDYLOSIS: ICD-10-CM

## 2025-01-08 DIAGNOSIS — M62.838 NECK MUSCLE SPASM: ICD-10-CM

## 2025-01-08 DIAGNOSIS — D84.821 IMMUNOCOMPROMISED STATE DUE TO DRUG THERAPY: ICD-10-CM

## 2025-01-08 DIAGNOSIS — L03.90 CELLULITIS, UNSPECIFIED CELLULITIS SITE: ICD-10-CM

## 2025-01-08 PROCEDURE — 99999PBSHW PR PBB SHADOW TECHNICAL ONLY FILED TO HB: Mod: PBBFAC,,,

## 2025-01-08 PROCEDURE — 99215 OFFICE O/P EST HI 40 MIN: CPT | Mod: 25,S$PBB,, | Performed by: INTERNAL MEDICINE

## 2025-01-08 PROCEDURE — 99214 OFFICE O/P EST MOD 30 MIN: CPT | Mod: PBBFAC,PO | Performed by: INTERNAL MEDICINE

## 2025-01-08 PROCEDURE — 96372 THER/PROPH/DIAG INJ SC/IM: CPT | Mod: PBBFAC,59,PO

## 2025-01-08 PROCEDURE — 99999 PR PBB SHADOW E&M-EST. PATIENT-LVL IV: CPT | Mod: PBBFAC,,, | Performed by: INTERNAL MEDICINE

## 2025-01-08 RX ORDER — OXYCODONE AND ACETAMINOPHEN 10; 325 MG/1; MG/1
1 TABLET ORAL EVERY 8 HOURS PRN
Qty: 90 TABLET | Refills: 0 | Status: SHIPPED | OUTPATIENT
Start: 2025-01-20 | End: 2025-02-19

## 2025-01-08 RX ORDER — METHYLPREDNISOLONE ACETATE 80 MG/ML
160 INJECTION, SUSPENSION INTRA-ARTICULAR; INTRALESIONAL; INTRAMUSCULAR; SOFT TISSUE
Status: COMPLETED | OUTPATIENT
Start: 2025-01-08 | End: 2025-01-08

## 2025-01-08 RX ORDER — DAPSONE 25 MG/1
25 TABLET ORAL
Qty: 90 TABLET | OUTPATIENT
Start: 2025-01-08

## 2025-01-08 RX ORDER — EZETIMIBE 10 MG/1
10 TABLET ORAL DAILY
Qty: 90 TABLET | Refills: 3 | Status: SHIPPED | OUTPATIENT
Start: 2025-01-08 | End: 2026-01-08

## 2025-01-08 RX ORDER — DAPSONE 25 MG/1
25 TABLET ORAL DAILY
Qty: 90 TABLET | Refills: 1 | Status: SHIPPED | OUTPATIENT
Start: 2025-01-08 | End: 2025-07-07

## 2025-01-08 RX ORDER — TRETINOIN 0.5 MG/G
CREAM TOPICAL NIGHTLY
Qty: 45 G | Refills: 3 | Status: SHIPPED | OUTPATIENT
Start: 2025-01-08

## 2025-01-08 RX ORDER — DOXYCYCLINE 100 MG/1
100 CAPSULE ORAL 2 TIMES DAILY
COMMUNITY
Start: 2024-10-26 | End: 2025-01-08 | Stop reason: SDUPTHER

## 2025-01-08 RX ORDER — CYANOCOBALAMIN 1000 UG/ML
1000 INJECTION, SOLUTION INTRAMUSCULAR; SUBCUTANEOUS
Status: COMPLETED | OUTPATIENT
Start: 2025-01-08 | End: 2025-01-08

## 2025-01-08 RX ORDER — SILDENAFIL CITRATE 20 MG/1
20 TABLET ORAL 3 TIMES DAILY
Qty: 60 TABLET | Refills: 5 | Status: SHIPPED | OUTPATIENT
Start: 2025-01-08 | End: 2025-07-07

## 2025-01-08 RX ORDER — OXYCODONE AND ACETAMINOPHEN 10; 325 MG/1; MG/1
1 TABLET ORAL EVERY 8 HOURS PRN
Qty: 90 TABLET | Refills: 0 | Status: SHIPPED | OUTPATIENT
Start: 2025-02-20 | End: 2025-03-22

## 2025-01-08 RX ORDER — KETOROLAC TROMETHAMINE 30 MG/ML
60 INJECTION, SOLUTION INTRAMUSCULAR; INTRAVENOUS
Status: COMPLETED | OUTPATIENT
Start: 2025-01-08 | End: 2025-01-08

## 2025-01-08 RX ORDER — DOXYCYCLINE 100 MG/1
100 CAPSULE ORAL 2 TIMES DAILY
Qty: 60 CAPSULE | Refills: 2 | Status: SHIPPED | OUTPATIENT
Start: 2025-01-08 | End: 2025-04-08

## 2025-01-08 RX ORDER — SILDENAFIL CITRATE 20 MG/1
TABLET ORAL
COMMUNITY
End: 2025-01-08 | Stop reason: SDUPTHER

## 2025-01-08 RX ORDER — OXYCODONE AND ACETAMINOPHEN 10; 325 MG/1; MG/1
1 TABLET ORAL EVERY 8 HOURS PRN
Qty: 90 TABLET | Refills: 0 | Status: SHIPPED | OUTPATIENT
Start: 2025-03-20 | End: 2025-04-19

## 2025-01-08 RX ADMIN — KETOROLAC TROMETHAMINE 60 MG: 60 INJECTION, SOLUTION INTRAMUSCULAR at 11:01

## 2025-01-08 RX ADMIN — CYANOCOBALAMIN 1000 MCG: 1000 INJECTION INTRAMUSCULAR; SUBCUTANEOUS at 11:01

## 2025-01-08 RX ADMIN — METHYLPREDNISOLONE ACETATE 160 MG: 80 INJECTION, SUSPENSION INTRA-ARTICULAR; INTRALESIONAL; INTRAMUSCULAR; SOFT TISSUE at 11:01

## 2025-01-08 ASSESSMENT — ROUTINE ASSESSMENT OF PATIENT INDEX DATA (RAPID3)
MDHAQ FUNCTION SCORE: 1.1
PATIENT GLOBAL ASSESSMENT SCORE: 7
TOTAL RAPID3 SCORE: 5.89
PAIN SCORE: 7
PSYCHOLOGICAL DISTRESS SCORE: 2.2
FATIGUE SCORE: 2.2

## 2025-01-08 NOTE — PROGRESS NOTES
Subjective:     Patient ID:  Umang Greer    Chief Complaint:  Disease Management     History of Present Illness:  Pt is a 44 y.o. male who presents to clinic for follow up on Ankylosing spondylitis,He has had multiple skin eruptions on his back he started docycline and dapsone. He has thrush that he is treating. He is using hibiclens prn and panoxyl soap slightly improved. He complain   He reports good improvement overall with his arthritis on simponi monthly.  He is using prednisone 2.5-5 mg daily PRN.      Current tx:  1. Simponi  2. Prednisone prn  3. Perocet 10/325 q8    Rheumatologic History:   - Diagnosis/es:  - Positive serologies:  - Infectious screening labs:  - Previous Treatments:  - Current Treatments:     Interval History:   Hospitalization since last office visit: No    Patient Active Problem List    Diagnosis Date Noted    Gastroesophageal reflux disease without esophagitis 03/14/2024    Immunocompromised state due to drug therapy 01/27/2023    Hyperlipidemia 01/27/2020    Chronic obstructive pulmonary disease 02/02/2018    Vitamin D deficiency 02/02/2018    Anxiety 12/15/2015    Ankylosing spondylitis 12/04/2014    Lumbar and sacral arthritis 12/04/2014    Osteoarthritis of both hips 12/04/2014    Sickle cell trait 12/14/2012    Cardiomyopathy, alcoholic 08/03/2012    Tobacco dependence 08/03/2012     Past Surgical History:   Procedure Laterality Date    HERNIA REPAIR      NASAL SEPTOPLASTY  12/20/2021    Dr. Menjivar at [facility]     Social History     Tobacco Use    Smoking status: Every Day     Current packs/day: 0.50     Average packs/day: 0.5 packs/day for 0.5 years (0.3 ttl pk-yrs)     Types: Cigarettes    Smokeless tobacco: Current   Substance Use Topics    Alcohol use: No    Drug use: Yes     Types: Hydrocodone     Family History   Problem Relation Name Age of Onset    Thyroid disease Mother      Heart disease Mother      Hypertension Mother      Anemia Mother      Heart disease Father       Cancer Paternal Grandmother      Amblyopia Neg Hx      Blindness Neg Hx      Cataracts Neg Hx      Macular degeneration Neg Hx      Retinal detachment Neg Hx      Strabismus Neg Hx      Stroke Neg Hx      Glaucoma Neg Hx       Review of patient's allergies indicates:   Allergen Reactions    Aspirin Anaphylaxis     Other Reaction(s): tongue/lip swelling    Azulfidine [sulfasalazine] Anaphylaxis    Gabapentin      Lip swelling, tongue swelling    Other Reaction(s): lip/throat swelling    Ibuprofen Anaphylaxis and Swelling    Sulfa (sulfonamide antibiotics) Swelling       Review of Systems   Review of Systems   Constitutional:  Negative for activity change, appetite change, chills, diaphoresis, fatigue, fever and unexpected weight change.   HENT:  Negative for congestion, ear pain, facial swelling, mouth sores, nosebleeds, postnasal drip, rhinorrhea, sinus pressure, sneezing, sore throat, tinnitus, trouble swallowing and voice change.    Eyes:  Negative for pain, discharge, redness, itching and visual disturbance.   Respiratory:  Negative for apnea, cough, chest tightness, shortness of breath and wheezing.    Cardiovascular:  Negative for chest pain, palpitations and leg swelling.   Gastrointestinal:  Negative for abdominal pain, constipation, diarrhea, nausea and vomiting.   Endocrine: Negative for cold intolerance, heat intolerance, polydipsia and polyuria.   Genitourinary:  Negative for decreased urine volume, difficulty urinating, dysuria, flank pain, frequency, hematuria and urgency.   Musculoskeletal:  Positive for back pain, joint swelling and neck stiffness. Negative for arthralgias, gait problem, myalgias and neck pain.   Skin:  Negative for pallor, rash and wound.   Allergic/Immunologic: Negative for immunocompromised state.   Neurological:  Negative for dizziness, tremors, seizures, syncope, weakness, numbness and headaches.   Hematological:  Negative for adenopathy. Does not bruise/bleed easily.    Psychiatric/Behavioral:  Negative for sleep disturbance and suicidal ideas. The patient is not nervous/anxious.         Current Medications:  Current Outpatient Medications   Medication Instructions    ALPRAZolam (XANAX) 2 mg, Oral, 2 times daily PRN    baclofen (LIORESAL) 20 mg, Oral, 4 times daily    budesonide-formoterol 160-4.5 mcg (SYMBICORT) 160-4.5 mcg/actuation HFAA INHALE 2 PUFFS INTO THE LUNGS EVERY 12 HOURS    carvediloL (COREG) 3.125 mg, Oral, 2 times daily    cetirizine (ZYRTEC) 10 mg, Daily    chlorhexidine (PERIDEX) 0.12 % solution SMARTSIG:By Mouth    clotrimazole-betamethasone 1-0.05% (LOTRISONE) cream Topical (Top), 2 times daily    cyanocobalamin (VITAMIN B-12) 1,000 mcg    dapsone 25 mg, Oral, Daily    doxycycline (VIBRAMYCIN) 100 mg, Oral, 2 times daily    EPINEPHrine (EPIPEN JR) 0.3 mg, As needed (PRN)    ezetimibe (ZETIA) 10 mg, Oral, Daily    famotidine (PEPCID) 40 mg, Oral, Nightly PRN    golimumab (SIMPONI) 50 mg/0.5 mL PnIj Inject 0.5 mL (50 mg) into the skin every 30 days.    icosapent ethyL (VASCEPA) 1,000 mg, Oral, 2 times daily, 5    ketoconazole (NIZORAL) 2 % shampoo Topical (Top), Twice weekly    mupirocin (BACTROBAN) 2 % ointment APPLY EXTERNALLY TO THE AFFECTED AREA THREE TIMES DAILY    NARCAN 4 mg/actuation Spry No dose, route, or frequency recorded.    omeprazole (PRILOSEC) 20 mg, Oral, Daily    [START ON 1/20/2025] oxyCODONE-acetaminophen (PERCOCET)  mg per tablet 1 tablet, Oral, Every 8 hours PRN    [START ON 2/20/2025] oxyCODONE-acetaminophen (PERCOCET)  mg per tablet 1 tablet, Oral, Every 8 hours PRN    [START ON 3/20/2025] oxyCODONE-acetaminophen (PERCOCET)  mg per tablet 1 tablet, Oral, Every 8 hours PRN    predniSONE (DELTASONE) 3 mg, Oral, Daily    sildenafil (REVATIO) 20 mg Tab 60    tadalafiL (CIALIS) 20 mg, Oral, Daily PRN    tretinoin (RETIN-A) 0.05 % cream Topical (Top), Nightly         Objective:     Vitals:    01/08/25 0931   BP: 132/81   Pulse:  102   Weight: 85.7 kg (188 lb 15 oz)   Height: 6' (1.829 m)   PainSc:   7   PainLoc: Back      Body mass index is 25.62 kg/m².     Physical Examinations:  Physical Exam   Constitutional: He is oriented to person, place, and time. No distress.   HENT:   Head: Normocephalic and atraumatic.   Mouth/Throat: Oropharynx is clear and moist.   Eyes: Pupils are equal, round, and reactive to light.   Neck: No thyromegaly present.   Cardiovascular: Normal rate, regular rhythm and normal heart sounds. Exam reveals no gallop and no friction rub.   No murmur heard.  Pulmonary/Chest: Breath sounds normal. He has no wheezes. He has no rales. He exhibits no tenderness.   Abdominal: There is no abdominal tenderness. There is no rebound and no guarding.   Musculoskeletal:         General: Tenderness and deformity present.      Right shoulder: Tenderness present.      Left shoulder: Tenderness present.      Right elbow: Tenderness present.      Left elbow: Normal.      Left wrist: Normal.      Cervical back: Neck supple.      Right knee: Swelling and effusion present.      Left knee: Swelling and effusion present.   Lymphadenopathy:     He has no cervical adenopathy.   Neurological: He is alert and oriented to person, place, and time. He displays weakness. He exhibits abnormal muscle tone.   Reflex Scores:       Patellar reflexes are 2+ on the right side and 2+ on the left side.  Skin: No rash noted. No erythema. No pallor.   Psychiatric: Mood and affect normal.   Vitals reviewed.      Right Side Rheumatological Exam     Examination finds the 2nd MCP, 3rd MCP, 4th MCP and 5th MCP normal.    The patient is tender to palpation of the shoulder and elbow    He has swelling of the knee    The patient has an enlarged wrist, 1st PIP, 2nd PIP, 3rd PIP, 4th PIP and 5th PIP    Shoulder Exam   Tenderness Location: acromioclavicular joint and posterior shoulder    Range of Motion   Active abduction:  abnormal   Adduction: abnormal  Sensation:  normal    Knee Exam     Range of Motion   Extension:  normal   Flexion:  normal   Patellofemoral Crepitus: positive  Effusion: positive  Sensation: normal    Hip Exam   Tenderness Location: anterior and posterior    Range of Motion   Extension:  abnormal   Flexion:  abnormal   Sensation: normal    Elbow/Wrist Exam   Tenderness Location: no tenderness    Range of Motion   Elbow   Flexion:  normal   Sensation: normal    Muscle Strength (0-5 scale):  Neck Flexion:  3  Neck Extension: 3  Deltoid:  3  Biceps: 3/5   Triceps:  3  Quadriceps:  3   Distal Lower Extremity: 3    Left Side Rheumatological Exam     Examination finds the elbow, wrist, 1st MCP, 2nd MCP, 3rd MCP, 4th MCP and 5th MCP normal.    The patient is tender to palpation of the shoulder.    He has swelling of the knee    The patient has an enlarged 1st PIP, 2nd PIP, 3rd PIP, 4th PIP and 5th PIP.    Shoulder Exam   Tenderness Location: acromioclavicular joint and posterior shoulder    Range of Motion   Active abduction:  abnormal   Extension:  abnormal   Sensation: normal    Knee Exam     Range of Motion   Extension:  normal   Flexion:  normal     Patellofemoral Crepitus: positive  Effusion: positive  Sensation: normal    Hip Exam   Tenderness Location: anterior and posterior    Range of Motion   Extension:  abnormal   Flexion:  abnormal   Sensation: normal    Elbow/Wrist Exam     Range of Motion   Elbow   Flexion:  normal   Sensation: normal    Muscle Strength (0-5 scale):  Neck Flexion:  3  Neck Extension: 3  Deltoid:  3  Biceps: 3/5   Triceps:  3  Quadriceps:  3   Distal Lower Extremity: 3      Back/Neck Exam   General Inspection   Gait: normal       Tenderness Right paramedian tenderness of the Lower C-Spine, Lower L-Spine, Upper C-Spine, Upper L-Spine and SI Joint.Left paramedian tenderness of the Lower C-Spine, Lower L-Spine, Upper C-Spine, Upper L-Spine and SI Joint.    Back Range of Motion   Extension:  abnormal  Flexion:  abnormal  Lateral Bend  Right:  abnormal  Lateral Bend Left:  abnormal  Rotation Right:  abnormal  Rotation Left:  abnormal    Neck Range of Motion   Flexion:  Limited and moderate  Extension:  Limited and moderate  Right Lateral Bend: abnormal  Left Lateral Bend: abnormal  Right Rotation: abnormal  Left Rotation: abnormal       Disease Assessment Scores:  Patient's Global Assessment of arthritis (0-10): 2  Physician's Global Assessment of arthritis (0-10): 2  Number of Tender Joints (0-28): 2  Number of Swollen Joints (0-28): 3        5/24/2024    12:39 PM   Rapid3 Question Responses and Scores   MDHAQ Score 1.1   Psychologic Score 3.3   Pain Score 7   When you awakened in the morning OVER THE LAST WEEK, did you feel stiff? Yes   If Yes, please indicate the number of hours until you are as limber as you will be for the day 1   Fatigue Score 7   Global Health Score 6.5   RAPID3 Score 5.72       Monitoring Lab Results:  Lab Results   Component Value Date    WBC 10.46 12/30/2024    RBC 4.79 12/30/2024    HGB 14.4 12/30/2024    HCT 44.1 12/30/2024    MCV 92 12/30/2024    MCH 30.1 12/30/2024    MCHC 32.7 12/30/2024    RDW 12.4 12/30/2024     12/30/2024        Lab Results   Component Value Date     12/30/2024    K 3.8 12/30/2024     12/30/2024    CO2 25 12/30/2024     12/30/2024    BUN 11 12/30/2024    CREATININE 0.9 12/30/2024    CALCIUM 9.7 12/30/2024    PROT 8.7 (H) 12/30/2024    ALBUMIN 4.3 12/30/2024    BILITOT 0.4 12/30/2024    ALKPHOS 121 12/30/2024    AST 18 12/30/2024    ALT 12 12/30/2024    ANIONGAP 13 12/30/2024    EGFRNORACEVR >60.0 12/30/2024       Lab Results   Component Value Date    SEDRATE <2 12/30/2024    CRP 8.3 (H) 12/30/2024        Lab Results   Component Value Date    YKGBRGJL04ZF 69 12/30/2024    WBMLBFHY09 964 (H) 10/08/2021        Lab Results   Component Value Date    CHOL 197 12/30/2024    HDL 32 (L) 12/30/2024    LDLCALC 100.8 12/30/2024    TRIG 321 (H) 12/30/2024       Lab Results  "  Component Value Date    RF <13.0 12/30/2024    CCPANTIBODIE <0.5 12/30/2024     No results found for: "ANASCREEN", "ANATITER", "ANAPATTE", "DSDNA", "SMRNPAB", "SSAANTIBODY", "SSBANTIBODY", "MSP78XO", "JO1AB"  No results found for: "HLABB27"    Infectious Disease Screening:  Lab Results   Component Value Date    HEPBSAG Non-reactive 12/30/2024    HEPBCAB Non-reactive 12/30/2024    HEPBSAB 5.27 12/30/2024    HEPBSAB Non-reactive 12/30/2024    HEPBSURFABQU Negative 12/30/2024    HEPBSURFABQU 4 12/30/2024    HEPBIGM Non-reactive 09/21/2022     Lab Results   Component Value Date    HEPCAB Non-reactive 12/30/2024     Lab Results   Component Value Date    TBGOLDPLUS Negative 12/30/2024     No results found for: "QUANTIFERON", "SVCMT", "QUANTAGVALUE", "QUANTNILVALU", "QUANTMITOGEN", "QFTTBAG", "QINT"     Imaging: DEXA, Xrays, MRIs, CTs, etc    Old & Outside Medical Records:  Reviewed old and all outside medical records available in Care Everywhere     Assessment:     Encounter Diagnoses   Name Primary?    Raynaud's disease without gangrene Yes    Acne vulgaris     Lumbar and sacral arthritis     Lumbar discitis     Lumbar spondylosis     Immunocompromised state due to drug therapy     Cellulitis, unspecified cellulitis site     Local skin infection     Ankylosing spondylitis, unspecified site of spine     Chronic pain syndrome     Neck muscle spasm     Vitamin D deficiency, unspecified          Plan:      Encounter Diagnoses   Name Primary?    Acne vulgaris     Lumbar and sacral arthritis     Lumbar discitis     Lumbar spondylosis     Immunocompromised state due to drug therapy     Cellulitis, unspecified cellulitis site     Local skin infection     Ankylosing spondylitis, unspecified site of spine     Chronic pain syndrome     Neck muscle spasm     Vitamin D deficiency, unspecified       Umang was seen today for follow-up.    Diagnoses and all orders for this visit:    Acne vulgaris  -     dapsone 25 MG Tab; Take 1 " tablet (25 mg total) by mouth once daily.  -     doxycycline (VIBRAMYCIN) 100 MG Cap; Take 1 capsule (100 mg total) by mouth 2 (two) times daily.  -     tretinoin (RETIN-A) 0.05 % cream; Apply topically every evening.  -     ezetimibe (ZETIA) 10 mg tablet; Take 1 tablet (10 mg total) by mouth once daily.  -     TESTOSTERONE; Future  -     oxyCODONE-acetaminophen (PERCOCET)  mg per tablet; Take 1 tablet by mouth every 8 (eight) hours as needed for Pain.  -     oxyCODONE-acetaminophen (PERCOCET)  mg per tablet; Take 1 tablet by mouth every 8 (eight) hours as needed for Pain.  -     oxyCODONE-acetaminophen (PERCOCET)  mg per tablet; Take 1 tablet by mouth every 8 (eight) hours as needed for Pain.  -     methylPREDNISolone acetate injection 160 mg  -     ketorolac injection 60 mg  -     cyanocobalamin injection 1,000 mcg    Lumbar and sacral arthritis  -     dapsone 25 MG Tab; Take 1 tablet (25 mg total) by mouth once daily.  -     doxycycline (VIBRAMYCIN) 100 MG Cap; Take 1 capsule (100 mg total) by mouth 2 (two) times daily.  -     tretinoin (RETIN-A) 0.05 % cream; Apply topically every evening.  -     ezetimibe (ZETIA) 10 mg tablet; Take 1 tablet (10 mg total) by mouth once daily.  -     TESTOSTERONE; Future  -     oxyCODONE-acetaminophen (PERCOCET)  mg per tablet; Take 1 tablet by mouth every 8 (eight) hours as needed for Pain.  -     oxyCODONE-acetaminophen (PERCOCET)  mg per tablet; Take 1 tablet by mouth every 8 (eight) hours as needed for Pain.  -     oxyCODONE-acetaminophen (PERCOCET)  mg per tablet; Take 1 tablet by mouth every 8 (eight) hours as needed for Pain.  -     methylPREDNISolone acetate injection 160 mg  -     ketorolac injection 60 mg  -     cyanocobalamin injection 1,000 mcg  -     Sedimentation rate; Future  -     C-Reactive Protein; Future  -     Comprehensive Metabolic Panel; Future  -     CBC Auto Differential; Future  -     T4, Free; Future  -     TSH;  Future  -     T3, Free; Future  -     Lipid Panel; Future    Lumbar discitis  -     dapsone 25 MG Tab; Take 1 tablet (25 mg total) by mouth once daily.  -     doxycycline (VIBRAMYCIN) 100 MG Cap; Take 1 capsule (100 mg total) by mouth 2 (two) times daily.  -     tretinoin (RETIN-A) 0.05 % cream; Apply topically every evening.  -     ezetimibe (ZETIA) 10 mg tablet; Take 1 tablet (10 mg total) by mouth once daily.  -     TESTOSTERONE; Future  -     oxyCODONE-acetaminophen (PERCOCET)  mg per tablet; Take 1 tablet by mouth every 8 (eight) hours as needed for Pain.  -     oxyCODONE-acetaminophen (PERCOCET)  mg per tablet; Take 1 tablet by mouth every 8 (eight) hours as needed for Pain.  -     oxyCODONE-acetaminophen (PERCOCET)  mg per tablet; Take 1 tablet by mouth every 8 (eight) hours as needed for Pain.  -     methylPREDNISolone acetate injection 160 mg  -     ketorolac injection 60 mg  -     cyanocobalamin injection 1,000 mcg  -     Sedimentation rate; Future  -     C-Reactive Protein; Future  -     Comprehensive Metabolic Panel; Future  -     CBC Auto Differential; Future  -     T4, Free; Future  -     TSH; Future  -     T3, Free; Future  -     Lipid Panel; Future    Lumbar spondylosis  -     dapsone 25 MG Tab; Take 1 tablet (25 mg total) by mouth once daily.  -     doxycycline (VIBRAMYCIN) 100 MG Cap; Take 1 capsule (100 mg total) by mouth 2 (two) times daily.  -     tretinoin (RETIN-A) 0.05 % cream; Apply topically every evening.  -     ezetimibe (ZETIA) 10 mg tablet; Take 1 tablet (10 mg total) by mouth once daily.  -     TESTOSTERONE; Future  -     oxyCODONE-acetaminophen (PERCOCET)  mg per tablet; Take 1 tablet by mouth every 8 (eight) hours as needed for Pain.  -     oxyCODONE-acetaminophen (PERCOCET)  mg per tablet; Take 1 tablet by mouth every 8 (eight) hours as needed for Pain.  -     oxyCODONE-acetaminophen (PERCOCET)  mg per tablet; Take 1 tablet by mouth every 8  (eight) hours as needed for Pain.  -     methylPREDNISolone acetate injection 160 mg  -     ketorolac injection 60 mg  -     cyanocobalamin injection 1,000 mcg  -     Sedimentation rate; Future  -     C-Reactive Protein; Future  -     Comprehensive Metabolic Panel; Future  -     CBC Auto Differential; Future  -     T4, Free; Future  -     TSH; Future  -     T3, Free; Future  -     Lipid Panel; Future    Immunocompromised state due to drug therapy  -     dapsone 25 MG Tab; Take 1 tablet (25 mg total) by mouth once daily.  -     doxycycline (VIBRAMYCIN) 100 MG Cap; Take 1 capsule (100 mg total) by mouth 2 (two) times daily.  -     tretinoin (RETIN-A) 0.05 % cream; Apply topically every evening.  -     ezetimibe (ZETIA) 10 mg tablet; Take 1 tablet (10 mg total) by mouth once daily.  -     TESTOSTERONE; Future  -     oxyCODONE-acetaminophen (PERCOCET)  mg per tablet; Take 1 tablet by mouth every 8 (eight) hours as needed for Pain.  -     oxyCODONE-acetaminophen (PERCOCET)  mg per tablet; Take 1 tablet by mouth every 8 (eight) hours as needed for Pain.  -     oxyCODONE-acetaminophen (PERCOCET)  mg per tablet; Take 1 tablet by mouth every 8 (eight) hours as needed for Pain.  -     Sedimentation rate; Future  -     C-Reactive Protein; Future  -     Comprehensive Metabolic Panel; Future  -     CBC Auto Differential; Future  -     T4, Free; Future  -     TSH; Future  -     T3, Free; Future  -     Lipid Panel; Future    Cellulitis, unspecified cellulitis site  -     Sedimentation rate; Future  -     C-Reactive Protein; Future  -     Comprehensive Metabolic Panel; Future  -     CBC Auto Differential; Future  -     T4, Free; Future  -     TSH; Future  -     T3, Free; Future  -     Lipid Panel; Future    Local skin infection  -     dapsone 25 MG Tab; Take 1 tablet (25 mg total) by mouth once daily.  -     doxycycline (VIBRAMYCIN) 100 MG Cap; Take 1 capsule (100 mg total) by mouth 2 (two) times daily.  -      tretinoin (RETIN-A) 0.05 % cream; Apply topically every evening.  -     ezetimibe (ZETIA) 10 mg tablet; Take 1 tablet (10 mg total) by mouth once daily.  -     TESTOSTERONE; Future  -     oxyCODONE-acetaminophen (PERCOCET)  mg per tablet; Take 1 tablet by mouth every 8 (eight) hours as needed for Pain.  -     oxyCODONE-acetaminophen (PERCOCET)  mg per tablet; Take 1 tablet by mouth every 8 (eight) hours as needed for Pain.  -     oxyCODONE-acetaminophen (PERCOCET)  mg per tablet; Take 1 tablet by mouth every 8 (eight) hours as needed for Pain.  -     Sedimentation rate; Future  -     C-Reactive Protein; Future  -     Comprehensive Metabolic Panel; Future  -     CBC Auto Differential; Future  -     T4, Free; Future  -     TSH; Future  -     T3, Free; Future  -     Lipid Panel; Future    Ankylosing spondylitis, unspecified site of spine  -     dapsone 25 MG Tab; Take 1 tablet (25 mg total) by mouth once daily.  -     doxycycline (VIBRAMYCIN) 100 MG Cap; Take 1 capsule (100 mg total) by mouth 2 (two) times daily.  -     tretinoin (RETIN-A) 0.05 % cream; Apply topically every evening.  -     ezetimibe (ZETIA) 10 mg tablet; Take 1 tablet (10 mg total) by mouth once daily.  -     TESTOSTERONE; Future  -     oxyCODONE-acetaminophen (PERCOCET)  mg per tablet; Take 1 tablet by mouth every 8 (eight) hours as needed for Pain.  -     oxyCODONE-acetaminophen (PERCOCET)  mg per tablet; Take 1 tablet by mouth every 8 (eight) hours as needed for Pain.  -     oxyCODONE-acetaminophen (PERCOCET)  mg per tablet; Take 1 tablet by mouth every 8 (eight) hours as needed for Pain.  -     methylPREDNISolone acetate injection 160 mg  -     ketorolac injection 60 mg  -     cyanocobalamin injection 1,000 mcg  -     Sedimentation rate; Future  -     C-Reactive Protein; Future  -     Comprehensive Metabolic Panel; Future  -     CBC Auto Differential; Future  -     T4, Free; Future  -     TSH; Future  -     T3,  Free; Future  -     Lipid Panel; Future    Chronic pain syndrome  -     dapsone 25 MG Tab; Take 1 tablet (25 mg total) by mouth once daily.  -     doxycycline (VIBRAMYCIN) 100 MG Cap; Take 1 capsule (100 mg total) by mouth 2 (two) times daily.  -     tretinoin (RETIN-A) 0.05 % cream; Apply topically every evening.  -     ezetimibe (ZETIA) 10 mg tablet; Take 1 tablet (10 mg total) by mouth once daily.  -     TESTOSTERONE; Future  -     oxyCODONE-acetaminophen (PERCOCET)  mg per tablet; Take 1 tablet by mouth every 8 (eight) hours as needed for Pain.  -     oxyCODONE-acetaminophen (PERCOCET)  mg per tablet; Take 1 tablet by mouth every 8 (eight) hours as needed for Pain.  -     oxyCODONE-acetaminophen (PERCOCET)  mg per tablet; Take 1 tablet by mouth every 8 (eight) hours as needed for Pain.  -     Sedimentation rate; Future  -     C-Reactive Protein; Future  -     Comprehensive Metabolic Panel; Future  -     CBC Auto Differential; Future  -     T4, Free; Future  -     TSH; Future  -     T3, Free; Future  -     Lipid Panel; Future    Neck muscle spasm  -     methylPREDNISolone acetate injection 160 mg  -     ketorolac injection 60 mg  -     cyanocobalamin injection 1,000 mcg  -     Sedimentation rate; Future  -     C-Reactive Protein; Future  -     Comprehensive Metabolic Panel; Future  -     CBC Auto Differential; Future  -     T4, Free; Future  -     TSH; Future  -     T3, Free; Future  -     Lipid Panel; Future    Vitamin D deficiency, unspecified   -     dapsone 25 MG Tab; Take 1 tablet (25 mg total) by mouth once daily.  -     doxycycline (VIBRAMYCIN) 100 MG Cap; Take 1 capsule (100 mg total) by mouth 2 (two) times daily.  -     tretinoin (RETIN-A) 0.05 % cream; Apply topically every evening.  -     ezetimibe (ZETIA) 10 mg tablet; Take 1 tablet (10 mg total) by mouth once daily.  -     TESTOSTERONE; Future  -     oxyCODONE-acetaminophen (PERCOCET)  mg per tablet; Take 1 tablet by mouth  every 8 (eight) hours as needed for Pain.  -     oxyCODONE-acetaminophen (PERCOCET)  mg per tablet; Take 1 tablet by mouth every 8 (eight) hours as needed for Pain.  -     oxyCODONE-acetaminophen (PERCOCET)  mg per tablet; Take 1 tablet by mouth every 8 (eight) hours as needed for Pain.  -     Sedimentation rate; Future  -     C-Reactive Protein; Future  -     Comprehensive Metabolic Panel; Future  -     CBC Auto Differential; Future  -     T4, Free; Future  -     TSH; Future  -     T3, Free; Future  -     Lipid Panel; Future        1. Continue simponi  2. perocet  3.  Nurse injection     Follow-up 4 months  More than 50% of the  40 minute encounter was spent face to face counseling the patient regarding current status and future plan of care as well as side effects  of the medications. All questions were answered to patient's satisfaction also includes  non-face to face time preparing to see the patient (eg, review of tests), Obtaining and/or reviewing separately obtained history, Documenting clinical information in the electronic or other health record, Independently interpreting results

## 2025-01-09 ENCOUNTER — OFFICE VISIT (OUTPATIENT)
Dept: FAMILY MEDICINE | Facility: CLINIC | Age: 45
End: 2025-01-09
Payer: MEDICARE

## 2025-01-09 DIAGNOSIS — D57.3 SICKLE CELL TRAIT: ICD-10-CM

## 2025-01-09 DIAGNOSIS — I42.6 CARDIOMYOPATHY, ALCOHOLIC: ICD-10-CM

## 2025-01-09 DIAGNOSIS — J44.9 CHRONIC OBSTRUCTIVE PULMONARY DISEASE, UNSPECIFIED COPD TYPE: ICD-10-CM

## 2025-01-09 DIAGNOSIS — F41.9 ANXIETY: Primary | ICD-10-CM

## 2025-01-09 RX ORDER — ALPRAZOLAM 2 MG/1
2 TABLET ORAL 2 TIMES DAILY PRN
Qty: 60 TABLET | Refills: 5 | Status: SHIPPED | OUTPATIENT
Start: 2025-01-09

## 2025-01-09 RX ORDER — BUDESONIDE AND FORMOTEROL FUMARATE DIHYDRATE 160; 4.5 UG/1; UG/1
AEROSOL RESPIRATORY (INHALATION)
Qty: 30.6 G | Refills: 3 | Status: SHIPPED | OUTPATIENT
Start: 2025-01-09

## 2025-01-09 NOTE — PROGRESS NOTES
Subjective:       Patient ID: Umang Greer is a 44 y.o. male.    Chief Complaint: Anxiety    Here for follow up multiple chronic medical issues. Doing well overall and in normal state of health.        Review of Systems   Constitutional:  Positive for activity change. Negative for chills, fever and unexpected weight change.   HENT:  Negative for hearing loss, rhinorrhea and trouble swallowing.    Eyes:  Negative for discharge and visual disturbance.   Respiratory:  Negative for cough, chest tightness, shortness of breath and wheezing.    Cardiovascular:  Negative for chest pain, palpitations and leg swelling.   Gastrointestinal:  Negative for blood in stool, constipation, diarrhea and vomiting.   Endocrine: Negative for cold intolerance, heat intolerance, polydipsia and polyuria.   Genitourinary:  Negative for difficulty urinating, hematuria and urgency.   Musculoskeletal:  Positive for arthralgias. Negative for joint swelling and neck pain.   Neurological:  Negative for weakness and headaches.   Psychiatric/Behavioral:  Negative for confusion, decreased concentration and dysphoric mood. The patient is not nervous/anxious.        Objective:      Physical Exam  Constitutional:       Appearance: Normal appearance.   Neurological:      Mental Status: He is alert.   Psychiatric:         Mood and Affect: Mood normal.         Behavior: Behavior normal.         Assessment:       1. Anxiety    2. Chronic obstructive pulmonary disease, unspecified COPD type    3. Cardiomyopathy, alcoholic    4. Sickle cell trait        Plan:       Anxiety  -     ALPRAZolam (XANAX) 2 MG Tab; Take 1 tablet (2 mg total) by mouth 2 (two) times daily as needed (anxiety).  Dispense: 60 tablet; Refill: 5    Chronic obstructive pulmonary disease, unspecified COPD type  -     budesonide-formoterol 160-4.5 mcg (SYMBICORT) 160-4.5 mcg/actuation HFAA; INHALE 2 PUFFS INTO THE LUNGS EVERY 12 HOURS  Dispense: 30.6 g; Refill: 3    Cardiomyopathy,  alcoholic    Sickle cell trait        Labs as planned with rheum  Will monitor chronic medical issues and continue current plan of care.  Visit today included increased complexity associated with the care of the episodic problem anxiety addressed and managing the longitudinal care of the patient due to the serious and/or complex managed problem(s) as above.  Mood stable  Copd stable with symbicort  Stop smoking     Follow up in about 3 months (around 4/9/2025), or if symptoms worsen or fail to improve, for Virtual Visit.

## 2025-01-20 DIAGNOSIS — M45.9 ANKYLOSING SPONDYLITIS, UNSPECIFIED SITE OF SPINE: ICD-10-CM

## 2025-01-27 RX ORDER — GOLIMUMAB 50 MG/.5ML
50 INJECTION, SOLUTION SUBCUTANEOUS
Qty: 0.5 ML | Refills: 11 | OUTPATIENT
Start: 2025-01-27 | End: 2026-01-27

## 2025-01-29 DIAGNOSIS — M45.9 ANKYLOSING SPONDYLITIS, UNSPECIFIED SITE OF SPINE: ICD-10-CM

## 2025-01-30 RX ORDER — GOLIMUMAB 50 MG/.5ML
50 INJECTION, SOLUTION SUBCUTANEOUS
Qty: 0.5 ML | Refills: 11 | Status: ACTIVE | OUTPATIENT
Start: 2025-01-30 | End: 2026-01-30

## 2025-02-21 DIAGNOSIS — Z00.00 ENCOUNTER FOR MEDICARE ANNUAL WELLNESS EXAM: ICD-10-CM

## 2025-02-27 ENCOUNTER — PATIENT MESSAGE (OUTPATIENT)
Dept: ADMINISTRATIVE | Facility: OTHER | Age: 45
End: 2025-02-27
Payer: MEDICARE

## 2025-03-31 ENCOUNTER — PATIENT MESSAGE (OUTPATIENT)
Dept: ADMINISTRATIVE | Facility: OTHER | Age: 45
End: 2025-03-31
Payer: MEDICARE

## 2025-04-13 DIAGNOSIS — M47.816 LUMBAR SPONDYLOSIS: ICD-10-CM

## 2025-04-13 DIAGNOSIS — M47.817 LUMBAR AND SACRAL ARTHRITIS: ICD-10-CM

## 2025-04-13 DIAGNOSIS — L70.0 ACNE VULGARIS: ICD-10-CM

## 2025-04-13 DIAGNOSIS — G89.4 CHRONIC PAIN SYNDROME: ICD-10-CM

## 2025-04-13 DIAGNOSIS — E55.9 VITAMIN D DEFICIENCY, UNSPECIFIED: ICD-10-CM

## 2025-04-13 DIAGNOSIS — Z79.899 IMMUNOCOMPROMISED STATE DUE TO DRUG THERAPY: ICD-10-CM

## 2025-04-13 DIAGNOSIS — M45.9 ANKYLOSING SPONDYLITIS, UNSPECIFIED SITE OF SPINE: ICD-10-CM

## 2025-04-13 DIAGNOSIS — M46.46 LUMBAR DISCITIS: ICD-10-CM

## 2025-04-13 DIAGNOSIS — D84.821 IMMUNOCOMPROMISED STATE DUE TO DRUG THERAPY: ICD-10-CM

## 2025-04-13 DIAGNOSIS — L08.9 LOCAL SKIN INFECTION: ICD-10-CM

## 2025-04-15 ENCOUNTER — PATIENT MESSAGE (OUTPATIENT)
Dept: RHEUMATOLOGY | Facility: CLINIC | Age: 45
End: 2025-04-15
Payer: MEDICARE

## 2025-04-16 ENCOUNTER — PATIENT MESSAGE (OUTPATIENT)
Dept: RHEUMATOLOGY | Facility: CLINIC | Age: 45
End: 2025-04-16
Payer: MEDICARE

## 2025-04-16 RX ORDER — OXYCODONE AND ACETAMINOPHEN 10; 325 MG/1; MG/1
1 TABLET ORAL EVERY 8 HOURS PRN
Qty: 90 TABLET | Refills: 0 | Status: SHIPPED | OUTPATIENT
Start: 2025-04-16 | End: 2025-05-16

## 2025-04-25 ENCOUNTER — PATIENT MESSAGE (OUTPATIENT)
Dept: ADMINISTRATIVE | Facility: OTHER | Age: 45
End: 2025-04-25
Payer: MEDICARE

## 2025-04-28 ENCOUNTER — LAB VISIT (OUTPATIENT)
Dept: LAB | Facility: HOSPITAL | Age: 45
End: 2025-04-28
Attending: INTERNAL MEDICINE
Payer: MEDICARE

## 2025-04-28 DIAGNOSIS — L03.90 CELLULITIS, UNSPECIFIED CELLULITIS SITE: ICD-10-CM

## 2025-04-28 DIAGNOSIS — L70.0 ACNE VULGARIS: ICD-10-CM

## 2025-04-28 DIAGNOSIS — M47.817 LUMBAR AND SACRAL ARTHRITIS: ICD-10-CM

## 2025-04-28 DIAGNOSIS — M46.46 LUMBAR DISCITIS: ICD-10-CM

## 2025-04-28 DIAGNOSIS — M45.9 ANKYLOSING SPONDYLITIS, UNSPECIFIED SITE OF SPINE: ICD-10-CM

## 2025-04-28 DIAGNOSIS — L08.9 LOCAL SKIN INFECTION: ICD-10-CM

## 2025-04-28 DIAGNOSIS — G89.4 CHRONIC PAIN SYNDROME: ICD-10-CM

## 2025-04-28 DIAGNOSIS — M47.816 LUMBAR SPONDYLOSIS: ICD-10-CM

## 2025-04-28 DIAGNOSIS — Z79.899 IMMUNOCOMPROMISED STATE DUE TO DRUG THERAPY: ICD-10-CM

## 2025-04-28 DIAGNOSIS — D84.821 IMMUNOCOMPROMISED STATE DUE TO DRUG THERAPY: ICD-10-CM

## 2025-04-28 DIAGNOSIS — M62.838 NECK MUSCLE SPASM: ICD-10-CM

## 2025-04-28 DIAGNOSIS — E55.9 VITAMIN D DEFICIENCY, UNSPECIFIED: ICD-10-CM

## 2025-04-28 LAB
ABSOLUTE EOSINOPHIL (OHS): 0.21 K/UL
ABSOLUTE MONOCYTE (OHS): 0.57 K/UL (ref 0.3–1)
ABSOLUTE NEUTROPHIL COUNT (OHS): 7.68 K/UL (ref 1.8–7.7)
ALBUMIN SERPL BCP-MCNC: 4 G/DL (ref 3.5–5.2)
ALP SERPL-CCNC: 98 UNIT/L (ref 40–150)
ALT SERPL W/O P-5'-P-CCNC: 13 UNIT/L (ref 10–44)
ANION GAP (OHS): 13 MMOL/L (ref 8–16)
AST SERPL-CCNC: 19 UNIT/L (ref 11–45)
BASOPHILS # BLD AUTO: 0.05 K/UL
BASOPHILS NFR BLD AUTO: 0.4 %
BILIRUB SERPL-MCNC: 0.5 MG/DL (ref 0.1–1)
BUN SERPL-MCNC: 17 MG/DL (ref 6–20)
CALCIUM SERPL-MCNC: 9.6 MG/DL (ref 8.7–10.5)
CHLORIDE SERPL-SCNC: 104 MMOL/L (ref 95–110)
CHOLEST SERPL-MCNC: 195 MG/DL (ref 120–199)
CHOLEST/HDLC SERPL: 6.3 {RATIO} (ref 2–5)
CO2 SERPL-SCNC: 24 MMOL/L (ref 23–29)
CREAT SERPL-MCNC: 0.9 MG/DL (ref 0.5–1.4)
CRP SERPL-MCNC: 5.4 MG/L
ERYTHROCYTE [DISTWIDTH] IN BLOOD BY AUTOMATED COUNT: 12.3 % (ref 11.5–14.5)
ERYTHROCYTE [SEDIMENTATION RATE] IN BLOOD BY PHOTOMETRIC METHOD: 7 MM/HR
GFR SERPLBLD CREATININE-BSD FMLA CKD-EPI: >60 ML/MIN/1.73/M2
GLUCOSE SERPL-MCNC: 102 MG/DL (ref 70–110)
HCT VFR BLD AUTO: 42 % (ref 40–54)
HDLC SERPL-MCNC: 31 MG/DL (ref 40–75)
HDLC SERPL: 15.9 % (ref 20–50)
HGB BLD-MCNC: 14.2 GM/DL (ref 14–18)
IMM GRANULOCYTES # BLD AUTO: 0.04 K/UL (ref 0–0.04)
IMM GRANULOCYTES NFR BLD AUTO: 0.3 % (ref 0–0.5)
LDLC SERPL CALC-MCNC: 129.4 MG/DL (ref 63–159)
LYMPHOCYTES # BLD AUTO: 4.3 K/UL (ref 1–4.8)
MCH RBC QN AUTO: 30 PG (ref 27–31)
MCHC RBC AUTO-ENTMCNC: 33.8 G/DL (ref 32–36)
MCV RBC AUTO: 89 FL (ref 82–98)
NONHDLC SERPL-MCNC: 164 MG/DL
NUCLEATED RBC (/100WBC) (OHS): 0 /100 WBC
PLATELET # BLD AUTO: 329 K/UL (ref 150–450)
PMV BLD AUTO: 9.6 FL (ref 9.2–12.9)
POTASSIUM SERPL-SCNC: 4.8 MMOL/L (ref 3.5–5.1)
PROT SERPL-MCNC: 7.9 GM/DL (ref 6–8.4)
RBC # BLD AUTO: 4.74 M/UL (ref 4.6–6.2)
RELATIVE EOSINOPHIL (OHS): 1.6 %
RELATIVE LYMPHOCYTE (OHS): 33.5 % (ref 18–48)
RELATIVE MONOCYTE (OHS): 4.4 % (ref 4–15)
RELATIVE NEUTROPHIL (OHS): 59.8 % (ref 38–73)
SODIUM SERPL-SCNC: 141 MMOL/L (ref 136–145)
T3FREE SERPL-MCNC: 3.3 PG/ML (ref 2.3–4.2)
T4 FREE SERPL-MCNC: 1.35 NG/DL (ref 0.71–1.51)
TESTOST SERPL-MCNC: 655 NG/DL (ref 304–1227)
TRIGL SERPL-MCNC: 173 MG/DL (ref 30–150)
TSH SERPL-ACNC: 1.17 UIU/ML (ref 0.4–4)
WBC # BLD AUTO: 12.85 K/UL (ref 3.9–12.7)

## 2025-04-28 PROCEDURE — 84439 ASSAY OF FREE THYROXINE: CPT

## 2025-04-28 PROCEDURE — 86140 C-REACTIVE PROTEIN: CPT

## 2025-04-28 PROCEDURE — 84460 ALANINE AMINO (ALT) (SGPT): CPT

## 2025-04-28 PROCEDURE — 84403 ASSAY OF TOTAL TESTOSTERONE: CPT

## 2025-04-28 PROCEDURE — 85652 RBC SED RATE AUTOMATED: CPT

## 2025-04-28 PROCEDURE — 85025 COMPLETE CBC W/AUTO DIFF WBC: CPT

## 2025-04-28 PROCEDURE — 84481 FREE ASSAY (FT-3): CPT

## 2025-04-28 PROCEDURE — 80061 LIPID PANEL: CPT

## 2025-04-28 PROCEDURE — 36415 COLL VENOUS BLD VENIPUNCTURE: CPT | Mod: PO

## 2025-04-28 PROCEDURE — 84443 ASSAY THYROID STIM HORMONE: CPT

## 2025-05-05 ENCOUNTER — OFFICE VISIT (OUTPATIENT)
Dept: RHEUMATOLOGY | Facility: CLINIC | Age: 45
End: 2025-05-05
Payer: MEDICARE

## 2025-05-05 VITALS
SYSTOLIC BLOOD PRESSURE: 124 MMHG | DIASTOLIC BLOOD PRESSURE: 76 MMHG | HEART RATE: 65 BPM | BODY MASS INDEX: 24.3 KG/M2 | WEIGHT: 179.44 LBS | HEIGHT: 72 IN

## 2025-05-05 DIAGNOSIS — L08.9 LOCAL SKIN INFECTION: ICD-10-CM

## 2025-05-05 DIAGNOSIS — M47.817 LUMBAR AND SACRAL ARTHRITIS: ICD-10-CM

## 2025-05-05 DIAGNOSIS — D57.3 SICKLE CELL TRAIT: ICD-10-CM

## 2025-05-05 DIAGNOSIS — M45.9 ANKYLOSING SPONDYLITIS, UNSPECIFIED SITE OF SPINE: Primary | ICD-10-CM

## 2025-05-05 DIAGNOSIS — G89.4 CHRONIC PAIN SYNDROME: ICD-10-CM

## 2025-05-05 DIAGNOSIS — M46.46 LUMBAR DISCITIS: ICD-10-CM

## 2025-05-05 DIAGNOSIS — D84.821 IMMUNOCOMPROMISED STATE DUE TO DRUG THERAPY: ICD-10-CM

## 2025-05-05 DIAGNOSIS — M47.816 LUMBAR SPONDYLOSIS: ICD-10-CM

## 2025-05-05 DIAGNOSIS — L70.0 ACNE VULGARIS: ICD-10-CM

## 2025-05-05 DIAGNOSIS — Z79.899 IMMUNOCOMPROMISED STATE DUE TO DRUG THERAPY: ICD-10-CM

## 2025-05-05 DIAGNOSIS — E55.9 VITAMIN D DEFICIENCY, UNSPECIFIED: ICD-10-CM

## 2025-05-05 DIAGNOSIS — M62.838 NECK MUSCLE SPASM: ICD-10-CM

## 2025-05-05 PROCEDURE — 99999 PR PBB SHADOW E&M-EST. PATIENT-LVL IV: CPT | Mod: PBBFAC,,, | Performed by: INTERNAL MEDICINE

## 2025-05-05 PROCEDURE — 99214 OFFICE O/P EST MOD 30 MIN: CPT | Mod: S$PBB,,, | Performed by: INTERNAL MEDICINE

## 2025-05-05 PROCEDURE — 99214 OFFICE O/P EST MOD 30 MIN: CPT | Mod: PBBFAC,PO | Performed by: INTERNAL MEDICINE

## 2025-05-05 RX ORDER — DOXYCYCLINE 100 MG/1
100 CAPSULE ORAL 2 TIMES DAILY
COMMUNITY
Start: 2025-05-02 | End: 2025-05-05 | Stop reason: SDUPTHER

## 2025-05-05 RX ORDER — OXYCODONE AND ACETAMINOPHEN 10; 325 MG/1; MG/1
1 TABLET ORAL EVERY 8 HOURS PRN
Qty: 90 TABLET | Refills: 0 | Status: SHIPPED | OUTPATIENT
Start: 2025-06-16 | End: 2025-07-16

## 2025-05-05 RX ORDER — DOXYCYCLINE 100 MG/1
100 CAPSULE ORAL EVERY 12 HOURS
Qty: 20 CAPSULE | Refills: 3 | Status: SHIPPED | OUTPATIENT
Start: 2025-05-05

## 2025-05-05 RX ORDER — OXYCODONE AND ACETAMINOPHEN 10; 325 MG/1; MG/1
1 TABLET ORAL EVERY 8 HOURS PRN
Qty: 90 TABLET | Refills: 0 | Status: SHIPPED | OUTPATIENT
Start: 2025-07-16 | End: 2025-08-15

## 2025-05-05 RX ORDER — PREDNISONE 1 MG/1
3 TABLET ORAL DAILY
COMMUNITY
Start: 2025-05-01

## 2025-05-05 RX ORDER — OXYCODONE AND ACETAMINOPHEN 10; 325 MG/1; MG/1
1 TABLET ORAL EVERY 6 HOURS PRN
Qty: 120 TABLET | Refills: 0 | Status: SHIPPED | OUTPATIENT
Start: 2025-05-16 | End: 2025-06-15

## 2025-05-05 RX ORDER — DAPSONE 25 MG/1
25 TABLET ORAL DAILY
Qty: 90 TABLET | Refills: 1 | Status: SHIPPED | OUTPATIENT
Start: 2025-05-05 | End: 2025-11-01

## 2025-05-05 RX ORDER — BACLOFEN 20 MG/1
20 TABLET ORAL 4 TIMES DAILY
Qty: 120 TABLET | Refills: 6 | Status: SHIPPED | OUTPATIENT
Start: 2025-05-05 | End: 2026-05-05

## 2025-05-05 ASSESSMENT — ROUTINE ASSESSMENT OF PATIENT INDEX DATA (RAPID3)
PATIENT GLOBAL ASSESSMENT SCORE: 6.5
PAIN SCORE: 7.5
FATIGUE SCORE: 2.2
MDHAQ FUNCTION SCORE: 1
PSYCHOLOGICAL DISTRESS SCORE: 2.2
TOTAL RAPID3 SCORE: 5.78

## 2025-05-05 NOTE — PROGRESS NOTES
Subjective:     Patient ID:  Umang Greer    Chief Complaint:  Disease Management     History of Present Illness:  Pt is a 44 y.o. male dx w/Ankylosing spondylitis,He has had multiple skin eruptions on his back he started docycline and dapsone. He has thrush that he is treating. He is using hibiclens prn and panoxyl soap slightly improved. He complain   He reports good improvement overall with his arthritis on simponi monthly.  He is using prednisone 2.5-5 mg daily PRN.      Current tx:  1. Simponi  2. Prednisone prn  3. Perocet 10/325 q8  Rheumatologic History:   - Diagnosis/es:  - Positive serologies:  - Infectious screening labs:  - Previous Treatments:  - Current Treatments:     Interval History:   Hospitalization since last office visit: No    Patient Active Problem List    Diagnosis Date Noted    Gastroesophageal reflux disease without esophagitis 03/14/2024    Immunocompromised state due to drug therapy 01/27/2023    Hyperlipidemia 01/27/2020    Chronic obstructive pulmonary disease 02/02/2018    Vitamin D deficiency 02/02/2018    Anxiety 12/15/2015    Ankylosing spondylitis 12/04/2014    Lumbar and sacral arthritis 12/04/2014    Osteoarthritis of both hips 12/04/2014    Sickle cell trait 12/14/2012    Cardiomyopathy, alcoholic 08/03/2012    Tobacco dependence 08/03/2012     Past Surgical History:   Procedure Laterality Date    HERNIA REPAIR      NASAL SEPTOPLASTY  12/20/2021    Dr. Menjivar at [facility]     Social History[1]  Family History   Problem Relation Name Age of Onset    Thyroid disease Mother      Heart disease Mother      Hypertension Mother      Anemia Mother      Heart disease Father      Cancer Paternal Grandmother      Amblyopia Neg Hx      Blindness Neg Hx      Cataracts Neg Hx      Macular degeneration Neg Hx      Retinal detachment Neg Hx      Strabismus Neg Hx      Stroke Neg Hx      Glaucoma Neg Hx       Review of patient's allergies indicates:   Allergen Reactions    Aspirin Anaphylaxis      Other Reaction(s): tongue/lip swelling    Azulfidine [sulfasalazine] Anaphylaxis    Gabapentin      Lip swelling, tongue swelling    Other Reaction(s): lip/throat swelling    Ibuprofen Anaphylaxis and Swelling    Sulfa (sulfonamide antibiotics) Swelling       Review of Systems   Review of Systems   Constitutional:  Negative for activity change, appetite change, chills, diaphoresis, fatigue, fever and unexpected weight change.   HENT:  Negative for congestion, ear pain, facial swelling, mouth sores, nosebleeds, postnasal drip, rhinorrhea, sinus pressure, sneezing, sore throat, tinnitus, trouble swallowing and voice change.    Eyes:  Negative for pain, discharge, redness, itching and visual disturbance.   Respiratory:  Negative for apnea, cough, chest tightness, shortness of breath and wheezing.    Cardiovascular:  Negative for chest pain, palpitations and leg swelling.   Gastrointestinal:  Negative for abdominal pain, constipation, diarrhea, nausea and vomiting.   Endocrine: Negative for cold intolerance, heat intolerance, polydipsia and polyuria.   Genitourinary:  Negative for decreased urine volume, difficulty urinating, dysuria, flank pain, frequency, hematuria and urgency.   Musculoskeletal:  Positive for back pain, joint swelling and neck stiffness. Negative for arthralgias, gait problem, myalgias and neck pain.   Skin:  Negative for pallor, rash and wound.   Allergic/Immunologic: Negative for immunocompromised state.   Neurological:  Negative for dizziness, tremors, seizures, syncope, weakness, numbness and headaches.   Hematological:  Negative for adenopathy. Does not bruise/bleed easily.   Psychiatric/Behavioral:  Negative for sleep disturbance and suicidal ideas. The patient is not nervous/anxious.         Current Medications:  Current Outpatient Medications   Medication Instructions    ALPRAZolam (XANAX) 2 mg, Oral, 2 times daily PRN    baclofen (LIORESAL) 20 mg, Oral, 4 times daily     "budesonide-formoterol 160-4.5 mcg (SYMBICORT) 160-4.5 mcg/actuation HFAA INHALE 2 PUFFS INTO THE LUNGS EVERY 12 HOURS    carvediloL (COREG) 3.125 mg, Oral, 2 times daily    cetirizine (ZYRTEC) 10 mg, Daily    chlorhexidine (PERIDEX) 0.12 % solution SMARTSIG:By Mouth    clotrimazole-betamethasone 1-0.05% (LOTRISONE) cream Topical (Top), 2 times daily    cyanocobalamin (VITAMIN B-12) 1,000 mcg    dapsone 25 mg, Oral, Daily    doxycycline (VIBRAMYCIN) 100 mg, Oral, Every 12 hours    EPINEPHrine (EPIPEN JR) 0.3 mg, As needed (PRN)    ezetimibe (ZETIA) 10 mg, Oral, Daily    famotidine (PEPCID) 40 mg, Oral, Nightly PRN    golimumab (SIMPONI) 50 mg/0.5 mL PnIj Inject 0.5 mL (50 mg) into the skin every 30 days.    icosapent ethyL (VASCEPA) 1,000 mg, Oral, 2 times daily, 5    ketoconazole (NIZORAL) 2 % shampoo Topical (Top), Twice weekly    mupirocin (BACTROBAN) 2 % ointment APPLY EXTERNALLY TO THE AFFECTED AREA THREE TIMES DAILY    NARCAN 4 mg/actuation Spry No dose, route, or frequency recorded.    omeprazole (PRILOSEC) 20 mg, Oral, Daily    [START ON 5/16/2025] oxyCODONE-acetaminophen (PERCOCET)  mg per tablet 1 tablet, Oral, Every 6 hours PRN    [START ON 6/16/2025] oxyCODONE-acetaminophen (PERCOCET)  mg per tablet 1 tablet, Oral, Every 8 hours PRN    [START ON 7/16/2025] oxyCODONE-acetaminophen (PERCOCET)  mg per tablet 1 tablet, Oral, Every 8 hours PRN    predniSONE (DELTASONE) 3 mg, Daily    sildenafil (REVATIO) 20 mg, Oral, 3 times daily    tadalafiL (CIALIS) 20 mg, Oral, Daily PRN    tretinoin (RETIN-A) 0.05 % cream Topical (Top), Nightly         Objective:     Vitals:    05/05/25 0913   BP: 124/76   Pulse: 65   Weight: 81.4 kg (179 lb 7.3 oz)   Height: 6' 0.01" (1.829 m)   PainSc:   7   PainLoc: Back      Body mass index is 24.33 kg/m².     Physical Examinations:  Physical Exam   Constitutional: He is oriented to person, place, and time.   HENT:   Head: Normocephalic and atraumatic. "   Mouth/Throat: Oropharynx is clear and moist.   Eyes: Pupils are equal, round, and reactive to light.   Neck: No thyromegaly present.   Cardiovascular: Normal rate, regular rhythm and normal heart sounds. Exam reveals no gallop and no friction rub.   No murmur heard.  Pulmonary/Chest: Breath sounds normal. He has no wheezes. He has no rales. He exhibits no tenderness.   Abdominal: There is no abdominal tenderness. There is no rebound and no guarding.   Musculoskeletal:         General: Tenderness and deformity present.      Right shoulder: Tenderness present.      Left shoulder: Tenderness present.      Right elbow: Swelling present. Tenderness present.      Left elbow: Tenderness present.      Right wrist: Swelling and tenderness present.      Left wrist: Swelling and tenderness present.      Cervical back: Neck supple.      Right knee: Swelling and effusion present. Tenderness present.      Left knee: Swelling and effusion present. Tenderness present.   Lymphadenopathy:     He has no cervical adenopathy.   Neurological: He is alert and oriented to person, place, and time. Gait normal.   Skin: No bruising and no rash noted. There is erythema. No pallor.   Psychiatric: Mood and affect normal.   Vitals reviewed.      Right Side Rheumatological Exam     The patient is tender to palpation of the shoulder, elbow, wrist, knee, 1st PIP, 1st MCP, 2nd PIP, 2nd MCP, 3rd PIP, 3rd MCP, 4th PIP, 4th MCP and 5th PIP    He has swelling of the elbow, wrist, knee, 1st PIP, 1st MCP, 2nd PIP, 2nd MCP, 3rd PIP, 3rd MCP, 4th PIP, 4th MCP, 5th PIP and 5th MCP    The patient has an enlarged wrist and knee    Shoulder Exam   Tenderness Location: no tenderness    Range of Motion   Active abduction:  abnormal   Adduction: abnormal  Sensation: normal    Knee Exam   Tenderness Location: medial joint line and LCL  Patellofemoral Crepitus: positive  Effusion: positive  Sensation: normal    Hip Exam   Tenderness Location: posterior and  anterior  Sensation: normal    Elbow/Wrist Exam   Tenderness Location: no tenderness  Sensation: normal    Left Side Rheumatological Exam     The patient is tender to palpation of the shoulder, elbow, wrist, knee, 1st PIP, 1st MCP, 2nd PIP, 2nd MCP, 3rd PIP, 3rd MCP, 4th PIP, 4th MCP, 5th PIP and 5th MCP.    He has swelling of the wrist, knee, 1st PIP, 1st MCP, 2nd PIP, 2nd MCP, 3rd PIP, 3rd MCP, 4th PIP, 4th MCP, 5th PIP and 5th MCP    The patient has an enlarged knee.    Shoulder Exam   Tenderness Location: no tenderness    Range of Motion   Active abduction:  abnormal   Sensation: normal    Knee Exam   Tenderness Location: lateral joint line and medial joint line    Patellofemoral Crepitus: positive  Effusion: positive  Sensation: normal    Hip Exam   Tenderness Location: posterior and anterior  Sensation: normal    Elbow/Wrist Exam   Sensation: normal      Back/Neck Exam   General Inspection   Gait: normal       Tenderness Right paramedian tenderness of the Upper C-Spine, Lower C-Spine, Lower L-Spine and SI Joint.Left paramedian tenderness of the Upper C-Spine, Lower L-Spine, Lower C-Spine and SI Joint.    Neck Range of Motion   Flexion:  Limited  Extension:  Limited       Disease Assessment Scores:  Patient's Global Assessment of arthritis (0-10): 1  Physician's Global Assessment of arthritis (0-10): 1  Number of Tender Joints (0-28): 1  Number of Swollen Joints (0-28): 1        5/24/2024    12:39 PM   Rapid3 Question Responses and Scores   MDHAQ Score 1.1   Psychologic Score 3.3   Pain Score 7   When you awakened in the morning OVER THE LAST WEEK, did you feel stiff? Yes   If Yes, please indicate the number of hours until you are as limber as you will be for the day 1   Fatigue Score 7   Global Health Score 6.5   RAPID3 Score 5.72       Monitoring Lab Results:  Lab Results   Component Value Date    WBC 12.85 (H) 04/28/2025    RBC 4.74 04/28/2025    HGB 14.2 04/28/2025    HCT 42.0 04/28/2025    MCV 89  "04/28/2025    MCH 30.0 04/28/2025    MCHC 33.8 04/28/2025    RDW 12.3 04/28/2025     04/28/2025        Lab Results   Component Value Date     04/28/2025    K 4.8 04/28/2025     04/28/2025    CO2 24 04/28/2025     04/28/2025    BUN 17 04/28/2025    CREATININE 0.9 04/28/2025    CALCIUM 9.6 04/28/2025    PROT 7.9 04/28/2025    ALBUMIN 4.0 04/28/2025    BILITOT 0.5 04/28/2025    ALKPHOS 98 04/28/2025    AST 19 04/28/2025    ALT 13 04/28/2025    ANIONGAP 13 04/28/2025    EGFRNORACEVR >60 04/28/2025       Lab Results   Component Value Date    SEDRATE 7 04/28/2025    CRP 5.4 04/28/2025        Lab Results   Component Value Date    UQFLRZEN61ZT 69 12/30/2024    AZGSVXLZ66 964 (H) 10/08/2021        Lab Results   Component Value Date    CHOL 195 04/28/2025    HDL 31 (L) 04/28/2025    LDLCALC 129.4 04/28/2025    TRIG 173 (H) 04/28/2025       Lab Results   Component Value Date    RF <13.0 12/30/2024    CCPANTIBODIE <0.5 12/30/2024     No results found for: "ANASCREEN", "ANATITER", "ANAPATTE", "DSDNA", "SMRNPAB", "SSAANTIBODY", "SSBANTIBODY", "PLU79LD", "JO1AB"  No results found for: "HLABB27"    Infectious Disease Screening:  Lab Results   Component Value Date    HEPBSAG Non-reactive 12/30/2024    HEPBCAB Non-reactive 12/30/2024    HEPBSAB 5.27 12/30/2024    HEPBSAB Non-reactive 12/30/2024    HEPBSURFABQU Negative 12/30/2024    HEPBSURFABQU 4 12/30/2024    HEPBIGM Non-reactive 09/21/2022     Lab Results   Component Value Date    HEPCAB Non-reactive 12/30/2024     Lab Results   Component Value Date    TBGOLDPLUS Negative 12/30/2024     Results for orders placed or performed in visit on 04/28/25   TESTOSTERONE    Collection Time: 04/28/25  7:44 AM   Result Value Ref Range    Testosterone Total 655 304 - 1,227 ng/dL   Sedimentation rate    Collection Time: 04/28/25  7:44 AM   Result Value Ref Range    Sed Rate 7 <=23 mm/hr   C-Reactive Protein    Collection Time: 04/28/25  7:44 AM   Result Value Ref Range "    CRP 5.4 <=8.2 mg/L   Comprehensive Metabolic Panel    Collection Time: 04/28/25  7:44 AM   Result Value Ref Range    Sodium 141 136 - 145 mmol/L    Potassium 4.8 3.5 - 5.1 mmol/L    Chloride 104 95 - 110 mmol/L    CO2 24 23 - 29 mmol/L    Glucose 102 70 - 110 mg/dL    BUN 17 6 - 20 mg/dL    Creatinine 0.9 0.5 - 1.4 mg/dL    Calcium 9.6 8.7 - 10.5 mg/dL    Protein Total 7.9 6.0 - 8.4 gm/dL    Albumin 4.0 3.5 - 5.2 g/dL    Bilirubin Total 0.5 0.1 - 1.0 mg/dL    ALP 98 40 - 150 unit/L    AST 19 11 - 45 unit/L    ALT 13 10 - 44 unit/L    Anion Gap 13 8 - 16 mmol/L    eGFR >60 >60 mL/min/1.73/m2   T4, Free    Collection Time: 04/28/25  7:44 AM   Result Value Ref Range    Free T4 1.35 0.71 - 1.51 ng/dL   TSH    Collection Time: 04/28/25  7:44 AM   Result Value Ref Range    TSH 1.165 0.400 - 4.000 uIU/mL   T3, Free    Collection Time: 04/28/25  7:44 AM   Result Value Ref Range    T3 Free 3.30 2.30 - 4.20 pg/mL   Lipid Panel    Collection Time: 04/28/25  7:44 AM   Result Value Ref Range    Cholesterol Total 195 120 - 199 mg/dL    Triglyceride 173 (H) 30 - 150 mg/dL    HDL Cholesterol 31 (L) 40 - 75 mg/dL    LDL Cholesterol 129.4 63.0 - 159.0 mg/dL    HDL/Cholesterol Ratio 15.9 (L) 20.0 - 50.0 %    Cholesterol/HDL Ratio 6.3 (H) 2.0 - 5.0    Non HDL Cholesterol 164 mg/dL   CBC with Differential    Collection Time: 04/28/25  7:44 AM   Result Value Ref Range    WBC 12.85 (H) 3.90 - 12.70 K/uL    RBC 4.74 4.60 - 6.20 M/uL    HGB 14.2 14.0 - 18.0 gm/dL    HCT 42.0 40.0 - 54.0 %    MCV 89 82 - 98 fL    MCH 30.0 27.0 - 31.0 pg    MCHC 33.8 32.0 - 36.0 g/dL    RDW 12.3 11.5 - 14.5 %    Platelet Count 329 150 - 450 K/uL    MPV 9.6 9.2 - 12.9 fL    Nucleated RBC 0 <=0 /100 WBC    Neut % 59.8 38 - 73 %    Lymph % 33.5 18 - 48 %    Mono % 4.4 4 - 15 %    Eos % 1.6 <=8 %    Basophil % 0.4 <=1.9 %    Imm Grans % 0.3 0.0 - 0.5 %    Neut # 7.68 1.8 - 7.7 K/uL    Lymph # 4.30 1 - 4.8 K/uL    Mono # 0.57 0.3 - 1 K/uL    Eos # 0.21 <=0.5  K/uL    Baso # 0.05 <=0.2 K/uL    Imm Grans # 0.04 0.00 - 0.04 K/uL     *Note: Due to a large number of results and/or encounters for the requested time period, some results have not been displayed. A complete set of results can be found in Results Review.           Imaging:  DEXA, Xrays, MRIs, CTs, etc    Old & Outside Medical Records:  Reviewed old and all outside medical records available in Care Everywhere     Assessment:     Encounter Diagnoses   Name Primary?    Ankylosing spondylitis, unspecified site of spine Yes    Acne vulgaris     Lumbar and sacral arthritis     Lumbar discitis     Lumbar spondylosis     Immunocompromised state due to drug therapy     Chronic pain syndrome     Vitamin D deficiency, unspecified      Sickle cell trait     Local skin infection     Neck muscle spasm           Plan:      Encounter Diagnoses   Name Primary?    Acne vulgaris     Lumbar and sacral arthritis     Lumbar discitis     Lumbar spondylosis     Immunocompromised state due to drug therapy     Ankylosing spondylitis, unspecified site of spine Yes    Chronic pain syndrome     Vitamin D deficiency, unspecified      Sickle cell trait     Local skin infection     Neck muscle spasm      Umang was seen today for disease management.    Diagnoses and all orders for this visit:    Ankylosing spondylitis, unspecified site of spine  -     oxyCODONE-acetaminophen (PERCOCET)  mg per tablet; Take 1 tablet by mouth every 6 (six) hours as needed for Pain.  -     oxyCODONE-acetaminophen (PERCOCET)  mg per tablet; Take 1 tablet by mouth every 8 (eight) hours as needed for Pain.  -     oxyCODONE-acetaminophen (PERCOCET)  mg per tablet; Take 1 tablet by mouth every 8 (eight) hours as needed for Pain.  -     baclofen (LIORESAL) 20 MG tablet; Take 1 tablet (20 mg total) by mouth 4 (four) times daily.  -     doxycycline (VIBRAMYCIN) 100 MG Cap; Take 1 capsule (100 mg total) by mouth every 12 (twelve) hours.  -     dapsone 25  MG Tab; Take 1 tablet (25 mg total) by mouth once daily.  -     Sedimentation rate; Future  -     C-Reactive Protein; Future  -     Comprehensive Metabolic Panel; Future  -     CBC Auto Differential; Future  -     Sedimentation rate; Future  -     C-Reactive Protein; Future  -     Comprehensive Metabolic Panel; Future  -     CBC Auto Differential; Future    Acne vulgaris  -     oxyCODONE-acetaminophen (PERCOCET)  mg per tablet; Take 1 tablet by mouth every 6 (six) hours as needed for Pain.  -     oxyCODONE-acetaminophen (PERCOCET)  mg per tablet; Take 1 tablet by mouth every 8 (eight) hours as needed for Pain.  -     oxyCODONE-acetaminophen (PERCOCET)  mg per tablet; Take 1 tablet by mouth every 8 (eight) hours as needed for Pain.  -     dapsone 25 MG Tab; Take 1 tablet (25 mg total) by mouth once daily.    Lumbar and sacral arthritis  -     oxyCODONE-acetaminophen (PERCOCET)  mg per tablet; Take 1 tablet by mouth every 6 (six) hours as needed for Pain.  -     oxyCODONE-acetaminophen (PERCOCET)  mg per tablet; Take 1 tablet by mouth every 8 (eight) hours as needed for Pain.  -     oxyCODONE-acetaminophen (PERCOCET)  mg per tablet; Take 1 tablet by mouth every 8 (eight) hours as needed for Pain.  -     baclofen (LIORESAL) 20 MG tablet; Take 1 tablet (20 mg total) by mouth 4 (four) times daily.  -     doxycycline (VIBRAMYCIN) 100 MG Cap; Take 1 capsule (100 mg total) by mouth every 12 (twelve) hours.  -     dapsone 25 MG Tab; Take 1 tablet (25 mg total) by mouth once daily.  -     Sedimentation rate; Future  -     C-Reactive Protein; Future  -     Comprehensive Metabolic Panel; Future  -     CBC Auto Differential; Future    Lumbar discitis  -     oxyCODONE-acetaminophen (PERCOCET)  mg per tablet; Take 1 tablet by mouth every 6 (six) hours as needed for Pain.  -     oxyCODONE-acetaminophen (PERCOCET)  mg per tablet; Take 1 tablet by mouth every 8 (eight) hours as needed  for Pain.  -     oxyCODONE-acetaminophen (PERCOCET)  mg per tablet; Take 1 tablet by mouth every 8 (eight) hours as needed for Pain.  -     baclofen (LIORESAL) 20 MG tablet; Take 1 tablet (20 mg total) by mouth 4 (four) times daily.  -     doxycycline (VIBRAMYCIN) 100 MG Cap; Take 1 capsule (100 mg total) by mouth every 12 (twelve) hours.  -     dapsone 25 MG Tab; Take 1 tablet (25 mg total) by mouth once daily.  -     Sedimentation rate; Future  -     C-Reactive Protein; Future  -     Comprehensive Metabolic Panel; Future  -     CBC Auto Differential; Future  -     Sedimentation rate; Future  -     C-Reactive Protein; Future  -     Comprehensive Metabolic Panel; Future  -     CBC Auto Differential; Future    Lumbar spondylosis  -     oxyCODONE-acetaminophen (PERCOCET)  mg per tablet; Take 1 tablet by mouth every 6 (six) hours as needed for Pain.  -     oxyCODONE-acetaminophen (PERCOCET)  mg per tablet; Take 1 tablet by mouth every 8 (eight) hours as needed for Pain.  -     oxyCODONE-acetaminophen (PERCOCET)  mg per tablet; Take 1 tablet by mouth every 8 (eight) hours as needed for Pain.  -     baclofen (LIORESAL) 20 MG tablet; Take 1 tablet (20 mg total) by mouth 4 (four) times daily.  -     doxycycline (VIBRAMYCIN) 100 MG Cap; Take 1 capsule (100 mg total) by mouth every 12 (twelve) hours.  -     dapsone 25 MG Tab; Take 1 tablet (25 mg total) by mouth once daily.  -     Sedimentation rate; Future  -     C-Reactive Protein; Future  -     Comprehensive Metabolic Panel; Future  -     CBC Auto Differential; Future    Immunocompromised state due to drug therapy  -     oxyCODONE-acetaminophen (PERCOCET)  mg per tablet; Take 1 tablet by mouth every 6 (six) hours as needed for Pain.  -     oxyCODONE-acetaminophen (PERCOCET)  mg per tablet; Take 1 tablet by mouth every 8 (eight) hours as needed for Pain.  -     oxyCODONE-acetaminophen (PERCOCET)  mg per tablet; Take 1 tablet by  mouth every 8 (eight) hours as needed for Pain.  -     baclofen (LIORESAL) 20 MG tablet; Take 1 tablet (20 mg total) by mouth 4 (four) times daily.  -     doxycycline (VIBRAMYCIN) 100 MG Cap; Take 1 capsule (100 mg total) by mouth every 12 (twelve) hours.  -     dapsone 25 MG Tab; Take 1 tablet (25 mg total) by mouth once daily.  -     Sedimentation rate; Future  -     C-Reactive Protein; Future  -     Comprehensive Metabolic Panel; Future  -     CBC Auto Differential; Future  -     Sedimentation rate; Future  -     C-Reactive Protein; Future  -     Comprehensive Metabolic Panel; Future  -     CBC Auto Differential; Future    Chronic pain syndrome  -     oxyCODONE-acetaminophen (PERCOCET)  mg per tablet; Take 1 tablet by mouth every 6 (six) hours as needed for Pain.  -     oxyCODONE-acetaminophen (PERCOCET)  mg per tablet; Take 1 tablet by mouth every 8 (eight) hours as needed for Pain.  -     oxyCODONE-acetaminophen (PERCOCET)  mg per tablet; Take 1 tablet by mouth every 8 (eight) hours as needed for Pain.  -     baclofen (LIORESAL) 20 MG tablet; Take 1 tablet (20 mg total) by mouth 4 (four) times daily.  -     doxycycline (VIBRAMYCIN) 100 MG Cap; Take 1 capsule (100 mg total) by mouth every 12 (twelve) hours.  -     dapsone 25 MG Tab; Take 1 tablet (25 mg total) by mouth once daily.  -     Sedimentation rate; Future  -     C-Reactive Protein; Future  -     Comprehensive Metabolic Panel; Future  -     CBC Auto Differential; Future  -     Sedimentation rate; Future  -     C-Reactive Protein; Future  -     Comprehensive Metabolic Panel; Future  -     CBC Auto Differential; Future    Vitamin D deficiency, unspecified   -     oxyCODONE-acetaminophen (PERCOCET)  mg per tablet; Take 1 tablet by mouth every 6 (six) hours as needed for Pain.  -     oxyCODONE-acetaminophen (PERCOCET)  mg per tablet; Take 1 tablet by mouth every 8 (eight) hours as needed for Pain.  -     oxyCODONE-acetaminophen  (PERCOCET)  mg per tablet; Take 1 tablet by mouth every 8 (eight) hours as needed for Pain.  -     dapsone 25 MG Tab; Take 1 tablet (25 mg total) by mouth once daily.    Sickle cell trait    Local skin infection  -     oxyCODONE-acetaminophen (PERCOCET)  mg per tablet; Take 1 tablet by mouth every 6 (six) hours as needed for Pain.  -     oxyCODONE-acetaminophen (PERCOCET)  mg per tablet; Take 1 tablet by mouth every 8 (eight) hours as needed for Pain.  -     oxyCODONE-acetaminophen (PERCOCET)  mg per tablet; Take 1 tablet by mouth every 8 (eight) hours as needed for Pain.  -     dapsone 25 MG Tab; Take 1 tablet (25 mg total) by mouth once daily.    Neck muscle spasm  -     baclofen (LIORESAL) 20 MG tablet; Take 1 tablet (20 mg total) by mouth 4 (four) times daily.          1. Perocet refills  2. Labs ordered  3. Continue simponi     Follow-up 4 months    More than 50% of the  32 minute encounter was spent face to face counseling the patient regarding current status and future plan of care as well as side effects  of the medications. All questions were answered to patient's satisfaction also includes  non-face to face time preparing to see the patient (eg, review of tests), Obtaining and/or reviewing separately obtained history, Documenting clinical information in the electronic or other health record, Independently interpreting results            [1]   Social History  Tobacco Use    Smoking status: Every Day     Current packs/day: 0.50     Average packs/day: 0.5 packs/day for 0.5 years (0.3 ttl pk-yrs)     Types: Cigarettes    Smokeless tobacco: Current   Substance Use Topics    Alcohol use: No    Drug use: Yes     Types: Hydrocodone

## 2025-05-09 ENCOUNTER — OFFICE VISIT (OUTPATIENT)
Dept: FAMILY MEDICINE | Facility: CLINIC | Age: 45
End: 2025-05-09
Payer: MEDICARE

## 2025-05-09 DIAGNOSIS — F41.9 ANXIETY: Primary | ICD-10-CM

## 2025-05-09 NOTE — PROGRESS NOTES
Subjective:       Patient ID: Umang Greer is a 44 y.o. male.    Chief Complaint: No chief complaint on file.    Virtual for f/u anxiety. Saw rheum recently with labs.        The patient location is: [place]  The chief complaint leading to consultation is: anxiety    Visit type: audiovisual    Face to Face time with patient: 7 minutes  9 minutes of total time spent on the encounter, which includes face to face time and non-face to face time preparing to see the patient (eg, review of tests), Obtaining and/or reviewing separately obtained history, Documenting clinical information in the electronic or other health record, Independently interpreting results (not separately reported) and communicating results to the patient/family/caregiver, or Care coordination (not separately reported).         Each patient to whom he or she provides medical services by telemedicine is:  (1) informed of the relationship between the physician and patient and the respective role of any other health care provider with respect to management of the patient; and (2) notified that he or she may decline to receive medical services by telemedicine and may withdraw from such care at any time.    Notes:     Review of Systems   Constitutional:  Positive for activity change. Negative for chills, fever and unexpected weight change.   HENT:  Negative for hearing loss, rhinorrhea and trouble swallowing.    Eyes:  Negative for discharge and visual disturbance.   Respiratory:  Negative for cough, chest tightness, shortness of breath and wheezing.    Cardiovascular:  Negative for chest pain, palpitations and leg swelling.   Gastrointestinal:  Negative for blood in stool, constipation, diarrhea and vomiting.   Endocrine: Negative for cold intolerance and heat intolerance.   Genitourinary:  Negative for difficulty urinating, hematuria and urgency.   Musculoskeletal:  Positive for arthralgias and joint swelling. Negative for neck pain.   Neurological:   Negative for weakness and headaches.   Psychiatric/Behavioral:  Negative for confusion, decreased concentration and dysphoric mood. The patient is not nervous/anxious.        Objective:      Physical Exam  Constitutional:       Appearance: Normal appearance.   Neurological:      Mental Status: He is alert.   Psychiatric:         Mood and Affect: Mood normal.         Behavior: Behavior normal.         Assessment:       1. Anxiety        Plan:       Anxiety      F/u with rheum as planned  Refill meds when due  Mood stable  Will monitor chronic medical issues and continue current plan of care.    Follow up in about 3 months (around 8/9/2025), or if symptoms worsen or fail to improve, for Virtual Visit.

## 2025-06-19 DIAGNOSIS — M46.46 LUMBAR DISCITIS: ICD-10-CM

## 2025-06-19 DIAGNOSIS — M47.817 LUMBAR AND SACRAL ARTHRITIS: ICD-10-CM

## 2025-06-19 DIAGNOSIS — M45.9 ANKYLOSING SPONDYLITIS, UNSPECIFIED SITE OF SPINE: ICD-10-CM

## 2025-06-19 DIAGNOSIS — L03.90 CELLULITIS, UNSPECIFIED CELLULITIS SITE: ICD-10-CM

## 2025-06-19 DIAGNOSIS — L08.9 LOCAL SKIN INFECTION: ICD-10-CM

## 2025-06-19 DIAGNOSIS — E55.9 VITAMIN D DEFICIENCY, UNSPECIFIED: ICD-10-CM

## 2025-06-19 DIAGNOSIS — M47.816 LUMBAR SPONDYLOSIS: ICD-10-CM

## 2025-06-19 DIAGNOSIS — D84.821 IMMUNOCOMPROMISED STATE DUE TO DRUG THERAPY: ICD-10-CM

## 2025-06-19 DIAGNOSIS — Z79.899 IMMUNOCOMPROMISED STATE DUE TO DRUG THERAPY: ICD-10-CM

## 2025-06-19 DIAGNOSIS — L70.0 ACNE VULGARIS: ICD-10-CM

## 2025-06-19 DIAGNOSIS — G89.4 CHRONIC PAIN SYNDROME: ICD-10-CM

## 2025-06-20 DIAGNOSIS — M46.46 LUMBAR DISCITIS: ICD-10-CM

## 2025-06-20 DIAGNOSIS — M45.9 ANKYLOSING SPONDYLITIS, UNSPECIFIED SITE OF SPINE: ICD-10-CM

## 2025-06-20 DIAGNOSIS — M47.817 LUMBAR AND SACRAL ARTHRITIS: ICD-10-CM

## 2025-06-20 DIAGNOSIS — M47.816 LUMBAR SPONDYLOSIS: ICD-10-CM

## 2025-06-20 DIAGNOSIS — G89.4 CHRONIC PAIN SYNDROME: ICD-10-CM

## 2025-06-20 DIAGNOSIS — Z79.899 IMMUNOCOMPROMISED STATE DUE TO DRUG THERAPY: ICD-10-CM

## 2025-06-20 DIAGNOSIS — M62.838 NECK MUSCLE SPASM: ICD-10-CM

## 2025-06-20 DIAGNOSIS — D84.821 IMMUNOCOMPROMISED STATE DUE TO DRUG THERAPY: ICD-10-CM

## 2025-06-22 ENCOUNTER — PATIENT MESSAGE (OUTPATIENT)
Dept: ADMINISTRATIVE | Facility: OTHER | Age: 45
End: 2025-06-22
Payer: MEDICARE

## 2025-06-23 RX ORDER — BACLOFEN 20 MG/1
TABLET ORAL
Qty: 120 TABLET | Refills: 6 | Status: SHIPPED | OUTPATIENT
Start: 2025-06-23

## 2025-06-23 RX ORDER — DOXYCYCLINE 100 MG/1
100 CAPSULE ORAL EVERY 12 HOURS
Qty: 20 CAPSULE | Refills: 3 | Status: SHIPPED | OUTPATIENT
Start: 2025-06-23

## 2025-06-23 RX ORDER — DAPSONE 25 MG/1
25 TABLET ORAL DAILY
Qty: 90 TABLET | Refills: 1 | Status: SHIPPED | OUTPATIENT
Start: 2025-06-23 | End: 2025-12-20

## 2025-06-23 RX ORDER — KETOCONAZOLE 20 MG/ML
SHAMPOO, SUSPENSION TOPICAL
Qty: 120 ML | Refills: 12 | Status: SHIPPED | OUTPATIENT
Start: 2025-06-23

## 2025-07-20 ENCOUNTER — PATIENT MESSAGE (OUTPATIENT)
Dept: ADMINISTRATIVE | Facility: OTHER | Age: 45
End: 2025-07-20
Payer: MEDICARE

## 2025-08-02 DIAGNOSIS — L08.9 LOCAL SKIN INFECTION: ICD-10-CM

## 2025-08-02 DIAGNOSIS — L70.0 ACNE VULGARIS: ICD-10-CM

## 2025-08-02 DIAGNOSIS — D84.821 IMMUNOCOMPROMISED STATE DUE TO DRUG THERAPY: ICD-10-CM

## 2025-08-02 DIAGNOSIS — E55.9 VITAMIN D DEFICIENCY, UNSPECIFIED: ICD-10-CM

## 2025-08-02 DIAGNOSIS — M46.46 LUMBAR DISCITIS: ICD-10-CM

## 2025-08-02 DIAGNOSIS — M45.9 ANKYLOSING SPONDYLITIS, UNSPECIFIED SITE OF SPINE: ICD-10-CM

## 2025-08-02 DIAGNOSIS — M47.816 LUMBAR SPONDYLOSIS: ICD-10-CM

## 2025-08-02 DIAGNOSIS — M47.817 LUMBAR AND SACRAL ARTHRITIS: ICD-10-CM

## 2025-08-02 DIAGNOSIS — Z79.899 IMMUNOCOMPROMISED STATE DUE TO DRUG THERAPY: ICD-10-CM

## 2025-08-02 DIAGNOSIS — G89.4 CHRONIC PAIN SYNDROME: ICD-10-CM

## 2025-08-04 RX ORDER — OXYCODONE AND ACETAMINOPHEN 10; 325 MG/1; MG/1
1 TABLET ORAL EVERY 8 HOURS PRN
Qty: 90 TABLET | Refills: 0 | Status: SHIPPED | OUTPATIENT
Start: 2025-08-13 | End: 2025-09-12

## 2025-08-08 ENCOUNTER — PATIENT MESSAGE (OUTPATIENT)
Dept: FAMILY MEDICINE | Facility: CLINIC | Age: 45
End: 2025-08-08
Payer: MEDICARE

## 2025-08-08 DIAGNOSIS — F41.9 ANXIETY: ICD-10-CM

## 2025-08-08 RX ORDER — ALPRAZOLAM 2 MG/1
2 TABLET ORAL 2 TIMES DAILY PRN
Qty: 30 TABLET | Refills: 0 | OUTPATIENT
Start: 2025-08-08

## 2025-08-08 RX ORDER — ALPRAZOLAM 2 MG/1
2 TABLET ORAL 2 TIMES DAILY PRN
Qty: 30 TABLET | Refills: 0 | Status: SHIPPED | OUTPATIENT
Start: 2025-08-08

## 2025-08-22 ENCOUNTER — PATIENT MESSAGE (OUTPATIENT)
Dept: RHEUMATOLOGY | Facility: CLINIC | Age: 45
End: 2025-08-22
Payer: MEDICARE

## 2025-08-27 DIAGNOSIS — F41.9 ANXIETY: ICD-10-CM

## 2025-08-27 RX ORDER — ALPRAZOLAM 2 MG/1
2 TABLET ORAL 2 TIMES DAILY PRN
Qty: 30 TABLET | Refills: 0 | Status: CANCELLED | OUTPATIENT
Start: 2025-08-27

## 2025-08-28 ENCOUNTER — PATIENT MESSAGE (OUTPATIENT)
Dept: FAMILY MEDICINE | Facility: CLINIC | Age: 45
End: 2025-08-28
Payer: MEDICARE

## 2025-08-28 ENCOUNTER — LAB VISIT (OUTPATIENT)
Dept: LAB | Facility: HOSPITAL | Age: 45
End: 2025-08-28
Attending: INTERNAL MEDICINE
Payer: MEDICARE

## 2025-08-28 DIAGNOSIS — M47.817 LUMBAR AND SACRAL ARTHRITIS: ICD-10-CM

## 2025-08-28 DIAGNOSIS — M46.46 LUMBAR DISCITIS: ICD-10-CM

## 2025-08-28 DIAGNOSIS — D84.821 IMMUNOCOMPROMISED STATE DUE TO DRUG THERAPY: ICD-10-CM

## 2025-08-28 DIAGNOSIS — F41.9 ANXIETY: ICD-10-CM

## 2025-08-28 DIAGNOSIS — M47.816 LUMBAR SPONDYLOSIS: ICD-10-CM

## 2025-08-28 DIAGNOSIS — M45.9 ANKYLOSING SPONDYLITIS, UNSPECIFIED SITE OF SPINE: ICD-10-CM

## 2025-08-28 DIAGNOSIS — Z79.899 IMMUNOCOMPROMISED STATE DUE TO DRUG THERAPY: ICD-10-CM

## 2025-08-28 DIAGNOSIS — G89.4 CHRONIC PAIN SYNDROME: ICD-10-CM

## 2025-08-28 LAB
ABSOLUTE EOSINOPHIL (OHS): 0.19 K/UL
ABSOLUTE MONOCYTE (OHS): 0.58 K/UL (ref 0.3–1)
ABSOLUTE NEUTROPHIL COUNT (OHS): 9.85 K/UL (ref 1.8–7.7)
ALBUMIN SERPL BCP-MCNC: 4 G/DL (ref 3.5–5.2)
ALP SERPL-CCNC: 90 UNIT/L (ref 40–150)
ALT SERPL W/O P-5'-P-CCNC: 12 UNIT/L (ref 0–55)
ANION GAP (OHS): 10 MMOL/L (ref 8–16)
AST SERPL-CCNC: 19 UNIT/L (ref 0–50)
BASOPHILS # BLD AUTO: 0.07 K/UL
BASOPHILS NFR BLD AUTO: 0.5 %
BILIRUB SERPL-MCNC: 0.4 MG/DL (ref 0.1–1)
BUN SERPL-MCNC: 13 MG/DL (ref 6–20)
CALCIUM SERPL-MCNC: 9.3 MG/DL (ref 8.7–10.5)
CHLORIDE SERPL-SCNC: 105 MMOL/L (ref 95–110)
CO2 SERPL-SCNC: 25 MMOL/L (ref 23–29)
CREAT SERPL-MCNC: 1 MG/DL (ref 0.5–1.4)
CRP SERPL-MCNC: 7.2 MG/L
ERYTHROCYTE [DISTWIDTH] IN BLOOD BY AUTOMATED COUNT: 12.5 % (ref 11.5–14.5)
ERYTHROCYTE [SEDIMENTATION RATE] IN BLOOD BY PHOTOMETRIC METHOD: 14 MM/HR
GFR SERPLBLD CREATININE-BSD FMLA CKD-EPI: >60 ML/MIN/1.73/M2
GLUCOSE SERPL-MCNC: 83 MG/DL (ref 70–110)
HCT VFR BLD AUTO: 38.7 % (ref 40–54)
HGB BLD-MCNC: 13.1 GM/DL (ref 14–18)
IMM GRANULOCYTES # BLD AUTO: 0.05 K/UL (ref 0–0.04)
IMM GRANULOCYTES NFR BLD AUTO: 0.3 % (ref 0–0.5)
LYMPHOCYTES # BLD AUTO: 4.42 K/UL (ref 1–4.8)
MCH RBC QN AUTO: 30.3 PG (ref 27–31)
MCHC RBC AUTO-ENTMCNC: 33.9 G/DL (ref 32–36)
MCV RBC AUTO: 90 FL (ref 82–98)
NUCLEATED RBC (/100WBC) (OHS): 0 /100 WBC
PLATELET # BLD AUTO: 268 K/UL (ref 150–450)
PMV BLD AUTO: 9.6 FL (ref 9.2–12.9)
POTASSIUM SERPL-SCNC: 4.2 MMOL/L (ref 3.5–5.1)
PROT SERPL-MCNC: 7.3 GM/DL (ref 6–8.4)
RBC # BLD AUTO: 4.32 M/UL (ref 4.6–6.2)
RELATIVE EOSINOPHIL (OHS): 1.3 %
RELATIVE LYMPHOCYTE (OHS): 29.2 % (ref 18–48)
RELATIVE MONOCYTE (OHS): 3.8 % (ref 4–15)
RELATIVE NEUTROPHIL (OHS): 64.9 % (ref 38–73)
SODIUM SERPL-SCNC: 140 MMOL/L (ref 136–145)
WBC # BLD AUTO: 15.16 K/UL (ref 3.9–12.7)

## 2025-08-28 PROCEDURE — 85025 COMPLETE CBC W/AUTO DIFF WBC: CPT

## 2025-08-28 PROCEDURE — 86140 C-REACTIVE PROTEIN: CPT

## 2025-08-28 PROCEDURE — 36415 COLL VENOUS BLD VENIPUNCTURE: CPT | Mod: PO

## 2025-08-28 PROCEDURE — 85652 RBC SED RATE AUTOMATED: CPT

## 2025-08-28 PROCEDURE — 80053 COMPREHEN METABOLIC PANEL: CPT

## 2025-08-29 RX ORDER — ALPRAZOLAM 2 MG/1
2 TABLET ORAL 2 TIMES DAILY PRN
Qty: 30 TABLET | Refills: 0 | Status: SHIPPED | OUTPATIENT
Start: 2025-08-29